# Patient Record
Sex: FEMALE | Race: WHITE | Employment: FULL TIME | URBAN - NONMETROPOLITAN AREA
[De-identification: names, ages, dates, MRNs, and addresses within clinical notes are randomized per-mention and may not be internally consistent; named-entity substitution may affect disease eponyms.]

---

## 2017-01-18 ENCOUNTER — TELEPHONE (OUTPATIENT)
Dept: NEUROSURGERY | Age: 42
End: 2017-01-18

## 2017-01-18 ENCOUNTER — TELEPHONE (OUTPATIENT)
Dept: NEUROLOGY | Age: 42
End: 2017-01-18

## 2017-02-02 ENCOUNTER — TRANSCRIBE ORDERS (OUTPATIENT)
Dept: ADMINISTRATIVE | Facility: HOSPITAL | Age: 42
End: 2017-02-02

## 2017-02-02 DIAGNOSIS — D47.2 MONOCLONAL PARAPROTEINEMIA: Primary | ICD-10-CM

## 2017-02-03 RX ORDER — LEVOCETIRIZINE DIHYDROCHLORIDE 5 MG/1
5 TABLET, FILM COATED ORAL NIGHTLY
Qty: 30 TABLET | Refills: 11 | Status: SHIPPED | OUTPATIENT
Start: 2017-02-03 | End: 2017-07-20

## 2017-02-07 ENCOUNTER — APPOINTMENT (OUTPATIENT)
Dept: GENERAL RADIOLOGY | Facility: HOSPITAL | Age: 42
End: 2017-02-07
Attending: INTERNAL MEDICINE

## 2017-02-09 ENCOUNTER — HOSPITAL ENCOUNTER (OUTPATIENT)
Dept: GENERAL RADIOLOGY | Facility: HOSPITAL | Age: 42
Discharge: HOME | End: 2017-02-09
Attending: INTERNAL MEDICINE | Admitting: INTERNAL MEDICINE

## 2017-02-09 DIAGNOSIS — D47.2 MONOCLONAL PARAPROTEINEMIA: ICD-10-CM

## 2017-02-09 PROCEDURE — 77075 RADEX OSSEOUS SURVEY COMPL: CPT

## 2017-03-31 DIAGNOSIS — M62.838 MUSCLE SPASM: ICD-10-CM

## 2017-03-31 RX ORDER — METHOCARBAMOL 500 MG/1
500 TABLET, FILM COATED ORAL 4 TIMES DAILY PRN
Qty: 120 TABLET | Refills: 0 | Status: SHIPPED | OUTPATIENT
Start: 2017-03-31 | End: 2018-05-22

## 2017-05-19 ENCOUNTER — TELEPHONE (OUTPATIENT)
Dept: PRIMARY CARE CLINIC | Age: 42
End: 2017-05-19

## 2017-07-18 DIAGNOSIS — E55.9 VITAMIN D DEFICIENCY: ICD-10-CM

## 2017-07-18 RX ORDER — CHOLECALCIFEROL (VITAMIN D3) 1250 MCG
50000 CAPSULE ORAL WEEKLY
Qty: 12 CAPSULE | Refills: 0 | Status: SHIPPED | OUTPATIENT
Start: 2017-07-18 | End: 2017-07-20

## 2017-07-20 ENCOUNTER — OFFICE VISIT (OUTPATIENT)
Dept: PRIMARY CARE CLINIC | Age: 42
End: 2017-07-20
Payer: COMMERCIAL

## 2017-07-20 VITALS
BODY MASS INDEX: 32.61 KG/M2 | OXYGEN SATURATION: 98 % | DIASTOLIC BLOOD PRESSURE: 98 MMHG | WEIGHT: 191 LBS | SYSTOLIC BLOOD PRESSURE: 136 MMHG | HEART RATE: 121 BPM | HEIGHT: 64 IN | TEMPERATURE: 98.1 F

## 2017-07-20 DIAGNOSIS — M79.10 MUSCLE PAIN: ICD-10-CM

## 2017-07-20 DIAGNOSIS — J06.9 UPPER RESPIRATORY TRACT INFECTION, UNSPECIFIED TYPE: ICD-10-CM

## 2017-07-20 DIAGNOSIS — G35 MULTIPLE SCLEROSIS (HCC): Primary | ICD-10-CM

## 2017-07-20 DIAGNOSIS — G89.4 CHRONIC PAIN SYNDROME: ICD-10-CM

## 2017-07-20 DIAGNOSIS — G47.00 INSOMNIA, UNSPECIFIED TYPE: ICD-10-CM

## 2017-07-20 PROCEDURE — 96372 THER/PROPH/DIAG INJ SC/IM: CPT | Performed by: NURSE PRACTITIONER

## 2017-07-20 PROCEDURE — 99214 OFFICE O/P EST MOD 30 MIN: CPT | Performed by: NURSE PRACTITIONER

## 2017-07-20 RX ORDER — HYDROXYZINE 50 MG/1
TABLET, FILM COATED ORAL
Qty: 30 TABLET | Refills: 11 | Status: SHIPPED | OUTPATIENT
Start: 2017-07-20 | End: 2018-05-31 | Stop reason: SDUPTHER

## 2017-07-20 RX ORDER — CLONAZEPAM 0.5 MG/1
0.5 TABLET ORAL NIGHTLY
COMMUNITY
End: 2018-05-22

## 2017-07-20 RX ORDER — BUSPIRONE HYDROCHLORIDE 10 MG/1
10 TABLET ORAL 3 TIMES DAILY PRN
Qty: 90 TABLET | Refills: 5 | Status: SHIPPED | OUTPATIENT
Start: 2017-07-20 | End: 2018-05-04 | Stop reason: SDUPTHER

## 2017-07-20 RX ORDER — LEVALBUTEROL 1.25 MG/.5ML
1 SOLUTION, CONCENTRATE RESPIRATORY (INHALATION) EVERY 8 HOURS PRN
COMMUNITY
End: 2017-09-15

## 2017-07-20 RX ORDER — DEXAMETHASONE SODIUM PHOSPHATE 4 MG/ML
8 INJECTION, SOLUTION INTRA-ARTICULAR; INTRALESIONAL; INTRAMUSCULAR; INTRAVENOUS; SOFT TISSUE ONCE
Status: COMPLETED | OUTPATIENT
Start: 2017-07-20 | End: 2017-07-20

## 2017-07-20 RX ORDER — ESCITALOPRAM OXALATE 20 MG/1
20 TABLET ORAL DAILY
Qty: 30 TABLET | Refills: 5 | Status: SHIPPED | OUTPATIENT
Start: 2017-07-20 | End: 2018-05-04 | Stop reason: SDUPTHER

## 2017-07-20 RX ORDER — TRAMADOL HYDROCHLORIDE 50 MG/1
50 TABLET ORAL EVERY 8 HOURS PRN
Qty: 90 TABLET | Refills: 0 | Status: SHIPPED | OUTPATIENT
Start: 2017-07-20 | End: 2017-09-15 | Stop reason: SDUPTHER

## 2017-07-20 RX ORDER — NORTRIPTYLINE HYDROCHLORIDE 25 MG/1
50 CAPSULE ORAL NIGHTLY
COMMUNITY
End: 2017-09-15 | Stop reason: ALTCHOICE

## 2017-07-20 RX ADMIN — DEXAMETHASONE SODIUM PHOSPHATE 8 MG: 4 INJECTION, SOLUTION INTRA-ARTICULAR; INTRALESIONAL; INTRAMUSCULAR; INTRAVENOUS; SOFT TISSUE at 17:44

## 2017-07-21 ASSESSMENT — ENCOUNTER SYMPTOMS
CONSTIPATION: 0
COUGH: 0
SORE THROAT: 0
SHORTNESS OF BREATH: 0
DIARRHEA: 0
ABDOMINAL PAIN: 0
EYE DISCHARGE: 0
VOMITING: 0
EYE PAIN: 0
WHEEZING: 0
NAUSEA: 0
SINUS PRESSURE: 0
VOICE CHANGE: 0
BACK PAIN: 1

## 2017-07-24 ENCOUNTER — TELEPHONE (OUTPATIENT)
Dept: PRIMARY CARE CLINIC | Age: 42
End: 2017-07-24

## 2017-07-31 ENCOUNTER — TELEPHONE (OUTPATIENT)
Dept: PRIMARY CARE CLINIC | Age: 42
End: 2017-07-31

## 2017-07-31 RX ORDER — LEVALBUTEROL 1.25 MG/.5ML
1 SOLUTION, CONCENTRATE RESPIRATORY (INHALATION) EVERY 8 HOURS PRN
Qty: 90 EACH | Refills: 0 | Status: CANCELLED | OUTPATIENT
Start: 2017-07-31

## 2017-08-01 RX ORDER — FLUTICASONE FUROATE AND VILANTEROL 200; 25 UG/1; UG/1
1 POWDER RESPIRATORY (INHALATION) DAILY
Qty: 1 EACH | Refills: 0 | Status: SHIPPED | OUTPATIENT
Start: 2017-08-01 | End: 2018-04-26 | Stop reason: SDUPTHER

## 2017-08-01 RX ORDER — METHYLPREDNISOLONE 4 MG/1
TABLET ORAL
Qty: 1 KIT | Refills: 0 | Status: SHIPPED | OUTPATIENT
Start: 2017-08-01 | End: 2017-08-07

## 2017-08-01 RX ORDER — AZITHROMYCIN 250 MG/1
TABLET, FILM COATED ORAL
Qty: 1 PACKET | Refills: 0 | Status: SHIPPED | OUTPATIENT
Start: 2017-08-01 | End: 2019-12-09 | Stop reason: SDUPTHER

## 2017-09-15 ENCOUNTER — OFFICE VISIT (OUTPATIENT)
Dept: PRIMARY CARE CLINIC | Age: 42
End: 2017-09-15
Payer: COMMERCIAL

## 2017-09-15 VITALS
HEART RATE: 102 BPM | SYSTOLIC BLOOD PRESSURE: 140 MMHG | BODY MASS INDEX: 32.05 KG/M2 | TEMPERATURE: 98.4 F | HEIGHT: 64 IN | DIASTOLIC BLOOD PRESSURE: 90 MMHG | OXYGEN SATURATION: 98 % | WEIGHT: 187.75 LBS

## 2017-09-15 DIAGNOSIS — G89.4 CHRONIC PAIN SYNDROME: ICD-10-CM

## 2017-09-15 DIAGNOSIS — G43.909 MIGRAINE WITHOUT STATUS MIGRAINOSUS, NOT INTRACTABLE, UNSPECIFIED MIGRAINE TYPE: ICD-10-CM

## 2017-09-15 DIAGNOSIS — G35 MULTIPLE SCLEROSIS (HCC): Primary | ICD-10-CM

## 2017-09-15 DIAGNOSIS — F32.A DEPRESSION, UNSPECIFIED DEPRESSION TYPE: ICD-10-CM

## 2017-09-15 DIAGNOSIS — M79.10 MUSCLE PAIN: ICD-10-CM

## 2017-09-15 PROCEDURE — 99214 OFFICE O/P EST MOD 30 MIN: CPT | Performed by: NURSE PRACTITIONER

## 2017-09-15 RX ORDER — LEVOCETIRIZINE DIHYDROCHLORIDE 5 MG/1
TABLET, FILM COATED ORAL
Refills: 11 | COMMUNITY
Start: 2017-08-22 | End: 2018-06-19

## 2017-09-15 RX ORDER — SUMATRIPTAN 100 MG/1
TABLET, FILM COATED ORAL
Qty: 27 TABLET | Refills: 2 | Status: SHIPPED | OUTPATIENT
Start: 2017-09-15 | End: 2022-04-14

## 2017-09-15 RX ORDER — TRAMADOL HYDROCHLORIDE 50 MG/1
50 TABLET ORAL EVERY 8 HOURS PRN
Qty: 90 TABLET | Refills: 0 | Status: SHIPPED | OUTPATIENT
Start: 2017-09-15 | End: 2017-10-15

## 2017-09-15 ASSESSMENT — ENCOUNTER SYMPTOMS
WHEEZING: 0
NAUSEA: 0
CONSTIPATION: 0
EYE DISCHARGE: 0
ABDOMINAL PAIN: 0
VOMITING: 0
SINUS PRESSURE: 0
SHORTNESS OF BREATH: 0
VOICE CHANGE: 0
DIARRHEA: 0
BACK PAIN: 1
EYE PAIN: 0
COUGH: 0
SORE THROAT: 0

## 2017-10-06 ENCOUNTER — TELEPHONE (OUTPATIENT)
Dept: PRIMARY CARE CLINIC | Age: 42
End: 2017-10-06

## 2017-10-06 NOTE — TELEPHONE ENCOUNTER
Tell her thank you.   I really needed to hear that today and I am SO HAPPY that her levels are better

## 2017-10-31 RX ORDER — TRAMADOL HYDROCHLORIDE 50 MG/1
TABLET ORAL
Qty: 90 TABLET | OUTPATIENT
Start: 2017-10-31

## 2017-11-02 ENCOUNTER — OFFICE VISIT (OUTPATIENT)
Dept: PRIMARY CARE CLINIC | Age: 42
End: 2017-11-02
Payer: COMMERCIAL

## 2017-11-02 VITALS
HEIGHT: 64 IN | HEART RATE: 90 BPM | TEMPERATURE: 98 F | DIASTOLIC BLOOD PRESSURE: 88 MMHG | WEIGHT: 187 LBS | BODY MASS INDEX: 31.92 KG/M2 | SYSTOLIC BLOOD PRESSURE: 126 MMHG | OXYGEN SATURATION: 97 %

## 2017-11-02 DIAGNOSIS — G89.4 CHRONIC PAIN SYNDROME: ICD-10-CM

## 2017-11-02 DIAGNOSIS — G89.29 CHRONIC NONINTRACTABLE HEADACHE, UNSPECIFIED HEADACHE TYPE: ICD-10-CM

## 2017-11-02 DIAGNOSIS — E55.9 VITAMIN D DEFICIENCY: ICD-10-CM

## 2017-11-02 DIAGNOSIS — M54.2 NECK PAIN: ICD-10-CM

## 2017-11-02 DIAGNOSIS — R51.9 CHRONIC NONINTRACTABLE HEADACHE, UNSPECIFIED HEADACHE TYPE: ICD-10-CM

## 2017-11-02 DIAGNOSIS — G89.29 CHRONIC MIDLINE LOW BACK PAIN, WITH SCIATICA PRESENCE UNSPECIFIED: ICD-10-CM

## 2017-11-02 DIAGNOSIS — G35 MULTIPLE SCLEROSIS (HCC): Primary | ICD-10-CM

## 2017-11-02 DIAGNOSIS — M62.838 MUSCLE SPASM: ICD-10-CM

## 2017-11-02 DIAGNOSIS — M54.5 CHRONIC MIDLINE LOW BACK PAIN, WITH SCIATICA PRESENCE UNSPECIFIED: ICD-10-CM

## 2017-11-02 PROCEDURE — 99214 OFFICE O/P EST MOD 30 MIN: CPT | Performed by: NURSE PRACTITIONER

## 2017-11-02 RX ORDER — CHOLECALCIFEROL (VITAMIN D3) 1250 MCG
50000 CAPSULE ORAL WEEKLY
Qty: 12 CAPSULE | Refills: 3 | Status: SHIPPED | OUTPATIENT
Start: 2017-11-02 | End: 2018-12-28 | Stop reason: SDUPTHER

## 2017-11-02 RX ORDER — BUTALBITAL, ACETAMINOPHEN AND CAFFEINE 50; 325; 40 MG/1; MG/1; MG/1
1 TABLET ORAL EVERY 6 HOURS PRN
Qty: 60 TABLET | Refills: 0 | Status: SHIPPED | OUTPATIENT
Start: 2017-11-02 | End: 2018-01-11

## 2017-11-02 RX ORDER — CHOLECALCIFEROL (VITAMIN D3) 1250 MCG
50000 CAPSULE ORAL WEEKLY
COMMUNITY
End: 2017-11-02 | Stop reason: SDUPTHER

## 2017-11-02 RX ORDER — TRAMADOL HYDROCHLORIDE 50 MG/1
50 TABLET ORAL EVERY 8 HOURS PRN
COMMUNITY
End: 2017-11-02 | Stop reason: SDUPTHER

## 2017-11-02 RX ORDER — TRAMADOL HYDROCHLORIDE 50 MG/1
50 TABLET ORAL EVERY 8 HOURS PRN
Qty: 90 TABLET | Refills: 0 | Status: SHIPPED | OUTPATIENT
Start: 2017-11-02 | End: 2018-01-11 | Stop reason: SDUPTHER

## 2017-11-02 ASSESSMENT — ENCOUNTER SYMPTOMS
WHEEZING: 0
EYE REDNESS: 0
SORE THROAT: 0
VOICE CHANGE: 0
COUGH: 0
NAUSEA: 0
TROUBLE SWALLOWING: 0
ABDOMINAL PAIN: 0
BACK PAIN: 1
VOMITING: 0
BLOOD IN STOOL: 0
EYE PAIN: 0
DIARRHEA: 0
CONSTIPATION: 0
SHORTNESS OF BREATH: 0
RHINORRHEA: 0
EYE DISCHARGE: 0
SINUS PRESSURE: 0

## 2017-11-02 NOTE — PROGRESS NOTES
nightly      hydrOXYzine (ATARAX) 50 MG tablet Take 1-2 tabs po nightly as needed for insomnia 30 tablet 11    busPIRone (BUSPAR) 10 MG tablet Take 1 tablet by mouth 3 times daily as needed (anxiety) 90 tablet 5    escitalopram (LEXAPRO) 20 MG tablet Take 1 tablet by mouth daily 30 tablet 5    methocarbamol (ROBAXIN) 500 MG tablet Take 1 tablet by mouth 4 times daily as needed (MUSCLE SPASMS) (Patient taking differently: Take 500 mg by mouth nightly ) 120 tablet 0    gabapentin (NEURONTIN) 300 MG capsule Take 300 mg by mouth 3 times daily 2 tabs tid      fenofibrate (TRICOR) 145 MG tablet TAKE ONE TABLET BY MOUTH DAILY 30 tablet 11    promethazine (PHENERGAN) 25 MG tablet TAKE ONE TABLET BY MOUTH EVERY 6 HOURS AS NEEDED FOR NAUSEA 30 tablet 0    COPAXONE 40 MG/ML SOSY       rizatriptan (MAXALT) 10 MG tablet Take 1 tablet by mouth once as needed for Migraine May repeat in 2 hours if needed x 1 in a 24 hour period 9 tablet 3     No current facility-administered medications for this visit. Allergies   Allergen Reactions    Ampicillin     Biaxin [Clarithromycin]     Flagyl [Metronidazole]     Albuterol Rash and Other (See Comments)     migraines       Health Maintenance   Topic Date Due    HIV screen  05/08/1990    DTaP/Tdap/Td vaccine (1 - Tdap) 05/08/1994    Diabetes screen  05/08/2015    Flu vaccine (1) 09/01/2017    Cervical cancer screen  12/03/2018    Lipid screen  05/17/2021        Subjective:      Review of Systems   Constitutional: Positive for fatigue. Negative for activity change, appetite change, fever and unexpected weight change. HENT: Negative for congestion, ear pain, postnasal drip, rhinorrhea, sinus pressure, sore throat, trouble swallowing and voice change. Eyes: Negative for pain, discharge and redness. Respiratory: Negative for cough, shortness of breath and wheezing. Cardiovascular: Negative for chest pain, palpitations and leg swelling.    Gastrointestinal: 32.10 kg/m²     Assessment:        ICD-10-CM ICD-9-CM    1. Multiple sclerosis (HCC) G35 340 traMADol (ULTRAM) 50 MG tablet   2. Muscle spasm M62.838 728.85 traMADol (ULTRAM) 50 MG tablet   3. Vitamin D deficiency E55.9 268.9 Cholecalciferol (VITAMIN D3) 94140 units CAPS   4. Chronic nonintractable headache, unspecified headache type R51 784.0 butalbital-acetaminophen-caffeine (FIORICET, ESGIC) -40 MG per tablet   5. Chronic midline low back pain, with sciatica presence unspecified M54.5 724.2     G89.29 338.29    6. Neck pain M54.2 723.1 traMADol (ULTRAM) 50 MG tablet   7. Chronic pain syndrome G89.4 338.4 traMADol (ULTRAM) 50 MG tablet       Plan:    will use esgic to help with rebound HAs related to imitrex and then alternate use of esgic and imitrex. Continue prn ultram for pain. Controlled Substances Monitoring:     Attestation: The Prescription Monitoring Report for this patient was reviewed today. ([id number]) ([address], APRN)  Documentation: Possible medication side effects, risk of tolerance and/or dependence, and alternative treatments discussed., No signs of potential drug abuse or diversion identified., Existing medication contract. Doris Narvaez, APRN)    3 months    No orders of the defined types were placed in this encounter. Orders Placed This Encounter   Medications    traMADol (ULTRAM) 50 MG tablet     Sig: Take 1 tablet by mouth every 8 hours as needed for Pain     Dispense:  90 tablet     Refill:  0    Cholecalciferol (VITAMIN D3) 70027 units CAPS     Sig: Take 50,000 Units by mouth once a week     Dispense:  12 capsule     Refill:  3    butalbital-acetaminophen-caffeine (FIORICET, ESGIC) -40 MG per tablet     Sig: Take 1 tablet by mouth every 6 hours as needed for Headaches     Dispense:  60 tablet     Refill:  0         Discussed use, benefit, and side effects of prescribed medications. All patient questions answered. Pt voiced understanding.  Reviewed health maintenance. .  Patient agreed with treatment plan. Follow up as directed. There are no Patient Instructions on file for this visit.       Electronically signed by CLINTON Lorenzo on 11/2/2017 at 4:16 PM

## 2017-11-13 ENCOUNTER — TELEPHONE (OUTPATIENT)
Dept: PRIMARY CARE CLINIC | Age: 42
End: 2017-11-13

## 2017-11-13 NOTE — TELEPHONE ENCOUNTER
[id number] Nara Shepherd  She is probably in a cycle of rebound headaches now. Recommend stopping all HA medications including imitrex, maxalt, esgic and ultram. Until the rebound cycle stops. She can call her neurologist and see if they think the same thing. I think she sees Dr. Misael Bruno locally and one in [place].

## 2017-11-14 NOTE — TELEPHONE ENCOUNTER
LEONEL with recommendations from Rosalva Moore. Asked her to callback with any questions or concerns.

## 2017-12-01 RX ORDER — FENOFIBRATE 145 MG/1
TABLET, COATED ORAL
Qty: 30 TABLET | Refills: 11 | Status: SHIPPED | OUTPATIENT
Start: 2017-12-01 | End: 2018-11-05 | Stop reason: SDUPTHER

## 2018-01-05 DIAGNOSIS — M54.2 NECK PAIN: ICD-10-CM

## 2018-01-05 DIAGNOSIS — M62.838 MUSCLE SPASM: ICD-10-CM

## 2018-01-05 DIAGNOSIS — G35 MULTIPLE SCLEROSIS (HCC): ICD-10-CM

## 2018-01-05 DIAGNOSIS — G89.4 CHRONIC PAIN SYNDROME: ICD-10-CM

## 2018-01-05 RX ORDER — TRAMADOL HYDROCHLORIDE 50 MG/1
TABLET ORAL
Qty: 90 TABLET | OUTPATIENT
Start: 2018-01-05

## 2018-01-11 ENCOUNTER — OFFICE VISIT (OUTPATIENT)
Dept: PRIMARY CARE CLINIC | Age: 43
End: 2018-01-11
Payer: COMMERCIAL

## 2018-01-11 VITALS
HEART RATE: 86 BPM | SYSTOLIC BLOOD PRESSURE: 124 MMHG | TEMPERATURE: 98.3 F | DIASTOLIC BLOOD PRESSURE: 86 MMHG | WEIGHT: 190 LBS | OXYGEN SATURATION: 98 % | HEIGHT: 64 IN | BODY MASS INDEX: 32.44 KG/M2

## 2018-01-11 DIAGNOSIS — M54.2 NECK PAIN: ICD-10-CM

## 2018-01-11 DIAGNOSIS — G35 MULTIPLE SCLEROSIS (HCC): ICD-10-CM

## 2018-01-11 DIAGNOSIS — G89.4 CHRONIC PAIN SYNDROME: ICD-10-CM

## 2018-01-11 DIAGNOSIS — M62.838 MUSCLE SPASM: ICD-10-CM

## 2018-01-11 PROCEDURE — 99213 OFFICE O/P EST LOW 20 MIN: CPT | Performed by: NURSE PRACTITIONER

## 2018-01-11 RX ORDER — PROPRANOLOL HYDROCHLORIDE 120 MG/1
120 CAPSULE, EXTENDED RELEASE ORAL DAILY
Refills: 2 | COMMUNITY
Start: 2017-12-06 | End: 2019-04-22 | Stop reason: DRUGHIGH

## 2018-01-11 RX ORDER — TRAMADOL HYDROCHLORIDE 50 MG/1
50 TABLET ORAL EVERY 8 HOURS PRN
Qty: 90 TABLET | Refills: 0 | Status: SHIPPED | OUTPATIENT
Start: 2018-01-11 | End: 2018-02-10

## 2018-01-11 ASSESSMENT — ENCOUNTER SYMPTOMS
VOICE CHANGE: 0
BLOOD IN STOOL: 0
SORE THROAT: 0
NAUSEA: 0
COUGH: 0
VOMITING: 0
RHINORRHEA: 0
EYE REDNESS: 0
BACK PAIN: 1
ABDOMINAL PAIN: 0
TROUBLE SWALLOWING: 0
DIARRHEA: 0
SHORTNESS OF BREATH: 0
EYE PAIN: 0
EYE DISCHARGE: 0
SINUS PRESSURE: 0
CONSTIPATION: 0
WHEEZING: 0

## 2018-01-11 NOTE — PROGRESS NOTES
[address]  [place], [address] [place] [address] Rd  Phone [phone]   Fax [phone]      OFFICE VISIT: 1/11/2018    Fiona Nogueira is a 43 y.o. female who presents today for her medical conditions/complaints as noted below. Fiona Nogueira is c/o of Medication Refill        HPI:     HPI  Patient reports overall she is doing well. Mostly takes her ultram at night to help with the pain . States this is helping her sleep because she is able to rest from the pain. Past Medical History:   Diagnosis Date    Headache(784.0)     Insomnia     Multiple sclerosis ([address]       Past Surgical History:   Procedure Laterality Date    CHOLECYSTECTOMY         No family history on file. Social History   Substance Use Topics    Smoking status: Never Smoker    Smokeless tobacco: Never Used    Alcohol use No      Current Outpatient Prescriptions   Medication Sig Dispense Refill    traMADol (ULTRAM) 50 MG tablet Take 1 tablet by mouth every 8 hours as needed for Pain for up to 30 days.  90 tablet 0    fenofibrate (TRICOR) 145 MG tablet TAKE ONE TABLET BY MOUTH DAILY 30 tablet 11    Cholecalciferol (VITAMIN D3) 82269 units CAPS Take 50,000 Units by mouth once a week 12 capsule 3    levocetirizine (XYZAL) 5 MG tablet TAKE ONE TABLET BY MOUTH AT BEDTIME  11    SUMAtriptan (IMITREX) 100 MG tablet TAKE ONE TABLET BY MOUTH DAILY AS NEEDED FOR MIGRAINE 27 tablet 2    Fluticasone Furoate-Vilanterol 200-25 MCG/INH AEPB Inhale 1 puff into the lungs daily 1 each 0    clonazePAM (KLONOPIN) 0.5 MG tablet Take 0.5 mg by mouth nightly      hydrOXYzine (ATARAX) 50 MG tablet Take 1-2 tabs po nightly as needed for insomnia 30 tablet 11    busPIRone (BUSPAR) 10 MG tablet Take 1 tablet by mouth 3 times daily as needed (anxiety) 90 tablet 5    escitalopram (LEXAPRO) 20 MG tablet Take 1 tablet by mouth daily 30 tablet 5    methocarbamol (ROBAXIN) 500 MG tablet Take 1 tablet by mouth 4 times daily as needed (MUSCLE SPASMS) (Patient taking headaches. Hematological: Negative for adenopathy. Psychiatric/Behavioral: Positive for dysphoric mood (improved) and sleep disturbance (improved). Negative for self-injury and suicidal ideas. The patient is nervous/anxious (improved). Objective:     Physical Exam   Constitutional: She is oriented to person, place, and time. She appears well-developed and well-nourished. HENT:   Head: Normocephalic. Right Ear: Tympanic membrane normal.   Left Ear: Tympanic membrane normal.   Nose: Nose normal.   Mouth/Throat: Oropharynx is clear and moist.   Eyes: Conjunctivae are normal. Pupils are equal, round, and reactive to light. Neck: Normal range of motion. Neck supple. Carotid bruit is not present. Cardiovascular: Normal rate, regular rhythm and normal heart sounds. No murmur heard. Pulmonary/Chest: Effort normal and breath sounds normal. No respiratory distress. She has no wheezes. She has no rales. Abdominal: Soft. Bowel sounds are normal. There is no tenderness. Musculoskeletal: Normal range of motion. Lumbar back: She exhibits tenderness, pain and spasm. Generalized weakness  walking with vogt   Neurological: She is alert and oriented to person, place, and time. She has normal reflexes. Skin: Skin is warm and dry. Psychiatric: She has a normal mood and affect. Her behavior is normal. Judgment and thought content normal.   Vitals reviewed. /86   Pulse 86   Temp 98.3 °F (36.8 °C) (Temporal)   Ht 5' 4\" (1.626 m)   Wt 190 lb (86.2 kg)   LMP 01/11/2018   SpO2 98%   BMI 32.61 kg/m²     Assessment:        ICD-10-CM ICD-9-CM    1. Neck pain M54.2 723.1 traMADol (ULTRAM) 50 MG tablet   2. Chronic pain syndrome G89.4 338.4 traMADol (ULTRAM) 50 MG tablet   3. Multiple sclerosis (HCC) G35 340 traMADol (ULTRAM) 50 MG tablet   4.  Muscle spasm M62.838 728.85 traMADol (ULTRAM) 50 MG tablet       Plan:    Controlled Substances Monitoring:     Attestation: The Prescription

## 2018-02-19 RX ORDER — LEVOCETIRIZINE DIHYDROCHLORIDE 5 MG/1
5 TABLET, FILM COATED ORAL NIGHTLY
Qty: 30 TABLET | Refills: 5 | Status: SHIPPED | OUTPATIENT
Start: 2018-02-19 | End: 2018-09-24 | Stop reason: SDUPTHER

## 2018-03-02 RX ORDER — LEVALBUTEROL 1.25 MG/.5ML
1 SOLUTION, CONCENTRATE RESPIRATORY (INHALATION) EVERY 8 HOURS PRN
Qty: 90 EACH | Refills: 1 | Status: SHIPPED | OUTPATIENT
Start: 2018-03-02 | End: 2019-10-04

## 2018-03-27 ENCOUNTER — OFFICE VISIT (OUTPATIENT)
Dept: PRIMARY CARE CLINIC | Age: 43
End: 2018-03-27
Payer: COMMERCIAL

## 2018-03-27 VITALS
BODY MASS INDEX: 29.62 KG/M2 | SYSTOLIC BLOOD PRESSURE: 108 MMHG | WEIGHT: 173.5 LBS | DIASTOLIC BLOOD PRESSURE: 85 MMHG | OXYGEN SATURATION: 97 % | HEIGHT: 64 IN | HEART RATE: 97 BPM | TEMPERATURE: 98.1 F

## 2018-03-27 DIAGNOSIS — G89.4 CHRONIC PAIN SYNDROME: Primary | ICD-10-CM

## 2018-03-27 DIAGNOSIS — M54.2 NECK PAIN: ICD-10-CM

## 2018-03-27 PROCEDURE — 99213 OFFICE O/P EST LOW 20 MIN: CPT | Performed by: NURSE PRACTITIONER

## 2018-03-27 RX ORDER — TRAMADOL HYDROCHLORIDE 50 MG/1
50 TABLET ORAL EVERY 8 HOURS PRN
Qty: 90 TABLET | Refills: 0 | Status: SHIPPED | OUTPATIENT
Start: 2018-03-27 | End: 2018-04-26

## 2018-03-27 RX ORDER — TRAMADOL HYDROCHLORIDE 50 MG/1
50 TABLET ORAL EVERY 6 HOURS PRN
COMMUNITY
End: 2018-03-27 | Stop reason: SDUPTHER

## 2018-03-27 ASSESSMENT — ENCOUNTER SYMPTOMS
VOMITING: 0
SINUS PRESSURE: 0
SHORTNESS OF BREATH: 0
BACK PAIN: 1
TROUBLE SWALLOWING: 0
COUGH: 0
NAUSEA: 0
RHINORRHEA: 0
DIARRHEA: 0
CONSTIPATION: 0
SORE THROAT: 0
ABDOMINAL PAIN: 0

## 2018-03-27 NOTE — PATIENT INSTRUCTIONS
Patient Education        Chronic Pain: Care Instructions  Your Care Instructions    Chronic pain is pain that lasts a long time (months or even years) and may or may not have a clear cause. It is different from acute pain, which usually does have a clear cause-like an injury or illness-and gets better over time. Chronic pain:  · Lasts over time but may vary from day to day. · Does not go away despite efforts to end it. · May disrupt your sleep and lead to fatigue. · May cause depression or anxiety. · May make your muscles tense, causing more pain. · Can disrupt your work, hobbies, home life, and relationships with friends and family. Chronic pain is a very real condition. It is not just in your head. Treatment can help and usually includes several methods used together, such as medicines, physical therapy, exercise, and other treatments. Learning how to relax and changing negative thought patterns can also help you cope. Chronic pain is complex. Taking an active role in your treatment will help you better manage your pain. Tell your doctor if you have trouble dealing with your pain. You may have to try several things before you find what works best for you. Follow-up care is a key part of your treatment and safety. Be sure to make and go to all appointments, and call your doctor if you are having problems. It's also a good idea to know your test results and keep a list of the medicines you take. How can you care for yourself at home? · Pace yourself. Break up large jobs into smaller tasks. Save harder tasks for days when you have less pain, or go back and forth between hard tasks and easier ones. Take rest breaks. · Relax, and reduce stress. Relaxation techniques such as deep breathing or meditation can help. · Keep moving. Gentle, daily exercise can help reduce pain over the long run. Try low- or no-impact exercises such as walking, swimming, and stationary biking.  Do stretches to stay

## 2018-03-27 NOTE — PROGRESS NOTES
ONE TABLET BY MOUTH AT BEDTIME Yes Historical Provider, MD   SUMAtriptan (IMITREX) 100 MG tablet TAKE ONE TABLET BY MOUTH DAILY AS NEEDED FOR MIGRAINE Yes CLINTON Valentine   Fluticasone Furoate-Vilanterol 200-25 MCG/INH AEPB Inhale 1 puff into the lungs daily Yes CLINTON Valentine   clonazePAM (KLONOPIN) 0.5 MG tablet Take 0.5 mg by mouth nightly Yes Historical Provider, MD   hydrOXYzine (ATARAX) 50 MG tablet Take 1-2 tabs po nightly as needed for insomnia Yes CLINTON Valentine   busPIRone (BUSPAR) 10 MG tablet Take 1 tablet by mouth 3 times daily as needed (anxiety) Yes CLINTON Valentine   escitalopram (LEXAPRO) 20 MG tablet Take 1 tablet by mouth daily Yes CLINTON Valentine   methocarbamol (ROBAXIN) 500 MG tablet Take 1 tablet by mouth 4 times daily as needed (MUSCLE SPASMS)  Patient taking differently: Take 500 mg by mouth nightly  Yes MARJ Gurrola DO   gabapentin (NEURONTIN) 300 MG capsule Take 300 mg by mouth 3 times daily 2 tabs tid Yes Historical Provider, MD   promethazine (PHENERGAN) 25 MG tablet TAKE ONE TABLET BY MOUTH EVERY 6 HOURS AS NEEDED FOR NAUSEA Yes CLINTON Valentine   rizatriptan (MAXALT) 10 MG tablet Take 1 tablet by mouth once as needed for Migraine May repeat in 2 hours if needed x 1 in a 24 hour period Yes Antonio Matos CLINTON Narvaez   COPAXONE 40 MG/ML SOSY  Yes Historical Provider, MD       Allergies: Ampicillin; Biaxin [clarithromycin]; Flagyl [metronidazole]; and Albuterol    Past Medical History:   Diagnosis Date    Headache(784.0)     Insomnia     Multiple sclerosis ([facility]        Past Surgical History:   Procedure Laterality Date    CHOLECYSTECTOMY         Social History   Substance Use Topics    Smoking status: Never Smoker    Smokeless tobacco: Never Used    Alcohol use No       Review of Systems   Constitutional: Negative for activity change, appetite change, fatigue, fever and unexpected weight change.    HENT: Negative for congestion, hearing loss, rhinorrhea, sinus pressure, sore throat and trouble swallowing. Eyes: Negative for visual disturbance. Respiratory: Negative for cough and shortness of breath. Cardiovascular: Negative for chest pain, palpitations and leg swelling. Gastrointestinal: Negative for abdominal pain, constipation, diarrhea, nausea and vomiting. Endocrine: Negative for cold intolerance and heat intolerance. Genitourinary: Negative for flank pain, menstrual problem, pelvic pain, urgency and vaginal discharge. Musculoskeletal: Positive for back pain and neck pain. Negative for arthralgias. Skin: Negative for rash. Neurological: Negative for headaches. Psychiatric/Behavioral: Negative for dysphoric mood and sleep disturbance. The patient is not nervous/anxious. Physical Exam   Constitutional: She is oriented to person, place, and time. She appears well-developed and well-nourished. HENT:   Head: Normocephalic and atraumatic. Mouth/Throat: Mucous membranes are normal.   Eyes: No scleral icterus. Neck: Normal range of motion. Neck supple. Cardiovascular: Normal rate, regular rhythm, normal heart sounds and intact distal pulses. No murmur heard. Pulmonary/Chest: Effort normal and breath sounds normal.   Abdominal: Soft. Bowel sounds are normal. She exhibits no distension and no mass. There is no tenderness. There is no rebound and no guarding. Musculoskeletal: Normal range of motion. She exhibits no edema. Generalized tenderness to back to light palpation. Neurological: She is alert and oriented to person, place, and time. Gait (with cane) abnormal.   Skin: Skin is warm, dry and intact. No lesion and no rash noted. Psychiatric: She has a normal mood and affect. Her speech is normal and behavior is normal. Judgment and thought content normal.   Vitals reviewed. ASSESSMENT      ICD-10-CM ICD-9-CM    1. Chronic pain syndrome G89.4 338.4 traMADol (ULTRAM) 50 MG tablet   2.  Neck pain M54.2

## 2018-04-26 RX ORDER — FLUTICASONE FUROATE AND VILANTEROL 200; 25 UG/1; UG/1
1 POWDER RESPIRATORY (INHALATION) DAILY
Qty: 60 EACH | Refills: 11 | Status: SHIPPED | OUTPATIENT
Start: 2018-04-26 | End: 2019-10-24 | Stop reason: SDUPTHER

## 2018-05-04 RX ORDER — BUSPIRONE HYDROCHLORIDE 10 MG/1
10 TABLET ORAL 3 TIMES DAILY PRN
Qty: 90 TABLET | Refills: 5 | Status: SHIPPED | OUTPATIENT
Start: 2018-05-04 | End: 2018-05-22 | Stop reason: SDUPTHER

## 2018-05-04 RX ORDER — ESCITALOPRAM OXALATE 20 MG/1
20 TABLET ORAL DAILY
Qty: 30 TABLET | Refills: 11 | Status: SHIPPED | OUTPATIENT
Start: 2018-05-04 | End: 2018-05-22

## 2018-05-10 RX ORDER — BUSPIRONE HYDROCHLORIDE 10 MG/1
TABLET ORAL
Qty: 90 TABLET | OUTPATIENT
Start: 2018-05-10

## 2018-05-17 DIAGNOSIS — G89.4 CHRONIC PAIN SYNDROME: ICD-10-CM

## 2018-05-17 DIAGNOSIS — M54.2 NECK PAIN: ICD-10-CM

## 2018-05-17 RX ORDER — TRAMADOL HYDROCHLORIDE 50 MG/1
TABLET ORAL
Qty: 90 TABLET | OUTPATIENT
Start: 2018-05-17

## 2018-05-22 ENCOUNTER — OFFICE VISIT (OUTPATIENT)
Dept: PRIMARY CARE CLINIC | Age: 43
End: 2018-05-22
Payer: COMMERCIAL

## 2018-05-22 VITALS
DIASTOLIC BLOOD PRESSURE: 76 MMHG | OXYGEN SATURATION: 98 % | TEMPERATURE: 98.5 F | SYSTOLIC BLOOD PRESSURE: 110 MMHG | HEIGHT: 64 IN | BODY MASS INDEX: 31.92 KG/M2 | HEART RATE: 81 BPM | WEIGHT: 187 LBS

## 2018-05-22 DIAGNOSIS — Z13.31 POSITIVE DEPRESSION SCREENING: ICD-10-CM

## 2018-05-22 DIAGNOSIS — E78.1 HYPERTRIGLYCERIDEMIA: ICD-10-CM

## 2018-05-22 DIAGNOSIS — F41.1 GAD (GENERALIZED ANXIETY DISORDER): ICD-10-CM

## 2018-05-22 DIAGNOSIS — G35 MULTIPLE SCLEROSIS (HCC): ICD-10-CM

## 2018-05-22 DIAGNOSIS — M79.10 MYALGIA: Primary | ICD-10-CM

## 2018-05-22 DIAGNOSIS — G89.29 CHRONIC NECK PAIN: ICD-10-CM

## 2018-05-22 DIAGNOSIS — F33.1 MODERATE EPISODE OF RECURRENT MAJOR DEPRESSIVE DISORDER (HCC): ICD-10-CM

## 2018-05-22 DIAGNOSIS — M54.2 CHRONIC NECK PAIN: ICD-10-CM

## 2018-05-22 PROCEDURE — G8431 POS CLIN DEPRES SCRN F/U DOC: HCPCS | Performed by: NURSE PRACTITIONER

## 2018-05-22 PROCEDURE — 99214 OFFICE O/P EST MOD 30 MIN: CPT | Performed by: NURSE PRACTITIONER

## 2018-05-22 RX ORDER — TRAMADOL HYDROCHLORIDE 50 MG/1
50 TABLET ORAL EVERY 8 HOURS PRN
Qty: 90 TABLET | Refills: 0 | Status: SHIPPED | OUTPATIENT
Start: 2018-05-22 | End: 2018-06-19 | Stop reason: SDUPTHER

## 2018-05-22 RX ORDER — VILAZODONE HYDROCHLORIDE 20 MG/1
20 TABLET ORAL DAILY
Qty: 30 TABLET | Refills: 5 | Status: SHIPPED | OUTPATIENT
Start: 2018-05-22 | End: 2018-11-05 | Stop reason: SDUPTHER

## 2018-05-22 RX ORDER — BUSPIRONE HYDROCHLORIDE 10 MG/1
10 TABLET ORAL 3 TIMES DAILY PRN
Qty: 90 TABLET | Refills: 5 | Status: SHIPPED | OUTPATIENT
Start: 2018-05-22 | End: 2018-12-14

## 2018-05-22 RX ORDER — TRAMADOL HYDROCHLORIDE 50 MG/1
50 TABLET ORAL EVERY 6 HOURS PRN
COMMUNITY
End: 2018-05-22 | Stop reason: SDUPTHER

## 2018-05-22 ASSESSMENT — PATIENT HEALTH QUESTIONNAIRE - PHQ9
SUM OF ALL RESPONSES TO PHQ QUESTIONS 1-9: 15
3. TROUBLE FALLING OR STAYING ASLEEP: 3
8. MOVING OR SPEAKING SO SLOWLY THAT OTHER PEOPLE COULD HAVE NOTICED. OR THE OPPOSITE, BEING SO FIGETY OR RESTLESS THAT YOU HAVE BEEN MOVING AROUND A LOT MORE THAN USUAL: 0
10. IF YOU CHECKED OFF ANY PROBLEMS, HOW DIFFICULT HAVE THESE PROBLEMS MADE IT FOR YOU TO DO YOUR WORK, TAKE CARE OF THINGS AT HOME, OR GET ALONG WITH OTHER PEOPLE: 1
7. TROUBLE CONCENTRATING ON THINGS, SUCH AS READING THE NEWSPAPER OR WATCHING TELEVISION: 0
9. THOUGHTS THAT YOU WOULD BE BETTER OFF DEAD, OR OF HURTING YOURSELF: 0
4. FEELING TIRED OR HAVING LITTLE ENERGY: 3
5. POOR APPETITE OR OVEREATING: 0
1. LITTLE INTEREST OR PLEASURE IN DOING THINGS: 3
2. FEELING DOWN, DEPRESSED OR HOPELESS: 3
SUM OF ALL RESPONSES TO PHQ9 QUESTIONS 1 & 2: 6
6. FEELING BAD ABOUT YOURSELF - OR THAT YOU ARE A FAILURE OR HAVE LET YOURSELF OR YOUR FAMILY DOWN: 3

## 2018-05-25 PROBLEM — F33.1 MODERATE EPISODE OF RECURRENT MAJOR DEPRESSIVE DISORDER (HCC): Status: ACTIVE | Noted: 2018-05-25

## 2018-05-25 ASSESSMENT — ENCOUNTER SYMPTOMS
SORE THROAT: 0
COUGH: 0
SHORTNESS OF BREATH: 0
TROUBLE SWALLOWING: 0
SINUS PRESSURE: 0
RHINORRHEA: 0
VOMITING: 0
NAUSEA: 0
DIARRHEA: 0
CONSTIPATION: 0
BACK PAIN: 1
ABDOMINAL PAIN: 0

## 2018-05-31 DIAGNOSIS — G47.00 INSOMNIA, UNSPECIFIED TYPE: ICD-10-CM

## 2018-05-31 RX ORDER — HYDROXYZINE 50 MG/1
TABLET, FILM COATED ORAL
Qty: 30 TABLET | Refills: 11 | Status: SHIPPED | OUTPATIENT
Start: 2018-05-31 | End: 2018-11-15

## 2018-06-12 ENCOUNTER — TELEPHONE (OUTPATIENT)
Dept: PRIMARY CARE CLINIC | Age: 43
End: 2018-06-12

## 2018-06-12 RX ORDER — PROMETHAZINE HYDROCHLORIDE 25 MG/1
25 SUPPOSITORY RECTAL EVERY 6 HOURS PRN
Qty: 15 SUPPOSITORY | Refills: 0 | Status: SHIPPED | OUTPATIENT
Start: 2018-06-12 | End: 2018-06-19

## 2018-06-19 ENCOUNTER — OFFICE VISIT (OUTPATIENT)
Dept: PRIMARY CARE CLINIC | Age: 43
End: 2018-06-19
Payer: COMMERCIAL

## 2018-06-19 VITALS
DIASTOLIC BLOOD PRESSURE: 80 MMHG | HEIGHT: 64 IN | SYSTOLIC BLOOD PRESSURE: 106 MMHG | HEART RATE: 76 BPM | WEIGHT: 188 LBS | BODY MASS INDEX: 32.1 KG/M2 | OXYGEN SATURATION: 98 % | TEMPERATURE: 97.3 F

## 2018-06-19 DIAGNOSIS — F33.1 MODERATE EPISODE OF RECURRENT MAJOR DEPRESSIVE DISORDER (HCC): Primary | ICD-10-CM

## 2018-06-19 DIAGNOSIS — Z12.39 SCREENING FOR BREAST CANCER: ICD-10-CM

## 2018-06-19 DIAGNOSIS — M79.10 MYALGIA: ICD-10-CM

## 2018-06-19 DIAGNOSIS — M54.2 CHRONIC NECK PAIN: ICD-10-CM

## 2018-06-19 DIAGNOSIS — G35 MULTIPLE SCLEROSIS (HCC): ICD-10-CM

## 2018-06-19 DIAGNOSIS — G89.29 CHRONIC NECK PAIN: ICD-10-CM

## 2018-06-19 PROCEDURE — 99214 OFFICE O/P EST MOD 30 MIN: CPT | Performed by: NURSE PRACTITIONER

## 2018-06-19 RX ORDER — TRAMADOL HYDROCHLORIDE 50 MG/1
50 TABLET ORAL EVERY 8 HOURS PRN
Qty: 90 TABLET | Refills: 0 | Status: SHIPPED | OUTPATIENT
Start: 2018-06-19 | End: 2018-08-06 | Stop reason: SDUPTHER

## 2018-06-19 ASSESSMENT — ENCOUNTER SYMPTOMS
CONSTIPATION: 0
SINUS PRESSURE: 0
SHORTNESS OF BREATH: 0
TROUBLE SWALLOWING: 0
RHINORRHEA: 0
NAUSEA: 0
DIARRHEA: 0
SORE THROAT: 0
BACK PAIN: 1
ABDOMINAL PAIN: 0
COUGH: 0
VOMITING: 0

## 2018-06-25 ENCOUNTER — TELEPHONE (OUTPATIENT)
Dept: PRIMARY CARE CLINIC | Age: 43
End: 2018-06-25

## 2018-06-29 ENCOUNTER — TELEPHONE (OUTPATIENT)
Dept: PRIMARY CARE CLINIC | Age: 43
End: 2018-06-29

## 2018-07-03 ENCOUNTER — HOSPITAL ENCOUNTER (OUTPATIENT)
Dept: WOMENS IMAGING | Age: 43
Discharge: HOME | End: 2018-07-03
Payer: COMMERCIAL

## 2018-07-03 DIAGNOSIS — Z12.39 SCREENING FOR BREAST CANCER: ICD-10-CM

## 2018-07-03 PROCEDURE — 77063 BREAST TOMOSYNTHESIS BI: CPT

## 2018-07-12 ENCOUNTER — TELEPHONE (OUTPATIENT)
Dept: PRIMARY CARE CLINIC | Age: 43
End: 2018-07-12

## 2018-08-06 ENCOUNTER — OFFICE VISIT (OUTPATIENT)
Dept: PRIMARY CARE CLINIC | Age: 43
End: 2018-08-06
Payer: COMMERCIAL

## 2018-08-06 VITALS
BODY MASS INDEX: 32.43 KG/M2 | HEIGHT: 63 IN | OXYGEN SATURATION: 98 % | SYSTOLIC BLOOD PRESSURE: 100 MMHG | HEART RATE: 61 BPM | DIASTOLIC BLOOD PRESSURE: 68 MMHG | TEMPERATURE: 98 F | WEIGHT: 183 LBS

## 2018-08-06 DIAGNOSIS — G89.29 CHRONIC NECK PAIN: ICD-10-CM

## 2018-08-06 DIAGNOSIS — M79.10 MYALGIA: ICD-10-CM

## 2018-08-06 DIAGNOSIS — M54.2 CHRONIC NECK PAIN: ICD-10-CM

## 2018-08-06 DIAGNOSIS — G35 MULTIPLE SCLEROSIS (HCC): ICD-10-CM

## 2018-08-06 DIAGNOSIS — F51.01 PRIMARY INSOMNIA: Primary | ICD-10-CM

## 2018-08-06 PROCEDURE — 99213 OFFICE O/P EST LOW 20 MIN: CPT | Performed by: NURSE PRACTITIONER

## 2018-08-06 RX ORDER — TRAMADOL HYDROCHLORIDE 50 MG/1
50 TABLET ORAL EVERY 8 HOURS PRN
Qty: 90 TABLET | Refills: 0 | Status: SHIPPED | OUTPATIENT
Start: 2018-08-06 | End: 2018-09-06 | Stop reason: SDUPTHER

## 2018-08-06 ASSESSMENT — ENCOUNTER SYMPTOMS
SINUS PRESSURE: 0
BACK PAIN: 1
CONSTIPATION: 0
RHINORRHEA: 0
ABDOMINAL PAIN: 0
DIARRHEA: 0
SORE THROAT: 0
SHORTNESS OF BREATH: 0
COUGH: 0
NAUSEA: 0
VOMITING: 0
TROUBLE SWALLOWING: 0

## 2018-08-06 NOTE — PROGRESS NOTES
[address]  [place], [address] [place] [address] Rd  Phone [phone]   Fax [phone]      OFFICE VISIT: 8/6/2018    Faby Peña is a 37 y.o. female who presents today for her medical conditions/complaints as noted below. Faby Peña is c/o of 3 Month Follow-Up; Medication Refill (tramadol); and Insomnia (taking the atarax is not helping )        HPI:     HPI  Chronic pain   Has MS has chronic pain related to this. Takes 1-2 ultram per day. Chronic neck pain  MRI scanned in media  She is needing a refill on this  She has only been taking this with a muscle relaxer at bed time. But with her walking 6-8 miles per week now, her pain has been increased. Due to this, she has not been sleeping as well. Past Medical History:   Diagnosis Date    Headache(784.0)     Insomnia     Multiple sclerosis ([address]       Past Surgical History:   Procedure Laterality Date    CHOLECYSTECTOMY         No family history on file. Social History   Substance Use Topics    Smoking status: Never Smoker    Smokeless tobacco: Never Used    Alcohol use No      Current Outpatient Prescriptions   Medication Sig Dispense Refill    traMADol (ULTRAM) 50 MG tablet Take 1 tablet by mouth every 8 hours as needed for Pain for up to 30 days. . 90 tablet 0    hydrOXYzine (ATARAX) 50 MG tablet TAKE 1 TO 2 TABLETS BY MOUTH NIGHTLY AS NEEDED FOR INSOMNIA 30 tablet 11    vilazodone HCl (VILAZODONE HCL) 20 MG TABS Take 1 tablet by mouth daily 30 tablet 5    busPIRone (BUSPAR) 10 MG tablet Take 1 tablet by mouth 3 times daily as needed (anxiety) 90 tablet 5    Fluticasone Furoate-Vilanterol (BREO ELLIPTA) 200-25 MCG/INH AEPB Take 1 puff by mouth daily 60 each 11    levalbuterol (XOPENEX) 1.25 MG/0.5ML nebulizer solution Take 0.5 mLs by nebulization every 8 hours as needed for Wheezing 90 each 1    levocetirizine (XYZAL) 5 MG tablet Take 1 tablet by mouth nightly 30 tablet 5    propranolol (INDERAL LA) 120 MG extended release capsule Take 2. Myalgia M79.1 729.1 traMADol (ULTRAM) 50 MG tablet   3. Chronic neck pain M54.2 723.1 traMADol (ULTRAM) 50 MG tablet    G89.29 338.29        Plan:    continue to work on weight loss and exercise. Discussed taking ultram and muscle relaxer when she gets home from work instead of waiting until bedtime and pain is out of control at that point. I think this will help her with her sleep. If not then consider lunesta or restoril. Yudy Thomas made her drowsy when she woke up and belsomra didn't work for her. Return in about 2 months (around 10/6/2018). No orders of the defined types were placed in this encounter. Orders Placed This Encounter   Medications    traMADol (ULTRAM) 50 MG tablet     Sig: Take 1 tablet by mouth every 8 hours as needed for Pain for up to 30 days. .     Dispense:  90 tablet     Refill:  0         Discussed use, benefit, and side effects of prescribed medications. All patient questions answered. Pt voiced understanding. Reviewed health maintenance. .  Patient agreed with treatment plan. Follow up as directed. There are no Patient Instructions on file for this visit.       Electronically signed by CLINTON Chase on 8/6/2018 at 2:24 PM

## 2018-09-06 ENCOUNTER — OFFICE VISIT (OUTPATIENT)
Dept: PRIMARY CARE CLINIC | Age: 43
End: 2018-09-06
Payer: COMMERCIAL

## 2018-09-06 VITALS
SYSTOLIC BLOOD PRESSURE: 126 MMHG | TEMPERATURE: 98 F | OXYGEN SATURATION: 98 % | BODY MASS INDEX: 31.07 KG/M2 | HEIGHT: 64 IN | DIASTOLIC BLOOD PRESSURE: 84 MMHG | HEART RATE: 76 BPM | WEIGHT: 182 LBS

## 2018-09-06 DIAGNOSIS — M54.2 CHRONIC NECK PAIN: ICD-10-CM

## 2018-09-06 DIAGNOSIS — H83.03 LABYRINTHITIS OF BOTH EARS: ICD-10-CM

## 2018-09-06 DIAGNOSIS — G47.19 EXCESSIVE DAYTIME SLEEPINESS: Primary | ICD-10-CM

## 2018-09-06 DIAGNOSIS — G35 MULTIPLE SCLEROSIS (HCC): ICD-10-CM

## 2018-09-06 DIAGNOSIS — M79.10 MYALGIA: ICD-10-CM

## 2018-09-06 DIAGNOSIS — G89.29 CHRONIC NECK PAIN: ICD-10-CM

## 2018-09-06 DIAGNOSIS — J06.9 VIRAL UPPER RESPIRATORY TRACT INFECTION: ICD-10-CM

## 2018-09-06 PROCEDURE — 96372 THER/PROPH/DIAG INJ SC/IM: CPT | Performed by: NURSE PRACTITIONER

## 2018-09-06 PROCEDURE — 99214 OFFICE O/P EST MOD 30 MIN: CPT | Performed by: NURSE PRACTITIONER

## 2018-09-06 RX ORDER — TRAMADOL HYDROCHLORIDE 50 MG/1
50 TABLET ORAL EVERY 8 HOURS PRN
Qty: 90 TABLET | Refills: 0 | Status: SHIPPED | OUTPATIENT
Start: 2018-09-06 | End: 2018-10-11 | Stop reason: SDUPTHER

## 2018-09-06 RX ORDER — TRIAMCINOLONE ACETONIDE 40 MG/ML
60 INJECTION, SUSPENSION INTRA-ARTICULAR; INTRAMUSCULAR ONCE
Status: COMPLETED | OUTPATIENT
Start: 2018-09-06 | End: 2018-09-06

## 2018-09-06 RX ORDER — MODAFINIL 100 MG/1
100 TABLET ORAL DAILY
Qty: 30 TABLET | Refills: 0 | Status: SHIPPED | OUTPATIENT
Start: 2018-09-06 | End: 2018-09-24 | Stop reason: SDUPTHER

## 2018-09-06 RX ADMIN — TRIAMCINOLONE ACETONIDE 60 MG: 40 INJECTION, SUSPENSION INTRA-ARTICULAR; INTRAMUSCULAR at 16:52

## 2018-09-06 ASSESSMENT — ENCOUNTER SYMPTOMS
SINUS PRESSURE: 1
SHORTNESS OF BREATH: 0
EYE REDNESS: 0
WHEEZING: 0
CONSTIPATION: 0
COUGH: 1
NAUSEA: 0
EYE DISCHARGE: 0
TROUBLE SWALLOWING: 0
DIARRHEA: 0
BACK PAIN: 1
VOMITING: 0
BLOOD IN STOOL: 0
RHINORRHEA: 1
SORE THROAT: 0
ABDOMINAL PAIN: 0

## 2018-09-06 NOTE — PROGRESS NOTES
After obtaining consent, and per orders of CLINTON Chauhan, injection of Kenalog 60mg given in Right upper quad. gluteus by Daryl Francis. Patient instructed to remain in clinic for 20 minutes afterwards, and to report any adverse reaction to me immediately.

## 2018-09-06 NOTE — PROGRESS NOTES
[address]  [place], [address] [place] [address] Rd  Phone [phone]   Fax [phone]      OFFICE VISIT: 9/6/2018    Cheri Gipson is a 37 y.o. female who presents today for her medical conditions/complaints as noted below. Cheri Gipson is c/o of Medication Refill; Nasal Congestion (Started Monday night); Congestion (has been using neb tx); Fatigue; and Fever (low grade on Tues)        HPI:     Cough   This is a new problem. The current episode started in the past 7 days. The problem has been gradually worsening. The cough is non-productive. Associated symptoms include chills, myalgias, nasal congestion, postnasal drip and rhinorrhea. Pertinent negatives include no chest pain, eye redness, fever, headaches, rash, sore throat, shortness of breath or wheezing. Associated symptoms comments: Chest tightness. She has tried OTC cough suppressant for the symptoms. The treatment provided mild relief. she is having some dizziness. And ear fullness    Chronic pain   Has MS has chronic pain related to this. Takes 1-2 ultram per day. Chronic neck pain  MRI scanned in media  She is needing a refill on this  She has only been taking this with a muscle relaxer at bed time. Chronic fatigue  This is worsened by her MS  She is having a hard time at work and is exhausted when she gets home. She tried the adderall that her neurologsit gave her. States she didn't like the way it made her feel. Past Medical History:   Diagnosis Date    Headache(784.0)     Insomnia     Multiple sclerosis ([address]       Past Surgical History:   Procedure Laterality Date    CHOLECYSTECTOMY         No family history on file. Social History   Substance Use Topics    Smoking status: Never Smoker    Smokeless tobacco: Never Used    Alcohol use No      Current Outpatient Prescriptions   Medication Sig Dispense Refill    traMADol (ULTRAM) 50 MG tablet Take 1 tablet by mouth every 8 hours as needed for Pain for up to 30 days. . 90 tablet 0    modafinil (PROVIGIL) 100 MG tablet Take 1 tablet by mouth daily for 30 days. . 30 tablet 0    hydrOXYzine (ATARAX) 50 MG tablet TAKE 1 TO 2 TABLETS BY MOUTH NIGHTLY AS NEEDED FOR INSOMNIA 30 tablet 11    vilazodone HCl (VILAZODONE HCL) 20 MG TABS Take 1 tablet by mouth daily 30 tablet 5    busPIRone (BUSPAR) 10 MG tablet Take 1 tablet by mouth 3 times daily as needed (anxiety) 90 tablet 5    Fluticasone Furoate-Vilanterol (BREO ELLIPTA) 200-25 MCG/INH AEPB Take 1 puff by mouth daily 60 each 11    levalbuterol (XOPENEX) 1.25 MG/0.5ML nebulizer solution Take 0.5 mLs by nebulization every 8 hours as needed for Wheezing 90 each 1    levocetirizine (XYZAL) 5 MG tablet Take 1 tablet by mouth nightly 30 tablet 5    propranolol (INDERAL LA) 120 MG extended release capsule Take 120 mg by mouth daily  2    fenofibrate (TRICOR) 145 MG tablet TAKE ONE TABLET BY MOUTH DAILY 30 tablet 11    Cholecalciferol (VITAMIN D3) 52905 units CAPS Take 50,000 Units by mouth once a week 12 capsule 3    SUMAtriptan (IMITREX) 100 MG tablet TAKE ONE TABLET BY MOUTH DAILY AS NEEDED FOR MIGRAINE 27 tablet 2    promethazine (PHENERGAN) 25 MG tablet TAKE ONE TABLET BY MOUTH EVERY 6 HOURS AS NEEDED FOR NAUSEA 30 tablet 0    COPAXONE 40 MG/ML SOSY        No current facility-administered medications for this visit. Allergies   Allergen Reactions    Ampicillin     Biaxin [Clarithromycin]     Flagyl [Metronidazole]     Saxenda [Liraglutide -Weight Management]      Nausea and vomiting    Albuterol Rash and Other (See Comments)     migraines       Health Maintenance   Topic Date Due    HIV screen  05/08/1990    DTaP/Tdap/Td vaccine (1 - Tdap) 05/08/1994    Diabetes screen  05/08/2015    Flu vaccine (1) 09/01/2018    Cervical cancer screen  12/03/2018    Breast cancer screen  07/03/2019    Lipid screen  05/17/2021        Subjective:      Review of Systems   Constitutional: Positive for chills and fatigue.  Negative for activity change, appetite change, fever and unexpected weight change. HENT: Positive for congestion, postnasal drip, rhinorrhea and sinus pressure. Negative for hearing loss, sore throat and trouble swallowing. Eyes: Negative for discharge, redness and visual disturbance. Respiratory: Positive for cough. Negative for shortness of breath and wheezing. Cardiovascular: Negative for chest pain, palpitations and leg swelling. Gastrointestinal: Negative for abdominal pain, blood in stool, constipation, diarrhea, nausea and vomiting. Endocrine: Negative for cold intolerance and heat intolerance. Genitourinary: Negative for dysuria, flank pain, menstrual problem, pelvic pain, urgency and vaginal discharge. Musculoskeletal: Positive for arthralgias, back pain, myalgias and neck pain. Skin: Negative for rash. Neurological: Positive for dizziness. Negative for weakness and headaches. Hematological: Negative for adenopathy. Psychiatric/Behavioral: Positive for sleep disturbance. Negative for dysphoric mood and suicidal ideas. The patient is not nervous/anxious. Objective:     Physical Exam  /84   Pulse 76   Temp 98 °F (36.7 °C) (Temporal)   Ht 5' 4\" (1.626 m)   Wt 182 lb (82.6 kg)   SpO2 98%   BMI 31.24 kg/m²     Assessment:        ICD-10-CM ICD-9-CM    1. Excessive daytime sleepiness G47.19 780.54 modafinil (PROVIGIL) 100 MG tablet   2. Multiple sclerosis (HCC) G35 340 traMADol (ULTRAM) 50 MG tablet      modafinil (PROVIGIL) 100 MG tablet   3. Myalgia M79.1 729.1 traMADol (ULTRAM) 50 MG tablet   4. Chronic neck pain M54.2 723.1 traMADol (ULTRAM) 50 MG tablet    G89.29 338.29    5. Viral upper respiratory tract infection J06.9 465.9    6. Labyrinthitis of both ears H83.03 386.30 triamcinolone acetonide (KENALOG-40) injection 60 mg       Plan:    will try to get provigil approved. Did not do well with adderall.    Controlled Substances Monitoring:     RX Monitoring 9/6/2018   Attestation The Prescription Monitoring Report for this patient was reviewed today. Documentation Possible medication side effects, risk of tolerance/dependence & alternative treatments discussed. ;No signs of potential drug abuse or diversion identified. Chronic Pain Functional status reviewed - continues with improved or maintaining ADL's. Medication Contracts Existing medication contract. Return in about 1 month (around 10/6/2018) for medrefill 30 min. No orders of the defined types were placed in this encounter. Orders Placed This Encounter   Medications    traMADol (ULTRAM) 50 MG tablet     Sig: Take 1 tablet by mouth every 8 hours as needed for Pain for up to 30 days. .     Dispense:  90 tablet     Refill:  0    modafinil (PROVIGIL) 100 MG tablet     Sig: Take 1 tablet by mouth daily for 30 days. .     Dispense:  30 tablet     Refill:  0    triamcinolone acetonide (KENALOG-40) injection 60 mg         Discussed use, benefit, and side effects of prescribed medications. All patient questions answered. Pt voiced understanding. Reviewed health maintenance. .  Patient agreed with treatment plan. Follow up as directed. There are no Patient Instructions on file for this visit.       Electronically signed by CLINTON Ramsey on 9/6/2018 at 5:29 PM

## 2018-09-07 ENCOUNTER — TELEPHONE (OUTPATIENT)
Dept: PRIMARY CARE CLINIC | Age: 43
End: 2018-09-07

## 2018-09-07 NOTE — TELEPHONE ENCOUNTER
Called pt to let her know that her Provigil has been approved and she should be able to pick that up at the pharmacy. Outcome   Approvedtoday   Your PA request has been approved. Additional information will be provided in the approval communication. (Message [phone])   DrugModafinil 100MG OR TABS   Form[employer] Electronic PA Form (NCPDP)   Original Claim [address] ASHLEIGH CALL [phone]. DRUG REQUIRES PRIOR AUTHORIZATION(PHARMACY HELP DESK [phone])

## 2018-09-24 DIAGNOSIS — G35 MULTIPLE SCLEROSIS (HCC): ICD-10-CM

## 2018-09-24 DIAGNOSIS — G47.19 EXCESSIVE DAYTIME SLEEPINESS: ICD-10-CM

## 2018-09-24 RX ORDER — MODAFINIL 100 MG/1
100 TABLET ORAL DAILY
Qty: 30 TABLET | Refills: 0 | Status: SHIPPED | OUTPATIENT
Start: 2018-09-24 | End: 2019-02-20 | Stop reason: SDUPTHER

## 2018-09-24 RX ORDER — LEVOCETIRIZINE DIHYDROCHLORIDE 5 MG/1
5 TABLET, FILM COATED ORAL NIGHTLY
Qty: 30 TABLET | Refills: 11 | Status: SHIPPED | OUTPATIENT
Start: 2018-09-24 | End: 2019-10-10 | Stop reason: SDUPTHER

## 2018-09-28 ENCOUNTER — TELEPHONE (OUTPATIENT)
Dept: PRIMARY CARE CLINIC | Age: 43
End: 2018-09-28

## 2018-09-28 RX ORDER — GABAPENTIN 300 MG/1
300 CAPSULE ORAL NIGHTLY
COMMUNITY
End: 2018-12-14 | Stop reason: SDUPTHER

## 2018-09-28 NOTE — TELEPHONE ENCOUNTER
FYI:  Pt wants you to know that  put her back on Neurontin 300mg QHS.      Added this to her med list.

## 2018-10-11 ENCOUNTER — OFFICE VISIT (OUTPATIENT)
Dept: PRIMARY CARE CLINIC | Age: 43
End: 2018-10-11
Payer: COMMERCIAL

## 2018-10-11 VITALS
WEIGHT: 186.75 LBS | TEMPERATURE: 98 F | DIASTOLIC BLOOD PRESSURE: 80 MMHG | SYSTOLIC BLOOD PRESSURE: 120 MMHG | HEART RATE: 72 BPM | OXYGEN SATURATION: 98 % | HEIGHT: 64 IN | BODY MASS INDEX: 31.88 KG/M2

## 2018-10-11 DIAGNOSIS — M79.10 MYALGIA: ICD-10-CM

## 2018-10-11 DIAGNOSIS — G35 MULTIPLE SCLEROSIS (HCC): ICD-10-CM

## 2018-10-11 DIAGNOSIS — M54.2 CHRONIC NECK PAIN: ICD-10-CM

## 2018-10-11 DIAGNOSIS — G89.29 CHRONIC NECK PAIN: ICD-10-CM

## 2018-10-11 PROCEDURE — 99213 OFFICE O/P EST LOW 20 MIN: CPT | Performed by: NURSE PRACTITIONER

## 2018-10-11 RX ORDER — TRAMADOL HYDROCHLORIDE 50 MG/1
50 TABLET ORAL EVERY 8 HOURS PRN
Qty: 90 TABLET | Refills: 0 | Status: SHIPPED | OUTPATIENT
Start: 2018-10-11 | End: 2018-11-15 | Stop reason: SDUPTHER

## 2018-10-11 ASSESSMENT — ENCOUNTER SYMPTOMS
NAUSEA: 0
COUGH: 0
SINUS PRESSURE: 0
SORE THROAT: 0
RHINORRHEA: 0
CONSTIPATION: 0
BACK PAIN: 1
ABDOMINAL PAIN: 0
SHORTNESS OF BREATH: 0
TROUBLE SWALLOWING: 0
EYE DISCHARGE: 0
WHEEZING: 0
VOMITING: 0
EYE REDNESS: 0
DIARRHEA: 0
BLOOD IN STOOL: 0

## 2018-10-11 NOTE — PROGRESS NOTES
[address]  [place], [address] [place] [address] Rd  Phone [phone]   Fax [phone]      OFFICE VISIT: 10/11/2018    Sana Gee is a 37 y.o. female whopresents today for her medical conditions/complaints as noted below. Sana Gee isc/o of 1 Month Follow-Up and Medication Refill (tramadol)        HPI:     HPI  Chronic pain   Has MS has chronic pain related to this. Takes 1-2 ultram per day. Chronic neck pain  MRI scanned in media  She is needing a refill on this  She has only been taking this with a muscle relaxer at bed time.      Chronic fatigue  This is worsened by her MS  She is having a hard time at work and is exhausted when she gets home. I gave her provigil last month. States that she was afraid to try it. But will try it this weekend. Past Medical History:   Diagnosis Date    Headache(784.0)     Insomnia     Multiple sclerosis ([address]       Past Surgical History:   Procedure Laterality Date    CHOLECYSTECTOMY         No family history on file. Social History   Substance Use Topics    Smoking status: Never Smoker    Smokeless tobacco: Never Used    Alcohol use No      Current Outpatient Prescriptions   Medication Sig Dispense Refill    traMADol (ULTRAM) 50 MG tablet Take 1 tablet by mouth every 8 hours as needed for Pain for up to 30 days. . 90 tablet 0    gabapentin (NEURONTIN) 300 MG capsule Take 300 mg by mouth nightly. Melvenia Lacie modafinil (PROVIGIL) 100 MG tablet Take 1 tablet by mouth daily for 30 days. . (Patient taking differently: Take 100 mg by mouth daily. Pt has not started yet. Rola Polanco ) 30 tablet 0    levocetirizine (XYZAL) 5 MG tablet Take 1 tablet by mouth nightly 30 tablet 11    hydrOXYzine (ATARAX) 50 MG tablet TAKE 1 TO 2 TABLETS BY MOUTH NIGHTLY AS NEEDED FOR INSOMNIA 30 tablet 11    vilazodone HCl (VILAZODONE HCL) 20 MG TABS Take 1 tablet by mouth daily 30 tablet 5    busPIRone (BUSPAR) 10 MG tablet Take 1 tablet by mouth 3 times daily as needed (anxiety) 90 tablet 5    nausea and vomiting. Endocrine: Negative for cold intolerance and heat intolerance. Genitourinary: Negative for dysuria, flank pain, menstrual problem, pelvic pain, urgency and vaginal discharge. Musculoskeletal: Positive for arthralgias, back pain, myalgias and neck pain. Skin: Negative for rash. Neurological: Negative for dizziness, weakness and headaches. Hematological: Negative for adenopathy. Psychiatric/Behavioral: Positive for sleep disturbance. Negative for dysphoric mood and suicidal ideas. The patient is not nervous/anxious. Objective:     Physical Exam   Constitutional: She is oriented to person, place, and time. She appears well-developed and well-nourished. HENT:   Head: Normocephalic and atraumatic. Right Ear: Tympanic membrane normal.   Left Ear: Tympanic membrane normal.   Nose: Nose normal.   Mouth/Throat: Oropharynx is clear and moist and mucous membranes are normal.   Eyes: Pupils are equal, round, and reactive to light. Conjunctivae are normal. No scleral icterus. Neck: Normal range of motion. Neck supple. Carotid bruit is not present. Cardiovascular: Normal rate, regular rhythm, normal heart sounds and intact distal pulses. No murmur heard. Pulmonary/Chest: Effort normal and breath sounds normal. No respiratory distress. She has no wheezes. She has no rales. Abdominal: Soft. Bowel sounds are normal. She exhibits no distension and no mass. There is no tenderness. There is no rebound and no guarding. Musculoskeletal: Normal range of motion. She exhibits no edema. Lumbar back: She exhibits tenderness, pain and spasm. Generalized weakness  walking with vogt   Neurological: She is alert and oriented to person, place, and time. She has normal reflexes. Gait (with cane) abnormal.   Skin: Skin is warm, dry and intact. No lesion and no rash noted.    Psychiatric: Her speech is normal and behavior is normal. Judgment and thought content normal. Her mood appears

## 2018-10-30 ENCOUNTER — TELEPHONE (OUTPATIENT)
Dept: PRIMARY CARE CLINIC | Age: 43
End: 2018-10-30

## 2018-10-30 DIAGNOSIS — F51.01 PRIMARY INSOMNIA: Primary | ICD-10-CM

## 2018-10-30 RX ORDER — ZOLPIDEM TARTRATE 10 MG/1
10 TABLET ORAL NIGHTLY PRN
Qty: 30 TABLET | Refills: 0 | Status: SHIPPED | OUTPATIENT
Start: 2018-10-30 | End: 2018-11-15

## 2018-11-05 DIAGNOSIS — F33.1 MODERATE EPISODE OF RECURRENT MAJOR DEPRESSIVE DISORDER (HCC): ICD-10-CM

## 2018-11-05 RX ORDER — VILAZODONE HYDROCHLORIDE 20 MG/1
20 TABLET ORAL DAILY
Qty: 30 TABLET | Refills: 11 | Status: SHIPPED | OUTPATIENT
Start: 2018-11-05 | End: 2019-12-04 | Stop reason: SDUPTHER

## 2018-11-05 RX ORDER — FENOFIBRATE 145 MG/1
145 TABLET, COATED ORAL DAILY
Qty: 30 TABLET | Refills: 11 | Status: SHIPPED | OUTPATIENT
Start: 2018-11-05 | End: 2019-10-04

## 2018-11-05 NOTE — TELEPHONE ENCOUNTER
Received fax from pharmacy requesting refill on pts medication(s). Pt was last seen in office on 10/11/2018  and has a follow up scheduled for 11/9/2018. Will send request to  Mario Marie  for patient.      Requested Prescriptions     Pending Prescriptions Disp Refills    vilazodone HCl (VILAZODONE HCL) 20 MG TABS [Pharmacy Med Name: [id number] 20 MG TABLET] 30 tablet 11     Sig: Take 1 tablet by mouth daily

## 2018-11-15 ENCOUNTER — TELEPHONE (OUTPATIENT)
Dept: PRIMARY CARE CLINIC | Age: 43
End: 2018-11-15

## 2018-11-15 ENCOUNTER — OFFICE VISIT (OUTPATIENT)
Dept: PRIMARY CARE CLINIC | Age: 43
End: 2018-11-15
Payer: COMMERCIAL

## 2018-11-15 VITALS
TEMPERATURE: 97.8 F | OXYGEN SATURATION: 98 % | DIASTOLIC BLOOD PRESSURE: 76 MMHG | BODY MASS INDEX: 31.92 KG/M2 | HEIGHT: 64 IN | SYSTOLIC BLOOD PRESSURE: 118 MMHG | WEIGHT: 187 LBS | HEART RATE: 71 BPM

## 2018-11-15 DIAGNOSIS — G35 MULTIPLE SCLEROSIS (HCC): ICD-10-CM

## 2018-11-15 DIAGNOSIS — M54.2 CHRONIC NECK PAIN: ICD-10-CM

## 2018-11-15 DIAGNOSIS — F41.9 ANXIETY: Primary | ICD-10-CM

## 2018-11-15 DIAGNOSIS — G89.29 CHRONIC NECK PAIN: ICD-10-CM

## 2018-11-15 DIAGNOSIS — M79.10 MYALGIA: ICD-10-CM

## 2018-11-15 PROCEDURE — 99214 OFFICE O/P EST MOD 30 MIN: CPT | Performed by: NURSE PRACTITIONER

## 2018-11-15 RX ORDER — LORAZEPAM 1 MG/1
1 TABLET ORAL EVERY 8 HOURS PRN
Qty: 60 TABLET | Refills: 0 | Status: SHIPPED | OUTPATIENT
Start: 2018-11-15 | End: 2018-12-14 | Stop reason: SDUPTHER

## 2018-11-15 RX ORDER — TRAMADOL HYDROCHLORIDE 50 MG/1
50 TABLET ORAL EVERY 8 HOURS PRN
Qty: 90 TABLET | Refills: 0 | Status: SHIPPED | OUTPATIENT
Start: 2018-11-15 | End: 2018-12-14 | Stop reason: SDUPTHER

## 2018-11-15 ASSESSMENT — ENCOUNTER SYMPTOMS
VOMITING: 0
COUGH: 0
NAUSEA: 0
RHINORRHEA: 0
WHEEZING: 0
DIARRHEA: 0
CONSTIPATION: 0
TROUBLE SWALLOWING: 0
ABDOMINAL PAIN: 0
BLOOD IN STOOL: 0
SHORTNESS OF BREATH: 0
EYE REDNESS: 0
BACK PAIN: 1
SINUS PRESSURE: 0
EYE DISCHARGE: 0
SORE THROAT: 0

## 2018-11-15 NOTE — PROGRESS NOTES
(anxiety) 90 tablet 5    Fluticasone Furoate-Vilanterol (BREO ELLIPTA) 200-25 MCG/INH AEPB Take 1 puff by mouth daily 60 each 11    levalbuterol (XOPENEX) 1.25 MG/0.5ML nebulizer solution Take 0.5 mLs by nebulization every 8 hours as needed for Wheezing 90 each 1    propranolol (INDERAL LA) 120 MG extended release capsule Take 120 mg by mouth daily  2    Cholecalciferol (VITAMIN D3) 05682 units CAPS Take 50,000 Units by mouth once a week 12 capsule 3    SUMAtriptan (IMITREX) 100 MG tablet TAKE ONE TABLET BY MOUTH DAILY AS NEEDED FOR MIGRAINE 27 tablet 2    promethazine (PHENERGAN) 25 MG tablet TAKE ONE TABLET BY MOUTH EVERY 6 HOURS AS NEEDED FOR NAUSEA 30 tablet 0    COPAXONE 40 MG/ML SOSY        No current facility-administered medications for this visit. Allergies   Allergen Reactions    Ampicillin     Biaxin [Clarithromycin]     Flagyl [Metronidazole]     Saxenda [Liraglutide -Weight Management]      Nausea and vomiting    Albuterol Rash and Other (See Comments)     migraines       Health Maintenance   Topic Date Due    HIV screen  05/08/1990    DTaP/Tdap/Td vaccine (1 - Tdap) 05/08/1994    Diabetes screen  05/08/2015    Flu vaccine (1) 09/01/2018    Cervical cancer screen  12/03/2018    Breast cancer screen  07/03/2019    Lipid screen  05/17/2021        Subjective:     Review of Systems   Constitutional: Positive for fatigue. Negative for activity change, appetite change, chills, fever and unexpected weight change. HENT: Negative for congestion, hearing loss, postnasal drip, rhinorrhea, sinus pressure, sore throat and trouble swallowing. Eyes: Negative for discharge, redness and visual disturbance. Respiratory: Negative for cough, shortness of breath and wheezing. Cardiovascular: Negative for chest pain, palpitations and leg swelling. Gastrointestinal: Negative for abdominal pain, blood in stool, constipation, diarrhea, nausea and vomiting.    Endocrine: Negative for cold

## 2018-12-14 ENCOUNTER — OFFICE VISIT (OUTPATIENT)
Dept: PRIMARY CARE CLINIC | Age: 43
End: 2018-12-14
Payer: COMMERCIAL

## 2018-12-14 VITALS
DIASTOLIC BLOOD PRESSURE: 78 MMHG | OXYGEN SATURATION: 98 % | WEIGHT: 184 LBS | SYSTOLIC BLOOD PRESSURE: 110 MMHG | HEIGHT: 64 IN | BODY MASS INDEX: 31.41 KG/M2 | TEMPERATURE: 97.8 F | HEART RATE: 74 BPM

## 2018-12-14 DIAGNOSIS — F41.9 ANXIETY: ICD-10-CM

## 2018-12-14 DIAGNOSIS — F51.01 PRIMARY INSOMNIA: Primary | ICD-10-CM

## 2018-12-14 DIAGNOSIS — G35 MULTIPLE SCLEROSIS (HCC): ICD-10-CM

## 2018-12-14 DIAGNOSIS — G89.29 CHRONIC NECK PAIN: ICD-10-CM

## 2018-12-14 DIAGNOSIS — G43.109 MIGRAINE WITH AURA AND WITHOUT STATUS MIGRAINOSUS, NOT INTRACTABLE: ICD-10-CM

## 2018-12-14 DIAGNOSIS — G25.81 RLS (RESTLESS LEGS SYNDROME): ICD-10-CM

## 2018-12-14 DIAGNOSIS — M79.10 MYALGIA: ICD-10-CM

## 2018-12-14 DIAGNOSIS — M54.2 CHRONIC NECK PAIN: ICD-10-CM

## 2018-12-14 PROCEDURE — 99214 OFFICE O/P EST MOD 30 MIN: CPT | Performed by: NURSE PRACTITIONER

## 2018-12-14 RX ORDER — LORAZEPAM 1 MG/1
1 TABLET ORAL EVERY 8 HOURS PRN
Qty: 60 TABLET | Refills: 0 | Status: SHIPPED | OUTPATIENT
Start: 2018-12-14 | End: 2019-01-17 | Stop reason: SDUPTHER

## 2018-12-14 RX ORDER — GABAPENTIN 300 MG/1
300 CAPSULE ORAL NIGHTLY
Qty: 90 CAPSULE | Refills: 3 | Status: SHIPPED | OUTPATIENT
Start: 2018-12-14 | End: 2019-03-25 | Stop reason: SDUPTHER

## 2018-12-14 RX ORDER — TRAMADOL HYDROCHLORIDE 50 MG/1
50 TABLET ORAL EVERY 8 HOURS PRN
Qty: 90 TABLET | Refills: 0 | Status: SHIPPED | OUTPATIENT
Start: 2018-12-14 | End: 2019-01-17 | Stop reason: SDUPTHER

## 2018-12-14 RX ORDER — ZOLPIDEM TARTRATE 12.5 MG/1
12.5 TABLET, FILM COATED, EXTENDED RELEASE ORAL NIGHTLY PRN
Qty: 30 TABLET | Refills: 0 | Status: SHIPPED | OUTPATIENT
Start: 2018-12-14 | End: 2019-01-17 | Stop reason: SDUPTHER

## 2018-12-14 ASSESSMENT — ENCOUNTER SYMPTOMS
TROUBLE SWALLOWING: 0
SINUS PRESSURE: 0
EYE DISCHARGE: 0
DIARRHEA: 0
NAUSEA: 0
BACK PAIN: 1
WHEEZING: 0
COUGH: 0
EYE REDNESS: 0
RHINORRHEA: 0
SHORTNESS OF BREATH: 0
VOMITING: 0
CONSTIPATION: 0
BLOOD IN STOOL: 0
ABDOMINAL PAIN: 0
SORE THROAT: 0

## 2018-12-14 NOTE — PROGRESS NOTES
dry and intact. No lesion and no rash noted. Psychiatric: Her speech is normal and behavior is normal. Judgment and thought content normal. Her mood appears not anxious. She does not exhibit a depressed mood. She expresses no suicidal ideation. Vitals reviewed. /78   Pulse 74   Temp 97.8 °F (36.6 °C) (Temporal)   Ht 5' 4\" (1.626 m)   Wt 184 lb (83.5 kg)   SpO2 98%   BMI 31.58 kg/m²     Assessment:        ICD-10-CM    1. Primary insomnia F51.01    2. Anxiety F41.9 LORazepam (ATIVAN) 1 MG tablet     zolpidem (AMBIEN CR) 12.5 MG extended release tablet   3. Multiple sclerosis (HCC) G35 traMADol (ULTRAM) 50 MG tablet   4. Myalgia M79.10 traMADol (ULTRAM) 50 MG tablet   5. Chronic neck pain M54.2 traMADol (ULTRAM) 50 MG tablet    G89.29    6. RLS (restless legs syndrome) G25.81 gabapentin (NEURONTIN) 300 MG capsule   7. Migraine with aura and without status migrainosus, not intractable G43. 109 Galcanezumab-gnlm (EMGALITY) 120 MG/ML SOAJ       Plan:    will try ambien CR as ambien 10 mg did not work. Will trial emgality . Gave the loading dose sample today. Controlled Substances Monitoring:     RX Monitoring 12/14/2018   Attestation The Prescription Monitoring Report for this patient was reviewed today. Documentation Possible medication side effects, risk of tolerance/dependence & alternative treatments discussed. ;No signs of potential drug abuse or diversion identified. Chronic Pain Functional status reviewed - continues with improved or maintaining ADL's. Medication Contracts Existing medication contract. Return in about 1 month (around 1/14/2019). No orders of the defined types were placed in this encounter. Orders Placed This Encounter   Medications    LORazepam (ATIVAN) 1 MG tablet     Sig: Take 1 tablet by mouth every 8 hours as needed for Anxiety for up to 30 days. .     Dispense:  60 tablet     Refill:  0    traMADol (ULTRAM) 50 MG tablet     Sig: Take 1 tablet by mouth every 8 hours as needed for Pain for up to 30 days. .     Dispense:  90 tablet     Refill:  0    gabapentin (NEURONTIN) 300 MG capsule     Sig: Take 1 capsule by mouth nightly. Cristobal Katz Dispense:  90 capsule     Refill:  3    zolpidem (AMBIEN CR) 12.5 MG extended release tablet     Sig: Take 1 tablet by mouth nightly as needed for Sleep for up to 30 days. .     Dispense:  30 tablet     Refill:  0    Galcanezumab-gnlm (EMGALITY) 120 MG/ML SOAJ     Sig: Inject 120 mg into the skin every 30 days     Dispense:  1 pen     Refill:  11         Discussed use, benefit, and side effectsof prescribed medications. All patient questions answered. Pt voiced understanding. Reviewed health maintenance. .  Patient agreed with treatment plan. Follow up asdirected. There are no Patient Instructions on file for this visit.       Electronically signed by CLINTON Calvillo on12/14/2018 at 4:33 PM

## 2018-12-28 DIAGNOSIS — E55.9 VITAMIN D DEFICIENCY: ICD-10-CM

## 2018-12-28 RX ORDER — CHOLECALCIFEROL (VITAMIN D3) 1250 MCG
1 CAPSULE ORAL WEEKLY
Qty: 12 CAPSULE | Refills: 1 | Status: SHIPPED | OUTPATIENT
Start: 2018-12-28 | End: 2019-04-22

## 2019-01-16 ENCOUNTER — TELEPHONE (OUTPATIENT)
Dept: PRIMARY CARE CLINIC | Age: 44
End: 2019-01-16

## 2019-01-17 ENCOUNTER — OFFICE VISIT (OUTPATIENT)
Dept: PRIMARY CARE CLINIC | Age: 44
End: 2019-01-17
Payer: COMMERCIAL

## 2019-01-17 VITALS
OXYGEN SATURATION: 98 % | HEART RATE: 72 BPM | WEIGHT: 191 LBS | BODY MASS INDEX: 32.61 KG/M2 | HEIGHT: 64 IN | TEMPERATURE: 98 F | SYSTOLIC BLOOD PRESSURE: 124 MMHG | DIASTOLIC BLOOD PRESSURE: 84 MMHG

## 2019-01-17 DIAGNOSIS — M79.10 MYALGIA: ICD-10-CM

## 2019-01-17 DIAGNOSIS — F41.9 ANXIETY: ICD-10-CM

## 2019-01-17 DIAGNOSIS — M54.2 CHRONIC NECK PAIN: ICD-10-CM

## 2019-01-17 DIAGNOSIS — F33.1 MODERATE EPISODE OF RECURRENT MAJOR DEPRESSIVE DISORDER (HCC): ICD-10-CM

## 2019-01-17 DIAGNOSIS — G35 MULTIPLE SCLEROSIS (HCC): ICD-10-CM

## 2019-01-17 DIAGNOSIS — G89.29 CHRONIC NECK PAIN: ICD-10-CM

## 2019-01-17 PROCEDURE — 99214 OFFICE O/P EST MOD 30 MIN: CPT | Performed by: NURSE PRACTITIONER

## 2019-01-17 RX ORDER — ZOLPIDEM TARTRATE 12.5 MG/1
12.5 TABLET, FILM COATED, EXTENDED RELEASE ORAL NIGHTLY PRN
Qty: 30 TABLET | Refills: 0 | Status: SHIPPED | OUTPATIENT
Start: 2019-01-17 | End: 2019-02-14 | Stop reason: SDUPTHER

## 2019-01-17 RX ORDER — LORAZEPAM 1 MG/1
1 TABLET ORAL EVERY 8 HOURS PRN
Qty: 60 TABLET | Refills: 0 | Status: SHIPPED | OUTPATIENT
Start: 2019-01-17 | End: 2019-02-14 | Stop reason: SDUPTHER

## 2019-01-17 RX ORDER — TRAMADOL HYDROCHLORIDE 50 MG/1
50 TABLET ORAL EVERY 8 HOURS PRN
Qty: 90 TABLET | Refills: 0 | Status: SHIPPED | OUTPATIENT
Start: 2019-01-17 | End: 2019-02-14 | Stop reason: SDUPTHER

## 2019-01-17 ASSESSMENT — ENCOUNTER SYMPTOMS
NAUSEA: 0
CONSTIPATION: 0
BACK PAIN: 1
BLOOD IN STOOL: 0
VOMITING: 0
SINUS PRESSURE: 0
EYE DISCHARGE: 0
RHINORRHEA: 0
ABDOMINAL PAIN: 0
COUGH: 0
SHORTNESS OF BREATH: 0
DIARRHEA: 0
EYE REDNESS: 0
SORE THROAT: 0
WHEEZING: 0
TROUBLE SWALLOWING: 0

## 2019-02-14 ENCOUNTER — OFFICE VISIT (OUTPATIENT)
Dept: PRIMARY CARE CLINIC | Age: 44
End: 2019-02-14
Payer: COMMERCIAL

## 2019-02-14 VITALS
TEMPERATURE: 98.5 F | HEIGHT: 64 IN | BODY MASS INDEX: 32.1 KG/M2 | SYSTOLIC BLOOD PRESSURE: 122 MMHG | HEART RATE: 85 BPM | DIASTOLIC BLOOD PRESSURE: 78 MMHG | OXYGEN SATURATION: 98 % | WEIGHT: 188 LBS

## 2019-02-14 DIAGNOSIS — M79.10 MYALGIA: ICD-10-CM

## 2019-02-14 DIAGNOSIS — M54.2 CHRONIC NECK PAIN: ICD-10-CM

## 2019-02-14 DIAGNOSIS — G35 MULTIPLE SCLEROSIS (HCC): ICD-10-CM

## 2019-02-14 DIAGNOSIS — G89.29 CHRONIC NECK PAIN: ICD-10-CM

## 2019-02-14 DIAGNOSIS — F41.9 ANXIETY: ICD-10-CM

## 2019-02-14 PROCEDURE — 99213 OFFICE O/P EST LOW 20 MIN: CPT | Performed by: NURSE PRACTITIONER

## 2019-02-14 RX ORDER — TRAMADOL HYDROCHLORIDE 50 MG/1
50 TABLET ORAL EVERY 8 HOURS PRN
Qty: 90 TABLET | Refills: 0 | Status: SHIPPED | OUTPATIENT
Start: 2019-02-14 | End: 2019-03-25 | Stop reason: SDUPTHER

## 2019-02-14 RX ORDER — LORAZEPAM 1 MG/1
1 TABLET ORAL EVERY 8 HOURS PRN
Qty: 60 TABLET | Refills: 0 | Status: SHIPPED | OUTPATIENT
Start: 2019-02-14 | End: 2019-03-25 | Stop reason: SDUPTHER

## 2019-02-14 RX ORDER — ZOLPIDEM TARTRATE 12.5 MG/1
12.5 TABLET, FILM COATED, EXTENDED RELEASE ORAL NIGHTLY PRN
Qty: 30 TABLET | Refills: 0 | Status: SHIPPED | OUTPATIENT
Start: 2019-02-14 | End: 2019-03-25 | Stop reason: SDUPTHER

## 2019-02-14 ASSESSMENT — ENCOUNTER SYMPTOMS
RHINORRHEA: 0
SINUS PRESSURE: 0
CONSTIPATION: 0
WHEEZING: 0
DIARRHEA: 0
BACK PAIN: 1
ABDOMINAL PAIN: 0
BLOOD IN STOOL: 0
VOMITING: 0
EYE REDNESS: 0
NAUSEA: 0
SHORTNESS OF BREATH: 0
EYE DISCHARGE: 0
SORE THROAT: 0
TROUBLE SWALLOWING: 0
COUGH: 0

## 2019-02-20 DIAGNOSIS — G47.19 EXCESSIVE DAYTIME SLEEPINESS: ICD-10-CM

## 2019-02-20 DIAGNOSIS — G35 MULTIPLE SCLEROSIS (HCC): ICD-10-CM

## 2019-02-21 RX ORDER — MODAFINIL 100 MG/1
TABLET ORAL
Qty: 30 TABLET | Refills: 0 | Status: SHIPPED | OUTPATIENT
Start: 2019-02-21 | End: 2019-03-25 | Stop reason: SDUPTHER

## 2019-02-28 ENCOUNTER — OFFICE VISIT (OUTPATIENT)
Dept: PRIMARY CARE CLINIC | Age: 44
End: 2019-02-28
Payer: COMMERCIAL

## 2019-02-28 VITALS
WEIGHT: 188 LBS | SYSTOLIC BLOOD PRESSURE: 118 MMHG | HEART RATE: 88 BPM | BODY MASS INDEX: 32.1 KG/M2 | HEIGHT: 64 IN | OXYGEN SATURATION: 98 % | TEMPERATURE: 98.5 F | DIASTOLIC BLOOD PRESSURE: 76 MMHG

## 2019-02-28 DIAGNOSIS — R09.89 CHEST CONGESTION: ICD-10-CM

## 2019-02-28 DIAGNOSIS — G35 MS (MULTIPLE SCLEROSIS) (HCC): Primary | ICD-10-CM

## 2019-02-28 DIAGNOSIS — R53.83 OTHER FATIGUE: ICD-10-CM

## 2019-02-28 DIAGNOSIS — R06.2 WHEEZING: ICD-10-CM

## 2019-02-28 LAB
INFLUENZA A ANTIBODY: NORMAL
INFLUENZA B ANTIBODY: NORMAL

## 2019-02-28 PROCEDURE — 99213 OFFICE O/P EST LOW 20 MIN: CPT | Performed by: NURSE PRACTITIONER

## 2019-02-28 PROCEDURE — 96372 THER/PROPH/DIAG INJ SC/IM: CPT | Performed by: NURSE PRACTITIONER

## 2019-02-28 PROCEDURE — 87804 INFLUENZA ASSAY W/OPTIC: CPT | Performed by: NURSE PRACTITIONER

## 2019-02-28 RX ORDER — TRIAMCINOLONE ACETONIDE 40 MG/ML
60 INJECTION, SUSPENSION INTRA-ARTICULAR; INTRAMUSCULAR ONCE
Status: COMPLETED | OUTPATIENT
Start: 2019-02-28 | End: 2019-02-28

## 2019-02-28 RX ORDER — LEVALBUTEROL 1.25 MG/.5ML
1.25 SOLUTION, CONCENTRATE RESPIRATORY (INHALATION) EVERY 4 HOURS PRN
Qty: 30 EACH | Refills: 5 | Status: SHIPPED | OUTPATIENT
Start: 2019-02-28 | End: 2019-04-22

## 2019-02-28 RX ORDER — PREDNISONE 10 MG/1
10 TABLET ORAL DAILY
Qty: 42 TABLET | Refills: 0 | Status: SHIPPED | OUTPATIENT
Start: 2019-02-28 | End: 2019-03-10

## 2019-02-28 RX ADMIN — TRIAMCINOLONE ACETONIDE 60 MG: 40 INJECTION, SUSPENSION INTRA-ARTICULAR; INTRAMUSCULAR at 16:01

## 2019-02-28 ASSESSMENT — ENCOUNTER SYMPTOMS
COUGH: 0
SHORTNESS OF BREATH: 0
EYE DISCHARGE: 0
SINUS PRESSURE: 0
RHINORRHEA: 0
CONSTIPATION: 0
SORE THROAT: 0
WHEEZING: 1
NAUSEA: 0
VOMITING: 0
EYE REDNESS: 0
BACK PAIN: 1
DIARRHEA: 0
ABDOMINAL PAIN: 0
BLOOD IN STOOL: 0
TROUBLE SWALLOWING: 0

## 2019-03-18 DIAGNOSIS — F41.9 ANXIETY: ICD-10-CM

## 2019-03-18 RX ORDER — ZOLPIDEM TARTRATE 12.5 MG/1
TABLET, FILM COATED, EXTENDED RELEASE ORAL
Qty: 30 TABLET | OUTPATIENT
Start: 2019-03-18

## 2019-03-20 DIAGNOSIS — F41.9 ANXIETY: ICD-10-CM

## 2019-03-20 RX ORDER — ZOLPIDEM TARTRATE 12.5 MG/1
TABLET, FILM COATED, EXTENDED RELEASE ORAL
Qty: 30 TABLET | OUTPATIENT
Start: 2019-03-20

## 2019-03-25 ENCOUNTER — OFFICE VISIT (OUTPATIENT)
Dept: PRIMARY CARE CLINIC | Age: 44
End: 2019-03-25
Payer: COMMERCIAL

## 2019-03-25 VITALS
TEMPERATURE: 97.4 F | DIASTOLIC BLOOD PRESSURE: 84 MMHG | OXYGEN SATURATION: 98 % | WEIGHT: 183 LBS | HEART RATE: 78 BPM | SYSTOLIC BLOOD PRESSURE: 120 MMHG | HEIGHT: 64 IN | BODY MASS INDEX: 31.24 KG/M2

## 2019-03-25 DIAGNOSIS — G35 MULTIPLE SCLEROSIS (HCC): ICD-10-CM

## 2019-03-25 DIAGNOSIS — G47.19 EXCESSIVE DAYTIME SLEEPINESS: ICD-10-CM

## 2019-03-25 DIAGNOSIS — M54.2 CHRONIC NECK PAIN: ICD-10-CM

## 2019-03-25 DIAGNOSIS — G89.29 CHRONIC NECK PAIN: ICD-10-CM

## 2019-03-25 DIAGNOSIS — G25.81 RLS (RESTLESS LEGS SYNDROME): ICD-10-CM

## 2019-03-25 DIAGNOSIS — F41.9 ANXIETY: ICD-10-CM

## 2019-03-25 DIAGNOSIS — M79.10 MYALGIA: ICD-10-CM

## 2019-03-25 PROCEDURE — 99214 OFFICE O/P EST MOD 30 MIN: CPT | Performed by: NURSE PRACTITIONER

## 2019-03-25 RX ORDER — GABAPENTIN 300 MG/1
300 CAPSULE ORAL NIGHTLY
Qty: 90 CAPSULE | Refills: 3 | Status: SHIPPED | OUTPATIENT
Start: 2019-03-25 | End: 2019-10-04 | Stop reason: SDUPTHER

## 2019-03-25 RX ORDER — TRAMADOL HYDROCHLORIDE 50 MG/1
50 TABLET ORAL EVERY 8 HOURS PRN
Qty: 90 TABLET | Refills: 0 | Status: SHIPPED | OUTPATIENT
Start: 2019-03-25 | End: 2019-04-22 | Stop reason: SDUPTHER

## 2019-03-25 RX ORDER — LORAZEPAM 1 MG/1
1 TABLET ORAL EVERY 8 HOURS PRN
Qty: 60 TABLET | Refills: 0 | Status: SHIPPED | OUTPATIENT
Start: 2019-03-25 | End: 2019-04-24

## 2019-03-25 RX ORDER — ZOLPIDEM TARTRATE 12.5 MG/1
12.5 TABLET, FILM COATED, EXTENDED RELEASE ORAL NIGHTLY PRN
Qty: 30 TABLET | Refills: 2 | Status: SHIPPED | OUTPATIENT
Start: 2019-03-25 | End: 2019-04-22 | Stop reason: SDUPTHER

## 2019-03-25 RX ORDER — MODAFINIL 100 MG/1
100 TABLET ORAL DAILY
Qty: 30 TABLET | Refills: 2 | Status: SHIPPED | OUTPATIENT
Start: 2019-03-25 | End: 2019-04-22 | Stop reason: SDUPTHER

## 2019-03-26 ASSESSMENT — ENCOUNTER SYMPTOMS
VOMITING: 0
BLOOD IN STOOL: 0
RHINORRHEA: 0
SINUS PRESSURE: 0
WHEEZING: 0
TROUBLE SWALLOWING: 0
ABDOMINAL PAIN: 0
BACK PAIN: 1
SHORTNESS OF BREATH: 0
DIARRHEA: 0
EYE DISCHARGE: 0
NAUSEA: 0
CONSTIPATION: 0
EYE REDNESS: 0
COUGH: 0
SORE THROAT: 0

## 2019-04-08 ENCOUNTER — OFFICE VISIT (OUTPATIENT)
Dept: PRIMARY CARE CLINIC | Age: 44
End: 2019-04-08
Payer: COMMERCIAL

## 2019-04-08 VITALS
SYSTOLIC BLOOD PRESSURE: 120 MMHG | BODY MASS INDEX: 31.07 KG/M2 | TEMPERATURE: 98 F | HEIGHT: 64 IN | HEART RATE: 87 BPM | OXYGEN SATURATION: 95 % | WEIGHT: 182 LBS | DIASTOLIC BLOOD PRESSURE: 80 MMHG

## 2019-04-08 DIAGNOSIS — J40 BRONCHITIS: Primary | ICD-10-CM

## 2019-04-08 DIAGNOSIS — R05.9 COUGH: ICD-10-CM

## 2019-04-08 PROCEDURE — 99213 OFFICE O/P EST LOW 20 MIN: CPT | Performed by: NURSE PRACTITIONER

## 2019-04-08 RX ORDER — LEVALBUTEROL TARTRATE 45 UG/1
1-2 AEROSOL, METERED ORAL EVERY 4 HOURS PRN
Qty: 1 INHALER | Refills: 3 | Status: SHIPPED | OUTPATIENT
Start: 2019-04-08 | End: 2019-04-22

## 2019-04-08 RX ORDER — PREDNISONE 10 MG/1
10 TABLET ORAL DAILY
Qty: 42 TABLET | Refills: 0 | Status: SHIPPED | OUTPATIENT
Start: 2019-04-08 | End: 2019-04-18

## 2019-04-08 RX ORDER — GUAIFENESIN AND CODEINE PHOSPHATE 100; 10 MG/5ML; MG/5ML
5 SOLUTION ORAL 4 TIMES DAILY PRN
Qty: 180 ML | Refills: 0 | Status: SHIPPED | OUTPATIENT
Start: 2019-04-08 | End: 2019-04-11

## 2019-04-08 ASSESSMENT — ENCOUNTER SYMPTOMS
CONSTIPATION: 0
WHEEZING: 1
COUGH: 0
SORE THROAT: 0
EYE DISCHARGE: 0
RHINORRHEA: 1
DIARRHEA: 0
BLOOD IN STOOL: 0
ABDOMINAL PAIN: 0
TROUBLE SWALLOWING: 0
SHORTNESS OF BREATH: 1
EYE REDNESS: 0

## 2019-04-08 NOTE — PROGRESS NOTES
Nikole [address]  [place], [address] [place] [address] Rd  Phone [phone]   Fax [phone]      OFFICE VISIT: 4/8/2019    Zenia Franks is a 37 y.o. female whopresents today for her medical conditions/complaints as noted below. Zenia Franks isc/o of Cough; Congestion; Shortness of Breath; and Wheezing        HPI:      Cough   This is a new problem. Episode onset: 1-2 months. The problem has been waxing and waning. The cough is non-productive. Associated symptoms include nasal congestion, postnasal drip, rhinorrhea, shortness of breath and wheezing. Pertinent negatives include no chest pain, eye redness, fever, rash or sore throat. Wheezing    This is a new problem. The current episode started in the past 7 days. The problem occurs constantly. The problem has been waxing and waning. Associated symptoms include rhinorrhea and shortness of breath. Pertinent negatives include no abdominal pain, chest pain, coughing, diarrhea, fever, rash or sore throat. Past Medical History:   Diagnosis Date    Headache(784.0)     Insomnia     Multiple sclerosis ([address]       Past Surgical History:   Procedure Laterality Date    CHOLECYSTECTOMY         No family history on file. Social History     Tobacco Use    Smoking status: Never Smoker    Smokeless tobacco: Never Used   Substance Use Topics    Alcohol use: No     Alcohol/week: 0.0 oz      Current Outpatient Medications   Medication Sig Dispense Refill    predniSONE (DELTASONE) 10 MG tablet Take 1 tablet by mouth daily for 10 days Days 1-2 (6 tablets), 3-4 (5 tabs), 5-6 (4 tabs), 7-8 (3 tabs), 9-10 (2 tabs) and 11-12 (1 tab) 42 tablet 0    levalbuterol (XOPENEX HFA) 45 MCG/ACT inhaler Inhale 1-2 puffs into the lungs every 4 hours as needed for Wheezing 1 Inhaler 3    guaiFENesin-codeine (CHERATUSSIN AC) 100-10 MG/5ML syrup Take 5 mLs by mouth 4 times daily as needed for Cough for up to 3 days. 180 mL 0    gabapentin (NEURONTIN) 300 MG capsule Take 1 capsule by mouth nightly. 90 capsule 3    traMADol (ULTRAM) 50 MG tablet Take 1 tablet by mouth every 8 hours as needed for Pain for up to 30 days. 90 tablet 0    zolpidem (AMBIEN CR) 12.5 MG extended release tablet Take 1 tablet by mouth nightly as needed for Sleep for up to 30 days. 30 tablet 2    LORazepam (ATIVAN) 1 MG tablet Take 1 tablet by mouth every 8 hours as needed for Anxiety for up to 30 days. 60 tablet 0    modafinil (PROVIGIL) 100 MG tablet Take 1 tablet by mouth daily for 30 days. 30 tablet 2    levalbuterol (XOPENEX) 1.25 MG/0.5ML nebulizer solution Take 0.5 mLs by nebulization every 4 hours as needed for Wheezing 30 each 5    Cholecalciferol (VITAMIN D3) 96305 units CAPS Take 1 capsule by mouth once a week 12 capsule 1    Galcanezumab-gnlm (EMGALITY) 120 MG/ML SOAJ Inject 120 mg into the skin every 30 days 1 pen 11    vilazodone HCl (VILAZODONE HCL) 20 MG TABS Take 1 tablet by mouth daily 30 tablet 11    fenofibrate (TRICOR) 145 MG tablet Take 1 tablet by mouth daily 30 tablet 11    levocetirizine (XYZAL) 5 MG tablet Take 1 tablet by mouth nightly 30 tablet 11    Fluticasone Furoate-Vilanterol (BREO ELLIPTA) 200-25 MCG/INH AEPB Take 1 puff by mouth daily 60 each 11    levalbuterol (XOPENEX) 1.25 MG/0.5ML nebulizer solution Take 0.5 mLs by nebulization every 8 hours as needed for Wheezing 90 each 1    propranolol (INDERAL LA) 120 MG extended release capsule Take 120 mg by mouth daily  2    SUMAtriptan (IMITREX) 100 MG tablet TAKE ONE TABLET BY MOUTH DAILY AS NEEDED FOR MIGRAINE 27 tablet 2    promethazine (PHENERGAN) 25 MG tablet TAKE ONE TABLET BY MOUTH EVERY 6 HOURS AS NEEDED FOR NAUSEA 30 tablet 0     No current facility-administered medications for this visit.       Allergies   Allergen Reactions    Ampicillin     Biaxin [Clarithromycin]     Flagyl [Metronidazole]     Saxenda [Liraglutide -Weight Management]      Nausea and vomiting    Albuterol Rash and Other (See Comments)     migraines Health Maintenance   Topic Date Due    HIV screen  05/08/1990    DTaP/Tdap/Td vaccine (1 - Tdap) 05/08/1994    Diabetes screen  05/08/2015    Cervical cancer screen  12/03/2018    Breast cancer screen  07/03/2019    Flu vaccine (Season Ended) 09/01/2019    Lipid screen  05/17/2021        Subjective:     Review of Systems   Constitutional: Negative for appetite change, fever and unexpected weight change. HENT: Positive for postnasal drip and rhinorrhea. Negative for congestion, sore throat and trouble swallowing. Eyes: Negative for discharge and redness. Respiratory: Positive for shortness of breath and wheezing. Negative for cough. Cardiovascular: Negative for chest pain. Gastrointestinal: Negative for abdominal pain, blood in stool, constipation and diarrhea. Genitourinary: Negative for dysuria. Skin: Negative for rash. Neurological: Negative for weakness. Hematological: Negative for adenopathy. Objective:      Physical Exam   Constitutional: She is oriented to person, place, and time. She appears well-developed and well-nourished. HENT:   Head: Normocephalic. Right Ear: Tympanic membrane normal.   Left Ear: Tympanic membrane normal.   Eyes: Conjunctivae are normal.   Neck: Normal range of motion. Neck supple. Cardiovascular: Normal rate, regular rhythm and normal heart sounds. No murmur heard. Pulmonary/Chest: Effort normal. She has wheezes. Abdominal: Soft. Bowel sounds are normal. There is no tenderness. Musculoskeletal: Normal range of motion. Neurological: She is alert and oriented to person, place, and time. Skin: Skin is warm and dry. Psychiatric: Her behavior is normal.   Vitals reviewed. /80 (Site: Right Upper Arm, Position: Sitting, Cuff Size: Large Adult)   Pulse 87   Temp 98 °F (36.7 °C) (Temporal)   Ht 5' 4\" (1.626 m)   Wt 182 lb (82.6 kg)   SpO2 95%   BMI 31.24 kg/m²     Assessment:           ICD-10-CM    1.  Bronchitis J40 predniSONE (DELTASONE) 10 MG tablet     levalbuterol (XOPENEX HFA) 45 MCG/ACT inhaler   2. Cough R05 guaiFENesin-codeine (CHERATUSSIN AC) 100-10 MG/5ML syrup       Plan:    start neb treatments. Return if symptoms worsen or fail to improve. No orders of the defined types were placed in this encounter. Orders Placed This Encounter   Medications    predniSONE (DELTASONE) 10 MG tablet     Sig: Take 1 tablet by mouth daily for 10 days Days 1-2 (6 tablets), 3-4 (5 tabs), 5-6 (4 tabs), 7-8 (3 tabs), 9-10 (2 tabs) and 11-12 (1 tab)     Dispense:  42 tablet     Refill:  0    levalbuterol (XOPENEX HFA) 45 MCG/ACT inhaler     Sig: Inhale 1-2 puffs into the lungs every 4 hours as needed for Wheezing     Dispense:  1 Inhaler     Refill:  3    guaiFENesin-codeine (CHERATUSSIN AC) 100-10 MG/5ML syrup     Sig: Take 5 mLs by mouth 4 times daily as needed for Cough for up to 3 days. Dispense:  180 mL     Refill:  0     Reduce doses taken as pain becomes manageable         Discussed use, benefit, and side effectsof prescribed medications. All patient questions answered. Pt voiced understanding. Reviewed health maintenance. .  Patient agreed with treatment plan. Follow up asdirected. There are no Patient Instructions on file for this visit.       Electronically signed by CLINTON Mcmahon on4/8/2019 at 10:55 AM

## 2019-04-22 ENCOUNTER — OFFICE VISIT (OUTPATIENT)
Dept: PRIMARY CARE CLINIC | Age: 44
End: 2019-04-22
Payer: COMMERCIAL

## 2019-04-22 VITALS
BODY MASS INDEX: 30.56 KG/M2 | HEIGHT: 64 IN | SYSTOLIC BLOOD PRESSURE: 124 MMHG | HEART RATE: 84 BPM | DIASTOLIC BLOOD PRESSURE: 78 MMHG | TEMPERATURE: 97.5 F | OXYGEN SATURATION: 98 % | WEIGHT: 179 LBS

## 2019-04-22 DIAGNOSIS — G35 MULTIPLE SCLEROSIS (HCC): ICD-10-CM

## 2019-04-22 DIAGNOSIS — Z79.899 MEDICATION MANAGEMENT: ICD-10-CM

## 2019-04-22 DIAGNOSIS — M79.10 MYALGIA: ICD-10-CM

## 2019-04-22 DIAGNOSIS — F41.9 ANXIETY: ICD-10-CM

## 2019-04-22 DIAGNOSIS — G47.19 EXCESSIVE DAYTIME SLEEPINESS: ICD-10-CM

## 2019-04-22 DIAGNOSIS — M54.2 CHRONIC NECK PAIN: Primary | ICD-10-CM

## 2019-04-22 DIAGNOSIS — J40 BRONCHITIS: ICD-10-CM

## 2019-04-22 DIAGNOSIS — G89.29 CHRONIC NECK PAIN: Primary | ICD-10-CM

## 2019-04-22 LAB
ALBUMIN SERPL-MCNC: 4.5 G/DL (ref 3.5–5.2)
ALP BLD-CCNC: 66 U/L (ref 35–104)
ALT SERPL-CCNC: 20 U/L (ref 5–33)
AMPHETAMINE SCREEN, URINE: NORMAL
ANION GAP SERPL CALCULATED.3IONS-SCNC: 17 MMOL/L (ref 7–19)
AST SERPL-CCNC: 11 U/L (ref 5–32)
BARBITURATE SCREEN, URINE: NORMAL
BASOPHILS ABSOLUTE: 0.1 K/UL (ref 0–0.2)
BASOPHILS RELATIVE PERCENT: 0.8 % (ref 0–1)
BENZODIAZEPINE SCREEN, URINE: NORMAL
BILIRUB SERPL-MCNC: 0.8 MG/DL (ref 0.2–1.2)
BUN BLDV-MCNC: 20 MG/DL (ref 6–20)
BUPRENORPHINE URINE: NORMAL
CALCIUM SERPL-MCNC: 10.1 MG/DL (ref 8.6–10)
CHLORIDE BLD-SCNC: 99 MMOL/L (ref 98–111)
CO2: 25 MMOL/L (ref 22–29)
COCAINE METABOLITE SCREEN URINE: NORMAL
CREAT SERPL-MCNC: 0.9 MG/DL (ref 0.5–0.9)
EOSINOPHILS ABSOLUTE: 0.2 K/UL (ref 0–0.6)
EOSINOPHILS RELATIVE PERCENT: 2.3 % (ref 0–5)
GABAPENTIN SCREEN, URINE: NORMAL
GFR NON-AFRICAN AMERICAN: >60
GLUCOSE BLD-MCNC: 337 MG/DL (ref 74–109)
HCT VFR BLD CALC: 47.1 % (ref 37–47)
HEMOGLOBIN: 15.1 G/DL (ref 12–16)
LYMPHOCYTES ABSOLUTE: 2.3 K/UL (ref 1.1–4.5)
LYMPHOCYTES RELATIVE PERCENT: 25 % (ref 20–40)
MCH RBC QN AUTO: 28.6 PG (ref 27–31)
MCHC RBC AUTO-ENTMCNC: 32.1 G/DL (ref 33–37)
MCV RBC AUTO: 89.2 FL (ref 81–99)
MDMA URINE: NORMAL
METHADONE SCREEN, URINE: NORMAL
METHAMPHETAMINE, URINE: NORMAL
MONOCYTES ABSOLUTE: 0.9 K/UL (ref 0–0.9)
MONOCYTES RELATIVE PERCENT: 10 % (ref 0–10)
NEUTROPHILS ABSOLUTE: 5.7 K/UL (ref 1.5–7.5)
NEUTROPHILS RELATIVE PERCENT: 61.5 % (ref 50–65)
OPIATE SCREEN URINE: NORMAL
OXYCODONE SCREEN URINE: NORMAL
PDW BLD-RTO: 12.3 % (ref 11.5–14.5)
PHENCYCLIDINE SCREEN URINE: NORMAL
PLATELET # BLD: 294 K/UL (ref 130–400)
PMV BLD AUTO: 11.2 FL (ref 9.4–12.3)
POTASSIUM SERPL-SCNC: 4.2 MMOL/L (ref 3.5–5)
PROPOXYPHENE SCREEN, URINE: NORMAL
RBC # BLD: 5.28 M/UL (ref 4.2–5.4)
SODIUM BLD-SCNC: 141 MMOL/L (ref 136–145)
THC SCREEN, URINE: NORMAL
TOTAL PROTEIN: 7.1 G/DL (ref 6.6–8.7)
TRICYCLIC ANTIDEPRESSANTS, UR: NORMAL
TSH SERPL DL<=0.05 MIU/L-ACNC: 2.52 UIU/ML (ref 0.27–4.2)
VITAMIN D 25-HYDROXY: 48.3 NG/ML
WBC # BLD: 9.3 K/UL (ref 4.8–10.8)

## 2019-04-22 PROCEDURE — 80305 DRUG TEST PRSMV DIR OPT OBS: CPT | Performed by: NURSE PRACTITIONER

## 2019-04-22 PROCEDURE — 99214 OFFICE O/P EST MOD 30 MIN: CPT | Performed by: NURSE PRACTITIONER

## 2019-04-22 RX ORDER — PROPRANOLOL HCL 60 MG
60 CAPSULE, EXTENDED RELEASE 24HR ORAL DAILY
Qty: 30 CAPSULE | Refills: 5 | Status: SHIPPED | OUTPATIENT
Start: 2019-04-22 | End: 2019-12-04 | Stop reason: SDUPTHER

## 2019-04-22 RX ORDER — GLATIRAMER 40 MG/ML
INJECTION, SOLUTION SUBCUTANEOUS
COMMUNITY
End: 2020-06-22

## 2019-04-22 RX ORDER — TRAMADOL HYDROCHLORIDE 50 MG/1
50 TABLET ORAL EVERY 8 HOURS PRN
Qty: 90 TABLET | Refills: 0 | Status: SHIPPED | OUTPATIENT
Start: 2019-04-22 | End: 2019-05-16 | Stop reason: SDUPTHER

## 2019-04-22 RX ORDER — LEVALBUTEROL TARTRATE 45 UG/1
1-2 AEROSOL, METERED ORAL EVERY 4 HOURS PRN
Qty: 1 INHALER | Refills: 3 | Status: SHIPPED | OUTPATIENT
Start: 2019-04-22 | End: 2019-10-04

## 2019-04-22 RX ORDER — ZOLPIDEM TARTRATE 12.5 MG/1
12.5 TABLET, FILM COATED, EXTENDED RELEASE ORAL NIGHTLY PRN
Qty: 30 TABLET | Refills: 2 | Status: SHIPPED | OUTPATIENT
Start: 2019-04-22 | End: 2019-05-16 | Stop reason: SDUPTHER

## 2019-04-22 RX ORDER — MODAFINIL 100 MG/1
100 TABLET ORAL DAILY
Qty: 30 TABLET | Refills: 2 | Status: SHIPPED | OUTPATIENT
Start: 2019-04-22 | End: 2019-05-16 | Stop reason: SDUPTHER

## 2019-04-22 NOTE — PROGRESS NOTES
Nikole [address]  [place], [address]  Phone [phone]   Fax [phone]      OFFICE VISIT: 4/22/2019    Greta Lang is a 37 y.o. female whopresents today for her medical conditions/complaints as noted below. Greta Lang isc/o of Hypertension (does she need to stay on Propanalol- Dr. Cristopher Willett ordered) and Medication Refill (Xopenex Inhaler never recieved at pharmacy)        HPI:      HPI  Chronic pain   Has MS has chronic pain related to this. Takes 1-2 ultram per day. Also takes gabapentin. Has tried cymbalta in the past.   Cant take NSAIDs any more due to CKD  Chronic neck pain  MRI scanned in media  She is needing a refill on this  She has only been taking this with a muscle relaxer at bed time or with flare ups  Gabapentin helps with the RLS and pins and needle feelings at night    Anxiety and depression  These are improved with viibryd and prn   States that she has had a better out look on life and work this week. takes buspar prn and if she has a panic attack , she takes an ativan.      Insomnia  ambroxann zaman is helping much better with her sleep and she got a new mattress. Getting about 6 hours of sleep. This has been a long term problem for her. She has tried several different medications in the past, including atarax, pamelor, belsomra, trazodone, elavil, ambien 10 mg, and klonopin. Migraines  These have greatly improved with emgality  She is on propranolol as a preventive. She wasn't to know if this can be stopped. Needs lab work. MS  She has excessive fatigue and daytime sleepiness related to this. She has been taking provigil daily in the last 1-2 weeks and states she can tell a difference. She did restart her copaxone and has a MRI scheduled this week.      Past Medical History:   Diagnosis Date    Headache(784.0)     Insomnia     Multiple sclerosis ([address]       Past Surgical History:   Procedure Laterality Date    CHOLECYSTECTOMY         No family history on file.    Social History     Tobacco Use    Smoking status: Never Smoker    Smokeless tobacco: Never Used   Substance Use Topics    Alcohol use: No     Alcohol/week: 0.0 oz      Current Outpatient Medications   Medication Sig Dispense Refill    vitamin D 1000 units CAPS Take 1 capsule by mouth daily      Glatiramer Acetate (COPAXONE) 40 MG/ML SOSY Inject into the skin Indications: Mon Wed and Fri      traMADol (ULTRAM) 50 MG tablet Take 1 tablet by mouth every 8 hours as needed for Pain for up to 30 days. 90 tablet 0    zolpidem (AMBIEN CR) 12.5 MG extended release tablet Take 1 tablet by mouth nightly as needed for Sleep for up to 30 days. 30 tablet 2    modafinil (PROVIGIL) 100 MG tablet Take 1 tablet by mouth daily for 30 days. 30 tablet 2    propranolol (INDERAL LA) 60 MG extended release capsule Take 1 capsule by mouth daily 30 capsule 5    levalbuterol (XOPENEX HFA) 45 MCG/ACT inhaler Inhale 1-2 puffs into the lungs every 4 hours as needed for Wheezing 1 Inhaler 3    gabapentin (NEURONTIN) 300 MG capsule Take 1 capsule by mouth nightly. 90 capsule 3    LORazepam (ATIVAN) 1 MG tablet Take 1 tablet by mouth every 8 hours as needed for Anxiety for up to 30 days.  60 tablet 0    Galcanezumab-gnlm (EMGALITY) 120 MG/ML SOAJ Inject 120 mg into the skin every 30 days 1 pen 11    vilazodone HCl (VILAZODONE HCL) 20 MG TABS Take 1 tablet by mouth daily 30 tablet 11    fenofibrate (TRICOR) 145 MG tablet Take 1 tablet by mouth daily 30 tablet 11    levocetirizine (XYZAL) 5 MG tablet Take 1 tablet by mouth nightly 30 tablet 11    Fluticasone Furoate-Vilanterol (BREO ELLIPTA) 200-25 MCG/INH AEPB Take 1 puff by mouth daily 60 each 11    levalbuterol (XOPENEX) 1.25 MG/0.5ML nebulizer solution Take 0.5 mLs by nebulization every 8 hours as needed for Wheezing 90 each 1    SUMAtriptan (IMITREX) 100 MG tablet TAKE ONE TABLET BY MOUTH DAILY AS NEEDED FOR MIGRAINE 27 tablet 2    promethazine (PHENERGAN) 25 MG tablet TAKE ONE TABLET BY MOUTH EVERY 6 HOURS AS NEEDED FOR NAUSEA 30 tablet 0     No current facility-administered medications for this visit. Allergies   Allergen Reactions    Ampicillin     Biaxin [Clarithromycin]     Flagyl [Metronidazole]     Saxenda [Liraglutide -Weight Management]      Nausea and vomiting    Albuterol Rash and Other (See Comments)     migraines          Health Maintenance   Topic Date Due    HIV screen  05/08/1990    DTaP/Tdap/Td vaccine (1 - Tdap) 05/08/1994    Diabetes screen  05/08/2015    Cervical cancer screen  12/03/2018    Breast cancer screen  07/03/2019    Flu vaccine (Season Ended) 09/01/2019    Lipid screen  05/17/2021    Pneumococcal 0-64 years Vaccine  Aged Out        Subjective:     Review of Systems   Constitutional: Positive for fatigue (improved with provigil). Negative for activity change, appetite change, chills, fever and unexpected weight change. HENT: Negative for congestion, hearing loss, postnasal drip, rhinorrhea, sinus pressure, sore throat and trouble swallowing. Eyes: Negative for discharge, redness and visual disturbance. Respiratory: Negative for cough, shortness of breath and wheezing. Cardiovascular: Negative for chest pain, palpitations and leg swelling. Gastrointestinal: Negative for abdominal pain, blood in stool, constipation, diarrhea, nausea and vomiting. Endocrine: Negative for cold intolerance and heat intolerance. Genitourinary: Negative for dysuria, flank pain, menstrual problem, pelvic pain, urgency and vaginal discharge. Musculoskeletal: Positive for arthralgias, back pain, myalgias and neck pain. Skin: Negative for rash. Neurological: Negative for dizziness, weakness and headaches. Hematological: Negative for adenopathy. Psychiatric/Behavioral: Positive for sleep disturbance. Negative for dysphoric mood and suicidal ideas. The patient is nervous/anxious.         Objective:      Physical Exam traMADol (ULTRAM) 50 MG tablet   4. Anxiety F41.9 zolpidem (AMBIEN CR) 12.5 MG extended release tablet   5. Excessive daytime sleepiness G47.19 modafinil (PROVIGIL) 100 MG tablet   6. Bronchitis J40 levalbuterol (XOPENEX HFA) 45 MCG/ACT inhaler   7. Medication management Z79.899 POCT Rapid Drug Screen       Plan:    she wants to stop propanolol. Will decrease this from 120 mg to 60 mg and see how the HAs do and BP. If not increase in HA and BP remains normal will continue to wean. As this medication could be further increasing her fatigue    Controlled Substances Monitoring:     RX Monitoring 4/23/2019   Attestation The Prescription Monitoring Report for this patient was reviewed today. Chronic Pain Routine Monitoring Possible medication side effects, risk of tolerance/dependence & alternative treatments discussed. ;Random urine drug screen sent today.;Obtaining appropriate analgesic effect of treatment. ;No signs of potential drug abuse or diversion identified: otherwise, see note documentation   Chronic Pain > 80 MEDD -       No follow-ups on file. Orders Placed This Encounter   Procedures    CBC Auto Differential     Standing Status:   Future     Number of Occurrences:   1     Standing Expiration Date:   4/21/2020    Comprehensive Metabolic Panel     Standing Status:   Future     Number of Occurrences:   1     Standing Expiration Date:   4/21/2020    TSH without Reflex     Standing Status:   Future     Number of Occurrences:   1     Standing Expiration Date:   4/21/2020    Vitamin D 25 Hydroxy     Standing Status:   Future     Number of Occurrences:   1     Standing Expiration Date:   4/21/2020    POCT Rapid Drug Screen     Orders Placed This Encounter   Medications    traMADol (ULTRAM) 50 MG tablet     Sig: Take 1 tablet by mouth every 8 hours as needed for Pain for up to 30 days.      Dispense:  90 tablet     Refill:  0     Reduce doses taken as pain becomes manageable    zolpidem (AMBIEN CR) 12.5

## 2019-04-23 ENCOUNTER — TELEPHONE (OUTPATIENT)
Dept: PRIMARY CARE CLINIC | Age: 44
End: 2019-04-23

## 2019-04-23 DIAGNOSIS — R73.09 ELEVATED HEMOGLOBIN A1C: Primary | ICD-10-CM

## 2019-04-23 DIAGNOSIS — R73.09 ELEVATED GLUCOSE: Primary | ICD-10-CM

## 2019-04-23 DIAGNOSIS — R73.09 ELEVATED GLUCOSE: ICD-10-CM

## 2019-04-23 LAB — HBA1C MFR BLD: 8.9 % (ref 4–6)

## 2019-04-23 ASSESSMENT — ENCOUNTER SYMPTOMS
RHINORRHEA: 0
TROUBLE SWALLOWING: 0
COUGH: 0
EYE REDNESS: 0
ABDOMINAL PAIN: 0
SHORTNESS OF BREATH: 0
SORE THROAT: 0
DIARRHEA: 0
NAUSEA: 0
WHEEZING: 0
EYE DISCHARGE: 0
VOMITING: 0
BACK PAIN: 1
SINUS PRESSURE: 0
BLOOD IN STOOL: 0
CONSTIPATION: 0

## 2019-04-23 NOTE — TELEPHONE ENCOUNTER
----- Message from CLINTON Schafer sent at 4/22/2019  7:29 PM CDT -----  CBC normal no anemia or concerns

## 2019-04-23 NOTE — TELEPHONE ENCOUNTER
I tried to call pt to notify her of her results and recommendations from Brissa. There was no answer and her vm box was full so unable to leave a message.

## 2019-04-23 NOTE — TELEPHONE ENCOUNTER
----- Message from CLINTON Awan sent at 4/23/2019  4:20 PM CDT -----  Please inform patient results show  This is high  Will see how we do over the next 3 months with out steroid if we can  Please make changes to diet and exercise such as walking when you can. Please send in testing supplies. Check fasting blood sugar 2-3 times per week.

## 2019-04-23 NOTE — TELEPHONE ENCOUNTER
----- Message from CLINTON Wadsworth sent at 4/23/2019  8:19 AM CDT -----  Please inform patient results show  Thyroid lab is normal  vitd is normal  Kidney function is normal  Glucose is 337 please have them add an a1c, I hoping this is just elevated due to all of the recent steroids.    Cbc is normal

## 2019-04-24 RX ORDER — GLUCOSAMINE HCL/CHONDROITIN SU 500-400 MG
CAPSULE ORAL
Qty: 100 STRIP | Refills: 3 | Status: SHIPPED | OUTPATIENT
Start: 2019-04-24 | End: 2022-04-14

## 2019-04-24 NOTE — TELEPHONE ENCOUNTER
Pt states she did not get her xopenex inhaler. Called [facility] and spoke with Jeremias Marks, ins will not cover xopenex inhaler, but will do proair. Do you want to change?

## 2019-04-24 NOTE — TELEPHONE ENCOUNTER
Called patient, spoke with: Patient regarding the results of the patients most recent labs. I advised Patient of Lisa Hinojosa recommendations.    Patient did voice understanding

## 2019-04-25 RX ORDER — LEVALBUTEROL TARTRATE 45 UG/1
1-2 AEROSOL, METERED ORAL EVERY 4 HOURS PRN
Qty: 1 INHALER | Refills: 3 | Status: SHIPPED | OUTPATIENT
Start: 2019-04-25 | End: 2019-10-04

## 2019-04-25 NOTE — TELEPHONE ENCOUNTER
The levalbuterol tartrate would be the generic. I don't know why the pharmacy didn't just substitute this, but I have sent it in.

## 2019-04-25 NOTE — TELEPHONE ENCOUNTER
Received a denial from [facility] on pts Xopenex inhaler. This medication cannot be approved because a preferred  drug is available. The preferred drugs available are:    Levalbuterol Tartrate  ProAir HFA   ProAir Respiclick       I will send this to provider for further recommendations.

## 2019-04-25 NOTE — TELEPHONE ENCOUNTER
Spoke with pt and she said that she would watch her diet and try to exercise. She ask what she could do to help with diet. I told her to try watch her sugars and carb intake. Supplies sent to pharmacy. She ask me to call and check on her Xopenex inhaler. I called and they said that it needed a PA. Maggie Deras said that she had already sent it in and is waiting on response.

## 2019-04-26 NOTE — TELEPHONE ENCOUNTER
Tried calling [facility] on pts inhaler. They are still running it through at [address] and not the generic name. They were closed of course and do not have an answering machine to leave information on. I will have ANDERSON Paige, nurse call tomorrow morning and have them run it under the generic brand.

## 2019-05-16 ENCOUNTER — OFFICE VISIT (OUTPATIENT)
Dept: PRIMARY CARE CLINIC | Age: 44
End: 2019-05-16
Payer: COMMERCIAL

## 2019-05-16 VITALS
SYSTOLIC BLOOD PRESSURE: 122 MMHG | HEART RATE: 71 BPM | OXYGEN SATURATION: 98 % | TEMPERATURE: 98.3 F | WEIGHT: 177 LBS | HEIGHT: 64 IN | BODY MASS INDEX: 30.22 KG/M2 | DIASTOLIC BLOOD PRESSURE: 78 MMHG

## 2019-05-16 DIAGNOSIS — M54.2 CHRONIC NECK PAIN: ICD-10-CM

## 2019-05-16 DIAGNOSIS — G89.29 CHRONIC NECK PAIN: ICD-10-CM

## 2019-05-16 DIAGNOSIS — G35 MULTIPLE SCLEROSIS (HCC): ICD-10-CM

## 2019-05-16 DIAGNOSIS — R73.09 ELEVATED GLUCOSE: Primary | ICD-10-CM

## 2019-05-16 DIAGNOSIS — M79.10 MYALGIA: ICD-10-CM

## 2019-05-16 DIAGNOSIS — G47.19 EXCESSIVE DAYTIME SLEEPINESS: ICD-10-CM

## 2019-05-16 DIAGNOSIS — G47.00 INSOMNIA, UNSPECIFIED TYPE: ICD-10-CM

## 2019-05-16 DIAGNOSIS — F41.9 ANXIETY: ICD-10-CM

## 2019-05-16 PROCEDURE — 99214 OFFICE O/P EST MOD 30 MIN: CPT | Performed by: NURSE PRACTITIONER

## 2019-05-16 RX ORDER — DOXEPIN HYDROCHLORIDE 50 MG/1
50 CAPSULE ORAL NIGHTLY
Qty: 30 CAPSULE | Refills: 5 | Status: SHIPPED | OUTPATIENT
Start: 2019-05-16 | End: 2019-11-14 | Stop reason: SDUPTHER

## 2019-05-16 RX ORDER — TRAMADOL HYDROCHLORIDE 50 MG/1
50 TABLET ORAL EVERY 8 HOURS PRN
Qty: 90 TABLET | Refills: 0 | Status: SHIPPED | OUTPATIENT
Start: 2019-05-16 | End: 2019-10-04 | Stop reason: SDUPTHER

## 2019-05-16 RX ORDER — ZOLPIDEM TARTRATE 12.5 MG/1
12.5 TABLET, FILM COATED, EXTENDED RELEASE ORAL NIGHTLY PRN
Qty: 30 TABLET | Refills: 2 | Status: SHIPPED | OUTPATIENT
Start: 2019-05-16 | End: 2019-06-15

## 2019-05-16 RX ORDER — MODAFINIL 100 MG/1
100 TABLET ORAL DAILY
Qty: 30 TABLET | Refills: 2 | Status: SHIPPED | OUTPATIENT
Start: 2019-05-16 | End: 2019-10-04 | Stop reason: SDUPTHER

## 2019-05-16 ASSESSMENT — ENCOUNTER SYMPTOMS
ABDOMINAL PAIN: 0
CONSTIPATION: 0
SHORTNESS OF BREATH: 0
EYE REDNESS: 0
SINUS PRESSURE: 0
SORE THROAT: 0
BACK PAIN: 1
WHEEZING: 0
COUGH: 0
TROUBLE SWALLOWING: 0
NAUSEA: 0
RHINORRHEA: 0
BLOOD IN STOOL: 0
VOMITING: 0
EYE DISCHARGE: 0
DIARRHEA: 0

## 2019-05-16 NOTE — PROGRESS NOTES
NEEDED FOR NAUSEA 30 tablet 0     No current facility-administered medications for this visit. Allergies   Allergen Reactions    Ampicillin     Biaxin [Clarithromycin]     Flagyl [Metronidazole]     Saxenda [Liraglutide -Weight Management]      Nausea and vomiting    Albuterol Rash and Other (See Comments)     migraines          Health Maintenance   Topic Date Due    Pneumococcal 0-64 years Vaccine (1 of 1 - PPSV23) 05/08/1981    Diabetic foot exam  05/08/1985    Diabetic retinal exam  05/08/1985    HIV screen  05/08/1990    Diabetic microalbuminuria test  05/08/1993    Hepatitis B Vaccine (1 of 3 - Risk 3-dose series) 05/08/1994    DTaP/Tdap/Td vaccine (1 - Tdap) 05/08/1994    Lipid screen  05/17/2017    Cervical cancer screen  12/03/2018    Breast cancer screen  07/03/2019    Flu vaccine (Season Ended) 09/01/2019    A1C test (Diabetic or Prediabetic)  04/22/2020        Subjective:     Review of Systems   Constitutional: Positive for fatigue (improved with provigil). Negative for activity change, appetite change, chills, fever and unexpected weight change. HENT: Negative for congestion, hearing loss, postnasal drip, rhinorrhea, sinus pressure, sore throat and trouble swallowing. Eyes: Negative for discharge, redness and visual disturbance. Respiratory: Negative for cough, shortness of breath and wheezing. Cardiovascular: Negative for chest pain, palpitations and leg swelling. Gastrointestinal: Negative for abdominal pain, blood in stool, constipation, diarrhea, nausea and vomiting. Endocrine: Negative for cold intolerance and heat intolerance. Genitourinary: Negative for dysuria, flank pain, menstrual problem, pelvic pain, urgency and vaginal discharge. Musculoskeletal: Positive for arthralgias, back pain, myalgias and neck pain. Skin: Negative for rash. Neurological: Negative for dizziness, weakness and headaches. Hematological: Negative for adenopathy. Psychiatric/Behavioral: Positive for sleep disturbance. Negative for dysphoric mood and suicidal ideas. The patient is nervous/anxious. Objective:      Physical Exam   Constitutional: She is oriented to person, place, and time. She appears well-developed and well-nourished. HENT:   Head: Normocephalic and atraumatic. Right Ear: Tympanic membrane normal.   Left Ear: Tympanic membrane normal.   Nose: Nose normal.   Mouth/Throat: Oropharynx is clear and moist and mucous membranes are normal.   Eyes: Pupils are equal, round, and reactive to light. Conjunctivae are normal. No scleral icterus. Neck: Normal range of motion. Neck supple. Carotid bruit is not present. Cardiovascular: Normal rate, regular rhythm, normal heart sounds and intact distal pulses. No murmur heard. Pulmonary/Chest: Effort normal and breath sounds normal. No respiratory distress. She has no wheezes. She has no rales. Abdominal: Soft. Bowel sounds are normal. She exhibits no distension and no mass. There is no tenderness. There is no rebound and no guarding. Musculoskeletal: Normal range of motion. She exhibits no edema. Lumbar back: She exhibits tenderness, pain and spasm. Generalized weakness  walking with vogt   Neurological: She is alert and oriented to person, place, and time. She has normal reflexes. Gait (with cane) abnormal.   Skin: Skin is warm, dry and intact. No lesion and no rash noted. Psychiatric: Her speech is normal and behavior is normal. Judgment and thought content normal. Her mood appears not anxious. She does not exhibit a depressed mood. She expresses no suicidal ideation. Vitals reviewed. /78   Pulse 71   Temp 98.3 °F (36.8 °C) (Temporal)   Ht 5' 4\" (1.626 m)   Wt 177 lb (80.3 kg)   SpO2 98%   BMI 30.38 kg/m²     Assessment:           ICD-10-CM    1. Elevated glucose R73.09    2.  Multiple sclerosis (HCC) G35 traMADol (ULTRAM) 50 MG tablet     modafinil (PROVIGIL) 100 MG tablet 3. Myalgia M79.10 traMADol (ULTRAM) 50 MG tablet   4. Chronic neck pain M54.2 traMADol (ULTRAM) 50 MG tablet    G89.29    5. Anxiety F41.9    6. Excessive daytime sleepiness G47.19 modafinil (PROVIGIL) 100 MG tablet   7. Insomnia, unspecified type G47.00 zolpidem (AMBIEN CR) 12.5 MG extended release tablet     doxepin (SINEQUAN) 50 MG capsule       Plan:    continue diet and exercise changes. Will check lab work in 3 months. She wants to try to wean off Doylene Popper- will try doxepin  Kidney function is normal- she will try prn motrin for pain recheck lab work when I return. Return in about 3 months (around 8/16/2019). No orders of the defined types were placed in this encounter. Orders Placed This Encounter   Medications    traMADol (ULTRAM) 50 MG tablet     Sig: Take 1 tablet by mouth every 8 hours as needed for Pain for up to 30 days. Dispense:  90 tablet     Refill:  0     Reduce doses taken as pain becomes manageable    zolpidem (AMBIEN CR) 12.5 MG extended release tablet     Sig: Take 1 tablet by mouth nightly as needed for Sleep for up to 30 days. Dispense:  30 tablet     Refill:  2    doxepin (SINEQUAN) 50 MG capsule     Sig: Take 1 capsule by mouth nightly     Dispense:  30 capsule     Refill:  5    modafinil (PROVIGIL) 100 MG tablet     Sig: Take 1 tablet by mouth daily for 30 days. Dispense:  30 tablet     Refill:  2         Discussed use, benefit, and side effectsof prescribed medications. All patient questions answered. Pt voiced understanding. Reviewed health maintenance. .  Patient agreed with treatment plan. Follow up asdirected. There are no Patient Instructions on file for this visit.       Electronically signed by CLINTON Mayo on5/16/2019 at 3:07 PM

## 2019-05-24 DIAGNOSIS — F41.9 ANXIETY: ICD-10-CM

## 2019-05-28 RX ORDER — ZOLPIDEM TARTRATE 12.5 MG/1
TABLET, FILM COATED, EXTENDED RELEASE ORAL
Qty: 30 TABLET | OUTPATIENT
Start: 2019-05-28

## 2019-05-28 NOTE — TELEPHONE ENCOUNTER
Received fax from pharmacy requesting refill on pts medication(s). Pt was last seen in office on 5/16/2019  and has a follow up scheduled for Visit date not found. Will send request to  Pharmacy  for patient.      Requested Prescriptions     Refused Prescriptions Disp Refills    zolpidem (AMBIEN CR) 12.5 MG extended release tablet [Pharmacy Med Name: ZOLPIDEM TARTRATE ER 12.5 MG TAB MPHASE] 30 tablet      Sig: TAKE ONE TABLET BY MOUTH IN THE EVENING AS NEEDED     Refused By: Bernie Mark     Reason for Refusal: Refill not appropriate

## 2019-08-14 DIAGNOSIS — G47.00 INSOMNIA, UNSPECIFIED TYPE: ICD-10-CM

## 2019-08-14 RX ORDER — HYDROXYZINE 50 MG/1
TABLET, FILM COATED ORAL
Qty: 30 TABLET | Refills: 11 | OUTPATIENT
Start: 2019-08-14

## 2019-09-27 RX ORDER — BUSPIRONE HYDROCHLORIDE 10 MG/1
10 TABLET ORAL 3 TIMES DAILY PRN
Qty: 90 TABLET | Refills: 5 | Status: SHIPPED | OUTPATIENT
Start: 2019-09-27 | End: 2020-05-21 | Stop reason: SDUPTHER

## 2019-10-04 ENCOUNTER — OFFICE VISIT (OUTPATIENT)
Dept: PRIMARY CARE CLINIC | Age: 44
End: 2019-10-04
Payer: COMMERCIAL

## 2019-10-04 VITALS
HEIGHT: 64 IN | DIASTOLIC BLOOD PRESSURE: 84 MMHG | BODY MASS INDEX: 31.76 KG/M2 | WEIGHT: 186 LBS | TEMPERATURE: 97.4 F | SYSTOLIC BLOOD PRESSURE: 120 MMHG | OXYGEN SATURATION: 98 % | HEART RATE: 92 BPM

## 2019-10-04 DIAGNOSIS — G89.29 CHRONIC NECK PAIN: ICD-10-CM

## 2019-10-04 DIAGNOSIS — M54.2 CHRONIC NECK PAIN: ICD-10-CM

## 2019-10-04 DIAGNOSIS — M79.10 MYALGIA: ICD-10-CM

## 2019-10-04 DIAGNOSIS — G25.81 RLS (RESTLESS LEGS SYNDROME): ICD-10-CM

## 2019-10-04 DIAGNOSIS — G35 MULTIPLE SCLEROSIS (HCC): ICD-10-CM

## 2019-10-04 DIAGNOSIS — G47.19 EXCESSIVE DAYTIME SLEEPINESS: ICD-10-CM

## 2019-10-04 PROCEDURE — 99214 OFFICE O/P EST MOD 30 MIN: CPT | Performed by: NURSE PRACTITIONER

## 2019-10-04 RX ORDER — METHOCARBAMOL 500 MG/1
500 TABLET, FILM COATED ORAL 4 TIMES DAILY
COMMUNITY
End: 2019-10-04 | Stop reason: SDUPTHER

## 2019-10-04 RX ORDER — METHOCARBAMOL 500 MG/1
500 TABLET, FILM COATED ORAL 4 TIMES DAILY
Qty: 120 TABLET | Refills: 3 | Status: SHIPPED | OUTPATIENT
Start: 2019-10-04 | End: 2019-11-03

## 2019-10-04 RX ORDER — MODAFINIL 100 MG/1
100 TABLET ORAL DAILY
Qty: 30 TABLET | Refills: 2 | Status: SHIPPED | OUTPATIENT
Start: 2019-10-04 | End: 2020-01-14 | Stop reason: SDUPTHER

## 2019-10-04 RX ORDER — TRAMADOL HYDROCHLORIDE 50 MG/1
50 TABLET ORAL EVERY 8 HOURS PRN
Qty: 90 TABLET | Refills: 0 | Status: SHIPPED | OUTPATIENT
Start: 2019-10-04 | End: 2020-01-14 | Stop reason: SDUPTHER

## 2019-10-04 RX ORDER — GABAPENTIN 300 MG/1
300 CAPSULE ORAL NIGHTLY
Qty: 90 CAPSULE | Refills: 3 | Status: SHIPPED | OUTPATIENT
Start: 2019-10-04 | End: 2020-01-14 | Stop reason: SDUPTHER

## 2019-10-04 RX ORDER — FUROSEMIDE 20 MG/1
20 TABLET ORAL DAILY PRN
COMMUNITY
End: 2022-04-14

## 2019-10-07 ASSESSMENT — ENCOUNTER SYMPTOMS
BACK PAIN: 1
WHEEZING: 0
BLOOD IN STOOL: 0
RHINORRHEA: 0
COUGH: 0
TROUBLE SWALLOWING: 0
SINUS PRESSURE: 0
SHORTNESS OF BREATH: 0
CONSTIPATION: 0
SORE THROAT: 0
EYE DISCHARGE: 0
NAUSEA: 0
ABDOMINAL PAIN: 0
DIARRHEA: 0
VOMITING: 0
EYE REDNESS: 0

## 2019-10-10 RX ORDER — LEVOCETIRIZINE DIHYDROCHLORIDE 5 MG/1
5 TABLET, FILM COATED ORAL NIGHTLY
Qty: 30 TABLET | Refills: 11 | Status: SHIPPED | OUTPATIENT
Start: 2019-10-10 | End: 2020-08-10 | Stop reason: SDUPTHER

## 2019-10-24 RX ORDER — FLUTICASONE FUROATE AND VILANTEROL 200; 25 UG/1; UG/1
1 POWDER RESPIRATORY (INHALATION) DAILY
Qty: 60 EACH | Refills: 11 | Status: SHIPPED | OUTPATIENT
Start: 2019-10-24 | End: 2021-01-08 | Stop reason: SDUPTHER

## 2019-10-28 ENCOUNTER — TELEPHONE (OUTPATIENT)
Dept: ONCOLOGY | Facility: CLINIC | Age: 44
End: 2019-10-28

## 2019-10-28 DIAGNOSIS — R11.0 NAUSEA: ICD-10-CM

## 2019-10-28 DIAGNOSIS — G44.229 CHRONIC TENSION-TYPE HEADACHE, NOT INTRACTABLE: ICD-10-CM

## 2019-10-28 RX ORDER — PROMETHAZINE HYDROCHLORIDE 25 MG/1
TABLET ORAL
Qty: 30 TABLET | Refills: 0 | Status: SHIPPED | OUTPATIENT
Start: 2019-10-28 | End: 2020-07-16 | Stop reason: SDUPTHER

## 2019-10-28 NOTE — TELEPHONE ENCOUNTER
----- Message from Augustine Baldwin MD sent at 10/27/2019 11:09 AM CDT -----  Regarding: Missing pre-office labs  Please asked patient to come in to have her labs drawn ASAP for her office appointment on Wednesday:     SPIEP, kappa/lambda light chains, 24 hour UPIEP, CMP and CBC with differential

## 2019-10-29 ENCOUNTER — TELEPHONE (OUTPATIENT)
Dept: ONCOLOGY | Facility: CLINIC | Age: 44
End: 2019-10-29

## 2019-10-29 NOTE — TELEPHONE ENCOUNTER
Reminder call placed and message was left for patient to please have her labs drawn today, if not we will need to ollie to later date.

## 2019-10-29 NOTE — TELEPHONE ENCOUNTER
----- Message from Augustine Baldwin MD sent at 10/27/2019 11:10 AM CDT -----  Regarding: Missing pre-office labs  Please asked the patient to come in ASAP for her office visit on Wednesday:     SPIEP, kappa/lambda light chains, 24 hour UPIEP, CMP and CBC with differential

## 2019-11-04 ENCOUNTER — TELEPHONE (OUTPATIENT)
Dept: ONCOLOGY | Facility: CLINIC | Age: 44
End: 2019-11-04

## 2019-11-04 DIAGNOSIS — D47.2 MONOCLONAL GAMMOPATHY: Primary | ICD-10-CM

## 2019-11-04 NOTE — TELEPHONE ENCOUNTER
[facility] called patient went to have labs drawn on  but the lab order she had is already . They need a new order for patient. Fax [phone]

## 2019-11-14 DIAGNOSIS — G47.00 INSOMNIA, UNSPECIFIED TYPE: ICD-10-CM

## 2019-11-15 RX ORDER — DOXEPIN HYDROCHLORIDE 50 MG/1
50 CAPSULE ORAL NIGHTLY
Qty: 30 CAPSULE | Refills: 11 | Status: SHIPPED | OUTPATIENT
Start: 2019-11-15 | End: 2020-11-10 | Stop reason: SDUPTHER

## 2019-11-16 NOTE — PROGRESS NOTES
[facility]AT[facility]Y  [address]  [place] [address] [phone]    Patient Name: Jeannette Hitchcock  Encounter Date: 11/18/2019  YOB: 1975  Patient Number: [id number]      REASON FOR VISIT: Jeannette Hitchcock is a 44-year-old female who returns in follow-up of monoclonal gammopathy. It has been 33.5 months since her initial visit on 02/02/2017. She remains in observation. She is here alone (usually with her spouse Juan). History is obtained from the patient who is considered reliable.     DIAGNOSTIC ABNORMALITIES:   1. CT chest, 06/13/2012: Negative. Prominent varices in the hilum of the spleen.   2. CT abdomen/pelvis, 06/13/2012: Diffuse fatty infiltration of the liver, varices in the splenic hilum. 4 cm right ovarian cyst present.   3. Labs, 06/2012 (evaluation for suspected chronic inflammatory condition): C-reactive protein elevated at 1.69. SPEP: Abnormal protein band 0.3 gm/dL, consistent with monoclonal immunoglobulin or light chains. Immunofixation: IgG lambda monoclonal protein. Urine free light chains unremarkable.   4. Labs, 07/19/2013, Dr. Matamoros: Hemoglobin 12.9, hematocrit 39.7, MCV 90.1, platelets 251,000, WBC 7.0. CMP notable for BUN 10, creatinine 0.82, calcium 9.1, total protein 6.2 (each normal), normal liver enzymes.  (0 - 214). IgG 860 (700 - 1600), IgA 49 (91 - 414), IgM 74 (40 -230), RA factor 6.0 (0 - 13.9). TSH 2.69, T4 11.9 (each normal). C-reactive protein elevated at 11.2 (0 -4.9). It was felt that the patient had nonsignificant monoclonal gammopathy likely related to the chronic inflammatory state.   5. Labs, 06/15/2016: CMP notable for BUN 16, creatinine 1.0 (GFR 64.9), total protein 6.9, calcium 9.7 (each normal). Hemoglobin 13.3, hematocrit 41.9, MCV 89.5, platelets 312,000, WBC 6.6, normal differential. IgG 705, IgA 44, IgM 44, M-spike 0.3.   6. Brain MRI, 10/2015, Dr. Figueroa:  Abnormal. 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 Scan felt to show typical and active white matter inflammatory disease and multiple sclerosis.   7. Labs, 02/02/2017: Hemoglobin 13.5, hematocrit 40.7, MCV 87.8, platelets 304,000, WBC 7.2 with a normal differential. CMP notable for BUN 17, creatinine 1.2 (GFR 52.6) otherwise normal with calcium 10.2, total protein 6.9, normal liver enzymes. B2M 2.96, serum iron 125, saturation 30.49, B12 520, folate 8.8, TIBC 410 (each within reference range), ferritin 21 (depressed). SPIEP IgG 881 (791 - 1643), IgM 55 (43 - 279), IgA 49.8 (66 - 436). Immunofixation electrophoresis: IgG lambda monoclonal immunoglobulin. Monoclonal immunoglobulin concentration 0.2. Kappa free light chains 0.94 (0.3 - 1.94), lambda free light chains 3.82 (0.5 - 2.63), kappa/lambda light chain ratio 0.25 (0.26 - 1.65).  8. [place], Bone survey, 02/09/2017, [facility]: Chest x-ray demonstrates no active disease. Bone survey demonstrates no lytic or blastic bone lesions. Mild degenerative changes in the thoracic spine. Minimal compression deformity of the superior endplate of T10.  9. Labs, 05/15/2017: Hemoglobin 13.7, hematocrit 41.5, MCV 87.9, platelets 258,000, WBC 5.6, normal differential. BUN 22, creatinine 1.11 (GFR 57), calcium 8.9, alk phos 42, kappa 80 (176-443), lambda 186 (), k/l ration 0.4 (1.29-2.55). Serum IEP, IgG lambda with M-spike 0.4  10. UPIEP, 05/16/2017: Total protein 51 mg/24 hr. LANCE: No monoclonal proteins detected.    PREVIOUS INTERVENTIONS:   1. Observation.        Problem List Items Addressed This Visit        Nervous and Auditory    Multiple sclerosis (CMS/HCC) - Primary       Immune and Lymphatic    Monoclonal gammopathy of unknown significance (MGUS)         No history exists.       PAST MEDICAL HISTORY:  ALLERGIES:  Allergies   Allergen Reactions   •  Ampicillin Unknown (See Comments)   • Clarithromycin Unknown (See Comments)   • Liraglutide -Weight Management Nausea And Vomiting     Nausea and vomiting   • Metronidazole Unknown (See Comments)   • Albuterol Other (See Comments) and Rash     migraines       CURRENT MEDICATIONS:  Outpatient Encounter Medications as of 11/18/2019   Medication Sig Dispense Refill   • acetaminophen (TYLENOL) 500 MG tablet Take 500 mg by mouth Every 6 (Six) Hours As Needed for Mild Pain .     • Blood Glucose Monitoring Suppl device Check fasting blood sugar 2-3 times a week     • BREO ELLIPTA 200-25 MCG/INH inhaler INHALE ONE PUFF INTO THE LUNGS DAILY  11   • busPIRone (BUSPAR) 10 MG tablet Take 10 mg by mouth.     • doxepin (SINEquan) 50 MG capsule Take 50 mg by mouth Every Night. nightly  5   • EMGALITY 120 MG/ML prefilled syringe INJECT 120MG INTO THE SKIN EVERY 30 DAYS  11   • furosemide (LASIX) 20 MG tablet Take 20 mg by mouth.     • gabapentin (NEURONTIN) 300 MG capsule gabapentin 300 mg capsule     • Glatiramer Acetate (COPAXONE) 40 MG/ML solution prefilled syringe Copaxone 40 mg/mL subcutaneous syringe     • Glucose Blood (BLOOD GLUCOSE TEST) strip Check fasting blood sugar 2-3 times a week     • levocetirizine (XYZAL) 5 MG tablet Take 5 mg by mouth every night at bedtime.  11   • methocarbamol (ROBAXIN) 500 MG tablet Take 500 mg by mouth 4 (Four) Times a Day.  3   • modafinil (PROVIGIL) 100 MG tablet Take 100 mg by mouth Daily.  2   • promethazine (PHENERGAN) 25 MG tablet Take 25 mg by mouth Every 6 (Six) Hours As Needed. for nausea  0   • propranolol LA (INDERAL LA) 60 MG 24 hr capsule propranolol ER 60 mg capsule,24 hr,extended release     • SUMAtriptan (IMITREX) 100 MG tablet sumatriptan 100 mg tablet   TAKE ONE-HALF TABLET AS NEEDED EACH WEEK FOR HEADACHE     • traMADol (ULTRAM) 50 MG tablet Take 50 mg by mouth Every 8 (Eight) Hours As Needed. for pain  0   • vilazodone (VIIBRYD) 20 MG tablet tablet Take 20 mg by mouth.    "    No facility-administered encounter medications on file as of 11/18/2019.      ADULT ILLNESSES:   Monoclonal gammopathy ( ICD-10:D47.2 ;Monoclonal gammopathy   Anemia ( ICD-10:D64.9 ;Anemia, unspecified   Brain lesion ( leukoencephalopathy; ICD-10:G93.9 ;Disorder of brain, unspecified )   Elevated triglycerides ( ICD-10:E78.1 ;Pure hyperglyceridemia   Fibromyalgia ( ICD-10:M79.7 ;Fibromyalgia   Migraines ( ICD-10:G43.909 ;Migraine, unspecified, not intractable, without status migrainosus   Multiple sclerosis (disorder) ( diagnosed 10/2015. Followed by Dr. Dave Figueroa; ICD-10:G35 ;Multiple sclerosis )   Perforation of colon ( 2 events; ICD-10:K63.1 ;Perforation of intestine (nontraumatic) )    SURGERIES:   Spinal tap   Biopsy of liver: Laparoscopic biopsy of liver \"fatty spot\" with repair of secondarily perforated colon, 2012.   Colostomy and takedown   Appendectomy   Repair of perforated colon x13 years ago with extensive hospital stay x3 months   EGD and colonoscopy, 2010, Dr. Jones      ADULT ILLNESSES:  Patient Active Problem List   Diagnosis Code   • Chronic headache disorder R51   • Headache R51   • Hypertriglyceridemia E78.1   • Insomnia G47.00   • Moderate episode of recurrent major depressive disorder (CMS/HCC) F33.1   • Multiple sclerosis (CMS/HCC) G35   • Vitamin D deficiency E55.9   • Monoclonal gammopathy of unknown significance (MGUS) D47.2     SURGERIES:  Past Surgical History:   Procedure Laterality Date   • APPENDECTOMY     • COLON SURGERY      Repair of perforated colon x13 years ago with extensive hospital stay x3 months   • COLOSTOMY     • COLOSTOMY CLOSURE     • ENDOSCOPY AND COLONOSCOPY  2010    Dr. Jones   • LIVER BIOPSY  2012    Laparoscopic biopsy of liver \"fatty spot\" with repair of secondarily perforated colon   • LUMBAR PUNCTURE      Spinal tap     HEALTH MAINTENANCE ITEMS:  Health Maintenance Due   Topic Date Due   • ANNUAL PHYSICAL  05/08/1978   • TDAP/TD VACCINES (1 - Tdap) " "05/08/1994   • INFLUENZA VACCINE  08/01/2019   • PAP SMEAR  10/03/2019   • LIPID PANEL  10/03/2019       <no information>  Last Completed Colonoscopy     Patient has no health maintenance due at this time          There is no immunization history on file for this patient.  Last Completed Mammogram     Patient has no health maintenance due at this time            FAMILY HISTORY:  Family History   Problem Relation Age of Onset   • Heart attack Mother    • Stroke Father      SOCIAL HISTORY:  Social History     Socioeconomic History   • Marital status:      Spouse name: Not on file   • Number of children: Not on file   • Years of education: Not on file   • Highest education level: Not on file   Tobacco Use   • Smoking status: Never Smoker       REVIEW OF SYSTEMS:  Constitutional:   The patient's appetite is fairly good, but energy is chronically low. 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 Says she also sees a counselor for herself. She has no weight changes (had lost 1 pound at her prior visit). Says she is not walking as she used to, \"too much work.\" Says her usual weight is in the 180's. She has no fevers, chills, or drenching night sweats. Her sleep habits seem appropriate.  Ear/Nose/Throat/Mouth:   She reports no ear pains but has chronic sinus symptoms. No sore throat, nosebleeds, or sore tongue. She has chronic migraine headaches. She denies any hoarseness, change in voice quality, or hemoptysis.   Ocular:   She reports no eye pain, significant change in visual acuity, double vision, or blurry vision. Says she sees intermittent \"orange spots.\" Says her ophthalmologist says she is developing optic nerve demyelination.  Respiratory:   She reports no chronic cough, significant shortness of breathing, phlegm production, or unexplained chest wall pain.  Cardiovascular:   She reports no exertional chest pain, chest pressure, " "or chest heaviness. She reports no claudication. She reports no palpitations or symptomatic orthostasis.  Gastrointestinal:   She reports chronic dysphagia with nausea but no vomiting. She has occasional postprandial abdominal pain, bloating, cramping, but no change in bowel habits, and no discoloration of the stool. She reports no rectal bleeding from known hemorrhoids. She reports intermittent constipation better modulated by dietary fiber. Has not had diarrhea. Last colonoscopy 2010. Diverticulosis. \"I'll never have another.\" Again admits to intermittent bowel incontinence. \"I still get accidents.\" Refuses to wear protective pads.  \"Mentally I can't do it.\"  Genitourinary:   She reports no urinary burning, frequency, dribbling, or discoloration. She reports difficulty controlling her bladder and incontinence. Does not use adult pads. She has no need to urinate frequently through the night.  Musculoskeletal:   She reports chronic arthralgias of the neck, back, arms, and hips with diffuse myalgias and nighttime leg cramping.  Extremities:   She reports no trouble with fluid retention or significant leg swelling.  Endocrine:   She reports no problems with excess thirst, excessive urination, vasomotor instability, or unexplained fatigue.  Heme/Lymphatic:   She reports no unexplained bleeding, bruising, petechial rashes, or swollen glands.  Skin:   She reports no itching, rashes, or lesions which won't heal.  Neuro:   She reports bouts of dizziness, tremors, limb weakness, numbness and weakness (from the MS), with fewer episodes on Copaxone. She is also on muscle relaxants that decreases her pains. She has no recent loss of consciousness, seizures, fainting spells. She has no difficulty with speech. She uses a cane for stability when she walks. She has not had any falls.  Psych:   She seems generally satisfied with life. She denies depression. She reports no mood swings.       VITAL SIGNS: /84   Pulse 87   " "Temp 97.8 °F (36.6 °C)   Resp 16   Ht 162.6 cm (64\")   Wt 84.8 kg (186 lb 14.4 oz)   SpO2 97%   Breastfeeding? No   BMI 32.08 kg/m² Body surface area is 1.9 meters squared.  Pain Score    11/18/19 1140   PainSc: 0-No pain         PHYSICAL EXAMINATION:   General:   She is a pleasant, heavyset, well-developed, well-nourished, and well-kept female who is comfortable at rest. She arrived in the exam room ambulatory with a cane (previously used a walker). She appears to be her stated age. Her skin color is normal.   Head/Neck:   The patient is anicteric and atraumatic. The oropharynx, mouth and throat are clear. The trachea is midline. The neck is supple without evidence of jugular venous distention or cervical adenopathy.   Eyes:   The pupils are equal, round, and reactive to light. The extraocular movements are full. There is no scleral jaundice or erythema.   Chest:   The respiratory efforts are normal and unhindered. The chest is clear to auscultation and percussion. There are no wheezes, rhonchi, rales, or asymmetry of breath sounds.  Cardiovascular:   The patient has a regular cardiac rate and rhythm without murmurs, rubs, or gallops. The peripheral pulses are equal and full.  Abdomen:   The belly is soft and globose. There is no rebound or guarding. There is no organomegaly, mass-effect, or tenderness. Bowel sounds are active and of normal character.  Extremities:   There is no evidence of cyanosis, clubbing, or edema.  Rheumatologic:   There is no overt evidence of rheumatoid deformities of the hands. There is no sausaging of the fingers. There is no sign of active synovitis. The gait is less tentative, less wobbly, but still cane supported.  Cutaneous:   There are no overt rashes, disseminated lesions, purpura, or petechiae.   Lymphatics:   There is no evidence of adenopathy in the cervical, supraclavicular, axillary areas.   Neurologic:   The patient is alert, oriented, cooperative, and pleasant. She is " appropriately conversant. She ambulated into the exam room with a cane but transferred from chair to exam table unaided. There is a subtle gait instability but no overt focal motor weakness.  Psych:   Mood and affect are appropriate for circumstance. Eye contact is appropriate. Normal judgement and decision making.         LABS    ASSESSMENT:   1. IgG lambda monoclonal gammopathy, available evidence consistent with monoclonal gammopathy of undetermined significance.  a. No anemia.  b. Stable immunoglobulins and M-spike (0.3, 11/05/2019; 0.3, 10/08/2018; 0.3, 09/07/2017; 0.4, 05/15/2017; 0.2, 02/02/2017; 0.3, 06/16/2016; baseline 0.2 on 02/05/2014).  c. No hypercalcemia.   d. Stable renal function.  e. Normal MRI of cervical spine and thoracic spine, 10/07/2013.    f. Negative bone survey, 02/09/2017   g. Negative UPIEP. On 09/12/2017 - 50 mg protein. LANCE: No monoclonal proteins detected.   2. Fibromyalgia.  3. Multiple sclerosis.  4. Migraines.  5. History of ruptured colon with repair and extensive hospital stay sometime in 2003.  6. Chronic kidney disease, stage 3. GFR 52.6 mL/min on 02/02/2017. Stable, GFR > 60 mL/min, 11/05/2019; 52 mL/min, 10/08/2018; > 60 mL/min on 09/07/2017; 57 mL/min on 05/15/2017. Followed by nephrology (Dr. Jolly).        7. IBS?    RECOMMENDATIONS:   1.  Re: Apprised of labs from 11/05/2019 with normal CBC, low GFR (stable), normal calcium, otherwise stable CMP, stable (normal) k/l ratio, stable serum M-spike (0.3), UPIEP without monoclonal proteins.   2. Previously discussed labs, 02/02/2017: Stable SPIEP (slightly depressed IgA otherwise normal IgG and IgM with M-spike of 0.2), normal CBC with differential, GFR 52 otherwise normal calcium, normal total protein and normal CMP, normal serum iron, normal Fe sat, low ferritin, normal B12, normal folate, normal retic count, normal B2M, and normal kappa/lambda serum light chains/ratio.   3. Review office encounter from Dr. Jolly,  01/18/2018. Stable from renal standpoint. Follow-up 12 months   4. Forego bone marrow biopsy as M-spike < 1.0 and no other suspicious indicators (CRAB criteria) for myeloma.   5. Previously discussed MGUS. I noted to the patient that in her age group, about 1% to 3% of normal persons harbor a paraprotein. Four groups of patients with MGUS can be identified: 1) Those still alive with monoclonal gammopathy (19%); 2) Those with serum immunoglobulin levels increased more than 3 g/dL, but who do not require any therapy (10%); 3) Patients who die without development of myeloma or another related disease (47%); 4) and patients who develop myeloma, amyloidosis, macroglobulinemia, or another lymphoproliferative disorder (24%, two-thirds of these disorders are myeloma); which occurs at a median of 8 to 10 years. Unfortunately, I noted that there is no consistent way to predict which patients with MGUS would progress to multiple myeloma.   6. Observe.   7. Continue currently identified medications.   8. Return to the [place] office with pre-office labs: SPIEP, kappa/lambda light chains, 24 hour UPIEP, CMP and CBC with differential in 1 year.     MEDICAL DECISION MAKING: Moderate Complexity   AMOUNT OF DATA: Moderate     TIME SPENT: Face-to-face time on this encounter, as defined by the American Medical Association in the 2019 Current Procedural Terminology codebook; assessment, record review, lab review, planning and education - 25 minutes.     cc: MD Gustabo Nagel DO Jeffrey Pittman, MD Rachel Korson, MD - [facility]       Caroline Méndez MD - Neurology - [place], [place]

## 2019-11-18 ENCOUNTER — OFFICE VISIT (OUTPATIENT)
Dept: ONCOLOGY | Facility: CLINIC | Age: 44
End: 2019-11-18

## 2019-11-18 VITALS
BODY MASS INDEX: 31.91 KG/M2 | TEMPERATURE: 97.8 F | HEART RATE: 87 BPM | RESPIRATION RATE: 16 BRPM | WEIGHT: 186.9 LBS | DIASTOLIC BLOOD PRESSURE: 84 MMHG | HEIGHT: 64 IN | OXYGEN SATURATION: 97 % | SYSTOLIC BLOOD PRESSURE: 136 MMHG

## 2019-11-18 DIAGNOSIS — G35 MULTIPLE SCLEROSIS (HCC): Primary | ICD-10-CM

## 2019-11-18 DIAGNOSIS — D47.2 MONOCLONAL GAMMOPATHY OF UNKNOWN SIGNIFICANCE (MGUS): ICD-10-CM

## 2019-11-18 PROBLEM — R51.9 HEADACHE: Status: ACTIVE | Noted: 2019-11-18

## 2019-11-18 PROBLEM — F33.1 MODERATE EPISODE OF RECURRENT MAJOR DEPRESSIVE DISORDER (HCC): Status: ACTIVE | Noted: 2018-05-25

## 2019-11-18 PROCEDURE — 99214 OFFICE O/P EST MOD 30 MIN: CPT | Performed by: INTERNAL MEDICINE

## 2019-11-18 RX ORDER — VILAZODONE HYDROCHLORIDE 20 MG/1
20 TABLET ORAL
COMMUNITY
Start: 2018-11-05

## 2019-11-18 RX ORDER — BUSPIRONE HYDROCHLORIDE 10 MG/1
10 TABLET ORAL
COMMUNITY
Start: 2019-09-27 | End: 2021-08-11

## 2019-11-18 RX ORDER — ACETAMINOPHEN 500 MG
500 TABLET ORAL EVERY 6 HOURS PRN
COMMUNITY
End: 2021-08-24 | Stop reason: HOSPADM

## 2019-11-18 RX ORDER — TRAMADOL HYDROCHLORIDE 50 MG/1
50 TABLET ORAL EVERY 8 HOURS PRN
Refills: 0 | COMMUNITY
Start: 2019-10-05

## 2019-11-18 RX ORDER — PROMETHAZINE HYDROCHLORIDE 25 MG/1
25 TABLET ORAL EVERY 6 HOURS PRN
Refills: 0 | COMMUNITY
Start: 2019-10-28

## 2019-11-18 RX ORDER — GLATIRAMER 40 MG/ML
INJECTION, SOLUTION SUBCUTANEOUS
COMMUNITY
End: 2020-12-03 | Stop reason: ALTCHOICE

## 2019-11-18 RX ORDER — SUMATRIPTAN 100 MG/1
TABLET, FILM COATED ORAL
COMMUNITY
Start: 2017-09-15

## 2019-11-18 RX ORDER — FUROSEMIDE 20 MG/1
20 TABLET ORAL
COMMUNITY

## 2019-11-18 RX ORDER — GLUCOSAMINE HCL/CHONDROITIN SU 500-400 MG
CAPSULE ORAL
COMMUNITY
Start: 2019-04-24

## 2019-11-18 RX ORDER — GABAPENTIN 300 MG/1
CAPSULE ORAL
COMMUNITY
Start: 2019-10-04 | End: 2020-12-03 | Stop reason: ALTCHOICE

## 2019-11-18 RX ORDER — PROPRANOLOL HCL 60 MG
CAPSULE, EXTENDED RELEASE 24HR ORAL
COMMUNITY
Start: 2019-04-22

## 2019-11-18 RX ORDER — GALCANEZUMAB 120 MG/ML
INJECTION, SOLUTION SUBCUTANEOUS
Refills: 11 | COMMUNITY
Start: 2019-10-21 | End: 2021-08-11

## 2019-11-18 RX ORDER — MODAFINIL 100 MG/1
200 TABLET ORAL DAILY
Refills: 2 | COMMUNITY
Start: 2019-10-09

## 2019-11-18 RX ORDER — LEVOCETIRIZINE DIHYDROCHLORIDE 5 MG/1
5 TABLET, FILM COATED ORAL
Refills: 11 | COMMUNITY
Start: 2019-11-11

## 2019-11-18 RX ORDER — METHOCARBAMOL 500 MG/1
500 TABLET, FILM COATED ORAL 4 TIMES DAILY
Refills: 3 | COMMUNITY
Start: 2019-10-05 | End: 2021-08-11

## 2019-11-18 RX ORDER — DOXEPIN HYDROCHLORIDE 50 MG/1
50 CAPSULE ORAL NIGHTLY
Refills: 5 | COMMUNITY
Start: 2019-11-11

## 2019-12-04 DIAGNOSIS — F33.1 MODERATE EPISODE OF RECURRENT MAJOR DEPRESSIVE DISORDER (HCC): ICD-10-CM

## 2019-12-04 RX ORDER — VILAZODONE HYDROCHLORIDE 20 MG/1
20 TABLET ORAL DAILY
Qty: 30 TABLET | Refills: 11 | Status: SHIPPED | OUTPATIENT
Start: 2019-12-04 | End: 2019-12-05 | Stop reason: SDUPTHER

## 2019-12-04 RX ORDER — PROPRANOLOL HCL 60 MG
60 CAPSULE, EXTENDED RELEASE 24HR ORAL DAILY
Qty: 30 CAPSULE | Refills: 5 | Status: SHIPPED | OUTPATIENT
Start: 2019-12-04 | End: 2020-04-21 | Stop reason: SDUPTHER

## 2019-12-05 ENCOUNTER — OFFICE VISIT (OUTPATIENT)
Dept: PRIMARY CARE CLINIC | Age: 44
End: 2019-12-05
Payer: COMMERCIAL

## 2019-12-05 VITALS
DIASTOLIC BLOOD PRESSURE: 86 MMHG | SYSTOLIC BLOOD PRESSURE: 124 MMHG | BODY MASS INDEX: 31.24 KG/M2 | TEMPERATURE: 97.6 F | HEART RATE: 98 BPM | OXYGEN SATURATION: 98 % | HEIGHT: 64 IN | WEIGHT: 183 LBS

## 2019-12-05 DIAGNOSIS — J40 BRONCHITIS: ICD-10-CM

## 2019-12-05 DIAGNOSIS — F33.1 MODERATE EPISODE OF RECURRENT MAJOR DEPRESSIVE DISORDER (HCC): Primary | ICD-10-CM

## 2019-12-05 DIAGNOSIS — R05.9 COUGH: ICD-10-CM

## 2019-12-05 DIAGNOSIS — J01.00 ACUTE MAXILLARY SINUSITIS, RECURRENCE NOT SPECIFIED: ICD-10-CM

## 2019-12-05 PROCEDURE — 99214 OFFICE O/P EST MOD 30 MIN: CPT | Performed by: NURSE PRACTITIONER

## 2019-12-05 PROCEDURE — 96372 THER/PROPH/DIAG INJ SC/IM: CPT | Performed by: NURSE PRACTITIONER

## 2019-12-05 RX ORDER — HYDROCODONE POLISTIREX AND CHLORPHENIRAMINE POLISTIREX 10; 8 MG/5ML; MG/5ML
5 SUSPENSION, EXTENDED RELEASE ORAL EVERY 12 HOURS PRN
Qty: 60 ML | Refills: 0 | Status: SHIPPED | OUTPATIENT
Start: 2019-12-05 | End: 2019-12-12

## 2019-12-05 RX ORDER — DOXYCYCLINE HYCLATE 100 MG
100 TABLET ORAL 2 TIMES DAILY
Qty: 20 TABLET | Refills: 0 | Status: SHIPPED | OUTPATIENT
Start: 2019-12-05 | End: 2019-12-09 | Stop reason: SINTOL

## 2019-12-05 RX ORDER — DEXAMETHASONE SODIUM PHOSPHATE 4 MG/ML
4 INJECTION, SOLUTION INTRA-ARTICULAR; INTRALESIONAL; INTRAMUSCULAR; INTRAVENOUS; SOFT TISSUE ONCE
Status: COMPLETED | OUTPATIENT
Start: 2019-12-05 | End: 2019-12-05

## 2019-12-05 RX ORDER — VILAZODONE HYDROCHLORIDE 20 MG/1
20 TABLET ORAL DAILY
Qty: 30 TABLET | Refills: 11 | Status: SHIPPED | OUTPATIENT
Start: 2019-12-05 | End: 2020-01-14

## 2019-12-05 RX ADMIN — DEXAMETHASONE SODIUM PHOSPHATE 4 MG: 4 INJECTION, SOLUTION INTRA-ARTICULAR; INTRALESIONAL; INTRAMUSCULAR; INTRAVENOUS; SOFT TISSUE at 14:26

## 2019-12-05 ASSESSMENT — ENCOUNTER SYMPTOMS
EYE REDNESS: 0
DIARRHEA: 0
CONSTIPATION: 0
COUGH: 1
BLOOD IN STOOL: 0
ABDOMINAL PAIN: 0
SINUS PAIN: 1
SORE THROAT: 0
SINUS PRESSURE: 1
TROUBLE SWALLOWING: 0
RHINORRHEA: 1
EYE DISCHARGE: 0
WHEEZING: 0

## 2019-12-09 ENCOUNTER — TELEPHONE (OUTPATIENT)
Dept: PRIMARY CARE CLINIC | Age: 44
End: 2019-12-09

## 2019-12-09 RX ORDER — AZITHROMYCIN 250 MG/1
TABLET, FILM COATED ORAL
Qty: 1 PACKET | Refills: 0 | Status: SHIPPED | OUTPATIENT
Start: 2019-12-09 | End: 2019-12-18

## 2019-12-09 RX ORDER — MOMETASONE FUROATE 50 UG/1
2 SPRAY, METERED NASAL DAILY
Qty: 1 INHALER | Refills: 3 | Status: SHIPPED | OUTPATIENT
Start: 2019-12-09 | End: 2021-09-27 | Stop reason: ALTCHOICE

## 2020-01-02 ENCOUNTER — TELEPHONE (OUTPATIENT)
Dept: PRIMARY CARE CLINIC | Age: 45
End: 2020-01-02

## 2020-01-02 NOTE — TELEPHONE ENCOUNTER
Pt aware and voiced understanding. Informed patient of any recommendations from providers. Will call with any further questions. There are samples up front.

## 2020-01-02 NOTE — TELEPHONE ENCOUNTER
Pt was given vraylar samples   She reports that she does not notice a difference in this  She only has one left and wants to know if this is something she can just stop cold turkey or something she will need to wean off of  If she needs to wean off of it she will need some more samples or a rx

## 2020-01-14 ENCOUNTER — OFFICE VISIT (OUTPATIENT)
Dept: PRIMARY CARE CLINIC | Age: 45
End: 2020-01-14
Payer: COMMERCIAL

## 2020-01-14 VITALS
BODY MASS INDEX: 31.92 KG/M2 | HEIGHT: 64 IN | OXYGEN SATURATION: 98 % | WEIGHT: 187 LBS | SYSTOLIC BLOOD PRESSURE: 112 MMHG | TEMPERATURE: 98.6 F | DIASTOLIC BLOOD PRESSURE: 64 MMHG | HEART RATE: 79 BPM

## 2020-01-14 PROCEDURE — 99214 OFFICE O/P EST MOD 30 MIN: CPT | Performed by: NURSE PRACTITIONER

## 2020-01-14 PROCEDURE — 96372 THER/PROPH/DIAG INJ SC/IM: CPT | Performed by: NURSE PRACTITIONER

## 2020-01-14 RX ORDER — VILAZODONE HYDROCHLORIDE 20 MG/1
20 TABLET ORAL DAILY
Qty: 30 TABLET | Refills: 11 | Status: SHIPPED | OUTPATIENT
Start: 2020-01-14 | End: 2021-01-08 | Stop reason: SDUPTHER

## 2020-01-14 RX ORDER — SYRINGE W-NEEDLE,DISPOSAB,3 ML 25GX5/8"
SYRINGE, EMPTY DISPOSABLE MISCELLANEOUS
Qty: 12 EACH | Refills: 1 | Status: SHIPPED | OUTPATIENT
Start: 2020-01-14

## 2020-01-14 RX ORDER — CYANOCOBALAMIN 1000 UG/ML
1000 INJECTION INTRAMUSCULAR; SUBCUTANEOUS
Qty: 3 VIAL | Refills: 3 | Status: SHIPPED | OUTPATIENT
Start: 2020-01-14 | End: 2020-01-15

## 2020-01-14 RX ORDER — ERGOCALCIFEROL 1.25 MG/1
50000 CAPSULE ORAL WEEKLY
Qty: 12 CAPSULE | Refills: 3 | Status: SHIPPED | OUTPATIENT
Start: 2020-01-14 | End: 2020-06-18 | Stop reason: SDUPTHER

## 2020-01-14 RX ORDER — MODAFINIL 200 MG/1
200 TABLET ORAL DAILY
Qty: 30 TABLET | Refills: 2 | Status: SHIPPED | OUTPATIENT
Start: 2020-01-14 | End: 2020-03-26 | Stop reason: SDUPTHER

## 2020-01-14 RX ORDER — GABAPENTIN 300 MG/1
600 CAPSULE ORAL NIGHTLY
Qty: 180 CAPSULE | Refills: 5 | Status: SHIPPED | OUTPATIENT
Start: 2020-01-14 | End: 2020-08-27 | Stop reason: SDUPTHER

## 2020-01-14 RX ORDER — TRAMADOL HYDROCHLORIDE 50 MG/1
50 TABLET ORAL EVERY 8 HOURS PRN
Qty: 90 TABLET | Refills: 0 | Status: SHIPPED | OUTPATIENT
Start: 2020-01-14 | End: 2020-03-16 | Stop reason: SDUPTHER

## 2020-01-14 RX ADMIN — CYANOCOBALAMIN 1000 MCG: 1000 INJECTION INTRAMUSCULAR; SUBCUTANEOUS at 16:45

## 2020-01-14 NOTE — PROGRESS NOTES
[address]  [facility], [address] [place] [address] Rd  Phone [phone]   Fax [phone]      OFFICE VISIT: 1/14/2020    Rachna Buck is a 40 y.o. female whopresents today for her medical conditions/complaints as noted below. Rachna Buck isc/o of Check-Up (pt here to discuss MS results with you. We have her report on file)        HPI:      HPI  She recently saw her new neurologist and is for her to change her medication for this. She did some lab work said that her B12 and vitamin D were low was 200. This was a nonfasting lab. Have her labs faxed over so I can review them. She reports that she is still having a lot of fatigue. She has been taking her Provigil 100 mg    deression  She feels that the Rexulti is helping with her depression she is doing a lot better now. She denies any suicidal ideations. A big stress in her  is having hip [place] in [place] next month. But today she seems to be doing better stress. Chronic pain   Has MS has chronic pain related to this. Takes 1-2 ultram per day. Also takes gabapentin. Has tried cymbalta in the past.   Cant take NSAIDs any more due to CKD  Chronic neck pain  MRI scanned in media  She is needing a refill on this  She has only been taking this with a muscle relaxer at bed time or with flare ups  Gabapentin helps with the RLS and pins and needle feelings at night      Past Medical History:   Diagnosis Date    Headache(784.0)     Insomnia     Multiple sclerosis ([address]       Past Surgical History:   Procedure Laterality Date    CHOLECYSTECTOMY         No family history on file.     Social History     Tobacco Use    Smoking status: Never Smoker    Smokeless tobacco: Never Used   Substance Use Topics    Alcohol use: No     Alcohol/week: 0.0 standard drinks      Current Outpatient Medications   Medication Sig Dispense Refill    vitamin D (ERGOCALCIFEROL) 1.25 MG (11382 UT) CAPS capsule Take 1 capsule by mouth once a week 12 capsule 3    gabapentin (NEURONTIN) 300 MG capsule Take 2 capsules by mouth nightly. 180 capsule 5    traMADol (ULTRAM) 50 MG tablet Take 1 tablet by mouth every 8 hours as needed for Pain for up to 30 days. 90 tablet 0    vilazodone HCl (VILAZODONE HCL) 20 MG TABS Take 1 tablet by mouth daily 30 tablet 11    brexpiprazole (REXULTI) 1 MG TABS tablet Take 1 tablet by mouth daily 30 tablet 11    Syringe/Needle, Disp, (SYRINGE 3CC/25GX1\") 25G X 1\" 3 ML MISC Use to inject b12 once weekly for 1 month, then monthly 12 each 1    modafinil (PROVIGIL) 200 MG tablet Take 1 tablet by mouth daily for 30 days. 30 tablet 2    mometasone (NASONEX) 50 MCG/ACT nasal spray 2 sprays by Nasal route daily 1 Inhaler 3    propranolol (INDERAL LA) 60 MG extended release capsule Take 1 capsule by mouth daily 30 capsule 5    doxepin (SINEQUAN) 50 MG capsule Take 1 capsule by mouth nightly 30 capsule 11    promethazine (PHENERGAN) 25 MG tablet TAKE ONE TABLET BY MOUTH EVERY 6 HOURS AS NEEDED FOR NAUSEA 30 tablet 0    Fluticasone furoate-vilanterol (BREO ELLIPTA) 200-25 MCG/INH AEPB inhaler Inhale 1 puff into the lungs daily 60 each 11    levocetirizine (XYZAL) 5 MG tablet Take 1 tablet by mouth nightly 30 tablet 11    furosemide (LASIX) 20 MG tablet Take 20 mg by mouth daily as needed      busPIRone (BUSPAR) 10 MG tablet Take 1 tablet by mouth 3 times daily as needed (anxiety) 90 tablet 5    blood glucose monitor kit and supplies Check fasting blood sugar 2-3 times a week 1 kit 0    blood glucose monitor strips Check fasting blood sugar 2-3 times a week 100 strip 3    Glatiramer Acetate (COPAXONE) 40 MG/ML SOSY Inject into the skin Indications: Mon Wed and Fri      Galcanezumab-gnlm (EMGALITY) 120 MG/ML SOAJ Inject 120 mg into the skin every 30 days 1 pen 11    SUMAtriptan (IMITREX) 100 MG tablet TAKE ONE TABLET BY MOUTH DAILY AS NEEDED FOR MIGRAINE 27 tablet 2     No current facility-administered medications for this visit.       Allergies Allergen Reactions    Ampicillin     Biaxin [Clarithromycin]     Flagyl [Metronidazole]     Saxenda [Liraglutide -Weight Management]      Nausea and vomiting    Albuterol Rash and Other (See Comments)     migraines    Doxycycline Rash          Health Maintenance   Topic Date Due    Diabetic foot exam  05/08/1985    Diabetic retinal exam  05/08/1985    DTaP/Tdap/Td vaccine (1 - Tdap) 05/08/1986    HIV screen  05/08/1990    Diabetic microalbuminuria test  05/08/1993    Lipid screen  05/17/2017    Cervical cancer screen  12/03/2018    Flu vaccine (1) 09/01/2019    A1C test (Diabetic or Prediabetic)  04/22/2020    Potassium monitoring  04/22/2020    Creatinine monitoring  04/22/2020    Breast cancer screen  07/03/2020    Pneumococcal 0-64 years Vaccine  Aged Out        Subjective:     Review of Systems   Constitutional: Positive for fatigue (improved with provigil). Negative for activity change, appetite change, chills, fever and unexpected weight change. HENT: Negative for congestion, hearing loss, postnasal drip, rhinorrhea, sinus pressure, sore throat and trouble swallowing. Eyes: Negative for discharge, redness and visual disturbance. Respiratory: Negative for cough, shortness of breath and wheezing. Cardiovascular: Negative for chest pain, palpitations and leg swelling. Gastrointestinal: Negative for abdominal pain, blood in stool, constipation, diarrhea, nausea and vomiting. Endocrine: Negative for cold intolerance and heat intolerance. Genitourinary: Negative for dysuria, flank pain, menstrual problem, pelvic pain, urgency and vaginal discharge. Musculoskeletal: Positive for arthralgias, back pain, myalgias and neck pain. Skin: Negative for rash. Neurological: Negative for dizziness, weakness and headaches. Hematological: Negative for adenopathy. Psychiatric/Behavioral: Positive for sleep disturbance. Negative for dysphoric mood and suicidal ideas.  The patient is

## 2020-01-15 RX ORDER — CYANOCOBALAMIN 1000 UG/ML
1000 INJECTION INTRAMUSCULAR; SUBCUTANEOUS ONCE
Status: COMPLETED | OUTPATIENT
Start: 2020-01-15 | End: 2020-01-14

## 2020-01-16 ENCOUNTER — TELEPHONE (OUTPATIENT)
Dept: PRIMARY CARE CLINIC | Age: 45
End: 2020-01-16

## 2020-01-16 ASSESSMENT — ENCOUNTER SYMPTOMS
EYE REDNESS: 0
EYE DISCHARGE: 0
NAUSEA: 0
RHINORRHEA: 0
WHEEZING: 0
ABDOMINAL PAIN: 0
VOMITING: 0
SINUS PRESSURE: 0
DIARRHEA: 0
TROUBLE SWALLOWING: 0
SORE THROAT: 0
SHORTNESS OF BREATH: 0
COUGH: 0
BLOOD IN STOOL: 0
BACK PAIN: 1
CONSTIPATION: 0

## 2020-01-22 ENCOUNTER — TELEPHONE (OUTPATIENT)
Dept: PRIMARY CARE CLINIC | Age: 45
End: 2020-01-22

## 2020-01-22 ENCOUNTER — PROCEDURE VISIT (OUTPATIENT)
Dept: PRIMARY CARE CLINIC | Age: 45
End: 2020-01-22
Payer: COMMERCIAL

## 2020-01-22 PROCEDURE — 96372 THER/PROPH/DIAG INJ SC/IM: CPT | Performed by: NURSE PRACTITIONER

## 2020-01-22 RX ORDER — CYANOCOBALAMIN 1000 UG/ML
1000 INJECTION INTRAMUSCULAR; SUBCUTANEOUS ONCE
Status: COMPLETED | OUTPATIENT
Start: 2020-01-22 | End: 2020-01-22

## 2020-01-22 RX ADMIN — CYANOCOBALAMIN 1000 MCG: 1000 INJECTION INTRAMUSCULAR; SUBCUTANEOUS at 16:17

## 2020-01-22 NOTE — TELEPHONE ENCOUNTER
Patient would like to have orders for vitamin b12 injections to be given at home. Principal Discharge DX:	Leg swelling

## 2020-01-22 NOTE — PROGRESS NOTES
After obtaining consent from [facility], gave patient vitamin b12 1000mcg injection in Right deltoid, patient tolerated well. Medication was not supplied by the patient.

## 2020-01-23 RX ORDER — CYANOCOBALAMIN 1000 UG/ML
INJECTION INTRAMUSCULAR; SUBCUTANEOUS
Qty: 10 ML | Refills: 0 | Status: SHIPPED | OUTPATIENT
Start: 2020-01-23 | End: 2021-04-20 | Stop reason: SDUPTHER

## 2020-03-06 DIAGNOSIS — R73.09 ELEVATED HEMOGLOBIN A1C: ICD-10-CM

## 2020-03-10 LAB
ALBUMIN SERPL-MCNC: 4.4 G/DL (ref 3.5–5.2)
ALP BLD-CCNC: 97 U/L (ref 35–104)
ALT SERPL-CCNC: 21 U/L (ref 5–33)
AST SERPL-CCNC: 16 U/L (ref 5–32)
BILIRUB SERPL-MCNC: 0.8 MG/DL (ref 0.2–1.2)
BILIRUBIN DIRECT: 0.2 MG/DL (ref 0–0.3)
BILIRUBIN, INDIRECT: 0.6 MG/DL (ref 0.1–1)
TOTAL PROTEIN: 7 G/DL (ref 6.6–8.7)

## 2020-03-16 RX ORDER — TRAMADOL HYDROCHLORIDE 50 MG/1
50 TABLET ORAL EVERY 8 HOURS PRN
Qty: 90 TABLET | Refills: 0 | Status: SHIPPED | OUTPATIENT
Start: 2020-03-16 | End: 2020-05-21 | Stop reason: SDUPTHER

## 2020-03-16 NOTE — TELEPHONE ENCOUNTER
LM that we will send in medication    Patient  needs monthly medications sent to pharmacy. Due to patients co-morbidities, lalito being consistent, patient at low risk for diversion and risk for potential exposure of COVID 19, ok to send medications to pharmacy.

## 2020-03-27 RX ORDER — MODAFINIL 200 MG/1
200 TABLET ORAL DAILY
Qty: 30 TABLET | Refills: 2 | Status: SHIPPED | OUTPATIENT
Start: 2020-03-27 | End: 2020-05-21 | Stop reason: SDUPTHER

## 2020-04-20 ENCOUNTER — TELEPHONE (OUTPATIENT)
Dept: PRIMARY CARE CLINIC | Age: 45
End: 2020-04-20

## 2020-04-21 NOTE — TELEPHONE ENCOUNTER
Received fax from pharmacy requesting refill on pts medication(s). Pt was last seen in office on 1/14/2020  and has a follow up scheduled for Visit date not found. Will send request to  Segundo Torres  for patient.      Requested Prescriptions     Pending Prescriptions Disp Refills    propranolol (INDERAL LA) 60 MG extended release capsule 30 capsule 5     Sig: Take 1 capsule by mouth daily

## 2020-04-22 RX ORDER — PROPRANOLOL HCL 60 MG
60 CAPSULE, EXTENDED RELEASE 24HR ORAL DAILY
Qty: 30 CAPSULE | Refills: 11 | Status: SHIPPED | OUTPATIENT
Start: 2020-04-22 | End: 2021-03-19

## 2020-04-23 LAB
ALBUMIN SERPL-MCNC: 3.3 G/DL
ALP BLD-CCNC: 104 U/L
ALT SERPL-CCNC: 32 U/L
ANION GAP SERPL CALCULATED.3IONS-SCNC: ABNORMAL MMOL/L
AST SERPL-CCNC: 24 U/L
AVERAGE GLUCOSE: ABNORMAL
BASOPHILS ABSOLUTE: 0.06 /ΜL
BASOPHILS RELATIVE PERCENT: 1.3 %
BILIRUB SERPL-MCNC: 1.34 MG/DL (ref 0.1–1.4)
BUN BLDV-MCNC: 8 MG/DL
CALCIUM SERPL-MCNC: 8.6 MG/DL
CHLORIDE BLD-SCNC: 102 MMOL/L
CHOLESTEROL, TOTAL: 322 MG/DL
CHOLESTEROL/HDL RATIO: ABNORMAL
CO2: 24 MMOL/L
CREAT SERPL-MCNC: 0.7 MG/DL
EOSINOPHILS ABSOLUTE: 0.19 /ΜL
EOSINOPHILS RELATIVE PERCENT: 4.1 %
GFR CALCULATED: 60
GLUCOSE BLD-MCNC: 280 MG/DL
HBA1C MFR BLD: 8.8 %
HCT VFR BLD CALC: 42.6 % (ref 36–46)
HDLC SERPL-MCNC: 38 MG/DL (ref 35–70)
HEMOGLOBIN: 14.4 G/DL (ref 12–16)
LDL CHOLESTEROL CALCULATED: ABNORMAL
LYMPHOCYTES ABSOLUTE: 1.34 /ΜL
LYMPHOCYTES RELATIVE PERCENT: 29.2 %
MCH RBC QN AUTO: 28.9 PG
MCHC RBC AUTO-ENTMCNC: 33.8 G/DL
MCV RBC AUTO: 85.4 FL
MONOCYTES ABSOLUTE: 0.71 /ΜL
MONOCYTES RELATIVE PERCENT: 15.5 %
NEUTROPHILS ABSOLUTE: 2.28 /ΜL
NEUTROPHILS RELATIVE PERCENT: 49.7 %
PDW BLD-RTO: 12.1 %
PLATELET # BLD: 165 K/ΜL
PMV BLD AUTO: 12.1 FL
POTASSIUM SERPL-SCNC: 3.5 MMOL/L
RBC # BLD: 4.99 10^6/ΜL
SODIUM BLD-SCNC: 135 MMOL/L
T4 FREE: 1.19
TOTAL PROTEIN: 6.4
TRIGL SERPL-MCNC: 1013 MG/DL
TSH SERPL DL<=0.05 MIU/L-ACNC: 3.29 UIU/ML
VITAMIN D 25-HYDROXY: 94
VITAMIN D2, 25 HYDROXY: 39
VITAMIN D3,25 HYDROXY: 55
VLDLC SERPL CALC-MCNC: ABNORMAL MG/DL
WBC # BLD: 4.6 10^3/ML

## 2020-04-24 ENCOUNTER — TELEPHONE (OUTPATIENT)
Dept: PRIMARY CARE CLINIC | Age: 45
End: 2020-04-24

## 2020-04-24 RX ORDER — ROSUVASTATIN CALCIUM 5 MG/1
5 TABLET, COATED ORAL NIGHTLY
Qty: 30 TABLET | Refills: 0 | Status: SHIPPED | OUTPATIENT
Start: 2020-04-24 | End: 2020-05-21

## 2020-04-24 RX ORDER — ROSUVASTATIN CALCIUM 10 MG/1
10 TABLET, COATED ORAL NIGHTLY
Qty: 30 TABLET | Refills: 5 | Status: SHIPPED | OUTPATIENT
Start: 2020-04-24 | End: 2020-06-18 | Stop reason: SDUPTHER

## 2020-04-28 ENCOUNTER — TELEPHONE (OUTPATIENT)
Dept: PRIMARY CARE CLINIC | Age: 45
End: 2020-04-28

## 2020-04-28 NOTE — TELEPHONE ENCOUNTER
----- Message from CLINTON Carrion sent at 4/27/2020  2:39 PM CDT -----  Please inform patient results show  Vit d is normal

## 2020-05-19 RX ORDER — MODAFINIL 200 MG/1
200 TABLET ORAL DAILY
Qty: 30 TABLET | Refills: 2 | OUTPATIENT
Start: 2020-05-19 | End: 2020-06-18

## 2020-05-20 ENCOUNTER — TELEPHONE (OUTPATIENT)
Dept: PRIMARY CARE CLINIC | Age: 45
End: 2020-05-20

## 2020-05-21 ENCOUNTER — VIRTUAL VISIT (OUTPATIENT)
Dept: PRIMARY CARE CLINIC | Age: 45
End: 2020-05-21
Payer: COMMERCIAL

## 2020-05-21 PROCEDURE — 99214 OFFICE O/P EST MOD 30 MIN: CPT | Performed by: NURSE PRACTITIONER

## 2020-05-21 PROCEDURE — 3052F HG A1C>EQUAL 8.0%<EQUAL 9.0%: CPT | Performed by: NURSE PRACTITIONER

## 2020-05-21 RX ORDER — MODAFINIL 200 MG/1
200 TABLET ORAL DAILY
Qty: 30 TABLET | Refills: 2 | Status: SHIPPED | OUTPATIENT
Start: 2020-05-21 | End: 2020-06-20

## 2020-05-21 RX ORDER — TRAMADOL HYDROCHLORIDE 50 MG/1
50 TABLET ORAL EVERY 8 HOURS PRN
Qty: 90 TABLET | Refills: 0 | Status: SHIPPED | OUTPATIENT
Start: 2020-05-21 | End: 2020-06-22 | Stop reason: SDUPTHER

## 2020-05-21 RX ORDER — BUSPIRONE HYDROCHLORIDE 10 MG/1
10 TABLET ORAL 3 TIMES DAILY PRN
Qty: 90 TABLET | Refills: 5 | Status: SHIPPED | OUTPATIENT
Start: 2020-05-21 | End: 2020-06-18 | Stop reason: SDUPTHER

## 2020-05-21 NOTE — PROGRESS NOTES
urgency and vaginal discharge. Musculoskeletal: Positive for arthralgias, back pain, myalgias and neck pain. Skin: Negative for rash. Neurological: Negative for dizziness, weakness and headaches. Hematological: Negative for adenopathy. Psychiatric/Behavioral: Positive for sleep disturbance. Negative for dysphoric mood and suicidal ideas. The patient is nervous/anxious. Prior to Visit Medications    Medication Sig Taking? Authorizing Provider   modafinil (PROVIGIL) 200 MG tablet Take 1 tablet by mouth daily for 30 days. Yes CLINTON Clark   traMADol (ULTRAM) 50 MG tablet Take 1 tablet by mouth every 8 hours as needed for Pain for up to 30 days. Yes CLINTON Clark   busPIRone (BUSPAR) 10 MG tablet Take 1 tablet by mouth 3 times daily as needed (anxiety) Yes CLINTON Valentine   Semaglutide 7 MG TABS 1 po 30 min before food with 4oz of water  CLINTON Clark   rosuvastatin (CRESTOR) 10 MG tablet Take 1 tablet by mouth nightly  CLINTON Valentine   propranolol (INDERAL LA) 60 MG extended release capsule Take 1 capsule by mouth daily  MARJ Nguyen DO   cyanocobalamin 1000 MCG/ML injection 1ml weekly x 4 weeks, then 1ml monthly there after  CLINTON Clark   vitamin D (ERGOCALCIFEROL) 1.25 MG (90613 UT) CAPS capsule Take 1 capsule by mouth once a week  CLINTON Valentine   gabapentin (NEURONTIN) 300 MG capsule Take 2 capsules by mouth nightly.   CLINTON Clark   vilazodone HCl (VILAZODONE HCL) 20 MG TABS Take 1 tablet by mouth daily  CLINTON Valentine   brexpiprazole (REXULTI) 1 MG TABS tablet Take 1 tablet by mouth daily  CLINTON Valentine   Syringe/Needle, Disp, (SYRINGE 3CC/25GX1\") 25G X 1\" 3 ML MISC Use to inject b12 once weekly for 1 month, then monthly  CLINTON Valentine   mometasone (NASONEX) 50 MCG/ACT nasal spray 2 sprays by Nasal route daily  CLINTON Valentine   doxepin (SINEQUAN) 50 MG capsule Take 1 capsule by mouth nightly this patient.,   Health Maintenance   Topic Date Due    Diabetic foot exam  05/08/1985    Diabetic retinal exam  05/08/1985    HIV screen  05/08/1990    Diabetic microalbuminuria test  05/08/1993    DTaP/Tdap/Td vaccine (1 - Tdap) 05/08/1994    Cervical cancer screen  12/03/2018    Breast cancer screen  07/03/2020    Flu vaccine (Season Ended) 09/01/2020    A1C test (Diabetic or Prediabetic)  04/23/2021    Lipid screen  04/23/2021    Potassium monitoring  04/23/2021    Creatinine monitoring  04/23/2021    Hepatitis A vaccine  Aged Out    Hepatitis B vaccine  Aged Out    Hib vaccine  Aged Out    Meningococcal (ACWY) vaccine  Aged Out    Pneumococcal 0-64 years Vaccine  Aged Out       PHYSICAL EXAMINATION:  [ INSTRUCTIONS:  \"[x]\" Indicates a positive item  \"[]\" Indicates a negative item  -- DELETE ALL ITEMS NOT EXAMINED]  Vital Signs: (As obtained by patient/caregiver or practitioner observation)    Blood pressure-  Heart rate-    Respiratory rate-    Temperature-  Pulse oximetry-     Constitutional: [x] Appears well-developed and well-nourished [x] No apparent distress      [] Abnormal-   Mental status  [x] Alert and awake  [x] Oriented to person/place/time [x]Able to follow commands      Eyes:  EOM    []  Normal  [] Abnormal-  Sclera  []  Normal  [] Abnormal -         Discharge [x]  None visible  [] Abnormal -    HENT:   [] Normocephalic, atraumatic.   [] Abnormal   [] Mouth/Throat: Mucous membranes are moist.     External Ears [x] Normal  [] Abnormal-     Neck: [x] No visualized mass     Pulmonary/Chest: [x] Respiratory effort normal.  [] No visualized signs of difficulty breathing or respiratory distress        [] Abnormal-      Musculoskeletal:   [] Normal gait with no signs of ataxia         [x] Normal range of motion of neck        [] Abnormal-       Neurological:        [x] No Facial Asymmetry (Cranial nerve 7 motor function) (limited exam to video visit)          [] No gaze palsy        [] Abnormal-         Skin:        [x] No significant exanthematous lesions or discoloration noted on facial skin         [] Abnormal-            Psychiatric:       [x] Normal Affect [] No Hallucinations        [] Abnormal-     Other pertinent observable physical exam findings-     ASSESSMENT/PLAN:    ICD-10-CM    1. Type 2 diabetes mellitus without complication, without long-term current use of insulin (HCC) E11.9    2. Multiple sclerosis (HCC) G35 modafinil (PROVIGIL) 200 MG tablet     traMADol (ULTRAM) 50 MG tablet   3. Excessive daytime sleepiness G47.19 modafinil (PROVIGIL) 200 MG tablet   4. Myalgia M79.10 traMADol (ULTRAM) 50 MG tablet   5. Chronic neck pain M54.2 traMADol (ULTRAM) 50 MG tablet    G89.29      Her monitoring her carbohydrates as well as sugars. And looking up foods to familiarize herself with how much carbohydrates and sugar are in each food. Discussed that even though watermelon and grapes are natural sugars, these still will have a great effect on her glucose level. Increase Rebelsus to 7 mg  Return in about 1 month (around 6/21/2020) for diabetes with me 30 min. Rosana Oliveira is a 39 y.o. female being evaluated by a Virtual Visit (video visit) encounter to address concerns as mentioned above. Verbal consent was given to conduct a teleheath visit. A caregiver was present when appropriate. Due to this being a TeleHealth encounter (During [place] [address] public health emergency), evaluation of the following organ systems was limited: Vitals/Constitutional/EENT/Resp/CV/GI//MS/Neuro/Skin/Heme-Lymph-Imm. Pursuant to the emergency declaration under the [facility], [address] [place] [address] authority and the [employer] and [employer] Act, this Virtual Visit was conducted with patient's (and/or legal guardian's) consent, to reduce the patient's risk of exposure to COVID-19 and provide necessary medical care.   The patient (and/or legal

## 2020-05-22 ASSESSMENT — ENCOUNTER SYMPTOMS
BACK PAIN: 1
SORE THROAT: 0
EYE REDNESS: 0
CONSTIPATION: 0
RHINORRHEA: 0
BLOOD IN STOOL: 0
EYE DISCHARGE: 0
SHORTNESS OF BREATH: 0
VOMITING: 0
WHEEZING: 0
DIARRHEA: 0
TROUBLE SWALLOWING: 0
NAUSEA: 0
COUGH: 0
SINUS PRESSURE: 0
ABDOMINAL PAIN: 0

## 2020-06-18 ENCOUNTER — PATIENT MESSAGE (OUTPATIENT)
Dept: PRIMARY CARE CLINIC | Age: 45
End: 2020-06-18

## 2020-06-18 RX ORDER — BUSPIRONE HYDROCHLORIDE 10 MG/1
10 TABLET ORAL 3 TIMES DAILY PRN
Qty: 90 TABLET | Refills: 5 | Status: SHIPPED | OUTPATIENT
Start: 2020-06-18 | End: 2020-06-22 | Stop reason: SDUPTHER

## 2020-06-18 RX ORDER — ROSUVASTATIN CALCIUM 10 MG/1
10 TABLET, COATED ORAL NIGHTLY
Qty: 30 TABLET | Refills: 5 | Status: SHIPPED | OUTPATIENT
Start: 2020-06-18 | End: 2021-04-21 | Stop reason: SDUPTHER

## 2020-06-18 RX ORDER — ERGOCALCIFEROL 1.25 MG/1
50000 CAPSULE ORAL WEEKLY
Qty: 12 CAPSULE | Refills: 3 | Status: SHIPPED | OUTPATIENT
Start: 2020-06-18 | End: 2020-09-10 | Stop reason: ALTCHOICE

## 2020-06-22 ENCOUNTER — VIRTUAL VISIT (OUTPATIENT)
Dept: PRIMARY CARE CLINIC | Age: 45
End: 2020-06-22
Payer: COMMERCIAL

## 2020-06-22 PROCEDURE — 99214 OFFICE O/P EST MOD 30 MIN: CPT | Performed by: NURSE PRACTITIONER

## 2020-06-22 PROCEDURE — 3052F HG A1C>EQUAL 8.0%<EQUAL 9.0%: CPT | Performed by: NURSE PRACTITIONER

## 2020-06-22 RX ORDER — BUSPIRONE HYDROCHLORIDE 10 MG/1
10 TABLET ORAL 3 TIMES DAILY PRN
Qty: 90 TABLET | Refills: 5 | Status: SHIPPED | OUTPATIENT
Start: 2020-06-22 | End: 2021-01-25 | Stop reason: SDUPTHER

## 2020-06-22 RX ORDER — ORAL SEMAGLUTIDE 14 MG/1
1 TABLET ORAL DAILY
Qty: 30 TABLET | Refills: 11 | Status: SHIPPED | OUTPATIENT
Start: 2020-06-22 | End: 2021-04-22

## 2020-06-22 RX ORDER — BACLOFEN 10 MG/1
TABLET ORAL
COMMUNITY
Start: 2020-06-16

## 2020-06-22 RX ORDER — TRAMADOL HYDROCHLORIDE 50 MG/1
50 TABLET ORAL EVERY 8 HOURS PRN
Qty: 90 TABLET | Refills: 0 | Status: SHIPPED | OUTPATIENT
Start: 2020-06-22 | End: 2020-09-03 | Stop reason: SDUPTHER

## 2020-06-22 RX ORDER — RENAGEL 800 MG/1
1 TABLET ORAL DAILY
COMMUNITY
Start: 2020-05-15 | End: 2022-09-29

## 2020-06-22 ASSESSMENT — ENCOUNTER SYMPTOMS
VOMITING: 0
COUGH: 0
CONSTIPATION: 0
SINUS PRESSURE: 0
EYE REDNESS: 0
ABDOMINAL PAIN: 0
DIARRHEA: 0
TROUBLE SWALLOWING: 0
BLOOD IN STOOL: 0
SORE THROAT: 0
BACK PAIN: 1
WHEEZING: 0
NAUSEA: 0
RHINORRHEA: 0
SHORTNESS OF BREATH: 0
EYE DISCHARGE: 0

## 2020-06-22 NOTE — PROGRESS NOTES
(PHENERGAN) 25 MG tablet TAKE ONE TABLET BY MOUTH EVERY 6 HOURS AS NEEDED FOR NAUSEA  CLINTON Valentine   Fluticasone furoate-vilanterol (BREO ELLIPTA) 200-25 MCG/INH AEPB inhaler Inhale 1 puff into the lungs daily  CLINTON Valentine   levocetirizine (XYZAL) 5 MG tablet Take 1 tablet by mouth nightly  CLINTON Valentine   furosemide (LASIX) 20 MG tablet Take 20 mg by mouth daily as needed  Historical Provider, MD   blood glucose monitor kit and supplies Check fasting blood sugar 2-3 times a week  CLINTON Snell   blood glucose monitor strips Check fasting blood sugar 2-3 times a week  CLINTON Valentine   Galcanezumab-gnlm (EMGALITY) 120 MG/ML SOAJ Inject 120 mg into the skin every 30 days  CLINTON Snell   SUMAtriptan (IMITREX) 100 MG tablet TAKE ONE TABLET BY MOUTH DAILY AS NEEDED FOR MIGRAINE  CLINTON Valentine       Social History     Tobacco Use    Smoking status: Never Smoker    Smokeless tobacco: Never Used   Substance Use Topics    Alcohol use: No     Alcohol/week: 0.0 standard drinks    Drug use: No        Allergies   Allergen Reactions    Ampicillin     Biaxin [Clarithromycin]     Flagyl [Metronidazole]     Saxenda [Liraglutide -Weight Management]      Nausea and vomiting    Albuterol Rash and Other (See Comments)     migraines    Doxycycline Rash   ,   Past Medical History:   Diagnosis Date    Headache(784.0)     Insomnia     Multiple sclerosis ([facility] [address] [address]    ,   Past Surgical History:   Procedure Laterality Date    CHOLECYSTECTOMY     ,   Social History     Tobacco Use    Smoking status: Never Smoker    Smokeless tobacco: Never Used   Substance Use Topics    Alcohol use: No     Alcohol/week: 0.0 standard drinks    Drug use: No   , History reviewed. No pertinent family history. ,   There is no immunization history on file for this patient.,   Health Maintenance   Topic Date Due    Pneumococcal 0-64 years Vaccine (1 of 1 - PPSV23) 05/08/1981    Diabetic foot exam  05/08/1985    Diabetic retinal exam  05/08/1985    HIV screen  05/08/1990    Diabetic microalbuminuria test  05/08/1993    Hepatitis B vaccine (1 of 3 - Risk 3-dose series) 05/08/1994    DTaP/Tdap/Td vaccine (1 - Tdap) 05/08/1994    Cervical cancer screen  12/03/2018    Breast cancer screen  07/03/2020    Flu vaccine (Season Ended) 09/01/2020    A1C test (Diabetic or Prediabetic)  04/23/2021    Lipid screen  04/23/2021    Potassium monitoring  04/23/2021    Creatinine monitoring  04/23/2021    Hepatitis A vaccine  Aged Out    Hib vaccine  Aged Out    Meningococcal (ACWY) vaccine  Aged Out       PHYSICAL EXAMINATION:  [ INSTRUCTIONS:  \"[x]\" Indicates a positive item  \"[]\" Indicates a negative item  -- DELETE ALL ITEMS NOT EXAMINED]  Vital Signs: (As obtained by patient/caregiver or practitioner observation)    Blood pressure-  Heart rate-    Respiratory rate-    Temperature-  Pulse oximetry-     Constitutional: [x] Appears well-developed and well-nourished [] No apparent distress      [] Abnormal-   Mental status  [x] Alert and awake  [x] Oriented to person/place/time [x]Able to follow commands      Eyes:  EOM    []  Normal  [] Abnormal-  Sclera  []  Normal  [] Abnormal -         Discharge [x]  None visible  [] Abnormal -    HENT:   [] Normocephalic, atraumatic.   [] Abnormal   [x] Mouth/Throat: Mucous membranes are moist.     External Ears [x] Normal  [] Abnormal-     Neck: [x] No visualized mass     Pulmonary/Chest: [x] Respiratory effort normal.  [x] No visualized signs of difficulty breathing or respiratory distress        [] Abnormal-      Musculoskeletal:   [] Normal gait with no signs of ataxia         [x] Normal range of motion of neck        [] Abnormal-       Neurological:        [x] No Facial Asymmetry (Cranial nerve 7 motor function) (limited exam to video visit)          [] No gaze palsy        [] Abnormal-         Skin:        [x] No significant exanthematous lesions or discoloration

## 2020-07-16 RX ORDER — PROMETHAZINE HYDROCHLORIDE 25 MG/1
TABLET ORAL
Qty: 30 TABLET | Refills: 0 | Status: SHIPPED | OUTPATIENT
Start: 2020-07-16 | End: 2021-08-31

## 2020-07-20 ENCOUNTER — TELEPHONE (OUTPATIENT)
Dept: PRIMARY CARE CLINIC | Age: 45
End: 2020-07-20

## 2020-07-20 NOTE — TELEPHONE ENCOUNTER
Left vmail for pt to call to get her appt r/s due to Cahce Vega will be out of the office until 7/30-

## 2020-08-03 ENCOUNTER — VIRTUAL VISIT (OUTPATIENT)
Dept: PRIMARY CARE CLINIC | Age: 45
End: 2020-08-03
Payer: COMMERCIAL

## 2020-08-03 PROCEDURE — 3052F HG A1C>EQUAL 8.0%<EQUAL 9.0%: CPT | Performed by: NURSE PRACTITIONER

## 2020-08-03 PROCEDURE — 99213 OFFICE O/P EST LOW 20 MIN: CPT | Performed by: NURSE PRACTITIONER

## 2020-08-03 RX ORDER — FLUCONAZOLE 150 MG/1
150 TABLET ORAL DAILY PRN
Qty: 12 TABLET | Refills: 0 | Status: SHIPPED | OUTPATIENT
Start: 2020-08-03 | End: 2020-08-06

## 2020-08-03 ASSESSMENT — ENCOUNTER SYMPTOMS
CONSTIPATION: 0
TROUBLE SWALLOWING: 0
EYE REDNESS: 0
WHEEZING: 0
EYE DISCHARGE: 0
SHORTNESS OF BREATH: 0
BLOOD IN STOOL: 0
BACK PAIN: 1
COUGH: 0
ABDOMINAL PAIN: 0
SINUS PRESSURE: 0
RHINORRHEA: 0
VOMITING: 0
DIARRHEA: 0
SORE THROAT: 0
NAUSEA: 0

## 2020-08-03 NOTE — PROGRESS NOTES
8/3/2020    TELEHEALTH EVALUATION -- Audio/Visual (During MIQNK-86 public health emergency)    Solange Azul is a 39 y.o. female who c/o of Diabetes   medical conditions/complaints as noted below. HPI:    Solange Azul (:  1975) has requested an audio/video evaluation for the following concern(s):    Diabetes  Patient has type 2 diabetes she is currently on Rivelsa's. States that on the good days her fasting blood sugar is 1 40-1 50. She recently was in the hospital with her  who was having surgery. During this time she states her blood sugar was in the low 200s. She is worried about what she can and cannot eat and monitoring her blood sugar. This has increased her stress. 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 Review of Systems   Constitutional: Positive for fatigue (improved with provigil). Negative for activity change, appetite change, chills, fever and unexpected weight change. HENT: Negative for congestion, hearing loss, postnasal drip, rhinorrhea, sinus pressure, sore throat and trouble swallowing. Eyes: Negative for discharge, redness and visual disturbance. Respiratory: Negative for cough, shortness of breath and wheezing. Cardiovascular: Negative for chest pain, palpitations and leg swelling. Gastrointestinal: Negative for abdominal pain, blood in stool, constipation, diarrhea, nausea and vomiting. Endocrine: Negative for cold intolerance and heat intolerance. Genitourinary: Negative for dysuria, flank pain, menstrual problem, pelvic pain, urgency and vaginal discharge. Musculoskeletal: Positive for arthralgias, back pain, myalgias and neck pain. Skin: Negative for rash. Neurological: Negative for dizziness, weakness and headaches. Hematological: Negative for adenopathy. Psychiatric/Behavioral: Positive for sleep disturbance. Negative for dysphoric mood and suicidal ideas.  The patient is nervous/anxious. Prior to Visit Medications    Medication Sig Taking? Authorizing Provider   dapagliflozin (FARXIGA) 10 MG tablet Take 1 tablet by mouth every morning Yes CLINTON Valentine   fluconazole (DIFLUCAN) 150 MG tablet Take 1 tablet by mouth daily as needed (yeast infection) Yes CLINTON Valentine   promethazine (PHENERGAN) 25 MG tablet TAKE ONE TABLET BY MOUTH EVERY 6 HOURS AS NEEDED FOR NAUSEA  CLINTON Valentine   Semaglutide (RYBELSUS) 14 MG TABS Take 1 tablet by mouth daily With 4 oz of water 30 min before eating  CLINTON Valentine   busPIRone (BUSPAR) 10 MG tablet Take 1 tablet by mouth 3 times daily as needed (anxiety)  CLINTON Valentine   baclofen (LIORESAL) 10 MG tablet TAKE 1/2 TO 1 TABLET TWICE DAILY AS NEEDED FOR MUSCLE SPASMS  Historical Provider, MD   Teriflunomide (AUBAGIO) 14 MG TABS   Historical Provider, MD   rosuvastatin (CRESTOR) 10 MG tablet Take 1 tablet by mouth nightly  CLINTON Valentine   vitamin D (ERGOCALCIFEROL) 1.25 MG (35509 UT) CAPS capsule Take 1 capsule by mouth once a week  CLINTON Valentine   propranolol (INDERAL LA) 60 MG extended release capsule Take 1 capsule by mouth daily  MARJ Stewart DO   cyanocobalamin 1000 MCG/ML injection 1ml weekly x 4 weeks, then 1ml monthly there after  CLINTON Hood   gabapentin (NEURONTIN) 300 MG capsule Take 2 capsules by mouth nightly.   CLINTON Hartman   vilazodone HCl (VILAZODONE HCL) 20 MG TABS Take 1 tablet by mouth daily  CLINTON Valentine   brexpiprazole (REXULTI) 1 MG TABS tablet Take 1 tablet by mouth daily  CLINTON Valentine   Syringe/Needle, Disp, (SYRINGE 3CC/25GX1\") 25G X 1\" 3 ML MISC Use to inject b12 once weekly for 1 month, then monthly  CLINTON Valentine   mometasone (NASONEX) 50 MCG/ACT nasal spray 2 sprays by Nasal route daily  CLINTON Valentine   doxepin (SINEQUAN) 50 MG capsule Take 1 capsule by mouth nightly  Pablito Bitter Narvaez, APRN   Fluticasone vaccine (1 of 3 - Risk 3-dose series) 05/08/1994    DTaP/Tdap/Td vaccine (1 - Tdap) 05/08/1994    Cervical cancer screen  12/03/2018    Breast cancer screen  07/03/2020    Flu vaccine (1) 09/01/2020    A1C test (Diabetic or Prediabetic)  04/23/2021    Lipid screen  04/23/2021    Potassium monitoring  04/23/2021    Creatinine monitoring  04/23/2021    Hepatitis A vaccine  Aged Out    Hib vaccine  Aged Out    Meningococcal (ACWY) vaccine  Aged Out       PHYSICAL EXAMINATION:  [ INSTRUCTIONS:  \"[x]\" Indicates a positive item  \"[]\" Indicates a negative item  -- DELETE ALL ITEMS NOT EXAMINED]  Vital Signs: (As obtained by patient/caregiver or practitioner observation)    Blood pressure-  Heart rate-    Respiratory rate-    Temperature-  Pulse oximetry-     Constitutional: [x] Appears well-developed and well-nourished [] No apparent distress      [] Abnormal-   Mental status  [x] Alert and awake  [x] Oriented to person/place/time []Able to follow commands      Eyes:  EOM    []  Normal  [] Abnormal-  Sclera  []  Normal  [] Abnormal -         Discharge [x]  None visible  [] Abnormal -    HENT:   [] Normocephalic, atraumatic.   [] Abnormal   [x] Mouth/Throat: Mucous membranes are moist.     External Ears [x] Normal  [] Abnormal-     Neck: [x] No visualized mass     Pulmonary/Chest: [x] Respiratory effort normal.  [] No visualized signs of difficulty breathing or respiratory distress        [] Abnormal-      Musculoskeletal:   [x] Normal gait with no signs of ataxia         [] Normal range of motion of neck        [] Abnormal-       Neurological:        [x] No Facial Asymmetry (Cranial nerve 7 motor function) (limited exam to video visit)          [] No gaze palsy        [] Abnormal-         Skin:        [x] No significant exanthematous lesions or discoloration noted on facial skin         [] Abnormal-            Psychiatric:       [x] Normal Affect [] No Hallucinations        [] Abnormal-     Other pertinent observable physical exam findings-     ASSESSMENT/PLAN:    ICD-10-CM    1. Type 2 diabetes mellitus with other specified complication, without long-term current use of insulin ([facility])  E11.69 dapagliflozin (FARXIGA) 10 MG tablet   2. Moderate episode of recurrent major depressive disorder ([facility] [address]  F33.1      States that her depression was improving until she has started going back to work and having to learn an online platform. Also has been stressed over her 's surgery states before then her depression was doing better. Wants to continue current medications and will discuss at later time. Add for farxiga to rafa. She is unable to take metformin due to severe GI upset. Blood sugar is not well controlled on current therapy    Return in about 1 month (around 9/3/2020) for diabetes 30 min. Kiki Cantrell is a 39 y.o. female being evaluated by a Virtual Visit (video visit) encounter to address concerns as mentioned above. Verbal consent was given to conduct a teleheath visit. A caregiver was present when appropriate. Due to this being a TeleHealth encounter (During EDE-58 public [facility] emergency), evaluation of the following organ systems was limited: Vitals/Constitutional/EENT/Resp/CV/GI//MS/Neuro/Skin/Heme-Lymph-Imm. Pursuant to the emergency declaration under the [address] [place] [address] authority and the [employer] and [employer] Act, this Virtual Visit was conducted with patient's (and/or legal guardian's) consent, to reduce the patient's risk of exposure to COVID-19 and provide necessary medical care. The patient (and/or legal guardian) has also been advised to contact this office for worsening conditions or problems, and seek emergency medical treatment and/or call 911 if deemed necessary. Services were provided through a video synchronous discussion virtually to substitute for in-person clinic visit.  Patient was

## 2020-08-10 NOTE — TELEPHONE ENCOUNTER
Received fax from pharmacy requesting refill on pts medication(s). Pt was last seen in office on 8/3/2020  and has a follow up scheduled for 9/3/2020. Will send request to  Nayeli Yoder  for patient.      Requested Prescriptions     Pending Prescriptions Disp Refills    levocetirizine (XYZAL) 5 MG tablet 30 tablet 11     Sig: Take 1 tablet by mouth nightly

## 2020-08-11 RX ORDER — LEVOCETIRIZINE DIHYDROCHLORIDE 5 MG/1
5 TABLET, FILM COATED ORAL NIGHTLY
Qty: 30 TABLET | Refills: 11 | Status: SHIPPED | OUTPATIENT
Start: 2020-08-11 | End: 2021-08-30 | Stop reason: SDUPTHER

## 2020-08-27 RX ORDER — GABAPENTIN 300 MG/1
600 CAPSULE ORAL NIGHTLY
Qty: 180 CAPSULE | Refills: 5 | Status: SHIPPED | OUTPATIENT
Start: 2020-08-27 | End: 2020-09-29

## 2020-09-03 ENCOUNTER — VIRTUAL VISIT (OUTPATIENT)
Dept: PRIMARY CARE CLINIC | Age: 45
End: 2020-09-03
Payer: COMMERCIAL

## 2020-09-03 VITALS — BODY MASS INDEX: 25.4 KG/M2 | WEIGHT: 148 LBS

## 2020-09-03 PROCEDURE — 3052F HG A1C>EQUAL 8.0%<EQUAL 9.0%: CPT | Performed by: NURSE PRACTITIONER

## 2020-09-03 PROCEDURE — 99214 OFFICE O/P EST MOD 30 MIN: CPT | Performed by: NURSE PRACTITIONER

## 2020-09-03 RX ORDER — ARMODAFINIL 200 MG/1
200 TABLET ORAL EVERY MORNING
Qty: 30 TABLET | Refills: 2 | Status: SHIPPED | OUTPATIENT
Start: 2020-09-03 | End: 2020-10-29

## 2020-09-03 RX ORDER — TRAMADOL HYDROCHLORIDE 50 MG/1
50 TABLET ORAL EVERY 8 HOURS PRN
Qty: 90 TABLET | Refills: 0 | Status: SHIPPED | OUTPATIENT
Start: 2020-09-03 | End: 2020-09-29 | Stop reason: SDUPTHER

## 2020-09-03 ASSESSMENT — ENCOUNTER SYMPTOMS
BLOOD IN STOOL: 0
TROUBLE SWALLOWING: 0
DIARRHEA: 0
VOMITING: 0
WHEEZING: 0
COUGH: 0
RHINORRHEA: 0
SINUS PRESSURE: 0
NAUSEA: 0
ABDOMINAL PAIN: 0
EYE REDNESS: 0
EYE DISCHARGE: 0
SHORTNESS OF BREATH: 0
CONSTIPATION: 0
SORE THROAT: 0
BACK PAIN: 1

## 2020-09-03 NOTE — PROGRESS NOTES
9/3/2020    TELEHEALTH EVALUATION -- Audio/Visual (During KTNEX-80 public health emergency)    Vanesa Alarcon is a 39 y.o. female who c/o of Diabetes   medical conditions/complaints as noted below. HPI:    Vanesa Alarcon (:  1975) has requested an audio/video evaluation for the following concern(s):    Diabetes  Patient has type 2 diabetes she is currently on [employer]  bs has been running 140-150 with the adition of farxiga. Chronic pain   Has MS has chronic pain related to this. Takes 1-2 ultram per day. Also takes gabapentin. Has tried cymbalta in the past.   Cant take NSAIDs any more due to CKD  Chronic neck pain  MRI scanned in media  She is needing a refill on this  She has only been taking this with a muscle relaxer at bed time or with flare ups  Gabapentin helps with the RLS and pins and needle feelings at night     She reports that she is still having a lot of fatigue.  She has been taking her Provigil 200 mg. This does help a little bit but in the past 2 weeks she has been extra tired. Spent most of the weekend in bed. .  Review of Systems   Constitutional: Positive for fatigue (improved with provigil). Negative for activity change, appetite change, chills, fever and unexpected weight change. HENT: Negative for congestion, hearing loss, postnasal drip, rhinorrhea, sinus pressure, sore throat and trouble swallowing. Eyes: Negative for discharge, redness and visual disturbance. Respiratory: Negative for cough, shortness of breath and wheezing. Cardiovascular: Negative for chest pain, palpitations and leg swelling. Gastrointestinal: Negative for abdominal pain, blood in stool, constipation, diarrhea, nausea and vomiting. Endocrine: Negative for cold intolerance and heat intolerance. Genitourinary: Negative for dysuria, flank pain, menstrual problem, pelvic pain, urgency and vaginal discharge.    Musculoskeletal: Positive for arthralgias, back pain, myalgias and neck pain.   Skin: Negative for rash. Neurological: Negative for dizziness, weakness and headaches. Hematological: Negative for adenopathy. Psychiatric/Behavioral: Positive for sleep disturbance. Negative for dysphoric mood and suicidal ideas. The patient is nervous/anxious. Prior to Visit Medications    Medication Sig Taking? Authorizing Provider   traMADol (ULTRAM) 50 MG tablet Take 1 tablet by mouth every 8 hours as needed for Pain for up to 30 days. Yes CLINTON Valentine   Armodafinil 200 MG TABS Take 200 mg by mouth every morning for 90 days. Yes CLINTON Gu   gabapentin (NEURONTIN) 300 MG capsule Take 2 capsules by mouth nightly.   CLINTON Gu   levocetirizine (XYZAL) 5 MG tablet Take 1 tablet by mouth nightly  CLINTON Valentine   dapagliflozin (FARXIGA) 10 MG tablet Take 1 tablet by mouth every morning  CLINTON Valentine   promethazine (PHENERGAN) 25 MG tablet TAKE ONE TABLET BY MOUTH EVERY 6 HOURS AS NEEDED FOR NAUSEA  CLINTON Valentine   Semaglutide (RYBELSUS) 14 MG TABS Take 1 tablet by mouth daily With 4 oz of water 30 min before eating  CLINTON Valentine   busPIRone (BUSPAR) 10 MG tablet Take 1 tablet by mouth 3 times daily as needed (anxiety)  CLINTON Valentine   baclofen (LIORESAL) 10 MG tablet TAKE 1/2 TO 1 TABLET TWICE DAILY AS NEEDED FOR MUSCLE SPASMS  Historical Provider, MD   Teriflunomide (AUBAGIO) 14 MG TABS   Historical Provider, MD   rosuvastatin (CRESTOR) 10 MG tablet Take 1 tablet by mouth nightly  CLINTON Valentine   vitamin D (ERGOCALCIFEROL) 1.25 MG (98756 UT) CAPS capsule Take 1 capsule by mouth once a week  CLINTON Valentine   propranolol (INDERAL LA) 60 MG extended release capsule Take 1 capsule by mouth daily  MARJ Gomez DO   cyanocobalamin 1000 MCG/ML injection 1ml weekly x 4 weeks, then 1ml monthly there after  CLINTON Finney   vilazodone HCl (VILAZODONE HCL) 20 MG TABS Take 1 tablet by mouth daily  Sreekanth GANDHI CLINTON Narvaez   brexpiprazole (REXULTI) 1 MG TABS tablet Take 1 tablet by mouth daily  CLINTON Valentine   Syringe/Needle, Disp, (SYRINGE 3CC/25GX1\") 25G X 1\" 3 ML MISC Use to inject b12 once weekly for 1 month, then monthly  CLINTON Valentine   mometasone (NASONEX) 50 MCG/ACT nasal spray 2 sprays by Nasal route daily  CLINTON Valentine   doxepin (SINEQUAN) 50 MG capsule Take 1 capsule by mouth nightly  CLINTON Valentine   Fluticasone furoate-vilanterol (BREO ELLIPTA) 200-25 MCG/INH AEPB inhaler Inhale 1 puff into the lungs daily  CLINTON Valentine   furosemide (LASIX) 20 MG tablet Take 20 mg by mouth daily as needed  Historical Provider, MD   blood glucose monitor kit and supplies Check fasting blood sugar 2-3 times a week  CLINTON Stanley   blood glucose monitor strips Check fasting blood sugar 2-3 times a week  CLINTON Valentine   Galcanezumab-gnlm (EMGALITY) 120 MG/ML SOAJ Inject 120 mg into the skin every 30 days  CLINTON Stanley   SUMAtriptan (IMITREX) 100 MG tablet TAKE ONE TABLET BY MOUTH DAILY AS NEEDED FOR MIGRAINE  CLINTON Valentine       Social History     Tobacco Use    Smoking status: Never Smoker    Smokeless tobacco: Never Used   Substance Use Topics    Alcohol use: No     Alcohol/week: 0.0 standard drinks    Drug use: No        Allergies   Allergen Reactions    Ampicillin     Biaxin [Clarithromycin]     Flagyl [Metronidazole]     Saxenda [Liraglutide -Weight Management]      Nausea and vomiting    Albuterol Rash and Other (See Comments)     migraines    Doxycycline Rash   ,   Past Medical History:   Diagnosis Date    Headache(784.0)     Insomnia     Multiple sclerosis ([facility] [address]    ,   Past Surgical History:   Procedure Laterality Date    CHOLECYSTECTOMY     ,   Social History     Tobacco Use    Smoking status: Never Smoker    Smokeless tobacco: Never Used   Substance Use Topics    Alcohol use: No     Alcohol/week: 0.0 standard drinks    Drug use: No   , No family history on file.,   There is no immunization history on file for this patient.,   Health Maintenance   Topic Date Due    Pneumococcal 0-64 years Vaccine (1 of 1 - PPSV23) 05/08/1981    Diabetic foot exam  05/08/1985    Diabetic retinal exam  05/08/1985    HIV screen  05/08/1990    Diabetic microalbuminuria test  05/08/1993    Hepatitis B vaccine (1 of 3 - Risk 3-dose series) 05/08/1994    DTaP/Tdap/Td vaccine (1 - Tdap) 05/08/1994    Cervical cancer screen  12/03/2018    Breast cancer screen  07/03/2020    Flu vaccine (1) 09/01/2020    A1C test (Diabetic or Prediabetic)  04/23/2021    Lipid screen  04/23/2021    Potassium monitoring  04/23/2021    Creatinine monitoring  04/23/2021    Hepatitis A vaccine  Aged Out    Hib vaccine  Aged Out    Meningococcal (ACWY) vaccine  Aged Out       PHYSICAL EXAMINATION:  [ INSTRUCTIONS:  \"[x]\" Indicates a positive item  \"[]\" Indicates a negative item  -- DELETE ALL ITEMS NOT EXAMINED]  Vital Signs: (As obtained by patient/caregiver or practitioner observation)    Blood pressure-  Heart rate-    Respiratory rate-    Temperature-  Pulse oximetry-     Constitutional: [x] Appears well-developed and well-nourished [] No apparent distress      [] Abnormal-   Mental status  [x] Alert and awake  [x] Oriented to person/place/time []Able to follow commands      Eyes:  EOM    []  Normal  [] Abnormal-  Sclera  []  Normal  [] Abnormal -         Discharge [x]  None visible  [] Abnormal -    HENT:   [] Normocephalic, atraumatic.   [] Abnormal   [x] Mouth/Throat: Mucous membranes are moist.     External Ears [x] Normal  [] Abnormal-     Neck: [x] No visualized mass     Pulmonary/Chest: [x] Respiratory effort normal.  [] No visualized signs of difficulty breathing or respiratory distress        [] Abnormal-      Musculoskeletal:   [x] Normal gait with no signs of ataxia         [] Normal range of motion of neck        [] Abnormal-       Neurological:        [x] No Facial Asymmetry (Cranial nerve 7 motor function) (limited exam to video visit)          [] No gaze palsy        [] Abnormal-         Skin:        [x] No significant exanthematous lesions or discoloration noted on facial skin         [] Abnormal-            Psychiatric:       [x] Normal Affect [] No Hallucinations        [] Abnormal-     Other pertinent observable physical exam findings-     ASSESSMENT/PLAN:    ICD-10-CM    1. Type 2 diabetes mellitus with other specified complication, without long-term current use of insulin (HCC)  E11.69    2. Multiple sclerosis (HCC)  G35 traMADol (ULTRAM) 50 MG tablet     Armodafinil 200 MG TABS   3. Myalgia  M79.10 traMADol (ULTRAM) 50 MG tablet   4. Chronic neck pain  M54.2 traMADol (ULTRAM) 50 MG tablet    G89.29    5. Chronic fatigue  R53.82 Armodafinil 200 MG TABS     Bs are improving . Will check lab work. Change provigil to nuvigil. Add for farxiga to rebelsus. She is unable to take metformin due to severe GI upset. Blood sugar is not well controlled on current therapy    Return in about 1 month (around 10/3/2020) for diabetes 30 min. Yonis Odom is a 39 y.o. female being evaluated by a Virtual Visit (video visit) encounter to address concerns as mentioned above. Verbal consent was given to conduct a teleheath visit. A caregiver was present when appropriate. Due to this being a TeleHealth encounter (During [facility]- public [facility] emergency), evaluation of the following organ systems was limited: Vitals/Constitutional/EENT/Resp/CV/GI//MS/Neuro/Skin/Heme-Lymph-Imm. Pursuant to the emergency declaration under the [facility], [address] [place] [address] authority and the [employer] and [employer] Act, this Virtual Visit was conducted with patient's (and/or legal guardian's) consent, to reduce the patient's risk of exposure to COVID-19 and provide necessary medical care.   The patient (and/or legal

## 2020-09-09 ENCOUNTER — TELEPHONE (OUTPATIENT)
Dept: PRIMARY CARE CLINIC | Age: 45
End: 2020-09-09

## 2020-09-09 LAB
VITAMIN D 25-HYDROXY: 137
VITAMIN D2, 25 HYDROXY: NORMAL
VITAMIN D3,25 HYDROXY: NORMAL

## 2020-09-09 NOTE — TELEPHONE ENCOUNTER
Would recommend starting otc vitamin d3 2000 units daily and stop the prescription once weekly after this current prescription. We will recheck in the future to make sure levels are staying up.

## 2020-09-29 ENCOUNTER — VIRTUAL VISIT (OUTPATIENT)
Dept: PRIMARY CARE CLINIC | Age: 45
End: 2020-09-29
Payer: COMMERCIAL

## 2020-09-29 PROCEDURE — 99214 OFFICE O/P EST MOD 30 MIN: CPT | Performed by: NURSE PRACTITIONER

## 2020-09-29 PROCEDURE — 3052F HG A1C>EQUAL 8.0%<EQUAL 9.0%: CPT | Performed by: NURSE PRACTITIONER

## 2020-09-29 RX ORDER — PREGABALIN 150 MG/1
150 CAPSULE ORAL 2 TIMES DAILY
Qty: 60 CAPSULE | Refills: 5 | Status: SHIPPED | OUTPATIENT
Start: 2020-09-29 | End: 2020-10-29 | Stop reason: SDUPTHER

## 2020-09-29 RX ORDER — TRAMADOL HYDROCHLORIDE 50 MG/1
50 TABLET ORAL EVERY 8 HOURS PRN
Qty: 90 TABLET | Refills: 0 | Status: SHIPPED | OUTPATIENT
Start: 2020-09-29 | End: 2020-10-29 | Stop reason: SDUPTHER

## 2020-09-29 ASSESSMENT — ENCOUNTER SYMPTOMS
RHINORRHEA: 0
TROUBLE SWALLOWING: 0
SHORTNESS OF BREATH: 0
VOMITING: 0
SINUS PRESSURE: 0
EYE DISCHARGE: 0
NAUSEA: 0
ABDOMINAL PAIN: 0
WHEEZING: 0
BLOOD IN STOOL: 0
COUGH: 0
DIARRHEA: 0
CONSTIPATION: 0
BACK PAIN: 1
EYE REDNESS: 0
SORE THROAT: 0

## 2020-09-29 NOTE — PROGRESS NOTES
2020    TELEHEALTH EVALUATION -- Audio/Visual (During YLMAO-04 public health emergency)    Marleni Reagan is a 39 y.o. female who c/o of Diabetes and Joint Pain   medical conditions/complaints as noted below. HPI:    Marleni Reagan (:  1975) has requested an audio/video evaluation for the following concern(s):    Diabetes  Patient has type 2 diabetes she is currently on Rybelsus  bs has been running 110-130 with the adition of farxiga. Chronic pain   Has MS has chronic pain related to this. Takes 1-2 ultram per day. Also takes gabapentin. Has tried cymbalta in the past.   Cant take NSAIDs any more due to CKD  Chronic neck pain  MRI scanned in media  She is needing a refill on this  She has only been taking this with a muscle relaxer at bed time or with flare ups  Gabapentin helps with the RLS and pins and needle feelings at night  Tried taking this to help during the day but makes her too tired.      She reports that she is still having a lot of fatigue.  She has been taking her Provigil 200 mg. This does help a little bit but in the past 2 weeks she has been extra tired. Spent most of the weekend in bed. He is not sleeping well. .  Review of Systems   Constitutional: Positive for fatigue (improved with provigil). Negative for activity change, appetite change, chills, fever and unexpected weight change. HENT: Negative for congestion, hearing loss, postnasal drip, rhinorrhea, sinus pressure, sore throat and trouble swallowing. Eyes: Negative for discharge, redness and visual disturbance. Respiratory: Negative for cough, shortness of breath and wheezing. Cardiovascular: Negative for chest pain, palpitations and leg swelling. Gastrointestinal: Negative for abdominal pain, blood in stool, constipation, diarrhea, nausea and vomiting. Endocrine: Negative for cold intolerance and heat intolerance.    Genitourinary: Negative for dysuria, flank pain, menstrual problem, pelvic pain, urgency and vaginal discharge. Musculoskeletal: Positive for arthralgias, back pain, myalgias and neck pain. Skin: Negative for rash. Neurological: Negative for dizziness, weakness and headaches. Hematological: Negative for adenopathy. Psychiatric/Behavioral: Positive for sleep disturbance. Negative for dysphoric mood and suicidal ideas. The patient is nervous/anxious. Prior to Visit Medications    Medication Sig Taking? Authorizing Provider   traMADol (ULTRAM) 50 MG tablet Take 1 tablet by mouth every 8 hours as needed for Pain for up to 30 days. Yes Vanessa Narvaez APRN   pregabalin (LYRICA) 150 MG capsule Take 1 capsule by mouth 2 times daily for 60 days. Yes Laila Narvaez APRN   Armodafinil 200 MG TABS Take 200 mg by mouth every morning for 90 days.   Vanessa Narvaez APRN   levocetirizine (XYZAL) 5 MG tablet Take 1 tablet by mouth nightly  Laila A Solange, APRN   dapagliflozin (FARXIGA) 10 MG tablet Take 1 tablet by mouth every morning  Lailacameron Narvaez APRN   promethazine (PHENERGAN) 25 MG tablet TAKE ONE TABLET BY MOUTH EVERY 6 HOURS AS NEEDED FOR NAUSEA  Laila A Solange, APRN   Semaglutide (RYBELSUS) 14 MG TABS Take 1 tablet by mouth daily With 4 oz of water 30 min before eating  Laila A Solange APRN   busPIRone (BUSPAR) 10 MG tablet Take 1 tablet by mouth 3 times daily as needed (anxiety)  Laila A Solange, APRN   baclofen (LIORESAL) 10 MG tablet TAKE 1/2 TO 1 TABLET TWICE DAILY AS NEEDED FOR MUSCLE SPASMS  Historical Provider, MD   Teriflunomide (AUBAGIO) 14 MG TABS   Historical Provider, MD   rosuvastatin (CRESTOR) 10 MG tablet Take 1 tablet by mouth nightly  Laila A Solange APRN   propranolol (INDERAL LA) 60 MG extended release capsule Take 1 capsule by mouth daily  MARJ Soriano DO   cyanocobalamin 1000 MCG/ML injection 1ml weekly x 4 weeks, then 1ml monthly there after  Vanessa Narvaez APRN   vilazodone HCl (VILAZODONE HCL) 20 MG TABS Take 1 tablet by mouth daily  Ita GANDHI CLINTON Narvaez   brexpiprazole (REXULTI) 1 MG TABS tablet Take 1 tablet by mouth daily  CLINTON Valentine   Syringe/Needle, Disp, (SYRINGE 3CC/25GX1\") 25G X 1\" 3 ML MISC Use to inject b12 once weekly for 1 month, then monthly  CLINTON Valentine   mometasone (NASONEX) 50 MCG/ACT nasal spray 2 sprays by Nasal route daily  CLINTON Valentine   doxepin (SINEQUAN) 50 MG capsule Take 1 capsule by mouth nightly  CLINTON Valentine   Fluticasone furoate-vilanterol (BREO ELLIPTA) 200-25 MCG/INH AEPB inhaler Inhale 1 puff into the lungs daily  CLINTON Valentine   furosemide (LASIX) 20 MG tablet Take 20 mg by mouth daily as needed  Historical Provider, MD   blood glucose monitor kit and supplies Check fasting blood sugar 2-3 times a week  CLINTON Barrios   blood glucose monitor strips Check fasting blood sugar 2-3 times a week  CLINTON Valentine   Galcanezumab-gnlm (EMGALITY) 120 MG/ML SOAJ Inject 120 mg into the skin every 30 days  CLINTON Barrios   SUMAtriptan (IMITREX) 100 MG tablet TAKE ONE TABLET BY MOUTH DAILY AS NEEDED FOR MIGRAINE  CLINTON Valentine       Social History     Tobacco Use    Smoking status: Never Smoker    Smokeless tobacco: Never Used   Substance Use Topics    Alcohol use: No     Alcohol/week: 0.0 standard drinks    Drug use: No        Allergies   Allergen Reactions    Ampicillin     Biaxin [Clarithromycin]     Flagyl [Metronidazole]     Saxenda [Liraglutide -Weight Management]      Nausea and vomiting    Albuterol Rash and Other (See Comments)     migraines    Doxycycline Rash   ,   Past Medical History:   Diagnosis Date    Headache(784.0)     Insomnia     Multiple sclerosis ([address]    ,   Past Surgical History:   Procedure Laterality Date    CHOLECYSTECTOMY     ,   Social History     Tobacco Use    Smoking status: Never Smoker    Smokeless tobacco: Never Used   Substance Use Topics    Alcohol use: No     Alcohol/week: 0.0 standard drinks    Drug use: No   , History reviewed. No pertinent family history. ,   There is no immunization history on file for this patient.,   Health Maintenance   Topic Date Due    Pneumococcal 0-64 years Vaccine (1 of 1 - PPSV23) 05/08/1981    Diabetic foot exam  05/08/1985    Diabetic retinal exam  05/08/1985    HIV screen  05/08/1990    Diabetic microalbuminuria test  05/08/1993    Hepatitis B vaccine (1 of 3 - Risk 3-dose series) 05/08/1994    DTaP/Tdap/Td vaccine (1 - Tdap) 05/08/1994    Cervical cancer screen  12/03/2018    Breast cancer screen  07/03/2020    Flu vaccine (1) 09/01/2020    A1C test (Diabetic or Prediabetic)  04/23/2021    Lipid screen  04/23/2021    Potassium monitoring  04/23/2021    Creatinine monitoring  04/23/2021    Hepatitis A vaccine  Aged Out    Hib vaccine  Aged Out    Meningococcal (ACWY) vaccine  Aged Out       PHYSICAL EXAMINATION:  [ INSTRUCTIONS:  \"[x]\" Indicates a positive item  \"[]\" Indicates a negative item  -- DELETE ALL ITEMS NOT EXAMINED]  Vital Signs: (As obtained by patient/caregiver or practitioner observation)    Blood pressure-  Heart rate-    Respiratory rate-    Temperature-  Pulse oximetry-     Constitutional: [x] Appears well-developed and well-nourished [] No apparent distress      [] Abnormal-   Mental status  [x] Alert and awake  [x] Oriented to person/place/time []Able to follow commands      Eyes:  EOM    []  Normal  [] Abnormal-  Sclera  []  Normal  [] Abnormal -         Discharge [x]  None visible  [] Abnormal -    HENT:   [] Normocephalic, atraumatic.   [] Abnormal   [x] Mouth/Throat: Mucous membranes are moist.     External Ears [x] Normal  [] Abnormal-     Neck: [x] No visualized mass     Pulmonary/Chest: [x] Respiratory effort normal.  [] No visualized signs of difficulty breathing or respiratory distress        [] Abnormal-      Musculoskeletal:   [x] Normal gait with no signs of ataxia         [] Normal range of motion of neck        [] Abnormal- Neurological:        [x] No Facial Asymmetry (Cranial nerve 7 motor function) (limited exam to video visit)          [] No gaze palsy        [] Abnormal-         Skin:        [x] No significant exanthematous lesions or discoloration noted on facial skin         [] Abnormal-            Psychiatric:       [x] Normal Affect [] No Hallucinations        [] Abnormal-     Other pertinent observable physical exam findings-     ASSESSMENT/PLAN:    ICD-10-CM    1. Type 2 diabetes mellitus with other specified complication, without long-term current use of insulin (HCC)  E11.69    2. Multiple sclerosis (HCC)  G35 traMADol (ULTRAM) 50 MG tablet     pregabalin (LYRICA) 150 MG capsule   3. Myalgia  M79.10 traMADol (ULTRAM) 50 MG tablet     pregabalin (LYRICA) 150 MG capsule   4. Chronic neck pain  M54.2 traMADol (ULTRAM) 50 MG tablet    G89.29 pregabalin (LYRICA) 150 MG capsule   5. Insomnia, unspecified type  G47.00      Bs are improving . Will check lab work. Change gabapentin to lyrica. Add for farxiga to rebelsus. She is unable to take metformin due to severe GI upset. Blood sugar is not well controlled on current therapy    Return in about 1 month (around 10/29/2020) for diabetes 27 m in. Marleni Reagan is a 39 y.o. female being evaluated by a Virtual Visit (video visit) encounter to address concerns as mentioned above. Verbal consent was given to conduct a teleheath visit. A caregiver was present when appropriate. Due to this being a TeleHealth encounter (During QUZFO-59 public health emergency), evaluation of the following organ systems was limited: Vitals/Constitutional/EENT/Resp/CV/GI//MS/Neuro/Skin/Heme-Lymph-Imm.   Pursuant to the emergency declaration under the [facility] waiver authority and the [employer] and [employer] Act, this Virtual Visit was conducted with patient's (and/or legal guardian's) consent, to reduce the patient's risk of exposure to COVID-19 and provide necessary medical care. The patient (and/or legal guardian) has also been advised to contact this office for worsening conditions or problems, and seek emergency medical treatment and/or call 911 if deemed necessary. Services were provided through a video synchronous discussion virtually to substitute for in-person clinic visit. Patient was located at their individual home and provider was located at the office of [facility]. Patient identification was verified at the start of the visit: Yes    Total time spent on this encounter: Not billed by time    Due to patients risk for potential exposure of COVID 19 pandemic, a virtual visit was initiated. Provider performed history of present illness and review of systems. Diagnosis and treatment plan was discussed with patient. Pharmacy of choice was reviewed along with past medical history, medication allergies, and current medications. Education provided to patient or patient parents/guardian with current illness diagnosis as well as when to seek additional healthcare due to changing or for worsening symptoms. Patient voiced understanding. --CLINTON De Anda on 9/29/2020 at 12:03 PM    An electronic signature was used to authenticate this note.

## 2020-10-29 ENCOUNTER — TELEPHONE (OUTPATIENT)
Dept: PRIMARY CARE CLINIC | Age: 45
End: 2020-10-29

## 2020-10-29 ENCOUNTER — VIRTUAL VISIT (OUTPATIENT)
Dept: PRIMARY CARE CLINIC | Age: 45
End: 2020-10-29
Payer: COMMERCIAL

## 2020-10-29 PROCEDURE — 99214 OFFICE O/P EST MOD 30 MIN: CPT | Performed by: NURSE PRACTITIONER

## 2020-10-29 PROCEDURE — 3052F HG A1C>EQUAL 8.0%<EQUAL 9.0%: CPT | Performed by: NURSE PRACTITIONER

## 2020-10-29 RX ORDER — TRAMADOL HYDROCHLORIDE 50 MG/1
50 TABLET ORAL EVERY 8 HOURS PRN
Qty: 90 TABLET | Refills: 0 | Status: SHIPPED | OUTPATIENT
Start: 2020-10-29 | End: 2021-01-12 | Stop reason: SDUPTHER

## 2020-10-29 RX ORDER — PREGABALIN 50 MG/1
50 CAPSULE ORAL DAILY
Qty: 30 CAPSULE | Refills: 5 | Status: SHIPPED | OUTPATIENT
Start: 2020-10-29 | End: 2021-04-16

## 2020-10-29 RX ORDER — ARMODAFINIL 250 MG/1
250 TABLET ORAL DAILY
Qty: 30 TABLET | Refills: 5 | Status: SHIPPED | OUTPATIENT
Start: 2020-10-29 | End: 2021-04-20 | Stop reason: SDUPTHER

## 2020-10-29 RX ORDER — PREGABALIN 150 MG/1
150 CAPSULE ORAL NIGHTLY
Qty: 30 CAPSULE | Refills: 5 | Status: SHIPPED | OUTPATIENT
Start: 2020-10-29 | End: 2021-04-16

## 2020-10-29 ASSESSMENT — ENCOUNTER SYMPTOMS
SORE THROAT: 0
BACK PAIN: 1
TROUBLE SWALLOWING: 0
COUGH: 0
CONSTIPATION: 0
RHINORRHEA: 0
VOMITING: 0
NAUSEA: 0
ABDOMINAL PAIN: 0
BLOOD IN STOOL: 0
SINUS PRESSURE: 0
EYE REDNESS: 0
DIARRHEA: 0
EYE DISCHARGE: 0
WHEEZING: 0
SHORTNESS OF BREATH: 0

## 2020-10-29 NOTE — PROGRESS NOTES
10/29/2020    TELEHEALTH EVALUATION -- Audio/Visual (During BJJVS-10 public health emergency)    Wendy Martinez is a 39 y.o. female who c/o of Diabetes   medical conditions/complaints as noted below. HPI:    Wendy Martinez (:  1975) has requested an audio/video evaluation for the following concern(s):    Diabetes  Blood sugar is running much better. It was 115 this morning. Chronic pain   Has MS has chronic pain related to this. Takes 1-2 ultram per day. Also takes gabapentin. Has tried cymbalta in the past.   Cant take NSAIDs any more due to CKD  Chronic neck pain  MRI scanned in media  She is needing a refill on this  She has only been taking this with a muscle relaxer at bed time or with flare ups  We tried corinne at bedtime. States she didn't really notice any different good or bad with this. .      Fatigue  Has fatigue from MS  Changed to nuvigil, but she is wiped out at the end of the week and has to rest all weekend.          Review of Systems   Constitutional: Positive for fatigue. Negative for activity change, appetite change, chills, fever and unexpected weight change. HENT: Negative for congestion, hearing loss, postnasal drip, rhinorrhea, sinus pressure, sore throat and trouble swallowing. Eyes: Negative for discharge, redness and visual disturbance. Respiratory: Negative for cough, shortness of breath and wheezing. Cardiovascular: Negative for chest pain, palpitations and leg swelling. Gastrointestinal: Negative for abdominal pain, blood in stool, constipation, diarrhea, nausea and vomiting. Endocrine: Negative for cold intolerance and heat intolerance. Genitourinary: Negative for dysuria, flank pain, menstrual problem, pelvic pain, urgency and vaginal discharge. Musculoskeletal: Positive for arthralgias, back pain, myalgias and neck pain. Skin: Negative for rash. Neurological: Negative for dizziness, weakness and headaches.    Hematological: Negative for adenopathy. Psychiatric/Behavioral: Positive for sleep disturbance. Negative for dysphoric mood and suicidal ideas. The patient is nervous/anxious. Prior to Visit Medications    Medication Sig Taking? Authorizing Provider   pregabalin (LYRICA) 150 MG capsule Take 1 capsule by mouth nightly for 30 days. Yes CLINTON Pickard   pregabalin (LYRICA) 50 MG capsule Take 1 capsule by mouth daily for 30 days. Yes CLINTON Valentine   Armodafinil (NUVIGIL) 250 MG TABS Take 250 mg by mouth daily for 30 days. Yes CLINTON Pickard   traMADol (ULTRAM) 50 MG tablet Take 1 tablet by mouth every 8 hours as needed for Pain for up to 30 days.  Yes CLINTON Pickard   levocetirizine (XYZAL) 5 MG tablet Take 1 tablet by mouth nightly  CLINTON Valentine   dapagliflozin (FARXIGA) 10 MG tablet Take 1 tablet by mouth every morning  CLINTON Valentine   promethazine (PHENERGAN) 25 MG tablet TAKE ONE TABLET BY MOUTH EVERY 6 HOURS AS NEEDED FOR NAUSEA  CLINTON Valentine   Semaglutide (RYBELSUS) 14 MG TABS Take 1 tablet by mouth daily With 4 oz of water 30 min before eating  CLINTON Valentine   busPIRone (BUSPAR) 10 MG tablet Take 1 tablet by mouth 3 times daily as needed (anxiety)  CLINTON Valentine   baclofen (LIORESAL) 10 MG tablet TAKE 1/2 TO 1 TABLET TWICE DAILY AS NEEDED FOR MUSCLE SPASMS  Historical Provider, MD   Teriflunomide (AUBAGIO) 14 MG TABS   Historical Provider, MD   rosuvastatin (CRESTOR) 10 MG tablet Take 1 tablet by mouth nightly  CLINTON Valentine   propranolol (INDERAL LA) 60 MG extended release capsule Take 1 capsule by mouth daily  B Toya Bosworth, DO   cyanocobalamin 1000 MCG/ML injection 1ml weekly x 4 weeks, then 1ml monthly there after  CLINTON Garza   vilazodone HCl (VILAZODONE HCL) 20 MG TABS Take 1 tablet by mouth daily  CLINTON Valentine   brexpiprazole (REXULTI) 1 MG TABS tablet Take 1 tablet by mouth daily  CLINTON Pickard   Syringe/Needle, Disp, (SYRINGE 3CC/25GX1\") 25G X 1\" 3 ML MISC Use to inject b12 once weekly for 1 month, then monthly  CLINTON Valentine   mometasone (NASONEX) 50 MCG/ACT nasal spray 2 sprays by Nasal route daily  CLINTON Valentine   doxepin (SINEQUAN) 50 MG capsule Take 1 capsule by mouth nightly  CLINTON Valentine   Fluticasone furoate-vilanterol (BREO ELLIPTA) 200-25 MCG/INH AEPB inhaler Inhale 1 puff into the lungs daily  CLINTON Valentine   furosemide (LASIX) 20 MG tablet Take 20 mg by mouth daily as needed  Historical Provider, MD   blood glucose monitor kit and supplies Check fasting blood sugar 2-3 times a week  CLINTON Anthony   blood glucose monitor strips Check fasting blood sugar 2-3 times a week  CLINTON Valentine   Galcanezumab-gnlm (EMGALITY) 120 MG/ML SOAJ Inject 120 mg into the skin every 30 days  CLINTON Anthony   SUMAtriptan (IMITREX) 100 MG tablet TAKE ONE TABLET BY MOUTH DAILY AS NEEDED FOR MIGRAINE  CLINTON Valentine       Social History     Tobacco Use    Smoking status: Never Smoker    Smokeless tobacco: Never Used   Substance Use Topics    Alcohol use: No     Alcohol/week: 0.0 standard drinks    Drug use: No        Allergies   Allergen Reactions    Ampicillin     Biaxin [Clarithromycin]     Flagyl [Metronidazole]     Saxenda [Liraglutide -Weight Management]      Nausea and vomiting    Albuterol Rash and Other (See Comments)     migraines    Doxycycline Rash   ,   Past Medical History:   Diagnosis Date    Headache(784.0)     Insomnia     Multiple sclerosis ([facility] [address]    ,   Past Surgical History:   Procedure Laterality Date    CHOLECYSTECTOMY     ,   Social History     Tobacco Use    Smoking status: Never Smoker    Smokeless tobacco: Never Used   Substance Use Topics    Alcohol use: No     Alcohol/week: 0.0 standard drinks    Drug use: No   , No family history on file.,   There is no immunization history on file for this patient.,   Health Maintenance   Topic Date Due    Pneumococcal 0-64 years Vaccine (1 of 1 - PPSV23) 05/08/1981    Diabetic foot exam  05/08/1985    Diabetic retinal exam  05/08/1985    HIV screen  05/08/1990    Diabetic microalbuminuria test  05/08/1993    Hepatitis B vaccine (1 of 3 - Risk 3-dose series) 05/08/1994    DTaP/Tdap/Td vaccine (1 - Tdap) 05/08/1994    Cervical cancer screen  12/03/2018    Breast cancer screen  07/03/2020    Flu vaccine (1) 09/01/2020    A1C test (Diabetic or Prediabetic)  04/23/2021    Lipid screen  04/23/2021    Potassium monitoring  04/23/2021    Creatinine monitoring  04/23/2021    Hepatitis A vaccine  Aged Out    Hib vaccine  Aged Out    Meningococcal (ACWY) vaccine  Aged Out       PHYSICAL EXAMINATION:  [ INSTRUCTIONS:  \"[x]\" Indicates a positive item  \"[]\" Indicates a negative item  -- DELETE ALL ITEMS NOT EXAMINED]  Vital Signs: (As obtained by patient/caregiver or practitioner observation)    Blood pressure-  Heart rate-    Respiratory rate-    Temperature-  Pulse oximetry-     Constitutional: [x] Appears well-developed and well-nourished [] No apparent distress      [] Abnormal-   Mental status  [x] Alert and awake  [] Oriented to person/place/time []Able to follow commands      Eyes:  EOM    []  Normal  [] Abnormal-  Sclera  []  Normal  [] Abnormal -         Discharge [x]  None visible  [] Abnormal -    HENT:   [x] Normocephalic, atraumatic.   [] Abnormal   [] Mouth/Throat: Mucous membranes are moist.     External Ears [x] Normal  [] Abnormal-     Neck: [x] No visualized mass     Pulmonary/Chest: [x] Respiratory effort normal.  [] No visualized signs of difficulty breathing or respiratory distress        [] Abnormal-      Musculoskeletal:   [x] Normal gait with no signs of ataxia         [x] Normal range of motion of neck        [] Abnormal-       Neurological:        [x] No Facial Asymmetry (Cranial nerve 7 motor function) (limited exam to video visit)          [] No gaze palsy        [] Abnormal- Skin:        [x] No significant exanthematous lesions or discoloration noted on facial skin         [] Abnormal-            Psychiatric:       [x] Normal Affect [] No Hallucinations        [] Abnormal-     Other pertinent observable physical exam findings-     ASSESSMENT/PLAN:    ICD-10-CM    1. Type 2 diabetes mellitus with other specified complication, without long-term current use of insulin (HCC)  E11.69    2. Multiple sclerosis (HCC)  G35 pregabalin (LYRICA) 150 MG capsule     pregabalin (LYRICA) 50 MG capsule     Armodafinil (NUVIGIL) 250 MG TABS     traMADol (ULTRAM) 50 MG tablet   3. Myalgia  M79.10 pregabalin (LYRICA) 150 MG capsule     traMADol (ULTRAM) 50 MG tablet   4. Chronic neck pain  M54.2 pregabalin (LYRICA) 150 MG capsule    G89.29 traMADol (ULTRAM) 50 MG tablet   5. Chronic fatigue  R53.82 Armodafinil (NUVIGIL) 250 MG TABS     Diabetes is well controlled. Increase nuvigil  Take lyrica 150 gm at bedtime and 50 mg during the day. Return in about 1 month (around 11/29/2020). Wendy Martinez is a 39 y.o. female being evaluated by a Virtual Visit (video visit) encounter to address concerns as mentioned above. Verbal consent was given to conduct a teleheath visit. A caregiver was present when appropriate. Due to this being a TeleHealth encounter (During [facility] public health emergency), evaluation of the following organ systems was limited: Vitals/Constitutional/EENT/Resp/CV/GI//MS/Neuro/Skin/Heme-Lymph-Imm. Pursuant to the emergency declaration under the [address] [place] [address], [address] [place] [address] authority and the [employer] Act, this Virtual Visit was conducted with patient's (and/or legal guardian's) consent, to reduce the patient's risk of exposure to COVID-19 and provide necessary medical care.   The patient (and/or legal guardian) has also been advised to contact this office for worsening conditions or problems, and seek emergency medical treatment and/or call 911 if deemed necessary. Services were provided through a video synchronous discussion virtually to substitute for in-person clinic visit. Patient was located at their individual home and provider was located at the office of [facility]. Patient identification was verified at the start of the visit: Yes    Total time spent on this encounter: Not billed by time    Due to patients risk for potential exposure of COVID 19 pandemic, a virtual visit was initiated. Provider performed history of present illness and review of systems. Diagnosis and treatment plan was discussed with patient. Pharmacy of choice was reviewed along with past medical history, medication allergies, and current medications. Education provided to patient or patient parents/guardian with current illness diagnosis as well as when to seek additional healthcare due to changing or for worsening symptoms. Patient voiced understanding. --CLINTON Wayne on 10/29/2020 at 5:09 PM    An electronic signature was used to authenticate this note.

## 2020-11-10 RX ORDER — DOXEPIN HYDROCHLORIDE 50 MG/1
50 CAPSULE ORAL NIGHTLY
Qty: 30 CAPSULE | Refills: 11 | Status: SHIPPED | OUTPATIENT
Start: 2020-11-10 | End: 2021-11-08 | Stop reason: SDUPTHER

## 2020-11-10 NOTE — TELEPHONE ENCOUNTER
Received fax from pharmacy requesting refill on pts medication(s). Pt was last seen in office on 10/29/2020  and has a follow up scheduled for Visit date not found. Will send request to  Ángel Golden  for patient.      Requested Prescriptions     Pending Prescriptions Disp Refills    doxepin (SINEQUAN) 50 MG capsule 30 capsule 11     Sig: Take 1 capsule by mouth nightly

## 2020-11-17 ENCOUNTER — TELEPHONE (OUTPATIENT)
Dept: ONCOLOGY | Facility: CLINIC | Age: 45
End: 2020-11-17

## 2020-11-17 NOTE — TELEPHONE ENCOUNTER
Pt asked to change appt for 11.20.20 to a telephone visit ([phone]).    She also asked if her lab orders for the 11.20.20 appt have been sent to [facility] outpatient lab.    [phone] PH

## 2020-11-18 NOTE — TELEPHONE ENCOUNTER
PATIENT IS STILL WAITING TO KNOW IF HER LABS ORDERS HAVE BEEN SENT TO [facility], SHE IS ALSO NOT SURE IF THE LABS WILL BE BACK BY 11-20-20 AND MAY STILL NEED TO RESCHEDULE, PLEASE ADVISE?    PT CALL BACK # [phone]

## 2020-11-30 ENCOUNTER — TELEPHONE (OUTPATIENT)
Dept: ONCOLOGY | Facility: CLINIC | Age: 45
End: 2020-11-30

## 2020-12-03 ENCOUNTER — OFFICE VISIT (OUTPATIENT)
Dept: ONCOLOGY | Facility: CLINIC | Age: 45
End: 2020-12-03

## 2020-12-03 VITALS — BODY MASS INDEX: 24.75 KG/M2 | WEIGHT: 145 LBS | HEIGHT: 64 IN

## 2020-12-03 DIAGNOSIS — D47.2 MONOCLONAL GAMMOPATHY OF UNKNOWN SIGNIFICANCE (MGUS): Primary | ICD-10-CM

## 2020-12-03 PROCEDURE — 99214 OFFICE O/P EST MOD 30 MIN: CPT | Performed by: INTERNAL MEDICINE

## 2020-12-03 RX ORDER — DAPAGLIFLOZIN 10 MG/1
1 TABLET, FILM COATED ORAL EVERY MORNING
COMMUNITY
Start: 2020-11-02

## 2020-12-03 RX ORDER — CYANOCOBALAMIN 1000 UG/ML
INJECTION, SOLUTION INTRAMUSCULAR; SUBCUTANEOUS
COMMUNITY
Start: 2020-10-05

## 2020-12-03 RX ORDER — BREXPIPRAZOLE 1 MG/1
1 TABLET ORAL DAILY
COMMUNITY
Start: 2020-11-02

## 2020-12-08 ENCOUNTER — TELEPHONE (OUTPATIENT)
Dept: ONCOLOGY | Facility: CLINIC | Age: 45
End: 2020-12-08

## 2020-12-08 NOTE — TELEPHONE ENCOUNTER
----- Message from Tarah Calloway sent at 12/4/2020 11:44 AM CST -----  This was Dr. GREGORIO's telephone visit yesterday, he didn't have her lab results during the phone call but they are in her chart now. She asked to be called about how those results look.

## 2021-01-08 ENCOUNTER — PATIENT MESSAGE (OUTPATIENT)
Dept: PRIMARY CARE CLINIC | Age: 46
End: 2021-01-08

## 2021-01-08 DIAGNOSIS — F33.1 MODERATE EPISODE OF RECURRENT MAJOR DEPRESSIVE DISORDER (HCC): ICD-10-CM

## 2021-01-08 RX ORDER — VILAZODONE HYDROCHLORIDE 20 MG/1
20 TABLET ORAL DAILY
Qty: 30 TABLET | Refills: 11 | Status: SHIPPED | OUTPATIENT
Start: 2021-01-08 | End: 2021-07-20 | Stop reason: SDUPTHER

## 2021-01-08 RX ORDER — LEVALBUTEROL TARTRATE 45 UG/1
1-2 AEROSOL, METERED ORAL EVERY 4 HOURS PRN
Qty: 1 INHALER | Refills: 3 | Status: SHIPPED | OUTPATIENT
Start: 2021-01-08 | End: 2021-06-21 | Stop reason: CLARIF

## 2021-01-08 NOTE — TELEPHONE ENCOUNTER
From: Franklin Alcazar  Sent: 1/8/2021 12:43 PM CST  To: Carlton Oliver [address] [place] [address] Clinical Staff  Subject: RE: Prescription Question    Vybrid, levelabuteral, breo ellipta, rexulti    ----- Message -----  From: Rocío Nogueira  Sent: 1/8/21 11:08 AM  To: Franklin Alcazar  Subject: RE: Prescription Question    What medications do you need refilled?       ----- Message -----   From:Mago Macedo   Sent:1/8/2021 11:07 AM EST   To:CLINTON Valentine   Subject:Prescription Question    Please refill meds at [facility]. If there is something I need to do, please let me know.

## 2021-01-12 ENCOUNTER — VIRTUAL VISIT (OUTPATIENT)
Dept: PRIMARY CARE CLINIC | Age: 46
End: 2021-01-12
Payer: COMMERCIAL

## 2021-01-12 DIAGNOSIS — M79.10 MYALGIA: ICD-10-CM

## 2021-01-12 DIAGNOSIS — G35 MULTIPLE SCLEROSIS (HCC): ICD-10-CM

## 2021-01-12 DIAGNOSIS — E11.69 TYPE 2 DIABETES MELLITUS WITH OTHER SPECIFIED COMPLICATION, WITHOUT LONG-TERM CURRENT USE OF INSULIN (HCC): Primary | ICD-10-CM

## 2021-01-12 DIAGNOSIS — G89.29 CHRONIC NECK PAIN: ICD-10-CM

## 2021-01-12 DIAGNOSIS — M54.2 CHRONIC NECK PAIN: ICD-10-CM

## 2021-01-12 PROBLEM — E11.9 DIABETES MELLITUS (HCC): Status: ACTIVE | Noted: 2021-01-12

## 2021-01-12 PROCEDURE — 99214 OFFICE O/P EST MOD 30 MIN: CPT | Performed by: NURSE PRACTITIONER

## 2021-01-12 RX ORDER — LEVALBUTEROL INHALATION SOLUTION 1.25 MG/3ML
1.25 SOLUTION RESPIRATORY (INHALATION) EVERY 4 HOURS PRN
Qty: 120 ML | Refills: 5 | Status: SHIPPED | OUTPATIENT
Start: 2021-01-12 | End: 2021-05-12 | Stop reason: SDUPTHER

## 2021-01-12 RX ORDER — TRAMADOL HYDROCHLORIDE 50 MG/1
50 TABLET ORAL EVERY 8 HOURS PRN
Qty: 90 TABLET | Refills: 0 | Status: SHIPPED | OUTPATIENT
Start: 2021-01-12 | End: 2021-04-01 | Stop reason: SDUPTHER

## 2021-01-12 ASSESSMENT — ENCOUNTER SYMPTOMS
VOMITING: 0
TROUBLE SWALLOWING: 0
EYE DISCHARGE: 0
CONSTIPATION: 0
SORE THROAT: 0
BLOOD IN STOOL: 0
DIARRHEA: 0
EYE REDNESS: 0
SINUS PRESSURE: 0
SHORTNESS OF BREATH: 0
NAUSEA: 0
BACK PAIN: 1
ABDOMINAL PAIN: 0
COUGH: 0
WHEEZING: 0
RHINORRHEA: 0

## 2021-01-12 NOTE — PROGRESS NOTES
Zuleyma Pimentel (:  1975) is a 39 y.o. female,Established patient, here for evaluation of the following chief complaint(s): Diabetes      ASSESSMENT/PLAN:  1. Type 2 diabetes mellitus with other specified complication, without long-term current use of insulin ([address]  2. Multiple sclerosis (HCC)  -     traMADol (ULTRAM) 50 MG tablet; Take 1 tablet by mouth every 8 hours as needed for Pain for up to 30 days. , Disp-90 tablet, R-0Normal  3. Myalgia  -     traMADol (ULTRAM) 50 MG tablet; Take 1 tablet by mouth every 8 hours as needed for Pain for up to 30 days. , Disp-90 tablet, R-0Normal  4. Chronic neck pain  -     traMADol (ULTRAM) 50 MG tablet; Take 1 tablet by mouth every 8 hours as needed for Pain for up to 30 days. , Disp-90 tablet, R-0Normal    Controlled Substance Monitoring:    Acute and Chronic Pain Monitoring:   RX Monitoring 2021   Attestation The Prescription Monitoring Report for this patient was reviewed today. Periodic Controlled Substance Monitoring Possible medication side effects, risk of tolerance/dependence & alternative treatments discussed. ;No signs of potential drug abuse or diversion identified. Chronic Pain > 80 MEDD -         Return in about 3 months (around 2021) for diabetes. SUBJECTIVE/OBJECTIVE:  HPI  Diabetes  Blood sugar has been running highier the  Last couple of weeks it was 140 when she last checked it. States she has been out of her routine. Chronic pain   Has MS has chronic pain related to this. Takes 1-2 ultram per day. Also takes gabapentin. Has tried cymbalta in the past.   Cant take NSAIDs any more due to CKD  Chronic neck pain  MRI scanned in media  She is needing a refill on this  She has only been taking this with a muscle relaxer at bed time or with flare ups  We tried corinne at bedtime. States she didn't really notice any different good or bad with this. .   Just has bunion surgery.  States the recovery has been bad.      Fatigue  Has fatigue from [facility] [address]  Changed to nuvigil, but she is wiped out at the end of the week and has to rest all weekend.       Review of Systems   Constitutional: Positive for fatigue. Negative for activity change, appetite change, chills, fever and unexpected weight change. HENT: Negative for congestion, hearing loss, postnasal drip, rhinorrhea, sinus pressure, sore throat and trouble swallowing. Eyes: Negative for discharge, redness and visual disturbance. Respiratory: Negative for cough, shortness of breath and wheezing. Cardiovascular: Negative for chest pain, palpitations and leg swelling. Gastrointestinal: Negative for abdominal pain, blood in stool, constipation, diarrhea, nausea and vomiting. Endocrine: Negative for cold intolerance and heat intolerance. Genitourinary: Negative for dysuria, flank pain, menstrual problem, pelvic pain, urgency and vaginal discharge. Musculoskeletal: Positive for arthralgias, back pain, myalgias and neck pain. Skin: Negative for rash. Neurological: Negative for dizziness, weakness and headaches. Hematological: Negative for adenopathy. Psychiatric/Behavioral: Positive for sleep disturbance. Negative for dysphoric mood and suicidal ideas. The patient is nervous/anxious.         Patient-Reported Vitals 9/22/2020   Patient-Reported Weight 147   Patient-Reported Height 5 ft 4 in   Patient-Reported Pulse 84   Patient-Reported Temperature 97.6        Physical Exam    [INSTRUCTIONS:  \"[x]\" Indicates a positive item  \"[]\" Indicates a negative item  -- DELETE ALL ITEMS NOT EXAMINED]    Constitutional: [x] Appears well-developed and well-nourished [x] No apparent distress      [] Abnormal -     Mental status: [x] Alert and awake  [x] Oriented to person/place/time [x] Able to follow commands    [] Abnormal -     Eyes:   EOM    [x]  Normal    [] Abnormal -   Sclera  [x]  Normal    [] Abnormal -          Discharge [x]  None visible   [] Abnormal -     HENT: [x] Normocephalic, atraumatic  [] Abnormal -   [x] Mouth/Throat: Mucous membranes are moist    External Ears [x] Normal  [] Abnormal -    Neck: [x] No visualized mass [] Abnormal -     Pulmonary/Chest: [x] Respiratory effort normal   [x] No visualized signs of difficulty breathing or respiratory distress        [] Abnormal -      Musculoskeletal:   [x] Normal gait with no signs of ataxia         [x] Normal range of motion of neck        [] Abnormal -     Neurological:        [x] No Facial Asymmetry (Cranial nerve 7 motor function) (limited exam due to video visit)          [x] No gaze palsy        [] Abnormal -          Skin:        [x] No significant exanthematous lesions or discoloration noted on facial skin         [] Abnormal -            Psychiatric:       [x] Normal Affect [] Abnormal -        [x] No Hallucinations    Other pertinent observable physical exam findings:-                  Verena Bautista is a 39 y.o. female being evaluated by a Virtual Visit (video visit) encounter to address concerns as mentioned above. A caregiver was present when appropriate. Due to this being a TeleHealth encounter (During [facility]- public health emergency), evaluation of the following organ systems was limited: Vitals/Constitutional/EENT/Resp/CV/GI//MS/Neuro/Skin/Heme-Lymph-Imm. Pursuant to the emergency declaration under the [address] [place] [address] authority and the [employer] and [employer] Act, this Virtual Visit was conducted with patient's (and/or legal guardian's) consent, to reduce the patient's risk of exposure to COVID-19 and provide necessary medical care. The patient (and/or legal guardian) has also been advised to contact this office for worsening conditions or problems, and seek emergency medical treatment and/or call 911 if deemed necessary.      Patient identification was verified at the start of the visit: Yes    Services were provided through a video synchronous discussion virtually to substitute for in-person clinic visit. Patient was located at home and provider was located in office or at home. An electronic signature was used to authenticate this note.     --CLINTON Devries

## 2021-01-15 RX ORDER — FLUCONAZOLE 150 MG/1
150 TABLET ORAL ONCE
Qty: 2 TABLET | Refills: 0 | Status: SHIPPED | OUTPATIENT
Start: 2021-01-15 | End: 2021-03-22 | Stop reason: SDUPTHER

## 2021-01-23 ENCOUNTER — PATIENT MESSAGE (OUTPATIENT)
Dept: PRIMARY CARE CLINIC | Age: 46
End: 2021-01-23

## 2021-01-23 DIAGNOSIS — E11.69 TYPE 2 DIABETES MELLITUS WITH OTHER SPECIFIED COMPLICATION, WITHOUT LONG-TERM CURRENT USE OF INSULIN (HCC): ICD-10-CM

## 2021-01-25 DIAGNOSIS — F41.9 ANXIETY: ICD-10-CM

## 2021-01-25 DIAGNOSIS — E11.69 TYPE 2 DIABETES MELLITUS WITH OTHER SPECIFIED COMPLICATION, WITHOUT LONG-TERM CURRENT USE OF INSULIN (HCC): ICD-10-CM

## 2021-01-25 RX ORDER — BUSPIRONE HYDROCHLORIDE 10 MG/1
10 TABLET ORAL 3 TIMES DAILY PRN
Qty: 270 TABLET | Refills: 3 | Status: SHIPPED | OUTPATIENT
Start: 2021-01-25 | End: 2021-02-22 | Stop reason: SDUPTHER

## 2021-01-25 NOTE — TELEPHONE ENCOUNTER
Received fax from pharmacy requesting refill on pts medication(s). Pt was last seen in office on 1/12/2021  and has a follow up scheduled for Visit date not found. Will send request to  Kalpesh Ibrahim  for patient.      Requested Prescriptions     Pending Prescriptions Disp Refills    dapagliflozin (FARXIGA) 10 MG tablet 30 tablet 5     Sig: Take 1 tablet by mouth every morning

## 2021-01-25 NOTE — TELEPHONE ENCOUNTER
Requested Prescriptions     Pending Prescriptions Disp Refills    dapagliflozin (FARXIGA) 10 MG tablet 30 tablet 5     Sig: Take 1 tablet by mouth every morning

## 2021-02-22 ENCOUNTER — PATIENT MESSAGE (OUTPATIENT)
Dept: PRIMARY CARE CLINIC | Age: 46
End: 2021-02-22

## 2021-02-22 DIAGNOSIS — F41.9 ANXIETY: ICD-10-CM

## 2021-02-22 RX ORDER — BUSPIRONE HYDROCHLORIDE 10 MG/1
15 TABLET ORAL 3 TIMES DAILY PRN
Qty: 270 TABLET | Refills: 3 | Status: SHIPPED | OUTPATIENT
Start: 2021-02-22 | End: 2021-04-16

## 2021-02-22 NOTE — TELEPHONE ENCOUNTER
From: Clemencia Alex  To: Christopher MooreCLINTON  Sent: 2/22/2021 7:02 AM CST  Subject: Non-Urgent Medical Question    Good morning! Hope you are doing well and staying safe. I was just wondering if we could adjust the dose on the buspar or change to another med. For the past month and a half, I have had episodes of a racing heart beat and have just been extra anxious and irritable. I'm implementing breathing techniques and taking breaks to distract myself. I'm planning on going back to get my masters in the fall, and my foot isn't healing like it should due to infection. I refuse to take the pain meds, so I just take ibuprophen. Being in pain isn't helping the anxiety. Thanks for all you do.  Let me know what you think is best!

## 2021-03-05 ENCOUNTER — TELEPHONE (OUTPATIENT)
Dept: PRIMARY CARE CLINIC | Age: 46
End: 2021-03-05

## 2021-03-05 NOTE — TELEPHONE ENCOUNTER
Pt called, she said she got notification that her ins would no longer cover  Ysabel Bellamy and shahnaz

## 2021-03-05 NOTE — TELEPHONE ENCOUNTER
CLINTON Russ 10 minutes ago (12:44 PM)        I received a letter from [facility] saying my plan no longer covers [place], [address], or Viibryd. They suggested changing my meds to metformin from [place]. Also changing Rexulti to aripiprazole, quetiapine, risperidone, or ziprasidone. Also change Viibryd to citalopram, escitalopram, fluoxetine, paroxetine HCL, paroxetine HCL ext-rel, sertraline, or Trintellix.

## 2021-03-08 NOTE — TELEPHONE ENCOUNTER
She has been on all of those medications in the past.   Metformin caused diarrhea.    Abilify didn't work and caused weight gain  She has been on celexa, prozac, zoloft, lexapro and trintellix (caused nausea)

## 2021-03-09 DIAGNOSIS — F41.1 GAD (GENERALIZED ANXIETY DISORDER): Primary | ICD-10-CM

## 2021-03-09 RX ORDER — LORAZEPAM 0.5 MG/1
0.5 TABLET ORAL EVERY 8 HOURS PRN
Qty: 60 TABLET | Refills: 0 | Status: SHIPPED | OUTPATIENT
Start: 2021-03-09 | End: 2021-04-08

## 2021-03-09 NOTE — TELEPHONE ENCOUNTER
I have authorizations on file for all three of these medications. Spoke with Ingrid at [facility] where pt fills meds and she checked all of these as well and they are covered. Sent pt a My Chart Message letting her know that these were approved by insurance.

## 2021-03-11 ENCOUNTER — PATIENT MESSAGE (OUTPATIENT)
Dept: PRIMARY CARE CLINIC | Age: 46
End: 2021-03-11

## 2021-03-11 DIAGNOSIS — G89.4 CHRONIC PAIN SYNDROME: Primary | ICD-10-CM

## 2021-03-11 RX ORDER — GABAPENTIN 100 MG/1
100 CAPSULE ORAL 3 TIMES DAILY
Qty: 90 CAPSULE | Refills: 5 | Status: SHIPPED | OUTPATIENT
Start: 2021-03-11 | End: 2021-11-24 | Stop reason: SDUPTHER

## 2021-03-11 NOTE — TELEPHONE ENCOUNTER
Michael Guzman [address] in gabapentin 100 mg tid.  Tell her to start with just taking it at night and can increase this to 300mg at night or can take it 1-3 times per day as tolerated from the side effects

## 2021-03-19 RX ORDER — PROPRANOLOL HCL 60 MG
60 CAPSULE, EXTENDED RELEASE 24HR ORAL DAILY
Qty: 30 CAPSULE | Refills: 11 | Status: SHIPPED | OUTPATIENT
Start: 2021-03-19 | End: 2022-04-14 | Stop reason: SDUPTHER

## 2021-03-19 NOTE — TELEPHONE ENCOUNTER
Received fax from pharmacy requesting refill on pts medication(s). Pt was last seen in office on 1/12/2021  and has a follow up scheduled for Visit date not found. Will send request to  Spencer Yates  for patient.      Requested Prescriptions     Pending Prescriptions Disp Refills    propranolol (INDERAL LA) 60 MG extended release capsule [Pharmacy Med Name: propranolol ER 60 mg capsule,24 hr,extended release] 30 capsule 11     Sig: TAKE ONE CAPSULE BY MOUTH EVERY DAY

## 2021-03-22 DIAGNOSIS — E11.69 TYPE 2 DIABETES MELLITUS WITH OTHER SPECIFIED COMPLICATION, WITHOUT LONG-TERM CURRENT USE OF INSULIN (HCC): ICD-10-CM

## 2021-03-22 RX ORDER — FLUCONAZOLE 150 MG/1
150 TABLET ORAL ONCE
Qty: 2 TABLET | Refills: 0 | Status: SHIPPED | OUTPATIENT
Start: 2021-03-22 | End: 2021-03-22

## 2021-03-22 NOTE — TELEPHONE ENCOUNTER
Received fax from pharmacy requesting refill on pts medication(s). Pt was last seen in office on 1/12/2021  and has a follow up scheduled for Visit date not found. Will send request to  Neda Sanchez  for patient.      Requested Prescriptions     Pending Prescriptions Disp Refills    dapagliflozin (FARXIGA) 10 MG tablet 90 tablet 3     Sig: Take 1 tablet by mouth every morning

## 2021-04-01 DIAGNOSIS — M54.2 CHRONIC NECK PAIN: ICD-10-CM

## 2021-04-01 DIAGNOSIS — M79.10 MYALGIA: ICD-10-CM

## 2021-04-01 DIAGNOSIS — G35 MULTIPLE SCLEROSIS (HCC): ICD-10-CM

## 2021-04-01 DIAGNOSIS — G89.29 CHRONIC NECK PAIN: ICD-10-CM

## 2021-04-01 RX ORDER — TRAMADOL HYDROCHLORIDE 50 MG/1
50 TABLET ORAL EVERY 8 HOURS PRN
Qty: 90 TABLET | Refills: 0 | Status: SHIPPED | OUTPATIENT
Start: 2021-04-01 | End: 2021-04-16 | Stop reason: SDUPTHER

## 2021-04-16 ENCOUNTER — VIRTUAL VISIT (OUTPATIENT)
Dept: PRIMARY CARE CLINIC | Age: 46
End: 2021-04-16
Payer: COMMERCIAL

## 2021-04-16 DIAGNOSIS — G89.29 CHRONIC NECK PAIN: ICD-10-CM

## 2021-04-16 DIAGNOSIS — G35 MULTIPLE SCLEROSIS (HCC): ICD-10-CM

## 2021-04-16 DIAGNOSIS — M79.10 MYALGIA: ICD-10-CM

## 2021-04-16 DIAGNOSIS — M54.2 CHRONIC NECK PAIN: ICD-10-CM

## 2021-04-16 DIAGNOSIS — E11.69 TYPE 2 DIABETES MELLITUS WITH OTHER SPECIFIED COMPLICATION, WITHOUT LONG-TERM CURRENT USE OF INSULIN (HCC): Primary | ICD-10-CM

## 2021-04-16 DIAGNOSIS — F41.1 GAD (GENERALIZED ANXIETY DISORDER): ICD-10-CM

## 2021-04-16 PROCEDURE — 99214 OFFICE O/P EST MOD 30 MIN: CPT | Performed by: NURSE PRACTITIONER

## 2021-04-16 RX ORDER — TRAMADOL HYDROCHLORIDE 50 MG/1
50 TABLET ORAL EVERY 8 HOURS PRN
Qty: 90 TABLET | Refills: 0 | Status: SHIPPED | OUTPATIENT
Start: 2021-04-16 | End: 2021-05-17 | Stop reason: SDUPTHER

## 2021-04-16 RX ORDER — DIAZEPAM 5 MG/1
5 TABLET ORAL EVERY 8 HOURS PRN
Qty: 60 TABLET | Refills: 0 | Status: SHIPPED | OUTPATIENT
Start: 2021-04-16 | End: 2021-05-17 | Stop reason: SDUPTHER

## 2021-04-16 NOTE — PROGRESS NOTES
Venice Slade (:  1975) is a 39 y.o. female,Established patient, here for evaluation of the following chief complaint(s): Diabetes and Anxiety      ASSESSMENT/PLAN:  1. Type 2 diabetes mellitus with other specified complication, without long-term current use of insulin ([facility] [address]  2. Multiple sclerosis (HCC)  -     traMADol (ULTRAM) 50 MG tablet; Take 1 tablet by mouth every 8 hours as needed for Pain for up to 30 days. , Disp-90 tablet, R-0Normal  3. Myalgia  -     traMADol (ULTRAM) 50 MG tablet; Take 1 tablet by mouth every 8 hours as needed for Pain for up to 30 days. , Disp-90 tablet, R-0Normal  4. Chronic neck pain  -     traMADol (ULTRAM) 50 MG tablet; Take 1 tablet by mouth every 8 hours as needed for Pain for up to 30 days. , Disp-90 tablet, R-0Normal  5. NERY (generalized anxiety disorder)  -     diazePAM (VALIUM) 5 MG tablet; Take 1 tablet by mouth every 8 hours as needed for Anxiety or Sleep for up to 30 days. , Disp-60 tablet, R-0Normal      Increase gabapentin to bid . This might help for a pain stand point and to help stablize moods. Change ativan to valium as it wasn't helping. Start checking blood sugar. Return in about 1 month (around 2021) for diabete. SUBJECTIVE/OBJECTIVE:  HPI  Diabetes. She hasn't been testing it lately. She hasn't been making the best food choices. Chronic pain   Has MS has chronic pain related to this. Takes 1-2 ultram per day. Also takes gabapentin. Has tried cymbalta in the past.   Cant take NSAIDs any more due to CKD  Chronic neck pain  MRI scanned in media  She is needing a refill on this  She has only been taking this with a muscle relaxer at bed time or with flare ups  We tried corinne at bedtime. States she didn't really notice any different good or bad with this. .   Just has bunion surgery. States the recovery has been bad.      Fatigue  Has fatigue from MS  Changed to nuvigil,    Mood swings. States her moods are all over the place.  Ativan isn't helping. She is also on viibryd and rexulti. She is in a lot of chronic pain and foot pain post surgery and might have to have another surgery. This has her more irritable. Review of Systems    Patient-Reported Vitals 9/22/2020   Patient-Reported Weight 147   Patient-Reported Height 5 ft 4 in   Patient-Reported Pulse 84   Patient-Reported Temperature 97.6        Physical Exam    [INSTRUCTIONS:  \"[x]\" Indicates a positive item  \"[]\" Indicates a negative item  -- DELETE ALL ITEMS NOT EXAMINED]    Constitutional: [x] Appears well-developed and well-nourished [x] No apparent distress      [] Abnormal -     Mental status: [x] Alert and awake  [x] Oriented to person/place/time [x] Able to follow commands    [] Abnormal -     Eyes:   EOM    [x]  Normal    [] Abnormal -   Sclera  [x]  Normal    [] Abnormal -          Discharge [x]  None visible   [] Abnormal -     HENT: [x] Normocephalic, atraumatic  [] Abnormal -   [x] Mouth/Throat: Mucous membranes are moist    External Ears [x] Normal  [] Abnormal -    Neck: [x] No visualized mass [] Abnormal -     Pulmonary/Chest: [x] Respiratory effort normal   [x] No visualized signs of difficulty breathing or respiratory distress        [] Abnormal -      Musculoskeletal:   [x] Normal gait with no signs of ataxia         [x] Normal range of motion of neck        [] Abnormal -     Neurological:        [x] No Facial Asymmetry (Cranial nerve 7 motor function) (limited exam due to video visit)          [x] No gaze palsy        [] Abnormal -          Skin:        [x] No significant exanthematous lesions or discoloration noted on facial skin         [] Abnormal -            Psychiatric:       [x] Normal Affect [] Abnormal -        [x] No Hallucinations    Other pertinent observable physical exam findings:-                Lita Joseph, was evaluated through a synchronous (real-time) audio-video encounter.  The patient (or guardian if applicable) is aware that this is a

## 2021-04-20 DIAGNOSIS — R53.82 CHRONIC FATIGUE: ICD-10-CM

## 2021-04-20 DIAGNOSIS — G35 MULTIPLE SCLEROSIS (HCC): ICD-10-CM

## 2021-04-20 DIAGNOSIS — E53.8 VITAMIN B12 DEFICIENCY: Primary | ICD-10-CM

## 2021-04-20 RX ORDER — CYANOCOBALAMIN 1000 UG/ML
INJECTION INTRAMUSCULAR; SUBCUTANEOUS
Qty: 10 ML | Refills: 0 | Status: SHIPPED | OUTPATIENT
Start: 2021-04-20 | End: 2021-12-14 | Stop reason: SDUPTHER

## 2021-04-20 RX ORDER — ARMODAFINIL 250 MG/1
250 TABLET ORAL DAILY
Qty: 30 TABLET | Refills: 5 | Status: SHIPPED | OUTPATIENT
Start: 2021-04-20 | End: 2021-06-21 | Stop reason: SDUPTHER

## 2021-04-21 DIAGNOSIS — E11.69 TYPE 2 DIABETES MELLITUS WITH OTHER SPECIFIED COMPLICATION, WITHOUT LONG-TERM CURRENT USE OF INSULIN (HCC): Primary | ICD-10-CM

## 2021-04-21 DIAGNOSIS — E78.2 MIXED HYPERLIPIDEMIA: ICD-10-CM

## 2021-04-21 RX ORDER — ROSUVASTATIN CALCIUM 10 MG/1
10 TABLET, COATED ORAL NIGHTLY
Qty: 90 TABLET | Refills: 3 | Status: SHIPPED | OUTPATIENT
Start: 2021-04-21 | End: 2022-04-14 | Stop reason: SDUPTHER

## 2021-04-22 ENCOUNTER — PATIENT MESSAGE (OUTPATIENT)
Dept: PRIMARY CARE CLINIC | Age: 46
End: 2021-04-22

## 2021-04-22 RX ORDER — DULAGLUTIDE 1.5 MG/.5ML
1.5 INJECTION, SOLUTION SUBCUTANEOUS WEEKLY
Qty: 4 PEN | Refills: 5 | Status: SHIPPED | OUTPATIENT
Start: 2021-04-22 | End: 2021-06-21

## 2021-04-22 NOTE — TELEPHONE ENCOUNTER
From: Jennifer Bull  To: CLINTON Escalera  Sent: 4/22/2021 7:22 AM CDT  Subject: Visit Follow-Up Question    My blood sugar each morning for the past three days has been 153, 152, 163.  Just letting you know per your request.

## 2021-05-12 ENCOUNTER — VIRTUAL VISIT (OUTPATIENT)
Dept: PRIMARY CARE CLINIC | Age: 46
End: 2021-05-12
Payer: COMMERCIAL

## 2021-05-12 DIAGNOSIS — J01.00 ACUTE MAXILLARY SINUSITIS, RECURRENCE NOT SPECIFIED: Primary | ICD-10-CM

## 2021-05-12 DIAGNOSIS — J40 BRONCHITIS: ICD-10-CM

## 2021-05-12 PROCEDURE — 99213 OFFICE O/P EST LOW 20 MIN: CPT | Performed by: NURSE PRACTITIONER

## 2021-05-12 RX ORDER — METHYLPREDNISOLONE 4 MG/1
TABLET ORAL
Qty: 1 KIT | Refills: 0 | Status: SHIPPED | OUTPATIENT
Start: 2021-05-12 | End: 2021-05-17

## 2021-05-12 RX ORDER — LEVALBUTEROL INHALATION SOLUTION 1.25 MG/3ML
1.25 SOLUTION RESPIRATORY (INHALATION) EVERY 4 HOURS PRN
Qty: 120 ML | Refills: 5 | Status: SHIPPED | OUTPATIENT
Start: 2021-05-12 | End: 2022-04-14

## 2021-05-12 RX ORDER — CEFDINIR 300 MG/1
300 CAPSULE ORAL 2 TIMES DAILY
Qty: 20 CAPSULE | Refills: 0 | Status: SHIPPED | OUTPATIENT
Start: 2021-05-12 | End: 2022-02-14 | Stop reason: SDUPTHER

## 2021-05-12 ASSESSMENT — ENCOUNTER SYMPTOMS
EYE REDNESS: 0
TROUBLE SWALLOWING: 0
EYE DISCHARGE: 0
ABDOMINAL PAIN: 0
RHINORRHEA: 1
COUGH: 1
SINUS PRESSURE: 1
DIARRHEA: 0
SINUS PAIN: 1
SORE THROAT: 1
WHEEZING: 0
BLOOD IN STOOL: 0
CONSTIPATION: 0

## 2021-05-12 NOTE — PROGRESS NOTES
Silvana Moore (:  1975) is a 55 y.o. female,Established patient, here for evaluation of the following chief complaint(s): Pharyngitis      ASSESSMENT/PLAN:  1. Acute maxillary sinusitis, recurrence not specified  -     cefdinir (OMNICEF) 300 MG capsule; Take 1 capsule by mouth 2 times daily for 10 days, Disp-20 capsule, R-0Normal  2. Bronchitis  -     levalbuterol (XOPENEX) 1.25 MG/3ML nebulizer solution; Take 3 mLs by nebulization every 4 hours as needed for Wheezing, Disp-120 mL, R-5Normal  -     methylPREDNISolone (MEDROL, MAYANK,) 4 MG tablet; Take po as directed, Disp-1 kit, R-0Normal      Return if symptoms worsen or fail to improve. SUBJECTIVE/OBJECTIVE:  HPI  Sore throat  This started a couple of days ago. Has a cough. Chills but no fever. Very tired  Has had both covid vaccines. Review of Systems   Constitutional: Negative for appetite change and unexpected weight change. HENT: Positive for congestion, postnasal drip, rhinorrhea, sinus pressure, sinus pain and sore throat. Negative for trouble swallowing. Eyes: Negative for discharge and redness. Respiratory: Positive for cough. Negative for wheezing. Cardiovascular: Negative for chest pain. Gastrointestinal: Negative for abdominal pain, blood in stool, constipation and diarrhea. Genitourinary: Negative for dysuria. Skin: Negative for rash. Neurological: Negative for weakness. Hematological: Negative for adenopathy.        Patient-Reported Vitals 2021   Patient-Reported Weight 155   Patient-Reported Height 5'4\"   Patient-Reported Systolic 127   Patient-Reported Diastolic 72   Patient-Reported Pulse -   Patient-Reported Temperature 97        Physical Exam    [INSTRUCTIONS:  \"[x]\" Indicates a positive item  \"[]\" Indicates a negative item  -- DELETE ALL ITEMS NOT EXAMINED]    Constitutional: [x] Appears well-developed and well-nourished [x] No apparent distress      [] Abnormal -     Mental status: [x] Alert and awake  [x] Oriented to person/place/time [x] Able to follow commands    [] Abnormal -     Eyes:   EOM    [x]  Normal    [] Abnormal -   Sclera  [x]  Normal    [] Abnormal -          Discharge [x]  None visible   [] Abnormal -     HENT: [x] Normocephalic, atraumatic  [] Abnormal -   [x] Mouth/Throat: Mucous membranes are moist    External Ears [x] Normal  [] Abnormal -    Neck: [x] No visualized mass [] Abnormal -     Pulmonary/Chest: [x] Respiratory effort normal   [x] No visualized signs of difficulty breathing or respiratory distress        [] Abnormal -      Musculoskeletal:   [x] Normal gait with no signs of ataxia         [x] Normal range of motion of neck        [] Abnormal -     Neurological:        [x] No Facial Asymmetry (Cranial nerve 7 motor function) (limited exam due to video visit)          [x] No gaze palsy        [] Abnormal -          Skin:        [x] No significant exanthematous lesions or discoloration noted on facial skin         [] Abnormal -            Psychiatric:       [x] Normal Affect [] Abnormal -        [x] No Hallucinations    Other pertinent observable physical exam findings:-          On this date 5/12/2021 I have spent 15 minutes reviewing previous notes, test results and face to face (virtual) with the patient discussing the diagnosis and importance of compliance with the treatment plan as well as documenting on the day of the visit. Ekaterina Sheth, was evaluated through a synchronous (real-time) audio-video encounter. The patient (or guardian if applicable) is aware that this is a billable service. Verbal consent to proceed has been obtained within the past 12 months. The visit was conducted pursuant to the emergency declaration under the [address] [place] [address] and the [employer] and [employer] General Act. Patient identification was verified, and a caregiver was present when appropriate.  The patient was located in a state where the provider was credentialed to provide care. An electronic signature was used to authenticate this note.     --CLINTON Brumfield

## 2021-05-17 ENCOUNTER — VIRTUAL VISIT (OUTPATIENT)
Dept: PRIMARY CARE CLINIC | Age: 46
End: 2021-05-17
Payer: COMMERCIAL

## 2021-05-17 DIAGNOSIS — M79.10 MYALGIA: ICD-10-CM

## 2021-05-17 DIAGNOSIS — F41.1 GAD (GENERALIZED ANXIETY DISORDER): ICD-10-CM

## 2021-05-17 DIAGNOSIS — E11.69 TYPE 2 DIABETES MELLITUS WITH OTHER SPECIFIED COMPLICATION, WITHOUT LONG-TERM CURRENT USE OF INSULIN (HCC): Primary | ICD-10-CM

## 2021-05-17 DIAGNOSIS — G35 MULTIPLE SCLEROSIS (HCC): ICD-10-CM

## 2021-05-17 DIAGNOSIS — G89.29 CHRONIC NECK PAIN: ICD-10-CM

## 2021-05-17 DIAGNOSIS — M54.2 CHRONIC NECK PAIN: ICD-10-CM

## 2021-05-17 PROCEDURE — 99214 OFFICE O/P EST MOD 30 MIN: CPT | Performed by: NURSE PRACTITIONER

## 2021-05-17 RX ORDER — DIAZEPAM 5 MG/1
5 TABLET ORAL EVERY 8 HOURS PRN
Qty: 60 TABLET | Refills: 0 | Status: SHIPPED | OUTPATIENT
Start: 2021-05-17 | End: 2021-08-17 | Stop reason: SDUPTHER

## 2021-05-17 RX ORDER — TRAMADOL HYDROCHLORIDE 50 MG/1
50 TABLET ORAL EVERY 8 HOURS PRN
Qty: 90 TABLET | Refills: 0 | Status: SHIPPED | OUTPATIENT
Start: 2021-05-17 | End: 2021-06-21 | Stop reason: SDUPTHER

## 2021-05-17 NOTE — PROGRESS NOTES
Ekaterina Sheth (:  1975) is a 55 y.o. female,Established patient, here for evaluation of the following chief complaint(s): Diabetes and Hyperlipidemia      ASSESSMENT/PLAN:  1. Type 2 diabetes mellitus with other specified complication, without long-term current use of insulin ([facility] [address]  2. Multiple sclerosis (HCC)  -     traMADol (ULTRAM) 50 MG tablet; Take 1 tablet by mouth every 8 hours as needed for Pain for up to 30 days. , Disp-90 tablet, R-0Normal  3. Myalgia  -     traMADol (ULTRAM) 50 MG tablet; Take 1 tablet by mouth every 8 hours as needed for Pain for up to 30 days. , Disp-90 tablet, R-0Normal  4. Chronic neck pain  -     traMADol (ULTRAM) 50 MG tablet; Take 1 tablet by mouth every 8 hours as needed for Pain for up to 30 days. , Disp-90 tablet, R-0Normal  5. NERY (generalized anxiety disorder)  -     diazePAM (VALIUM) 5 MG tablet; Take 1 tablet by mouth every 8 hours as needed for Anxiety or Sleep for up to 30 days. , Disp-60 tablet, R-0Normal      Controlled Substance Monitoring:    Acute and Chronic Pain Monitoring:   RX Monitoring 2021   Attestation The Prescription Monitoring Report for this patient was reviewed today. Periodic Controlled Substance Monitoring Possible medication side effects, risk of tolerance/dependence & alternative treatments discussed. ;No signs of potential drug abuse or diversion identified. Chronic Pain > 80 MEDD -         Return in about 1 month (around 2021) for diabetes. SUBJECTIVE/OBJECTIVE:  HPI  Diabetes. She hasn't been testing it lately. She started making better food choices this week. bs after eating this morning after binging last night was 200. This was not fasting. She has made her mind up to eat better. Chronic pain   Has MS has chronic pain related to this. Takes 1-2 ultram per day. Also takes gabapentin.    Has tried cymbalta in the past.   Cant take NSAIDs any more due to CKD  Chronic neck pain  MRI scanned in media  She is needing a refill on this  She has only been taking this with a muscle relaxer at bed time or with flare ups    Fatigue  Has fatigue from MS  Changed to nuvigil,         Review of Systems    Patient-Reported Vitals 5/12/2021   Patient-Reported Weight 155   Patient-Reported Height 5'4\"   Patient-Reported Systolic 523   Patient-Reported Diastolic 72   Patient-Reported Pulse -   Patient-Reported Temperature 97        Physical Exam    [INSTRUCTIONS:  \"[x]\" Indicates a positive item  \"[]\" Indicates a negative item  -- DELETE ALL ITEMS NOT EXAMINED]    Constitutional: [x] Appears well-developed and well-nourished [x] No apparent distress      [] Abnormal -     Mental status: [x] Alert and awake  [x] Oriented to person/place/time [x] Able to follow commands    [] Abnormal -     Eyes:   EOM    [x]  Normal    [] Abnormal -   Sclera  [x]  Normal    [] Abnormal -          Discharge [x]  None visible   [] Abnormal -     HENT: [x] Normocephalic, atraumatic  [] Abnormal -   [x] Mouth/Throat: Mucous membranes are moist    External Ears [x] Normal  [] Abnormal -    Neck: [x] No visualized mass [] Abnormal -     Pulmonary/Chest: [x] Respiratory effort normal   [x] No visualized signs of difficulty breathing or respiratory distress        [] Abnormal -      Musculoskeletal:   [x] Normal gait with no signs of ataxia         [x] Normal range of motion of neck        [] Abnormal -     Neurological:        [x] No Facial Asymmetry (Cranial nerve 7 motor function) (limited exam due to video visit)          [x] No gaze palsy        [] Abnormal -          Skin:        [x] No significant exanthematous lesions or discoloration noted on facial skin         [] Abnormal -            Psychiatric:       [x] Normal Affect [] Abnormal -        [x] No Hallucinations    Other pertinent observable physical exam findings:-                Phoebe Sether, was evaluated through a synchronous (real-time) audio-video encounter.  The patient (or guardian if applicable) is aware that this is a billable service. Verbal consent to proceed has been obtained within the past 12 months. The visit was conducted pursuant to the emergency declaration under the [address] [place] [address] authority and the [employer] and [employer] General Act. Patient identification was verified, and a caregiver was present when appropriate. The patient was located in a state where the provider was credentialed to provide care. An electronic signature was used to authenticate this note.     --CLINTON Mir

## 2021-06-09 ENCOUNTER — TELEPHONE (OUTPATIENT)
Dept: PRIMARY CARE CLINIC | Age: 46
End: 2021-06-09

## 2021-06-09 LAB
ALBUMIN SERPL-MCNC: 3.5 G/DL
ALP BLD-CCNC: 0.68 U/L
ALT SERPL-CCNC: 23 U/L
ANION GAP SERPL CALCULATED.3IONS-SCNC: 1.1 MMOL/L
AST SERPL-CCNC: 15 U/L
AVERAGE GLUCOSE: NORMAL
BASOPHILS ABSOLUTE: 0.08 /ΜL
BASOPHILS RELATIVE PERCENT: 1.2 %
BILIRUB SERPL-MCNC: 0.68 MG/DL (ref 0.1–1.4)
BUN BLDV-MCNC: 17 MG/DL
CALCIUM SERPL-MCNC: 8.7 MG/DL
CHLORIDE BLD-SCNC: 104 MMOL/L
CO2: 25.7 MMOL/L
CREAT SERPL-MCNC: 0.77 MG/DL
CREATININE, URINE: 70
EOSINOPHILS ABSOLUTE: 0.84 /ΜL
EOSINOPHILS RELATIVE PERCENT: 9.9 %
GFR CALCULATED: >60
GLUCOSE BLD-MCNC: 122 MG/DL
HBA1C MFR BLD: 5.7 %
HCT VFR BLD CALC: 42.7 % (ref 36–46)
HEMOGLOBIN: 13.8 G/DL (ref 12–16)
LYMPHOCYTES ABSOLUTE: 0 /ΜL
LYMPHOCYTES RELATIVE PERCENT: 21.2 %
MCH RBC QN AUTO: 29.8 PG
MCHC RBC AUTO-ENTMCNC: 32.3 G/DL
MCV RBC AUTO: 92.2 FL
MICROALBUMIN/CREAT 24H UR: 1.3 MG/G{CREAT}
MICROALBUMIN/CREAT UR-RTO: 1.8
MONOCYTES ABSOLUTE: 0 /ΜL
MONOCYTES RELATIVE PERCENT: 13 %
NEUTROPHILS ABSOLUTE: 0.2 /ΜL
NEUTROPHILS RELATIVE PERCENT: 54.5 %
PDW BLD-RTO: 12.7 %
PLATELET # BLD: 167 K/ΜL
PMV BLD AUTO: 11.3 FL
POTASSIUM SERPL-SCNC: 3.7 MMOL/L
RBC # BLD: 4.63 10^6/ΜL
SODIUM BLD-SCNC: 138 MMOL/L
TOTAL PROTEIN: 6.6
VITAMIN D 25-HYDROXY: 25.3
VITAMIN D2, 25 HYDROXY: NORMAL
VITAMIN D3,25 HYDROXY: NORMAL
WBC # BLD: 6.5 10^3/ML

## 2021-06-09 NOTE — TELEPHONE ENCOUNTER
----- Message from CLINTON Thakkar sent at 6/9/2021  9:19 AM CDT -----  No significant microalbumin in the urine  CBC: White blood cell count, infection fighting cells, is within normal limits. KILEY globin and hematocrit, oxygen-carrying cells are within normal limits. The size of your red blood cells are normal  CMP: Normal sodium, potassium and calcium. Kidney function is within normal limits. Liver function is within normal limits. Blood sugar is slightly elevated at 122. Vitamin D is mildly low at 25.3. Recommend over-the-counter vitamin D supplement. 2000 units daily  A1c is 5.7. This shows great control of blood sugars over the previous 3 months. Keep up the good work.

## 2021-06-21 ENCOUNTER — OFFICE VISIT (OUTPATIENT)
Dept: PRIMARY CARE CLINIC | Age: 46
End: 2021-06-21
Payer: COMMERCIAL

## 2021-06-21 ENCOUNTER — TELEPHONE (OUTPATIENT)
Dept: PRIMARY CARE CLINIC | Age: 46
End: 2021-06-21

## 2021-06-21 VITALS
SYSTOLIC BLOOD PRESSURE: 122 MMHG | WEIGHT: 161 LBS | DIASTOLIC BLOOD PRESSURE: 80 MMHG | HEIGHT: 64 IN | TEMPERATURE: 97.7 F | BODY MASS INDEX: 27.49 KG/M2 | HEART RATE: 93 BPM | OXYGEN SATURATION: 97 %

## 2021-06-21 DIAGNOSIS — E55.9 VITAMIN D DEFICIENCY: ICD-10-CM

## 2021-06-21 DIAGNOSIS — M54.2 CHRONIC NECK PAIN: ICD-10-CM

## 2021-06-21 DIAGNOSIS — G89.29 CHRONIC NECK PAIN: ICD-10-CM

## 2021-06-21 DIAGNOSIS — R53.82 CHRONIC FATIGUE: ICD-10-CM

## 2021-06-21 DIAGNOSIS — G35 MULTIPLE SCLEROSIS (HCC): ICD-10-CM

## 2021-06-21 DIAGNOSIS — M79.10 MYALGIA: ICD-10-CM

## 2021-06-21 DIAGNOSIS — R11.0 NAUSEA: ICD-10-CM

## 2021-06-21 DIAGNOSIS — E11.69 TYPE 2 DIABETES MELLITUS WITH OTHER SPECIFIED COMPLICATION, WITHOUT LONG-TERM CURRENT USE OF INSULIN (HCC): Primary | ICD-10-CM

## 2021-06-21 PROCEDURE — 99214 OFFICE O/P EST MOD 30 MIN: CPT | Performed by: NURSE PRACTITIONER

## 2021-06-21 RX ORDER — ARMODAFINIL 250 MG/1
250 TABLET ORAL DAILY
Qty: 30 TABLET | Refills: 2 | Status: SHIPPED | OUTPATIENT
Start: 2021-06-21 | End: 2021-08-17 | Stop reason: SDUPTHER

## 2021-06-21 RX ORDER — ERGOCALCIFEROL 1.25 MG/1
50000 CAPSULE ORAL WEEKLY
Qty: 12 CAPSULE | Refills: 1 | Status: SHIPPED | OUTPATIENT
Start: 2021-06-21

## 2021-06-21 RX ORDER — TRAMADOL HYDROCHLORIDE 50 MG/1
50 TABLET ORAL EVERY 8 HOURS PRN
Qty: 90 TABLET | Refills: 0 | Status: SHIPPED | OUTPATIENT
Start: 2021-06-21 | End: 2021-07-20 | Stop reason: SDUPTHER

## 2021-06-21 ASSESSMENT — ENCOUNTER SYMPTOMS
DIARRHEA: 0
SHORTNESS OF BREATH: 0
BLOOD IN STOOL: 0
WHEEZING: 0
EYE REDNESS: 0
ABDOMINAL PAIN: 0
SINUS PRESSURE: 0
SORE THROAT: 0
BACK PAIN: 1
RHINORRHEA: 0
TROUBLE SWALLOWING: 0
CONSTIPATION: 0
NAUSEA: 0
EYE DISCHARGE: 0
VOMITING: 0
COUGH: 0

## 2021-06-21 NOTE — PROGRESS NOTES
Venice Slade (:  1975) is a 55 y.o. female,Established patient, here for evaluation of the following chief complaint(s):  1 Month Follow-Up (nausea and LE edema- low vit d ) and Medication Refill (tramadol )      ASSESSMENT/PLAN:    ICD-10-CM    1. Type 2 diabetes mellitus with other specified complication, without long-term current use of insulin (HCC)  E11.69 SITagliptin (JANUVIA) 100 MG tablet   2. Multiple sclerosis (HCC)  G35 traMADol (ULTRAM) 50 MG tablet     Armodafinil (NUVIGIL) 250 MG TABS   3. Myalgia  M79.10 traMADol (ULTRAM) 50 MG tablet   4. Chronic neck pain  M54.2 traMADol (ULTRAM) 50 MG tablet    G89.29    5. Vitamin D deficiency  E55.9 vitamin D (ERGOCALCIFEROL) 1.25 MG (84202 UT) CAPS capsule   6. Nausea  R11.0    7. Chronic fatigue  R53.82 Armodafinil (NUVIGIL) 250 MG TABS     Stop trulicity and change to [place] and [place]. Controlled Substance Monitoring:    Acute and Chronic Pain Monitoring:   RX Monitoring 2021   Attestation The Prescription Monitoring Report for this patient was reviewed today. Periodic Controlled Substance Monitoring Possible medication side effects, risk of tolerance/dependence & alternative treatments discussed. ;No signs of potential drug abuse or diversion identified. Chronic Pain > 80 MEDD -         Return in about 1 month (around 2021) for diabetes. SUBJECTIVE/OBJECTIVE:  HPI  Chronic pain   Has MS has chronic pain related to this. Takes 1-2 ultram per day. Also takes gabapentin. Has tried cymbalta in the past.   Cant take NSAIDs any more due to CKD  Chronic neck pain  MRI scanned in media  She is needing a refill on this  She has only been taking this with a muscle relaxer at bed time or with flare ups     Fatigue  Has fatigue from MS  She is taking nuvigil    Diabetes  a1c was good. She isn't checking it as much as she was in the past.   She has waves of nausea. Wondering if this is med related.  Had this really bad with manoj a little better with trulicity but still has it off and on. Review of Systems   Constitutional: Positive for fatigue. Negative for activity change, appetite change, chills, fever and unexpected weight change. HENT: Negative for congestion, hearing loss, postnasal drip, rhinorrhea, sinus pressure, sore throat and trouble swallowing. Eyes: Negative for discharge, redness and visual disturbance. Respiratory: Negative for cough, shortness of breath and wheezing. Cardiovascular: Negative for chest pain, palpitations and leg swelling. Gastrointestinal: Negative for abdominal pain, blood in stool, constipation, diarrhea, nausea and vomiting. Endocrine: Negative for cold intolerance and heat intolerance. Genitourinary: Negative for dysuria, flank pain, menstrual problem, pelvic pain, urgency and vaginal discharge. Musculoskeletal: Positive for arthralgias, back pain, myalgias and neck pain. Skin: Negative for rash. Neurological: Negative for dizziness, weakness and headaches. Hematological: Negative for adenopathy. Psychiatric/Behavioral: Positive for sleep disturbance. Negative for dysphoric mood and suicidal ideas. The patient is nervous/anxious. /80 (Site: Right Upper Arm, Position: Sitting, Cuff Size: Large Adult)   Pulse 93   Temp 97.7 °F (36.5 °C) (Temporal)   Ht 5' 4\" (1.626 m)   Wt 161 lb (73 kg)   SpO2 97%   BMI 27.64 kg/m²    Physical Exam  Vitals reviewed. Constitutional:       Appearance: She is well-developed. HENT:      Head: Normocephalic. Eyes:      Conjunctiva/sclera: Conjunctivae normal.   Cardiovascular:      Rate and Rhythm: Normal rate and regular rhythm. Heart sounds: Normal heart sounds. Pulmonary:      Effort: Pulmonary effort is normal.      Breath sounds: Normal breath sounds. Abdominal:      General: Bowel sounds are normal.      Palpations: Abdomen is soft. Tenderness: There is no abdominal tenderness.    Musculoskeletal:         General:

## 2021-06-21 NOTE — TELEPHONE ENCOUNTER
Pt called, on her mychart there are some meds that she is not taking and another that was not added.

## 2021-07-20 ENCOUNTER — OFFICE VISIT (OUTPATIENT)
Dept: PRIMARY CARE CLINIC | Age: 46
End: 2021-07-20
Payer: COMMERCIAL

## 2021-07-20 VITALS
OXYGEN SATURATION: 99 % | HEIGHT: 64 IN | HEART RATE: 77 BPM | TEMPERATURE: 98.4 F | BODY MASS INDEX: 27.14 KG/M2 | DIASTOLIC BLOOD PRESSURE: 72 MMHG | SYSTOLIC BLOOD PRESSURE: 118 MMHG | WEIGHT: 159 LBS

## 2021-07-20 DIAGNOSIS — M79.10 MYALGIA: ICD-10-CM

## 2021-07-20 DIAGNOSIS — G89.29 CHRONIC NECK PAIN: ICD-10-CM

## 2021-07-20 DIAGNOSIS — G35 MULTIPLE SCLEROSIS (HCC): ICD-10-CM

## 2021-07-20 DIAGNOSIS — F41.1 GAD (GENERALIZED ANXIETY DISORDER): Primary | ICD-10-CM

## 2021-07-20 DIAGNOSIS — M54.2 CHRONIC NECK PAIN: ICD-10-CM

## 2021-07-20 DIAGNOSIS — F33.1 MODERATE EPISODE OF RECURRENT MAJOR DEPRESSIVE DISORDER (HCC): ICD-10-CM

## 2021-07-20 PROCEDURE — 99214 OFFICE O/P EST MOD 30 MIN: CPT | Performed by: NURSE PRACTITIONER

## 2021-07-20 RX ORDER — TRAMADOL HYDROCHLORIDE 50 MG/1
50 TABLET ORAL EVERY 8 HOURS PRN
Qty: 90 TABLET | Refills: 0 | Status: SHIPPED | OUTPATIENT
Start: 2021-07-20 | End: 2021-08-17 | Stop reason: SDUPTHER

## 2021-07-20 RX ORDER — VILAZODONE HYDROCHLORIDE 40 MG/1
40 TABLET ORAL DAILY
Qty: 30 TABLET | Refills: 5 | Status: SHIPPED | OUTPATIENT
Start: 2021-07-20 | End: 2021-12-14

## 2021-07-20 NOTE — PROGRESS NOTES
Kat Diane (:  1975) is a 55 y.o. female,Established patient, here for evaluation of the following chief complaint(s):  1 Month Follow-Up (refill tramadol )      ASSESSMENT/PLAN:    ICD-10-CM    1. NERY (generalized anxiety disorder)  F41.1    2. Multiple sclerosis (HCC)  G35 traMADol (ULTRAM) 50 MG tablet   3. Myalgia  M79.10 traMADol (ULTRAM) 50 MG tablet   4. Chronic neck pain  M54.2 traMADol (ULTRAM) 50 MG tablet    G89.29    5. Moderate episode of recurrent major depressive disorder (HCC)  F33.1 vilazodone HCl (VIIBRYD) 40 MG TABS     She has valium at home that she will start taking prn. Increase viibryd  Controlled Substance Monitoring:    Acute and Chronic Pain Monitoring:   RX Monitoring 2021   Attestation The Prescription Monitoring Report for this patient was reviewed today. Periodic Controlled Substance Monitoring Possible medication side effects, risk of tolerance/dependence & alternative treatments discussed. ;No signs of potential drug abuse or diversion identified. Chronic Pain > 80 MEDD -         Return in about 1 month (around 2021) for diabetes anxiety 30 min. SUBJECTIVE/OBJECTIVE:  HPI  Chronic pain   Has MS has chronic pain related to this. Takes 1-2 ultram per day. Also takes gabapentin. Has tried cymbalta in the past.   Cant take NSAIDs any more due to CKD  Chronic neck pain  MRI scanned in media  She is needing a refill on this  She has only been taking this with a muscle relaxer at bed time or with flare ups    Anxiety  She is having a lot of anxiety  She will be starting school in august and kids will be coming back to school all day soon. Still stressed about her foot that has still not fully healed from surgery 7 months ago. She has a follow up with Dr Dior Shown this week. Review of Systems   Constitutional: Positive for fatigue. Negative for activity change, appetite change, chills, fever and unexpected weight change.    HENT: Negative for congestion, hearing loss, postnasal drip, rhinorrhea, sinus pressure, sore throat and trouble swallowing. Eyes: Negative for discharge, redness and visual disturbance. Respiratory: Negative for cough, shortness of breath and wheezing. Cardiovascular: Negative for chest pain, palpitations and leg swelling. Gastrointestinal: Negative for abdominal pain, blood in stool, constipation, diarrhea, nausea and vomiting. Endocrine: Negative for cold intolerance and heat intolerance. Genitourinary: Negative for dysuria, flank pain, menstrual problem, pelvic pain, urgency and vaginal discharge. Musculoskeletal: Positive for arthralgias, back pain, myalgias and neck pain. Skin: Negative for rash. Neurological: Negative for dizziness, weakness and headaches. Hematological: Negative for adenopathy. Psychiatric/Behavioral: Positive for sleep disturbance. Negative for dysphoric mood and suicidal ideas. The patient is nervous/anxious. /72 (Site: Left Upper Arm, Position: Sitting, Cuff Size: Large Adult)   Pulse 77   Temp 98.4 °F (36.9 °C) (Temporal)   Ht 5' 4\" (1.626 m)   Wt 159 lb (72.1 kg)   SpO2 99%   BMI 27.29 kg/m²    Physical Exam  Vitals reviewed. Constitutional:       Appearance: She is well-developed. HENT:      Head: Normocephalic. Eyes:      Conjunctiva/sclera: Conjunctivae normal.   Cardiovascular:      Rate and Rhythm: Normal rate and regular rhythm. Heart sounds: Normal heart sounds. Pulmonary:      Effort: Pulmonary effort is normal.      Breath sounds: Normal breath sounds. Abdominal:      General: Bowel sounds are normal.      Palpations: Abdomen is soft. Tenderness: There is no abdominal tenderness. Musculoskeletal:         General: Normal range of motion. Cervical back: Normal range of motion and neck supple. Skin:     General: Skin is warm and dry. Neurological:      Mental Status: She is alert and oriented to person, place, and time.    Psychiatric: Behavior: Behavior normal.                 An electronic signature was used to authenticate this note.     --CLINTON Colmenares

## 2021-07-22 ASSESSMENT — ENCOUNTER SYMPTOMS
ABDOMINAL PAIN: 0
BACK PAIN: 1
TROUBLE SWALLOWING: 0
SORE THROAT: 0
RHINORRHEA: 0
BLOOD IN STOOL: 0
NAUSEA: 0
WHEEZING: 0
SINUS PRESSURE: 0
DIARRHEA: 0
COUGH: 0
EYE DISCHARGE: 0
EYE REDNESS: 0
VOMITING: 0
CONSTIPATION: 0
SHORTNESS OF BREATH: 0

## 2021-08-11 ENCOUNTER — PRE-ADMISSION TESTING (OUTPATIENT)
Dept: PREADMISSION TESTING | Facility: HOSPITAL | Age: 46
End: 2021-08-11

## 2021-08-11 VITALS
WEIGHT: 160.94 LBS | HEIGHT: 65 IN | HEART RATE: 90 BPM | BODY MASS INDEX: 26.81 KG/M2 | DIASTOLIC BLOOD PRESSURE: 71 MMHG | OXYGEN SATURATION: 98 % | RESPIRATION RATE: 16 BRPM | SYSTOLIC BLOOD PRESSURE: 124 MMHG

## 2021-08-11 LAB
ANION GAP SERPL CALCULATED.3IONS-SCNC: 11 MMOL/L (ref 5–15)
BUN SERPL-MCNC: 20 MG/DL (ref 6–20)
BUN/CREAT SERPL: 29.9 (ref 7–25)
CALCIUM SPEC-SCNC: 9.7 MG/DL (ref 8.6–10.5)
CHLORIDE SERPL-SCNC: 105 MMOL/L (ref 98–107)
CO2 SERPL-SCNC: 22 MMOL/L (ref 22–29)
CREAT SERPL-MCNC: 0.67 MG/DL (ref 0.57–1)
DEPRECATED RDW RBC AUTO: 36.9 FL (ref 37–54)
ERYTHROCYTE [DISTWIDTH] IN BLOOD BY AUTOMATED COUNT: 12.2 % (ref 12.3–15.4)
GFR SERPL CREATININE-BSD FRML MDRD: 95 ML/MIN/1.73
GLUCOSE SERPL-MCNC: 166 MG/DL (ref 65–99)
HCT VFR BLD AUTO: 41.3 % (ref 34–46.6)
HGB BLD-MCNC: 14.4 G/DL (ref 12–15.9)
MCH RBC QN AUTO: 29.1 PG (ref 26.6–33)
MCHC RBC AUTO-ENTMCNC: 34.9 G/DL (ref 31.5–35.7)
MCV RBC AUTO: 83.6 FL (ref 79–97)
PLATELET # BLD AUTO: 170 10*3/MM3 (ref 140–450)
PMV BLD AUTO: 12.7 FL (ref 6–12)
POTASSIUM SERPL-SCNC: 4.1 MMOL/L (ref 3.5–5.2)
RBC # BLD AUTO: 4.94 10*6/MM3 (ref 3.77–5.28)
SODIUM SERPL-SCNC: 138 MMOL/L (ref 136–145)
WBC # BLD AUTO: 6.69 10*3/MM3 (ref 3.4–10.8)

## 2021-08-11 PROCEDURE — 36415 COLL VENOUS BLD VENIPUNCTURE: CPT

## 2021-08-11 PROCEDURE — 93010 ELECTROCARDIOGRAM REPORT: CPT | Performed by: INTERNAL MEDICINE

## 2021-08-11 PROCEDURE — 93005 ELECTROCARDIOGRAM TRACING: CPT

## 2021-08-11 PROCEDURE — 85027 COMPLETE CBC AUTOMATED: CPT

## 2021-08-11 PROCEDURE — 80048 BASIC METABOLIC PNL TOTAL CA: CPT

## 2021-08-11 RX ORDER — ROSUVASTATIN CALCIUM 10 MG/1
10 TABLET, COATED ORAL NIGHTLY
COMMUNITY

## 2021-08-11 RX ORDER — RENAGEL 800 MG/1
14 TABLET ORAL DAILY
COMMUNITY

## 2021-08-11 RX ORDER — BACLOFEN 10 MG/1
10 TABLET ORAL AS NEEDED
COMMUNITY

## 2021-08-11 RX ORDER — GABAPENTIN 600 MG/1
600 TABLET ORAL DAILY
COMMUNITY

## 2021-08-11 RX ORDER — ERGOCALCIFEROL 1.25 MG/1
50000 CAPSULE ORAL WEEKLY
COMMUNITY

## 2021-08-12 LAB
QT INTERVAL: 396 MS
QTC INTERVAL: 465 MS

## 2021-08-17 ENCOUNTER — VIRTUAL VISIT (OUTPATIENT)
Dept: PRIMARY CARE CLINIC | Age: 46
End: 2021-08-17
Payer: COMMERCIAL

## 2021-08-17 DIAGNOSIS — R53.82 CHRONIC FATIGUE: ICD-10-CM

## 2021-08-17 DIAGNOSIS — M79.10 MYALGIA: ICD-10-CM

## 2021-08-17 DIAGNOSIS — M54.2 CHRONIC NECK PAIN: ICD-10-CM

## 2021-08-17 DIAGNOSIS — G35 MULTIPLE SCLEROSIS (HCC): ICD-10-CM

## 2021-08-17 DIAGNOSIS — F41.1 GAD (GENERALIZED ANXIETY DISORDER): ICD-10-CM

## 2021-08-17 DIAGNOSIS — G89.29 CHRONIC NECK PAIN: ICD-10-CM

## 2021-08-17 PROCEDURE — 99214 OFFICE O/P EST MOD 30 MIN: CPT | Performed by: NURSE PRACTITIONER

## 2021-08-17 RX ORDER — ARMODAFINIL 250 MG/1
250 TABLET ORAL DAILY
Qty: 30 TABLET | Refills: 2 | Status: SHIPPED | OUTPATIENT
Start: 2021-08-17 | End: 2021-11-08 | Stop reason: SDUPTHER

## 2021-08-17 RX ORDER — DIAZEPAM 5 MG/1
5 TABLET ORAL EVERY 8 HOURS PRN
Qty: 60 TABLET | Refills: 0 | Status: SHIPPED | OUTPATIENT
Start: 2021-08-17 | End: 2021-10-19 | Stop reason: SDUPTHER

## 2021-08-17 RX ORDER — TRAMADOL HYDROCHLORIDE 50 MG/1
50 TABLET ORAL EVERY 8 HOURS PRN
Qty: 90 TABLET | Refills: 0 | Status: SHIPPED | OUTPATIENT
Start: 2021-08-17 | End: 2021-11-12 | Stop reason: SDUPTHER

## 2021-08-17 ASSESSMENT — ENCOUNTER SYMPTOMS
CONSTIPATION: 0
EYE DISCHARGE: 0
VOMITING: 0
EYE REDNESS: 0
RHINORRHEA: 0
DIARRHEA: 0
WHEEZING: 0
NAUSEA: 0
SORE THROAT: 0
TROUBLE SWALLOWING: 0
BLOOD IN STOOL: 0
SINUS PRESSURE: 0
COUGH: 0
BACK PAIN: 1
SHORTNESS OF BREATH: 0
ABDOMINAL PAIN: 0

## 2021-08-17 NOTE — PROGRESS NOTES
Brina Salinas (:  1975) is a 55 y.o. female,Established patient, here for evaluation of the following chief complaint(s): Anxiety and Diabetes         ASSESSMENT/PLAN:  1. Multiple sclerosis (HCC)  -     traMADol (ULTRAM) 50 MG tablet; Take 1 tablet by mouth every 8 hours as needed for Pain for up to 30 days. , Disp-90 tablet, R-0Normal  -     Armodafinil (NUVIGIL) 250 MG TABS; Take 250 mg by mouth daily for 30 days. , Disp-30 tablet, R-2Normal  2. Myalgia  -     traMADol (ULTRAM) 50 MG tablet; Take 1 tablet by mouth every 8 hours as needed for Pain for up to 30 days. , Disp-90 tablet, R-0Normal  3. Chronic neck pain  -     traMADol (ULTRAM) 50 MG tablet; Take 1 tablet by mouth every 8 hours as needed for Pain for up to 30 days. , Disp-90 tablet, R-0Normal  4. NERY (generalized anxiety disorder)  -     diazePAM (VALIUM) 5 MG tablet; Take 1 tablet by mouth every 8 hours as needed for Anxiety or Sleep for up to 30 days. , Disp-60 tablet, R-0Normal  5. Chronic fatigue  -     Armodafinil (NUVIGIL) 250 MG TABS; Take 250 mg by mouth daily for 30 days. , Disp-30 tablet, R-2Normal      Return in about 1 month (around 2021) for diabetes stress 30 min doxy. SUBJECTIVE/OBJECTIVE:  HPI  Chronic pain   Has MS has chronic pain related to this. Takes 1-2 ultram per day. Also takes gabapentin. Has tried cymbalta in the past.   Cant take NSAIDs any more due to CKD  Chronic neck pain  MRI scanned in media  She is needing a refill on this  She has only been taking this with a muscle relaxer at bed time or with flare ups    Foot pain  She is having surgery next week. Anxiety  She has a lot going on right now. She will have to cancel her classes at [employer] due to having surgery on her foot and she will be out for 8 weeks non-weight bearing. Review of Systems   Constitutional: Positive for fatigue. Negative for activity change, appetite change, chills, fever and unexpected weight change.    HENT: Negative for congestion, hearing loss, postnasal drip, rhinorrhea, sinus pressure, sore throat and trouble swallowing. Eyes: Negative for discharge, redness and visual disturbance. Respiratory: Negative for cough, shortness of breath and wheezing. Cardiovascular: Negative for chest pain, palpitations and leg swelling. Gastrointestinal: Negative for abdominal pain, blood in stool, constipation, diarrhea, nausea and vomiting. Endocrine: Negative for cold intolerance and heat intolerance. Genitourinary: Negative for dysuria, flank pain, menstrual problem, pelvic pain, urgency and vaginal discharge. Musculoskeletal: Positive for arthralgias, back pain, myalgias and neck pain. Skin: Negative for rash. Neurological: Negative for dizziness, weakness and headaches. Hematological: Negative for adenopathy. Psychiatric/Behavioral: Positive for sleep disturbance. Negative for dysphoric mood and suicidal ideas. The patient is nervous/anxious.         Patient-Reported Vitals 8/10/2021   Patient-Reported Weight 158   Patient-Reported Height 5'4\"   Patient-Reported Systolic -   Patient-Reported Diastolic -   Patient-Reported Pulse -   Patient-Reported Temperature 97.6        Physical Exam    [INSTRUCTIONS:  \"[x]\" Indicates a positive item  \"[]\" Indicates a negative item  -- DELETE ALL ITEMS NOT EXAMINED]    Constitutional: [x] Appears well-developed and well-nourished [x] No apparent distress      [] Abnormal -     Mental status: [x] Alert and awake  [x] Oriented to person/place/time [x] Able to follow commands    [] Abnormal -     Eyes:   EOM    [x]  Normal    [] Abnormal -   Sclera  [x]  Normal    [] Abnormal -          Discharge [x]  None visible   [] Abnormal -     HENT: [x] Normocephalic, atraumatic  [] Abnormal -   [x] Mouth/Throat: Mucous membranes are moist    External Ears [x] Normal  [] Abnormal -    Neck: [x] No visualized mass [] Abnormal -     Pulmonary/Chest: [x] Respiratory effort normal   [x] No visualized signs of difficulty breathing or respiratory distress        [] Abnormal -      Musculoskeletal:   [x] Normal gait with no signs of ataxia         [x] Normal range of motion of neck        [] Abnormal -     Neurological:        [x] No Facial Asymmetry (Cranial nerve 7 motor function) (limited exam due to video visit)          [x] No gaze palsy        [] Abnormal -          Skin:        [x] No significant exanthematous lesions or discoloration noted on facial skin         [] Abnormal -            Psychiatric:       [x] Normal Affect [] Abnormal -        [x] No Hallucinations    Other pertinent observable physical exam findings:-                Arnoldo Ivan, was evaluated through a synchronous (real-time) audio-video encounter. The patient (or guardian if applicable) is aware that this is a billable service. Verbal consent to proceed has been obtained within the past 12 months. The visit was conducted pursuant to the emergency declaration under the [facility], [address] [place] [address] authority and the [employer] and [employer] Act. Patient identification was verified, and a caregiver was present when appropriate. The patient was located in a state where the provider was credentialed to provide care. An electronic signature was used to authenticate this note.     --CLINTON Frausto

## 2021-08-19 ENCOUNTER — TRANSCRIBE ORDERS (OUTPATIENT)
Dept: LAB | Facility: HOSPITAL | Age: 46
End: 2021-08-19

## 2021-08-19 DIAGNOSIS — Z01.818 PREOPERATIVE TESTING: Primary | ICD-10-CM

## 2021-08-21 ENCOUNTER — LAB (OUTPATIENT)
Dept: LAB | Facility: HOSPITAL | Age: 46
End: 2021-08-21

## 2021-08-21 LAB — SARS-COV-2 ORF1AB RESP QL NAA+PROBE: NOT DETECTED

## 2021-08-21 PROCEDURE — U0004 COV-19 TEST NON-CDC HGH THRU: HCPCS | Performed by: PODIATRIST

## 2021-08-21 PROCEDURE — C9803 HOPD COVID-19 SPEC COLLECT: HCPCS | Performed by: PODIATRIST

## 2021-08-24 ENCOUNTER — ANESTHESIA (OUTPATIENT)
Dept: PERIOP | Facility: HOSPITAL | Age: 46
End: 2021-08-24

## 2021-08-24 ENCOUNTER — ANESTHESIA EVENT (OUTPATIENT)
Dept: PERIOP | Facility: HOSPITAL | Age: 46
End: 2021-08-24

## 2021-08-24 ENCOUNTER — HOSPITAL ENCOUNTER (OUTPATIENT)
Facility: HOSPITAL | Age: 46
Setting detail: HOSPITAL OUTPATIENT SURGERY
Discharge: HOME | End: 2021-08-24
Attending: PODIATRIST | Admitting: PODIATRIST

## 2021-08-24 ENCOUNTER — APPOINTMENT (OUTPATIENT)
Dept: GENERAL RADIOLOGY | Facility: HOSPITAL | Age: 46
End: 2021-08-24

## 2021-08-24 VITALS
OXYGEN SATURATION: 95 % | TEMPERATURE: 98.3 F | HEART RATE: 92 BPM | DIASTOLIC BLOOD PRESSURE: 69 MMHG | RESPIRATION RATE: 16 BRPM | SYSTOLIC BLOOD PRESSURE: 120 MMHG

## 2021-08-24 DIAGNOSIS — Z98.1 ARTHRODESIS STATUS: ICD-10-CM

## 2021-08-24 LAB
B-HCG UR QL: NEGATIVE
GLUCOSE BLDC GLUCOMTR-MCNC: 183 MG/DL (ref 70–130)
GLUCOSE BLDC GLUCOMTR-MCNC: 215 MG/DL (ref 70–130)

## 2021-08-24 PROCEDURE — 88304 TISSUE EXAM BY PATHOLOGIST: CPT | Performed by: PODIATRIST

## 2021-08-24 PROCEDURE — 25010000002 ROPIVACAINE PER 1 MG: Performed by: PODIATRIST

## 2021-08-24 PROCEDURE — C1713 ANCHOR/SCREW BN/BN,TIS/BN: HCPCS | Performed by: PODIATRIST

## 2021-08-24 PROCEDURE — 25010000002 PROPOFOL 10 MG/ML EMULSION: Performed by: NURSE ANESTHETIST, CERTIFIED REGISTERED

## 2021-08-24 PROCEDURE — 25010000002 FENTANYL CITRATE (PF) 100 MCG/2ML SOLUTION: Performed by: NURSE ANESTHETIST, CERTIFIED REGISTERED

## 2021-08-24 PROCEDURE — 76000 FLUOROSCOPY <1 HR PHYS/QHP: CPT

## 2021-08-24 PROCEDURE — 82962 GLUCOSE BLOOD TEST: CPT

## 2021-08-24 PROCEDURE — 25010000002 DEXAMETHASONE PER 1 MG: Performed by: NURSE ANESTHETIST, CERTIFIED REGISTERED

## 2021-08-24 PROCEDURE — 81025 URINE PREGNANCY TEST: CPT | Performed by: PODIATRIST

## 2021-08-24 PROCEDURE — 25010000002 ONDANSETRON PER 1 MG: Performed by: NURSE ANESTHETIST, CERTIFIED REGISTERED

## 2021-08-24 PROCEDURE — 25010000002 MIDAZOLAM PER 1 MG: Performed by: ANESTHESIOLOGY

## 2021-08-24 PROCEDURE — 73620 X-RAY EXAM OF FOOT: CPT

## 2021-08-24 DEVICE — 3.5 X 14 MM R3CON LOCKING PLATE SCREW
Type: IMPLANTABLE DEVICE | Site: FOOT | Status: FUNCTIONAL
Brand: GORILLA PLATING SYSTEM

## 2021-08-24 DEVICE — PUTTY BONE W/CORT FIBR 1CC: Type: IMPLANTABLE DEVICE | Site: ANKLE | Status: FUNCTIONAL

## 2021-08-24 DEVICE — K-WIRE, SINGLE ENDED TROCAR TIP, SMOOTH, 1.2 X 150MM
Type: IMPLANTABLE DEVICE | Site: FOOT | Status: FUNCTIONAL
Brand: MONSTER SCREW SYSTEM

## 2021-08-24 DEVICE — 3.5 X 16 MM R3CON LOCKING PLATE SCREW
Type: IMPLANTABLE DEVICE | Site: FOOT | Status: FUNCTIONAL
Brand: GORILLA PLATING SYSTEM

## 2021-08-24 DEVICE — 3.5 X 10 MM R3CON LOCKING PLATE SCREW
Type: IMPLANTABLE DEVICE | Site: FOOT | Status: FUNCTIONAL
Brand: GORILLA PLATING SYSTEM

## 2021-08-24 DEVICE — K-WIRE, SINGLE ENDED TROCAR TIP, SMOOTH, 2.3 X 150MM
Type: IMPLANTABLE DEVICE | Site: FOOT | Status: FUNCTIONAL
Brand: MULTI SYSTEM

## 2021-08-24 DEVICE — OLIVE WIRE, SMOOTH, 1.4MM
Type: IMPLANTABLE DEVICE | Site: FOOT | Status: FUNCTIONAL
Brand: BABY GORILLA/GORILLA PLATING SYSTEM

## 2021-08-24 DEVICE — GRFT TISS CELLECT3 ECM: Type: IMPLANTABLE DEVICE | Site: ANKLE | Status: FUNCTIONAL

## 2021-08-24 DEVICE — 3.5 X 18 MM R3CON LOCKING PLATE SCREW
Type: IMPLANTABLE DEVICE | Site: FOOT | Status: FUNCTIONAL
Brand: GORILLA PLATING SYSTEM

## 2021-08-24 DEVICE — IMPLANTABLE DEVICE: Type: IMPLANTABLE DEVICE | Site: ANKLE | Status: FUNCTIONAL

## 2021-08-24 DEVICE — LAPIDUS, MEDIAL WALL - GRAFT SPANNING MEDIUM, RIGHT (1.6MM THK)
Type: IMPLANTABLE DEVICE | Site: FOOT | Status: FUNCTIONAL
Brand: GORILLA PLATING SYSTEM

## 2021-08-24 DEVICE — MINI MONSTER 4.0 X 36MM HEADED SCREW, SHORT THREAD
Type: IMPLANTABLE DEVICE | Site: FOOT | Status: FUNCTIONAL
Brand: MONSTER SCREW SYSTEM

## 2021-08-24 RX ORDER — FENTANYL CITRATE 50 UG/ML
INJECTION, SOLUTION INTRAMUSCULAR; INTRAVENOUS AS NEEDED
Status: DISCONTINUED | OUTPATIENT
Start: 2021-08-24 | End: 2021-08-24 | Stop reason: SURG

## 2021-08-24 RX ORDER — ACETAMINOPHEN 500 MG
1000 TABLET ORAL ONCE
Status: COMPLETED | OUTPATIENT
Start: 2021-08-24 | End: 2021-08-24

## 2021-08-24 RX ORDER — DEXAMETHASONE SODIUM PHOSPHATE 4 MG/ML
INJECTION, SOLUTION INTRA-ARTICULAR; INTRALESIONAL; INTRAMUSCULAR; INTRAVENOUS; SOFT TISSUE AS NEEDED
Status: DISCONTINUED | OUTPATIENT
Start: 2021-08-24 | End: 2021-08-24 | Stop reason: SURG

## 2021-08-24 RX ORDER — IBUPROFEN 600 MG/1
600 TABLET ORAL ONCE AS NEEDED
Status: DISCONTINUED | OUTPATIENT
Start: 2021-08-24 | End: 2021-08-24 | Stop reason: HOSPADM

## 2021-08-24 RX ORDER — SODIUM CHLORIDE, SODIUM LACTATE, POTASSIUM CHLORIDE, CALCIUM CHLORIDE 600; 310; 30; 20 MG/100ML; MG/100ML; MG/100ML; MG/100ML
1000 INJECTION, SOLUTION INTRAVENOUS CONTINUOUS
Status: DISCONTINUED | OUTPATIENT
Start: 2021-08-24 | End: 2021-08-24 | Stop reason: HOSPADM

## 2021-08-24 RX ORDER — FLUMAZENIL 0.1 MG/ML
0.2 INJECTION INTRAVENOUS AS NEEDED
Status: DISCONTINUED | OUTPATIENT
Start: 2021-08-24 | End: 2021-08-24 | Stop reason: HOSPADM

## 2021-08-24 RX ORDER — ONDANSETRON 2 MG/ML
4 INJECTION INTRAMUSCULAR; INTRAVENOUS AS NEEDED
Status: DISCONTINUED | OUTPATIENT
Start: 2021-08-24 | End: 2021-08-24 | Stop reason: HOSPADM

## 2021-08-24 RX ORDER — SODIUM CHLORIDE, SODIUM LACTATE, POTASSIUM CHLORIDE, CALCIUM CHLORIDE 600; 310; 30; 20 MG/100ML; MG/100ML; MG/100ML; MG/100ML
100 INJECTION, SOLUTION INTRAVENOUS CONTINUOUS
Status: DISCONTINUED | OUTPATIENT
Start: 2021-08-24 | End: 2021-08-24 | Stop reason: HOSPADM

## 2021-08-24 RX ORDER — LIDOCAINE HYDROCHLORIDE 10 MG/ML
0.5 INJECTION, SOLUTION EPIDURAL; INFILTRATION; INTRACAUDAL; PERINEURAL ONCE AS NEEDED
Status: DISCONTINUED | OUTPATIENT
Start: 2021-08-24 | End: 2021-08-24 | Stop reason: HOSPADM

## 2021-08-24 RX ORDER — SODIUM CHLORIDE 0.9 % (FLUSH) 0.9 %
3 SYRINGE (ML) INJECTION AS NEEDED
Status: DISCONTINUED | OUTPATIENT
Start: 2021-08-24 | End: 2021-08-24 | Stop reason: HOSPADM

## 2021-08-24 RX ORDER — HYDROMORPHONE HYDROCHLORIDE 1 MG/ML
0.5 INJECTION, SOLUTION INTRAMUSCULAR; INTRAVENOUS; SUBCUTANEOUS
Status: DISCONTINUED | OUTPATIENT
Start: 2021-08-24 | End: 2021-08-24 | Stop reason: HOSPADM

## 2021-08-24 RX ORDER — OXYCODONE AND ACETAMINOPHEN 10; 325 MG/1; MG/1
1 TABLET ORAL EVERY 4 HOURS PRN
Qty: 42 TABLET | Refills: 0 | Status: SHIPPED | OUTPATIENT
Start: 2021-08-24

## 2021-08-24 RX ORDER — ROPIVACAINE HYDROCHLORIDE 5 MG/ML
INJECTION, SOLUTION EPIDURAL; INFILTRATION; PERINEURAL AS NEEDED
Status: DISCONTINUED | OUTPATIENT
Start: 2021-08-24 | End: 2021-08-24 | Stop reason: HOSPADM

## 2021-08-24 RX ORDER — FENTANYL CITRATE 50 UG/ML
25 INJECTION, SOLUTION INTRAMUSCULAR; INTRAVENOUS
Status: DISCONTINUED | OUTPATIENT
Start: 2021-08-24 | End: 2021-08-24 | Stop reason: HOSPADM

## 2021-08-24 RX ORDER — ROCURONIUM BROMIDE 10 MG/ML
INJECTION, SOLUTION INTRAVENOUS AS NEEDED
Status: DISCONTINUED | OUTPATIENT
Start: 2021-08-24 | End: 2021-08-24 | Stop reason: SURG

## 2021-08-24 RX ORDER — OXYCODONE AND ACETAMINOPHEN 10; 325 MG/1; MG/1
1 TABLET ORAL ONCE AS NEEDED
Status: DISCONTINUED | OUTPATIENT
Start: 2021-08-24 | End: 2021-08-24 | Stop reason: HOSPADM

## 2021-08-24 RX ORDER — ONDANSETRON 2 MG/ML
INJECTION INTRAMUSCULAR; INTRAVENOUS AS NEEDED
Status: DISCONTINUED | OUTPATIENT
Start: 2021-08-24 | End: 2021-08-24 | Stop reason: SURG

## 2021-08-24 RX ORDER — NALOXONE HCL 0.4 MG/ML
0.04 VIAL (ML) INJECTION AS NEEDED
Status: DISCONTINUED | OUTPATIENT
Start: 2021-08-24 | End: 2021-08-24 | Stop reason: HOSPADM

## 2021-08-24 RX ORDER — PROPOFOL 10 MG/ML
VIAL (ML) INTRAVENOUS AS NEEDED
Status: DISCONTINUED | OUTPATIENT
Start: 2021-08-24 | End: 2021-08-24 | Stop reason: SURG

## 2021-08-24 RX ORDER — SODIUM CHLORIDE 0.9 % (FLUSH) 0.9 %
3-10 SYRINGE (ML) INJECTION AS NEEDED
Status: DISCONTINUED | OUTPATIENT
Start: 2021-08-24 | End: 2021-08-24 | Stop reason: HOSPADM

## 2021-08-24 RX ORDER — CHOLECALCIFEROL (VITAMIN D3) 125 MCG
5 CAPSULE ORAL NIGHTLY PRN
COMMUNITY

## 2021-08-24 RX ORDER — SODIUM CHLORIDE 0.9 % (FLUSH) 0.9 %
3 SYRINGE (ML) INJECTION EVERY 12 HOURS SCHEDULED
Status: DISCONTINUED | OUTPATIENT
Start: 2021-08-24 | End: 2021-08-24 | Stop reason: HOSPADM

## 2021-08-24 RX ORDER — MAGNESIUM HYDROXIDE 1200 MG/15ML
LIQUID ORAL AS NEEDED
Status: DISCONTINUED | OUTPATIENT
Start: 2021-08-24 | End: 2021-08-24 | Stop reason: HOSPADM

## 2021-08-24 RX ORDER — MIDAZOLAM HYDROCHLORIDE 1 MG/ML
1 INJECTION INTRAMUSCULAR; INTRAVENOUS
Status: DISCONTINUED | OUTPATIENT
Start: 2021-08-24 | End: 2021-08-24 | Stop reason: HOSPADM

## 2021-08-24 RX ORDER — LABETALOL HYDROCHLORIDE 5 MG/ML
5 INJECTION, SOLUTION INTRAVENOUS
Status: DISCONTINUED | OUTPATIENT
Start: 2021-08-24 | End: 2021-08-24 | Stop reason: HOSPADM

## 2021-08-24 RX ORDER — LIDOCAINE HYDROCHLORIDE 20 MG/ML
INJECTION, SOLUTION EPIDURAL; INFILTRATION; INTRACAUDAL; PERINEURAL AS NEEDED
Status: DISCONTINUED | OUTPATIENT
Start: 2021-08-24 | End: 2021-08-24 | Stop reason: SURG

## 2021-08-24 RX ORDER — CLINDAMYCIN PHOSPHATE 900 MG/50ML
INJECTION INTRAVENOUS AS NEEDED
Status: DISCONTINUED | OUTPATIENT
Start: 2021-08-24 | End: 2021-08-24 | Stop reason: SURG

## 2021-08-24 RX ORDER — LIDOCAINE HYDROCHLORIDE 40 MG/ML
SOLUTION TOPICAL AS NEEDED
Status: DISCONTINUED | OUTPATIENT
Start: 2021-08-24 | End: 2021-08-24 | Stop reason: SURG

## 2021-08-24 RX ORDER — DOCUSATE SODIUM 100 MG/1
100 CAPSULE, LIQUID FILLED ORAL 2 TIMES DAILY
Qty: 60 CAPSULE | Refills: 0 | Status: SHIPPED | OUTPATIENT
Start: 2021-08-24

## 2021-08-24 RX ADMIN — ROCURONIUM BROMIDE 30 MG: 50 INJECTION INTRAVENOUS at 13:08

## 2021-08-24 RX ADMIN — PROPOFOL 150 MG: 10 INJECTION, EMULSION INTRAVENOUS at 13:08

## 2021-08-24 RX ADMIN — DEXAMETHASONE SODIUM PHOSPHATE 4 MG: 4 INJECTION, SOLUTION INTRA-ARTICULAR; INTRALESIONAL; INTRAMUSCULAR; INTRAVENOUS; SOFT TISSUE at 14:39

## 2021-08-24 RX ADMIN — MIDAZOLAM 1 MG: 1 INJECTION INTRAMUSCULAR; INTRAVENOUS at 12:51

## 2021-08-24 RX ADMIN — SODIUM CHLORIDE, POTASSIUM CHLORIDE, SODIUM LACTATE AND CALCIUM CHLORIDE 1000 ML: 600; 310; 30; 20 INJECTION, SOLUTION INTRAVENOUS at 08:40

## 2021-08-24 RX ADMIN — FENTANYL CITRATE 100 MCG: 50 INJECTION, SOLUTION INTRAMUSCULAR; INTRAVENOUS at 13:08

## 2021-08-24 RX ADMIN — LIDOCAINE HYDROCHLORIDE 100 MG: 20 INJECTION, SOLUTION EPIDURAL; INFILTRATION; INTRACAUDAL; PERINEURAL at 13:08

## 2021-08-24 RX ADMIN — ACETAMINOPHEN 1000 MG: 500 TABLET, FILM COATED ORAL at 12:51

## 2021-08-24 RX ADMIN — ONDANSETRON 4 MG: 2 INJECTION INTRAMUSCULAR; INTRAVENOUS at 14:39

## 2021-08-24 RX ADMIN — CLINDAMYCIN IN 5 PERCENT DEXTROSE 900 MG: 18 INJECTION, SOLUTION INTRAVENOUS at 13:24

## 2021-08-24 RX ADMIN — LIDOCAINE HYDROCHLORIDE 1 EACH: 40 SOLUTION TOPICAL at 13:08

## 2021-08-24 NOTE — ANESTHESIA PROCEDURE NOTES
Airway  Urgency: elective    Date/Time: 8/24/2021 1:08 PM  Airway not difficult    General Information and Staff    Patient location during procedure: OR  CRNA: Sony Candelario CRNA    Indications and Patient Condition  Indications for airway management: airway protection    Preoxygenated: yes  Mask difficulty assessment: 1 - vent by mask    Final Airway Details  Final airway type: endotracheal airway      Successful airway: ETT  Cuffed: yes   Successful intubation technique: direct laryngoscopy  Facilitating devices/methods: intubating stylet and cricoid pressure  Endotracheal tube insertion site: oral  Blade: Aparicio  Blade size: 2  ETT size (mm): 7.5  Cormack-Lehane Classification: grade I - full view of glottis  Placement verified by: chest auscultation and capnometry   Cuff volume (mL): 8  Measured from: teeth  ETT/EBT  to teeth (cm): 21  Number of attempts at approach: 1  Assessment: lips, teeth, and gum same as pre-op and atraumatic intubation

## 2021-08-24 NOTE — ANESTHESIA PREPROCEDURE EVALUATION
Anesthesia Evaluation     Patient summary reviewed and Nursing notes reviewed   history of anesthetic complications: prolonged sedation  NPO Solid Status: > 8 hours  NPO Liquid Status: > 8 hours           Airway   Mallampati: II  TM distance: >3 FB  Neck ROM: full  No difficulty expected  Dental      Pulmonary    (-) sleep apnea, not a smoker  Cardiovascular   Exercise tolerance: good (4-7 METS)    Patient on routine beta blocker and Beta blocker given within 24 hours of surgery    (+) hyperlipidemia,   (-) CAD      Neuro/Psych  (+) psychiatric history Depression,     (-) seizures, TIA, CVA  GI/Hepatic/Renal/Endo    (+)   renal disease CRI, diabetes mellitus,     Musculoskeletal     Abdominal    Substance History      OB/GYN          Other                        Anesthesia Plan    ASA 2     general     intravenous induction     Anesthetic plan, all risks, benefits, and alternatives have been provided, discussed and informed consent has been obtained with: patient.

## 2021-08-24 NOTE — OP NOTE
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  Bone graft was necessary to get the metatarsal back out to length and revision plating was necessary.      Surgeon(s):  Alexx Lara DPM    Anesthesia: General    Staff:   Circulator: Latisha Kidd RN; Carmine Alvarado RN; Henry Cosby RN  Scrub Person: Robert Sandoval; Kiki López; Sheeba Valles; Marti Rodrigues     was responsible for performing the following activities: Retraction and their skilled assistance was necessary for the success of this case.    Indications for procedure:  pain    Procedure details:  Patient brought the operating room placed under general anesthesia.  A ankle block was performed with 30 cc 0.5% naropin plain.  Right leg prepped draped in usual sterile fashion.      Procedure #1 revision first metatarsocuneiform joint arthrodesis with hardware removal right    Attention was then directed to first metatarsal cuneiform joint where a hypertrophic scar was present.  This was excised.  Was removed the operative site in toto.  A linear capsular incision was made.  The broken hardware was identified dorsally and medially and that was removed.  The joint was then distracted open in the broken arm of the staple  and screws were removed.  Nonviable tissue was removed.    Attention was then directed to first metatarsal phalangeal joint where a lateral use was performed.  Sesamoids were mobilized and McGlamry elevator was utilized to free the sesamoids plantarly.    Attention was then directed back to the first metatarsocuneiform joint.  That was distracted open.  Sagittal saw was utilized to remove nonviable bone.  The defects from the broken hardware were packed with cortical fiber bone graft.  A 5 mm graft was chosen at the first metatarsal back out to length.  This was opened and soaked in cellect.  It was then tamped into place.  The arthrodesis site was then reduced and temporally fixated.  Struthers 28 graft span revision first metatarsocuneiform plate was then placed medially.  Plate tacks were placed for fixation.  The guide was then utilized with guidewire from dorsal lateral to proximal medial.  A 36 mm screw was placed excellent fixation was accomplished.  2 locking screws were placed in the cuneiform.  3 locking screws were then placed into the first metatarsal.  X-ray exam was performed.  The remainder the cortical fiber bone graft was packed around the structural graft.  Closure performed in layers using Monocryl for deep suture.    Procedure #2 excision of hypertrophic scar with sharron Y skin plasty tissue rearrangement for closure.    Attention was then directed to the area of scar tissue on the dorsal aspect of right foot proximal to the second and third digits.  This was excised full-thickness.  Was sent to pathology for gross dislodge examination.  Z-lengthening of the extensor tendons was performed.  Several different types of closure were considered.  I was concerned about dorsal contracture of the of the digits.  A view to Y skin plasty from lateral to medial was mapped out.  Linear incision was made.  The the tissue was advanced.  Skin is reapproximate lysing 4-0 nylon.  Compression bandage was  applied.    Patient procedure a well-padded compression bandage was applied.  Posterior splint was applied.          Estimated Blood Loss: none    Specimens:                Specimens     ID Source Type Tests Collected By Collected At Frozen?    A Foot, Right Tissue · TISSUE PATHOLOGY EXAM   Alexx Lara, DPMARIA DE JESUS 8/24/21 6752     Description: Hypertrophic scar            Drains: * No LDAs found *    Implants:   Implant Name Type Inv. Item Serial No.  Lot No. LRB No. Used Action   GRFT TISS CELLECT3 ECM - [id number] - [id number] Implant GRFT TISS CELLECT3 ECM [id number] GENSANO [id number] Right 1 Implanted   PUTTY BONE W/MOHINI FIBR 1CC - [id number] - [id number] Implant PUTTY BONE W/MOHINI FIBR 1CC [id number] GENSANO [id number] Right 1 Implanted   PUTTY BONE W/MOHINI FIBR 1CC - [id number] - [id number] Implant PUTTY BONE W/MOHINI FIBR 1CC [id number] GENSANO [id number] Right 1 Implanted   WEDGE LAPIDUS 5D 5MM - [id number] Implant WEDGE LAPIDUS 5D 5MM  PARAGON 28 [id number] Right 1 Implanted   KWIRE SMOTH SGL/TROC 2.8E391HK NS - [id number] Implant KWIRE SMOTH SGL/TROC [id number] NS  PARAGON 28  Right 2 Implanted   KWIRE SMOTH SGL/TROC 1.8G327UH NS - [id number] Implant KWIRE SMOTH SGL/TROC 1.9V596KK NS  PARAGON 28  Right 4 Implanted   WR OLIVE SMOTH 1.4MM - [id number] Implant WR OLIVE SMOTH 1.4MM  PARAGON 28  Right 2 Implanted   Lapidus Plate    PARAGON 28  Right 1 Implanted   SCRW ROSA MINI MONSTER SHT THRD 4X36MM - [id number] Implant SCRW ROSA MINI MONSTER SHT THRD 4X36MM  PARAGON 28  Right 1 Implanted   SCRW PLT R3CON LK 3.5X10MM - [id number] Implant SCRW PLT R3CON LK 3.5X10MM  PARAGON 28  Right 1 Implanted   SCRW PLT R3CON LK 3.5X16MM - [id number] Implant SCRW PLT R3CON LK 3.5X16MM  PARAGON 28  Right 1 Implanted   SCRW PLT R3CON LK 3.5X18MM - [id number] Implant SCRW PLT R3CON LK 3.5X18MM  PARAGON 28  Right 2 Implanted   SCRW PLT R3CON LK 3.5X14MM - [id number] Implant SCRW PLT R3CON LK  3.5X14MM  PARAGON 28  Right 1 Implanted        Complications: none    Follow up:   Nonweightbearing.  3 to 5 days for follow-up.  Prescriptions for Percocet Phenergan and Xarelto were given.    Alexx Lara DPM     Date: 8/24/2021  Time: 15:09 CDT

## 2021-08-24 NOTE — DISCHARGE INSTRUCTIONS

## 2021-08-24 NOTE — ANESTHESIA POSTPROCEDURE EVALUATION
Patient: Jeannette Hitchcock    Procedure Summary     Date: 08/24/21 Room / Location: [facility] OR  /  [facility] OR    Anesthesia Start: 1303 Anesthesia Stop: 1503    Procedures:       REVISION FIRST METATARSAL CUNEIFORM JOINT ARTHRODESIS WITH HARDWARE REMOVAL, EXCISION OF HYPERTROPHIC SCAR SECOND METATARSOPHALANGEAL JOINT WITH TISSUE REARRANGEMENT FLAP CLOSURE - RIGHT FOOT (Right Toes)      HARDWARE REMOVAL (Right Ankle) Diagnosis:       Nonunion after arthrodesis      Retained orthopedic hardware      Pain, foot      Keloid scar of skin      Hypertrophic scar of skin      (nonuinon,  HYPERTROPHIC SCAR RIGHT FOOT)    Surgeons: Alexx Lara DPM Provider: Josue Hurley CRNA    Anesthesia Type: general ASA Status: 2          Anesthesia Type: general    Vitals  Vitals Value Taken Time   /74 08/24/21 1534   Temp 98.3 °F (36.8 °C) 08/24/21 1525   Pulse 91 08/24/21 1534   Resp 14 08/24/21 1534   SpO2 94 % 08/24/21 1534           Post Anesthesia Care and Evaluation    Patient location during evaluation: PACU  Patient participation: complete - patient participated  Level of consciousness: awake and alert  Pain management: adequate  Airway patency: patent  Anesthetic complications: No anesthetic complications    Cardiovascular status: acceptable  Respiratory status: acceptable  Hydration status: acceptable    Comments: Blood pressure 120/69, pulse 92, temperature 98.3 °F (36.8 °C), temperature source Temporal, resp. rate 16, last menstrual period 08/03/2021, SpO2 95 %, not currently breastfeeding.    Pt discharged from PACU based on ashley score >8

## 2021-08-30 DIAGNOSIS — E11.69 TYPE 2 DIABETES MELLITUS WITH OTHER SPECIFIED COMPLICATION, WITHOUT LONG-TERM CURRENT USE OF INSULIN (HCC): ICD-10-CM

## 2021-08-30 LAB
CYTO UR: NORMAL
LAB AP CASE REPORT: NORMAL
PATH REPORT.FINAL DX SPEC: NORMAL
PATH REPORT.GROSS SPEC: NORMAL

## 2021-08-30 RX ORDER — LEVOCETIRIZINE DIHYDROCHLORIDE 5 MG/1
5 TABLET, FILM COATED ORAL NIGHTLY
Qty: 90 TABLET | Refills: 3 | Status: SHIPPED | OUTPATIENT
Start: 2021-08-30 | End: 2022-08-05 | Stop reason: SDUPTHER

## 2021-08-31 RX ORDER — PROMETHAZINE HYDROCHLORIDE 25 MG/1
25 TABLET ORAL 4 TIMES DAILY PRN
Qty: 30 TABLET | Refills: 1 | Status: SHIPPED | OUTPATIENT
Start: 2021-08-31 | End: 2021-09-10

## 2021-08-31 RX ORDER — ONDANSETRON 4 MG/1
4 TABLET, ORALLY DISINTEGRATING ORAL EVERY 8 HOURS PRN
Qty: 30 TABLET | Refills: 1 | Status: SHIPPED | OUTPATIENT
Start: 2021-08-31 | End: 2022-04-14

## 2021-09-27 ENCOUNTER — OFFICE VISIT (OUTPATIENT)
Dept: PRIMARY CARE CLINIC | Age: 46
End: 2021-09-27
Payer: COMMERCIAL

## 2021-09-27 ENCOUNTER — TELEPHONE (OUTPATIENT)
Dept: PRIMARY CARE CLINIC | Age: 46
End: 2021-09-27

## 2021-09-27 VITALS
OXYGEN SATURATION: 96 % | HEIGHT: 64 IN | HEART RATE: 100 BPM | DIASTOLIC BLOOD PRESSURE: 70 MMHG | BODY MASS INDEX: 26.46 KG/M2 | TEMPERATURE: 98.5 F | SYSTOLIC BLOOD PRESSURE: 110 MMHG | WEIGHT: 155 LBS

## 2021-09-27 DIAGNOSIS — E11.69 TYPE 2 DIABETES MELLITUS WITH OTHER SPECIFIED COMPLICATION, WITHOUT LONG-TERM CURRENT USE OF INSULIN (HCC): Primary | ICD-10-CM

## 2021-09-27 DIAGNOSIS — G35 MULTIPLE SCLEROSIS (HCC): ICD-10-CM

## 2021-09-27 DIAGNOSIS — R50.9 FEVER, UNSPECIFIED FEVER CAUSE: ICD-10-CM

## 2021-09-27 DIAGNOSIS — M79.10 MYALGIA: ICD-10-CM

## 2021-09-27 LAB — SARS-COV-2, PCR: DETECTED

## 2021-09-27 PROCEDURE — 99214 OFFICE O/P EST MOD 30 MIN: CPT | Performed by: NURSE PRACTITIONER

## 2021-09-27 RX ORDER — FREMANEZUMAB-VFRM 225 MG/1.5ML
225 INJECTION SUBCUTANEOUS
COMMUNITY

## 2021-09-27 RX ORDER — HYDROCODONE BITARTRATE AND ACETAMINOPHEN 7.5; 325 MG/1; MG/1
1 TABLET ORAL EVERY 8 HOURS PRN
Status: ON HOLD | COMMUNITY
Start: 2021-09-15 | End: 2021-10-03

## 2021-09-27 ASSESSMENT — ENCOUNTER SYMPTOMS
WHEEZING: 0
COUGH: 1
ABDOMINAL PAIN: 0
BACK PAIN: 0
SHORTNESS OF BREATH: 0

## 2021-09-27 NOTE — TELEPHONE ENCOUNTER
Pt called and LM that she tested pos for COVID and she is symptomatic.     Called pt and made her an appt Hatchet Flap Text: The defect edges were debeveled with a #15 scalpel blade.  Given the location of the defect, shape of the defect and the proximity to free margins a hatchet flap was deemed most appropriate.  Using a sterile surgical marker, an appropriate hatchet flap was drawn incorporating the defect and placing the expected incisions within the relaxed skin tension lines where possible.    The area thus outlined was incised deep to adipose tissue with a #15 scalpel blade.  The skin margins were undermined to an appropriate distance in all directions utilizing iris scissors.

## 2021-09-27 NOTE — PROGRESS NOTES
Brina Salinas (:  1975) is a 55 y.o. female,Established patient, here for evaluation of the following chief complaint(s):  Positive For Covid-19 (per rapid test ), Fever (as high as 101 off and on for past 2 days.), Cough, Head Congestion, and Shortness of Breath (already started breathing treatments. )      ASSESSMENT/PLAN:    ICD-10-CM    1. Type 2 diabetes mellitus with other specified complication, without long-term current use of insulin ([facility])  E11.69 COVID-19  Risks will check   Consider infusion     COVID-19   2. Multiple sclerosis ([facility] [address]  G35 COVID-19     COVID-19   3. Myalgia  M79.10 COVID-19     COVID-19   4. Fever, unspecified fever cause  R50.9 COVID-19  Supportive care       COVID-19       No follow-ups on file. SUBJECTIVE/OBJECTIVE:  HPI    Patient here    symptoms Wednesday and hers saturday night  Took rapid test yesterday positive  Using mucinex twice a day  concerns with asthma and risks  Vaccinated with moderna    She has asthma   MS and continues with present  Fever and aches in evening  Mild cough   No SOA   Able to taste and smell          /70 (Site: Left Upper Arm, Position: Sitting, Cuff Size: Large Adult)   Pulse 100   Temp 98.5 °F (36.9 °C) (Temporal)   Ht 5' 4\" (1.626 m)   Wt 155 lb (70.3 kg)   SpO2 96%   BMI 26.61 kg/m²   Review of Systems   Constitutional: Positive for activity change, fatigue and fever. Negative for appetite change. HENT: Positive for congestion and postnasal drip. Respiratory: Positive for cough. Negative for shortness of breath and wheezing. Cardiovascular: Negative for chest pain, palpitations and leg swelling. Gastrointestinal: Negative for abdominal pain. Genitourinary: Negative for difficulty urinating and urgency. Musculoskeletal: Negative for arthralgias and back pain. Skin: Negative for rash. Neurological: Negative for tremors, syncope, light-headedness and headaches. Hematological: Negative for adenopathy. Psychiatric/Behavioral: Negative for behavioral problems, self-injury and sleep disturbance. The patient is not nervous/anxious and is not hyperactive. Physical Exam  Vitals reviewed. Constitutional:       General: She is not in acute distress. Appearance: Normal appearance. She is not ill-appearing. HENT:      Head: Normocephalic. Right Ear: Tympanic membrane normal. There is no impacted cerumen. Left Ear: Tympanic membrane normal. There is no impacted cerumen. Nose: No rhinorrhea. Mouth/Throat:      Pharynx: No posterior oropharyngeal erythema. Eyes:      General:         Right eye: No discharge. Left eye: No discharge. Cardiovascular:      Rate and Rhythm: Normal rate and regular rhythm. Heart sounds: No murmur heard. Pulmonary:      Effort: Pulmonary effort is normal.      Breath sounds: Normal breath sounds. No wheezing, rhonchi or rales. Abdominal:      General: Bowel sounds are normal.   Musculoskeletal:         General: No swelling. Normal range of motion. Skin:     General: Skin is warm. Capillary Refill: Capillary refill takes less than 2 seconds. Findings: No rash. Neurological:      General: No focal deficit present. Mental Status: She is alert and oriented to person, place, and time. Psychiatric:         Mood and Affect: Mood normal.         Behavior: Behavior normal.                   An electronic signature was used to authenticate this note.     --CLINTON Keith

## 2021-09-27 NOTE — PATIENT INSTRUCTIONS
Patient Education        Coronavirus ([id number]): Care Instructions  Overview  The coronavirus disease (COVID-19) is caused by a virus. Symptoms may include a fever, a cough, and shortness of breath. It can spread through droplets from coughing and sneezing, breathing, and singing. The virus also can spread when people are in close contact with someone who is infected. Most people have mild symptoms and can take care of themselves at home. If their symptoms get worse, they may need care in a hospital. Treatment may include medicines to reduce symptoms, plus breathing support such as oxygen therapy or a ventilator. It's important to not spread the virus to others. If you have COVID-19, wear a mask anytime you are around other people. It can help stop the spread of the virus. You need to isolate yourself while you are sick. Leave your home only if you need to get medical care or testing. Follow-up care is a key part of your treatment and safety. Be sure to make and go to all appointments, and call your doctor if you are having problems. It's also a good idea to know your test results and keep a list of the medicines you take. How can you care for yourself at home? · Get extra rest. It can help you feel better. · Drink plenty of fluids. This helps replace fluids lost from fever. Fluids may also help ease a scratchy throat. · You can take acetaminophen (Tylenol) or ibuprofen (Advil, Motrin) to reduce a fever. It may also help with muscle and body aches. Read and follow all instructions on the label. · Use petroleum jelly on sore skin. This can help if the skin around your nose and lips becomes sore from rubbing a lot with tissues. If you use oxygen, use a water-based product instead of petroleum jelly. · Keep track of symptoms such as fever and shortness of breath. This can help you know if you need to call your doctor. It can also help you know when it's safe to be around other people.   · In some cases, your doctor might suggest that you get a pulse oximeter. How can you self-isolate when you have COVID-19? If you have COVID-19, there are things you can do to help avoid spreading the virus to others. · Limit contact with people in your home. If possible, stay in a separate bedroom and use a separate bathroom. · Wear a mask when you are around other people. · If you have to leave home, avoid crowds and try to stay at least 6 feet away from other people. · Avoid contact with pets and other animals. · Cover your mouth and nose with a tissue when you cough or sneeze. Then throw it in the trash right away. · Wash your hands often, especially after you cough or sneeze. Use soap and water, and scrub for at least 20 seconds. If soap and water aren't available, use an alcohol-based hand . · Don't share personal household items. These include bedding, towels, cups and glasses, and eating utensils. · [address] in the warmest water allowed for the fabric type, and dry it completely. It's okay to wash other people's laundry with yours. · Clean and disinfect your home. Use household  and disinfectant wipes or sprays. When can you end self-isolation for COVID-19? If you know or think that you have the virus, you will need to self-isolate. You can be around others after:  · It's been at least 10 days since your symptoms started and  · You haven't had a fever for 24 hours without taking medicines to lower the fever and  · Your symptoms are improving. If you tested positive but have no symptoms, you can end isolation after 10 days. But if you start to have symptoms, follow the steps above. Ask your doctor if you need to be tested before you end isolation. This is especially important if you have a weakened immune system. When should you call for help? Call 911 anytime you think you may need emergency care.  For example, call if you have life-threatening symptoms, such as:    · You have severe trouble breathing. (You can't talk at all.)     · You have constant chest pain or pressure.     · You are severely dizzy or lightheaded.     · You are confused or can't think clearly.     · You have pale, gray, or blue-colored skin or lips.     · You pass out (lose consciousness) or are very hard to wake up. Call your doctor now or seek immediate medical care if:    · You have moderate trouble breathing. (You can't speak a full sentence.)     · You are coughing up blood (more than about 1 teaspoon).     · You have signs of low blood pressure. These include feeling lightheaded; being too weak to stand; and having cold, pale, clammy skin. Watch closely for changes in your health, and be sure to contact your doctor if:    · Your symptoms get worse.     · You are not getting better as expected.     · You have new or worse symptoms of anxiety, depression, nightmares, or flashbacks. Call before you go to the doctor's office. Follow their instructions. And wear a mask. Current as of: July 1, 2021               Content Version: 13.0  © 2006-2021 [employer]. Care instructions adapted under license by [facility] ([facility]). If you have questions about a medical condition or this instruction, always ask your healthcare professional. [address] any warranty or liability for your use of this information.

## 2021-09-28 ENCOUNTER — HOSPITAL ENCOUNTER (OUTPATIENT)
Dept: INFUSION THERAPY | Age: 46
Setting detail: INFUSION SERIES
Discharge: HOME | End: 2021-09-28
Payer: COMMERCIAL

## 2021-09-28 ENCOUNTER — TELEPHONE (OUTPATIENT)
Dept: PRIMARY CARE CLINIC | Age: 46
End: 2021-09-28

## 2021-09-28 VITALS
SYSTOLIC BLOOD PRESSURE: 103 MMHG | HEART RATE: 79 BPM | TEMPERATURE: 97.6 F | DIASTOLIC BLOOD PRESSURE: 67 MMHG | OXYGEN SATURATION: 94 %

## 2021-09-28 DIAGNOSIS — U07.1 COVID-19: Primary | ICD-10-CM

## 2021-09-28 DIAGNOSIS — U07.1 COVID-19: ICD-10-CM

## 2021-09-28 PROCEDURE — M0243 CASIRIVI AND IMDEVI INFUSION: HCPCS

## 2021-09-28 PROCEDURE — 96374 THER/PROPH/DIAG INJ IV PUSH: CPT

## 2021-09-28 PROCEDURE — 6370000000 HC RX 637 (ALT 250 FOR IP): Performed by: NURSE PRACTITIONER

## 2021-09-28 PROCEDURE — 6360000002 HC RX W HCPCS: Performed by: NURSE PRACTITIONER

## 2021-09-28 PROCEDURE — 2580000003 HC RX 258: Performed by: NURSE PRACTITIONER

## 2021-09-28 RX ORDER — SODIUM CHLORIDE 9 MG/ML
INJECTION, SOLUTION INTRAVENOUS CONTINUOUS
Status: ACTIVE | OUTPATIENT
Start: 2021-09-28 | End: 2021-09-28

## 2021-09-28 RX ORDER — METHYLPREDNISOLONE SODIUM SUCCINATE 125 MG/2ML
125 INJECTION, POWDER, LYOPHILIZED, FOR SOLUTION INTRAMUSCULAR; INTRAVENOUS ONCE
Status: CANCELLED | OUTPATIENT
Start: 2021-09-28 | End: 2021-09-28

## 2021-09-28 RX ORDER — DIPHENHYDRAMINE HYDROCHLORIDE 50 MG/ML
50 INJECTION INTRAMUSCULAR; INTRAVENOUS ONCE
Status: CANCELLED | OUTPATIENT
Start: 2021-09-28 | End: 2021-09-28

## 2021-09-28 RX ORDER — SODIUM CHLORIDE 0.9 % (FLUSH) 0.9 %
5-40 SYRINGE (ML) INJECTION PRN
Status: CANCELLED | OUTPATIENT
Start: 2021-09-28

## 2021-09-28 RX ORDER — SODIUM CHLORIDE 9 MG/ML
INJECTION, SOLUTION INTRAVENOUS CONTINUOUS
Status: CANCELLED | OUTPATIENT
Start: 2021-09-28

## 2021-09-28 RX ORDER — DIPHENHYDRAMINE HYDROCHLORIDE 50 MG/ML
25 INJECTION INTRAMUSCULAR; INTRAVENOUS ONCE
Status: COMPLETED | OUTPATIENT
Start: 2021-09-28 | End: 2021-09-28

## 2021-09-28 RX ORDER — SODIUM CHLORIDE 0.9 % (FLUSH) 0.9 %
5-40 SYRINGE (ML) INJECTION PRN
Status: DISCONTINUED | OUTPATIENT
Start: 2021-09-28 | End: 2021-09-29 | Stop reason: HOSPADM

## 2021-09-28 RX ORDER — ACETAMINOPHEN 325 MG/1
650 TABLET ORAL ONCE
Status: CANCELLED
Start: 2021-09-28 | End: 2021-09-28

## 2021-09-28 RX ORDER — DIPHENHYDRAMINE HYDROCHLORIDE 50 MG/ML
25 INJECTION INTRAMUSCULAR; INTRAVENOUS ONCE
Status: CANCELLED
Start: 2021-09-28 | End: 2021-09-28

## 2021-09-28 RX ORDER — EPINEPHRINE 1 MG/ML
0.3 INJECTION, SOLUTION, CONCENTRATE INTRAVENOUS PRN
Status: CANCELLED | OUTPATIENT
Start: 2021-09-28

## 2021-09-28 RX ORDER — ACETAMINOPHEN 325 MG/1
650 TABLET ORAL ONCE
Status: COMPLETED | OUTPATIENT
Start: 2021-09-28 | End: 2021-09-28

## 2021-09-28 RX ADMIN — SODIUM CHLORIDE: 9 INJECTION, SOLUTION INTRAVENOUS at 10:40

## 2021-09-28 RX ADMIN — ACETAMINOPHEN 650 MG: 325 TABLET ORAL at 10:40

## 2021-09-28 RX ADMIN — DIPHENHYDRAMINE HYDROCHLORIDE 25 MG: 50 INJECTION, SOLUTION INTRAMUSCULAR; INTRAVENOUS at 10:40

## 2021-09-28 RX ADMIN — CASIRIVIMAB AND IMDEVIMAB: 600; 600 INJECTION, SOLUTION, CONCENTRATE INTRAVENOUS at 10:57

## 2021-09-28 ASSESSMENT — PAIN SCALES - GENERAL: PAINLEVEL_OUTOF10: 0

## 2021-09-28 NOTE — TELEPHONE ENCOUNTER
----- Message from Thaddeus Severin, APRN sent at 9/28/2021  6:45 AM CDT -----  Covid positive due to risk factors please set up for infusion if patient desires

## 2021-09-28 NOTE — PROGRESS NOTES
Patient/Caregiver given FACT SHEET for EMERGENCY USE AUTHORIZATION for monoclonal antibody therapy. Patient/Caregiver verbalize understanding of EMERGENCY USE AUTHORIZATION and understand full FDA approval has not been granted because medication is still being studied. Patient/Caregiver verbalize understanding regarding known safety and effectiveness and understand limited data is available regarding full risks and benefits. Patient/Caregiver understand there is a continued need to self-isolate and continue all infection control measures. Patient/Caregiver choose to proceed with monoclonal antibody infusion therapy. Patient/Caregiver signed a copy of the fact sheet for emergency use authorization. Copy placed in patient medical record.  Electronically signed by Idalia Milner RN on 9/28/2021 at 10:25 AM

## 2021-10-03 ENCOUNTER — APPOINTMENT (OUTPATIENT)
Dept: CT IMAGING | Age: 46
DRG: 388 | End: 2021-10-03
Payer: COMMERCIAL

## 2021-10-03 ENCOUNTER — APPOINTMENT (OUTPATIENT)
Dept: GENERAL RADIOLOGY | Age: 46
DRG: 388 | End: 2021-10-03
Payer: COMMERCIAL

## 2021-10-03 ENCOUNTER — HOSPITAL ENCOUNTER (INPATIENT)
Age: 46
LOS: 2 days | Discharge: HOME | DRG: 388 | End: 2021-10-05
Attending: EMERGENCY MEDICINE | Admitting: INTERNAL MEDICINE
Payer: COMMERCIAL

## 2021-10-03 DIAGNOSIS — R10.31 RIGHT LOWER QUADRANT ABDOMINAL PAIN: Primary | ICD-10-CM

## 2021-10-03 DIAGNOSIS — U07.1 COVID-19: ICD-10-CM

## 2021-10-03 DIAGNOSIS — R11.2 NAUSEA, VOMITING, AND DIARRHEA: ICD-10-CM

## 2021-10-03 DIAGNOSIS — R19.7 NAUSEA, VOMITING, AND DIARRHEA: ICD-10-CM

## 2021-10-03 PROBLEM — K56.600 PARTIAL SMALL BOWEL OBSTRUCTION (HCC): Status: ACTIVE | Noted: 2021-10-03

## 2021-10-03 LAB
ALBUMIN SERPL-MCNC: 4.3 G/DL (ref 3.5–5.2)
ALP BLD-CCNC: 77 U/L (ref 35–104)
ALT SERPL-CCNC: 23 U/L (ref 5–33)
ANION GAP SERPL CALCULATED.3IONS-SCNC: 10 MMOL/L (ref 7–19)
AST SERPL-CCNC: 26 U/L (ref 5–32)
BASOPHILS ABSOLUTE: 0 K/UL (ref 0–0.2)
BASOPHILS RELATIVE PERCENT: 0.6 % (ref 0–1)
BILIRUB SERPL-MCNC: 0.6 MG/DL (ref 0.2–1.2)
BUN BLDV-MCNC: 11 MG/DL (ref 6–20)
C-REACTIVE PROTEIN: 0.86 MG/DL (ref 0–0.5)
CALCIUM SERPL-MCNC: 9.9 MG/DL (ref 8.6–10)
CHLORIDE BLD-SCNC: 103 MMOL/L (ref 98–111)
CO2: 26 MMOL/L (ref 22–29)
CREAT SERPL-MCNC: 0.6 MG/DL (ref 0.5–0.9)
EOSINOPHILS ABSOLUTE: 0.2 K/UL (ref 0–0.6)
EOSINOPHILS RELATIVE PERCENT: 2.3 % (ref 0–5)
GFR AFRICAN AMERICAN: >59
GFR NON-AFRICAN AMERICAN: >60
GLUCOSE BLD-MCNC: 124 MG/DL (ref 74–109)
HBA1C MFR BLD: 6.4 % (ref 4–6)
HCT VFR BLD CALC: 46.6 % (ref 37–47)
HEMOGLOBIN: 15.2 G/DL (ref 12–16)
IMMATURE GRANULOCYTES #: 0 K/UL
LACTIC ACID: 1.3 MMOL/L (ref 0.5–1.9)
LIPASE: 30 U/L (ref 13–60)
LYMPHOCYTES ABSOLUTE: 1 K/UL (ref 1.1–4.5)
LYMPHOCYTES RELATIVE PERCENT: 14.5 % (ref 20–40)
MCH RBC QN AUTO: 28.8 PG (ref 27–31)
MCHC RBC AUTO-ENTMCNC: 32.6 G/DL (ref 33–37)
MCV RBC AUTO: 88.4 FL (ref 81–99)
MONOCYTES ABSOLUTE: 0.5 K/UL (ref 0–0.9)
MONOCYTES RELATIVE PERCENT: 7.8 % (ref 0–10)
NEUTROPHILS ABSOLUTE: 5.1 K/UL (ref 1.5–7.5)
NEUTROPHILS RELATIVE PERCENT: 74.7 % (ref 50–65)
PDW BLD-RTO: 12.7 % (ref 11.5–14.5)
PLATELET # BLD: 228 K/UL (ref 130–400)
PMV BLD AUTO: 10.9 FL (ref 9.4–12.3)
POTASSIUM REFLEX MAGNESIUM: 4.6 MMOL/L (ref 3.5–5)
RBC # BLD: 5.27 M/UL (ref 4.2–5.4)
SODIUM BLD-SCNC: 139 MMOL/L (ref 136–145)
TOTAL PROTEIN: 7.1 G/DL (ref 6.6–8.7)
WBC # BLD: 6.8 K/UL (ref 4.8–10.8)

## 2021-10-03 PROCEDURE — 1210000000 HC MED SURG R&B

## 2021-10-03 PROCEDURE — 87040 BLOOD CULTURE FOR BACTERIA: CPT

## 2021-10-03 PROCEDURE — 84145 PROCALCITONIN (PCT): CPT

## 2021-10-03 PROCEDURE — 6360000002 HC RX W HCPCS

## 2021-10-03 PROCEDURE — 83605 ASSAY OF LACTIC ACID: CPT

## 2021-10-03 PROCEDURE — 83690 ASSAY OF LIPASE: CPT

## 2021-10-03 PROCEDURE — 74022 RADEX COMPL AQT ABD SERIES: CPT

## 2021-10-03 PROCEDURE — 80053 COMPREHEN METABOLIC PANEL: CPT

## 2021-10-03 PROCEDURE — 74177 CT ABD & PELVIS W/CONTRAST: CPT

## 2021-10-03 PROCEDURE — 85025 COMPLETE CBC W/AUTO DIFF WBC: CPT

## 2021-10-03 PROCEDURE — 96374 THER/PROPH/DIAG INJ IV PUSH: CPT

## 2021-10-03 PROCEDURE — 6360000002 HC RX W HCPCS: Performed by: NURSE PRACTITIONER

## 2021-10-03 PROCEDURE — 36415 COLL VENOUS BLD VENIPUNCTURE: CPT

## 2021-10-03 PROCEDURE — 86140 C-REACTIVE PROTEIN: CPT

## 2021-10-03 PROCEDURE — 6360000002 HC RX W HCPCS: Performed by: INTERNAL MEDICINE

## 2021-10-03 PROCEDURE — 2580000003 HC RX 258: Performed by: INTERNAL MEDICINE

## 2021-10-03 PROCEDURE — 83036 HEMOGLOBIN GLYCOSYLATED A1C: CPT

## 2021-10-03 PROCEDURE — 6360000004 HC RX CONTRAST MEDICATION: Performed by: NURSE PRACTITIONER

## 2021-10-03 PROCEDURE — 99283 EMERGENCY DEPT VISIT LOW MDM: CPT

## 2021-10-03 PROCEDURE — 2580000003 HC RX 258: Performed by: NURSE PRACTITIONER

## 2021-10-03 PROCEDURE — 96375 TX/PRO/DX INJ NEW DRUG ADDON: CPT

## 2021-10-03 RX ORDER — KETOROLAC TROMETHAMINE 30 MG/ML
30 INJECTION, SOLUTION INTRAMUSCULAR; INTRAVENOUS EVERY 6 HOURS PRN
Status: DISCONTINUED | OUTPATIENT
Start: 2021-10-03 | End: 2021-10-05 | Stop reason: HOSPADM

## 2021-10-03 RX ORDER — POTASSIUM CHLORIDE 7.45 MG/ML
10 INJECTION INTRAVENOUS PRN
Status: DISCONTINUED | OUTPATIENT
Start: 2021-10-03 | End: 2021-10-05 | Stop reason: HOSPADM

## 2021-10-03 RX ORDER — ONDANSETRON 2 MG/ML
4 INJECTION INTRAMUSCULAR; INTRAVENOUS EVERY 6 HOURS PRN
Status: DISCONTINUED | OUTPATIENT
Start: 2021-10-03 | End: 2021-10-05 | Stop reason: HOSPADM

## 2021-10-03 RX ORDER — DEXTROSE MONOHYDRATE 25 G/50ML
12.5 INJECTION, SOLUTION INTRAVENOUS PRN
Status: DISCONTINUED | OUTPATIENT
Start: 2021-10-03 | End: 2021-10-05 | Stop reason: HOSPADM

## 2021-10-03 RX ORDER — POTASSIUM CHLORIDE 20 MEQ/1
40 TABLET, EXTENDED RELEASE ORAL PRN
Status: DISCONTINUED | OUTPATIENT
Start: 2021-10-03 | End: 2021-10-05 | Stop reason: HOSPADM

## 2021-10-03 RX ORDER — ONDANSETRON 2 MG/ML
4 INJECTION INTRAMUSCULAR; INTRAVENOUS ONCE
Status: COMPLETED | OUTPATIENT
Start: 2021-10-03 | End: 2021-10-03

## 2021-10-03 RX ORDER — ONDANSETRON 4 MG/1
4 TABLET, ORALLY DISINTEGRATING ORAL EVERY 8 HOURS PRN
Status: DISCONTINUED | OUTPATIENT
Start: 2021-10-03 | End: 2021-10-05 | Stop reason: HOSPADM

## 2021-10-03 RX ORDER — NICOTINE POLACRILEX 4 MG
15 LOZENGE BUCCAL PRN
Status: DISCONTINUED | OUTPATIENT
Start: 2021-10-03 | End: 2021-10-05 | Stop reason: HOSPADM

## 2021-10-03 RX ORDER — HYDROMORPHONE HYDROCHLORIDE 1 MG/ML
0.5 INJECTION, SOLUTION INTRAMUSCULAR; INTRAVENOUS; SUBCUTANEOUS ONCE
Status: COMPLETED | OUTPATIENT
Start: 2021-10-03 | End: 2021-10-03

## 2021-10-03 RX ORDER — 0.9 % SODIUM CHLORIDE 0.9 %
1000 INTRAVENOUS SOLUTION INTRAVENOUS ONCE
Status: COMPLETED | OUTPATIENT
Start: 2021-10-03 | End: 2021-10-03

## 2021-10-03 RX ORDER — SODIUM CHLORIDE 0.9 % (FLUSH) 0.9 %
10 SYRINGE (ML) INJECTION PRN
Status: DISCONTINUED | OUTPATIENT
Start: 2021-10-03 | End: 2021-10-05 | Stop reason: HOSPADM

## 2021-10-03 RX ORDER — SODIUM CHLORIDE 9 MG/ML
25 INJECTION, SOLUTION INTRAVENOUS PRN
Status: DISCONTINUED | OUTPATIENT
Start: 2021-10-03 | End: 2021-10-05 | Stop reason: HOSPADM

## 2021-10-03 RX ORDER — DEXTROSE MONOHYDRATE 50 MG/ML
100 INJECTION, SOLUTION INTRAVENOUS PRN
Status: DISCONTINUED | OUTPATIENT
Start: 2021-10-03 | End: 2021-10-05 | Stop reason: HOSPADM

## 2021-10-03 RX ORDER — ACETAMINOPHEN 650 MG/1
650 SUPPOSITORY RECTAL EVERY 6 HOURS PRN
Status: DISCONTINUED | OUTPATIENT
Start: 2021-10-03 | End: 2021-10-05 | Stop reason: HOSPADM

## 2021-10-03 RX ORDER — SODIUM CHLORIDE 0.9 % (FLUSH) 0.9 %
10 SYRINGE (ML) INJECTION EVERY 12 HOURS SCHEDULED
Status: DISCONTINUED | OUTPATIENT
Start: 2021-10-03 | End: 2021-10-05 | Stop reason: HOSPADM

## 2021-10-03 RX ORDER — ACETAMINOPHEN 325 MG/1
650 TABLET ORAL EVERY 6 HOURS PRN
Status: DISCONTINUED | OUTPATIENT
Start: 2021-10-03 | End: 2021-10-05 | Stop reason: HOSPADM

## 2021-10-03 RX ADMIN — KETOROLAC TROMETHAMINE 30 MG: 30 INJECTION, SOLUTION INTRAMUSCULAR; INTRAVENOUS at 21:59

## 2021-10-03 RX ADMIN — IOPAMIDOL 95 ML: 755 INJECTION, SOLUTION INTRAVENOUS at 16:55

## 2021-10-03 RX ADMIN — HYDROMORPHONE HYDROCHLORIDE 0.5 MG: 1 INJECTION, SOLUTION INTRAMUSCULAR; INTRAVENOUS; SUBCUTANEOUS at 16:26

## 2021-10-03 RX ADMIN — SODIUM CHLORIDE 1000 ML: 9 INJECTION, SOLUTION INTRAVENOUS at 16:25

## 2021-10-03 RX ADMIN — ONDANSETRON HYDROCHLORIDE 4 MG: 2 INJECTION, SOLUTION INTRAMUSCULAR; INTRAVENOUS at 16:26

## 2021-10-03 RX ADMIN — ONDANSETRON 4 MG: 2 INJECTION INTRAMUSCULAR; INTRAVENOUS at 21:59

## 2021-10-03 RX ADMIN — SODIUM CHLORIDE, PRESERVATIVE FREE 10 ML: 5 INJECTION INTRAVENOUS at 22:40

## 2021-10-03 ASSESSMENT — PAIN SCALES - GENERAL
PAINLEVEL_OUTOF10: 4
PAINLEVEL_OUTOF10: 7
PAINLEVEL_OUTOF10: 5
PAINLEVEL_OUTOF10: 6
PAINLEVEL_OUTOF10: 5

## 2021-10-03 ASSESSMENT — PAIN DESCRIPTION - LOCATION
LOCATION: ABDOMEN
LOCATION: ABDOMEN

## 2021-10-03 ASSESSMENT — PAIN DESCRIPTION - DESCRIPTORS: DESCRIPTORS: CRAMPING;DISCOMFORT

## 2021-10-03 ASSESSMENT — PAIN DESCRIPTION - PROGRESSION: CLINICAL_PROGRESSION: NOT CHANGED

## 2021-10-03 ASSESSMENT — ENCOUNTER SYMPTOMS
COUGH: 0
VOMITING: 1
COUGH: 1
DIARRHEA: 1
NAUSEA: 1
ABDOMINAL PAIN: 1

## 2021-10-03 ASSESSMENT — PAIN DESCRIPTION - PAIN TYPE
TYPE: ACUTE PAIN
TYPE: ACUTE PAIN

## 2021-10-03 ASSESSMENT — PAIN DESCRIPTION - ONSET: ONSET: ON-GOING

## 2021-10-03 ASSESSMENT — PAIN DESCRIPTION - ORIENTATION: ORIENTATION: MID

## 2021-10-03 ASSESSMENT — PAIN DESCRIPTION - FREQUENCY: FREQUENCY: INTERMITTENT

## 2021-10-03 NOTE — H&P
[address] - History & Physical      PCP: CLINTON Whitman    Date of Admission: 10/3/2021    Date of Service: 10/3/2021    Chief Complaint:  Abdominal pain, nausea and vomiting    History Of Present Illness: The patient is a 55 y.o. female who presented to [facility] ER with PMH bowel resections, MS, DM, COVID+ 9/27, received monoclonal antibodies complaining of worsening abdominal pain and nausea and vomiting. Patient states that she tested positive for COVID on 9/27/2021, has been having nausea,vomiting,diarrhea, fever, and cough during that time. On 10/2 patient states that she began to have intermittent abdominal cramps, along with worsening nausea, and vomiting. Today, patient awakens to severe abdominal pain, persistent vomiting and decides to come into ER. Patient was vaccinated for Jaden [place]. Patient is to be admitted to hospitalist service for partial small bowel obstruction and consultation to general surgery. Past Medical History:        Diagnosis Date    Headache(784.0)     Insomnia     Multiple sclerosis ([facility] [address]        Past Surgical History:        Procedure Laterality Date    CHOLECYSTECTOMY         Home Medications:  Prior to Admission medications    Medication Sig Start Date End Date Taking? Authorizing Provider   Fremanezumab-vfrm (AJOVY) 225 MG/1.5ML SOAJ Inject 225 mg into the skin every 30 days    Historical Provider, MD   HYDROcodone-acetaminophen (NORCO) 7.5-325 MG per tablet Take 1 tablet by mouth every 8 hours as needed.  9/15/21   Historical Provider, MD   ondansetron (ZOFRAN ODT) 4 MG disintegrating tablet Take 1 tablet by mouth every 8 hours as needed for Nausea or Vomiting 8/31/21   Glorine Osgood Narvaez APRNIA   dapagliflozin (FARXIGA) 10 MG tablet Take 1 tablet by mouth every morning 8/30/21   Glorine Osgood Narvaez APRNIA   levocetirizine (XYZAL) 5 MG tablet Take 1 tablet by mouth nightly 8/30/21   Glorine Osgood Solange APRNIA   vilazodone HCl (VIIBRYD) 40 MG TABS Take 1 tablet by mouth daily 7/20/21   CLINTON Esquivel   SITagliptin (JANUVIA) 100 MG tablet Take 1 tablet by mouth daily 6/21/21   CLINTON Esquivel   vitamin D (ERGOCALCIFEROL) 1.25 MG (48095 UT) CAPS capsule Take 1 capsule by mouth once a week 6/21/21   CLINTON Esquivel   levalbuterol Brice Barter) 1.25 MG/3ML nebulizer solution Take 3 mLs by nebulization every 4 hours as needed for Wheezing 5/12/21   CLINTON Esquivel   rosuvastatin (CRESTOR) 10 MG tablet Take 1 tablet by mouth nightly 4/21/21   CLINTON Esquivel   cyanocobalamin 1000 MCG/ML injection 1ml weekly x 4 weeks, then 1ml monthly there after 4/20/21   CLINTON Davidson   propranolol (INDERAL LA) 60 MG extended release capsule Take 1 capsule by mouth daily 3/19/21   CLINTON Esquivel   gabapentin (NEURONTIN) 100 MG capsule Take 1 capsule by mouth 3 times daily for 180 days.  Intended supply: 30 days 3/11/21 9/27/21  CLINTON Davidson   Fluticasone furoate-vilanterol (BREO ELLIPTA) 200-25 MCG/INH AEPB inhaler Inhale 1 puff into the lungs daily  Patient taking differently: Inhale 1 puff into the lungs daily as needed  1/12/21   CLINTON Esquivel   brexpiprazole (REXULTI) 1 MG TABS tablet Take 1 tablet by mouth daily 1/8/21   CLINTON Esquivel   doxepin (SINEQUAN) 50 MG capsule Take 1 capsule by mouth nightly 11/10/20 11/10/21  CLINTON Valentine   baclofen (LIORESAL) 10 MG tablet TAKE 1/2 TO 1 TABLET TWICE DAILY AS NEEDED FOR MUSCLE SPASMS 6/16/20   Historical Provider, MD   Teriflunomide (AUBAGIO) 14 MG TABS Take 1 tablet by mouth daily  5/15/20   Historical Provider, MD   Syringe/Needle, Disp, (SYRINGE 3CC/25GX1\") 25G X 1\" 3 ML MISC Use to inject b12 once weekly for 1 month, then monthly 1/14/20   CLINTON Davidson   furosemide (LASIX) 20 MG tablet Take 20 mg by mouth daily as needed    Historical Provider, MD   blood glucose monitor kit and supplies Check fasting blood sugar 2-3 times a week 4/24/19   Faviola Castro CLINTON Spence   blood glucose monitor strips Check fasting blood sugar 2-3 times a week 4/24/19   Glojerardo Osgoodandrea Narvaez APRNIA   SUMAtriptan (IMITREX) 100 MG tablet TAKE ONE TABLET BY MOUTH DAILY AS NEEDED FOR MIGRAINE 9/15/17   Glorine Osgoodandrea Narvaez APRNIA       Allergies:    Ampicillin, Biaxin [clarithromycin], Flagyl [metronidazole], Saxenda [liraglutide -weight management], Albuterol, and Doxycycline    Social History:    The patient currently lives home with spouse  Tobacco:   reports that she has never smoked. She has never used smokeless tobacco.  Alcohol:   reports no history of alcohol use. Illicit Drugs: Never    Family History:  History reviewed. No pertinent family history. Review of Systems:   Review of Systems   Constitutional: Positive for fever. HENT: Negative. Respiratory: Positive for cough. Gastrointestinal: Positive for abdominal pain, nausea and vomiting. Genitourinary: Negative. Musculoskeletal: Negative. Skin: Positive for wound (has right boot, due to recent surgery). Neurological: Positive for weakness. Hematological: Negative. Psychiatric/Behavioral: Negative. 14 point review of systems is negative except as specifically addressed above. Physical Examination:  /67   Pulse 82   Temp 98.6 °F (37 °C) (Oral)   Resp 18   SpO2 94%   Physical Exam  Constitutional:       Appearance: Normal appearance. HENT:      Head: Normocephalic. Nose: No congestion or rhinorrhea. Mouth/Throat:      Pharynx: Oropharynx is clear. No oropharyngeal exudate or posterior oropharyngeal erythema. Eyes:      Extraocular Movements: Extraocular movements intact. Conjunctiva/sclera: Conjunctivae normal.      Pupils: Pupils are equal, round, and reactive to light. Cardiovascular:      Rate and Rhythm: Normal rate and regular rhythm. Pulses: Normal pulses. Heart sounds: Normal heart sounds. No murmur heard.      Pulmonary:      Effort: Pulmonary effort is normal.      Breath sounds: Normal breath sounds. Abdominal:      General: Bowel sounds are decreased. There is no distension. Tenderness: There is abdominal tenderness in the right upper quadrant. There is rebound. There is no right CVA tenderness, left CVA tenderness or guarding. Musculoskeletal:         General: Signs of injury (right foot in boot due to recent surgery, non-weight bearing) present. No tenderness. Cervical back: Normal range of motion and neck supple. No rigidity or tenderness. Right lower leg: No edema. Left lower leg: No edema. Skin:     General: Skin is warm and dry. Capillary Refill: Capillary refill takes less than 2 seconds. Coloration: Skin is not pale. Neurological:      General: No focal deficit present. Mental Status: She is alert and oriented to person, place, and time. Cranial Nerves: No cranial nerve deficit. Motor: Weakness present. Psychiatric:         Mood and Affect: Mood normal.         Behavior: Behavior normal.          Diagnostic Data:  CBC:  Recent Labs     10/03/21  1530   WBC 6.8   HGB 15.2   HCT 46.6        BMP:  Recent Labs     10/03/21  1530      K 4.6      CO2 26   BUN 11   CREATININE 0.6   CALCIUM 9.9     Recent Labs     10/03/21  1530   AST 26   ALT 23   BILITOT 0.6   ALKPHOS 77       XR ACUTE ABD SERIES CHEST 1 VW    Result Date: 10/3/2021  EXAM: XR ACUTE ABD SERIES CHEST 1 VW - 10/3/2021 4:13 PM INDICATION: Abdominal pain COMPARISON: 6/12/2012 FINDINGS: Cardiac silhouette is normal in size. No pleural effusion or pneumothorax. No focal airspace opacity. No acute osseous finding in the chest. Nonobstructive bowel gas pattern. No evidence of large retained stool burden. Scattered surgical clips are noted. No suspicious calcifications no acute osseous finding. 1.  No acute findings in the chest. 2.  Nonobstructive bowel gas pattern.  Signed by Dr Jaclyn Martinez Additional Contrast? None    Result Date: 10/3/2021  EXAMINATION: CT ABDOMEN PELVIS W IV CONTRAST 10/3/2021 6:14 PM HISTORY: CT ABDOMEN PELVIS W IV CONTRAST 10/3/2021 4:46 PM HISTORY: COMPARISON: October 14, 2016. DLP: 1078 mGy cm TECHNIQUE: Following the intravenous administration of contrast, helical CT tomographic images of the abdomen and pelvis were acquired. Coronal reformatted images were also provided for review. FINDINGS: The lung bases and base of the heart are unremarkable. LIVER: No focal liver lesion. The hepatic vasculature is patent. BILIARY SYSTEM: Surgical clips are present from previous cholecystectomy. Alysa Smith PANCREAS: No focal pancreatic lesion. SPLEEN: Unremarkable. KIDNEYS AND ADRENALS: Bilateral kidneys and adrenal glands are unremarkable. The ureters are decompressed and normal in appearance. RETROPERITONEUM: No mass, lymphadenopathy or hemorrhage. GI TRACT: Small bowel is dilated and fluid-filled. Diverticula are noted throughout the colon. Postsurgical changes noted in the ascending colon. Free fluid is present in the right paracolic gutter. Free fluid is present in the left paracolic gutter. There is some fluid noted in the pelvis. . The small bowel is fluid-filled and dilated. Sutures are also noted in the small bowel in the midabdomen. This may be partial small bowel obstruction or possibly enteritis with the fluid in the paracolic gutters and pelvis. .. OTHER: There is no mesenteric mass. . The abdominopelvic vasculature is patent. The osseous structures and soft tissues demonstrate no worrisome lesions. PELVIS: The uterus is visualized. . The urinary bladder is normal in appearance. 1. Dilated loops of small bowel fluid filled. This may be either enteritis or partial small bowel obstruction. 2. Free fluid is present in the paracolic gutters and pelvis. 3. Diverticulosis.  Signed by Dr Sajan Shook      Assessment/Plan:  Active Problems:    Nausea and vomiting/Partial small bowel obstruction ([facility] [address]    -CT abdomen pelvis   -consult to general surgery   -daily labs, CBC, BMP   -NPO   -antiemetic as needed  COVID   -noted   -currently no respiratory symptoms present   -supplemental oxygen as needed   -encourage cough and deep breathing   -Droplet Plus Precautions    DVT prophylactic: Lovenox, SCD     Signed:  CLINTON Lopes - CNP, 10/3/2021 6:36 PM

## 2021-10-03 NOTE — ED NOTES
Bed: 11  Expected date:   Expected time:   Means of arrival:   Comments:  Terminal     Macario Khan RN  10/03/21 [id number]

## 2021-10-03 NOTE — ED PROVIDER NOTES
[facility] 80  eMERGENCY dEPARTMENT eNCOUnter      Pt Name: Tiffanie Davies  MRN: [id number]  [place] 1975  Date of evaluation: 10/3/2021  Provider: CLINTON Huizar    CHIEF COMPLAINT       Chief Complaint   Patient presents with    Positive For Covid-19    Nausea & Vomiting         HISTORY OF PRESENT ILLNESS   (Location/Symptom, Timing/Onset,Context/Setting, Quality, Duration, Modifying Factors, Severity)  Note limiting factors. Tiffanie Davies is a 55 y.o. female who presents to the emergency department with covid that she has had    Since 9/27. She tells me she was sick a week before this. Did receive monoclonal antibody infusion. + MS. She is here today after having diarrhea x 1 week. Started vomiting today and having abd pain. Patient has a history of 2 bowel ruptures and multiple abdominal surgeries. She says it feels like a previous bowel obstruction. No fever  Coughing better      The history is provided by the patient. Nausea & Vomiting  Severity:  Moderate  Duration:  1 day  Number of daily episodes:  2  Chronicity:  New  Ineffective treatments:  None tried  Associated symptoms: abdominal pain and diarrhea    Associated symptoms: no cough and no fever    Risk factors: prior abdominal surgery    Risk factors comment:  Covid      NursingNotes were reviewed. REVIEW OF SYSTEMS    (2-9 systems for level 4, 10 or more for level 5)     Review of Systems   Constitutional: Negative for fever. Respiratory: Negative for cough. Gastrointestinal: Positive for abdominal pain, diarrhea and nausea. Except as noted above the remainder of the review of systems was reviewed and negative.        PAST MEDICAL HISTORY     Past Medical History:   Diagnosis Date    COVID     Diabetes ([facility] [address]     Headache(784.0)     Insomnia     Multiple sclerosis ([facility] [address]          SURGICALHISTORY       Past Surgical History:   Procedure Laterality Date    APPENDECTOMY      BUNIONECTOMY  2020    and infection  CHOLECYSTECTOMY      REVISION COLOSTOMY      SMALL INTESTINE SURGERY      [id number]         CURRENT MEDICATIONS       Current Discharge Medication List      CONTINUE these medications which have NOT CHANGED    Details   dapagliflozin (FARXIGA) 10 MG tablet Take 1 tablet by mouth every morning  Qty: 90 tablet, Refills: 3    Associated Diagnoses: Type 2 diabetes mellitus with other specified complication, without long-term current use of insulin (HCC)      levocetirizine (XYZAL) 5 MG tablet Take 1 tablet by mouth nightly  Qty: 90 tablet, Refills: 3      vilazodone HCl (VIIBRYD) 40 MG TABS Take 1 tablet by mouth daily  Qty: 30 tablet, Refills: 5    Comments: This prescription was filled on 11/5/2018. Any refills authorized will be placed on file. Associated Diagnoses: Moderate episode of recurrent major depressive disorder (HCC)      SITagliptin (JANUVIA) 100 MG tablet Take 1 tablet by mouth daily  Qty: 30 tablet, Refills: 11    Associated Diagnoses: Type 2 diabetes mellitus with other specified complication, without long-term current use of insulin ([facility])      rosuvastatin (CRESTOR) 10 MG tablet Take 1 tablet by mouth nightly  Qty: 90 tablet, Refills: 3    Associated Diagnoses: Type 2 diabetes mellitus with other specified complication, without long-term current use of insulin ([address]  Mixed hyperlipidemia      cyanocobalamin 1000 MCG/ML injection 1ml weekly x 4 weeks, then 1ml monthly there after  Qty: 10 mL, Refills: 0    Associated Diagnoses: Chronic fatigue; Vitamin B12 deficiency      propranolol (INDERAL LA) 60 MG extended release capsule Take 1 capsule by mouth daily  Qty: 30 capsule, Refills: 11      brexpiprazole (REXULTI) 1 MG TABS tablet Take 1 tablet by mouth daily  Qty: 30 tablet, Refills: 11      doxepin (SINEQUAN) 50 MG capsule Take 1 capsule by mouth nightly  Qty: 30 capsule, Refills: 11    Associated Diagnoses: Insomnia, unspecified type      baclofen (LIORESAL) 10 MG tablet TAKE 1/2 TO 1 TABLET TWICE DAILY AS NEEDED FOR MUSCLE SPASMS      Teriflunomide (AUBAGIO) 14 MG TABS Take 1 tablet by mouth daily       furosemide (LASIX) 20 MG tablet Take 20 mg by mouth daily as needed      Fremanezumab-vfrm (AJOVY) 225 MG/1.5ML SOAJ Inject 225 mg into the skin every 30 days      ondansetron (ZOFRAN ODT) 4 MG disintegrating tablet Take 1 tablet by mouth every 8 hours as needed for Nausea or Vomiting  Qty: 30 tablet, Refills: 1      vitamin D (ERGOCALCIFEROL) 1.25 MG (56390 UT) CAPS capsule Take 1 capsule by mouth once a week  Qty: 12 capsule, Refills: 1    Associated Diagnoses: Vitamin D deficiency      levalbuterol (XOPENEX) 1.25 MG/3ML nebulizer solution Take 3 mLs by nebulization every 4 hours as needed for Wheezing  Qty: 120 mL, Refills: 5    Associated Diagnoses: Bronchitis      gabapentin (NEURONTIN) 100 MG capsule Take 1 capsule by mouth 3 times daily for 180 days. Intended supply: 30 days  Qty: 90 capsule, Refills: 5    Associated Diagnoses: Chronic pain syndrome      Syringe/Needle, Disp, (SYRINGE 3CC/25GX1\") 25G X 1\" 3 ML MISC Use to inject b12 once weekly for 1 month, then monthly  Qty: 12 each, Refills: 1      blood glucose monitor kit and supplies Check fasting blood sugar 2-3 times a week  Qty: 1 kit, Refills: 0    Comments: Brand per patient preference. May round up to next available package size. blood glucose monitor strips Check fasting blood sugar 2-3 times a week  Qty: 100 strip, Refills: 3    Comments: Brand per patient preference. May round up to next available package size.       SUMAtriptan (IMITREX) 100 MG tablet TAKE ONE TABLET BY MOUTH DAILY AS NEEDED FOR MIGRAINE  Qty: 27 tablet, Refills: 2    Comments: Doesn't need one now just keep on file  Associated Diagnoses: Migraine without status migrainosus, not intractable, unspecified migraine type             ALLERGIES     Ampicillin, Biaxin [clarithromycin], Flagyl [metronidazole], Saxenda [liraglutide -weight management], Albuterol, and 105/67 98.6 °F (37 °C) Oral 82 18 94 % -- --       Physical Exam  Vitals and nursing note reviewed. Constitutional:       Appearance: She is well-developed. HENT:      Head: Normocephalic and atraumatic. Eyes:      General: No scleral icterus. Right eye: No discharge. Left eye: No discharge. Cardiovascular:      Rate and Rhythm: Normal rate and regular rhythm. Heart sounds: Normal heart sounds. Pulmonary:      Effort: No respiratory distress. Breath sounds: Normal breath sounds. Abdominal:      General: Abdomen is protuberant. A surgical scar is present. Bowel sounds are decreased. Palpations: Abdomen is soft. Tenderness: There is abdominal tenderness in the right upper quadrant and right lower quadrant. There is guarding. There is no rebound. Hernia: No hernia is present. Musculoskeletal:      Cervical back: Normal range of motion and neck supple. Skin:     General: Skin is warm and dry. Neurological:      Mental Status: She is alert. Psychiatric:         Behavior: Behavior normal.         DIAGNOSTIC RESULTS     EKG: All EKG's are interpreted by the Emergency Department Physician who either signs or Co-signsthis chart in the absence of a cardiologist.        RADIOLOGY:   [place] such as CT, Ultrasound and MRI are read by the radiologist. Plain radiographic images are visualized and preliminarily interpreted by the emergency physician with the below findings:      Interpretation per the Radiologist below, if available at the time of this note:    CT ABDOMEN PELVIS W IV CONTRAST Additional Contrast? None   Final Result   1. Dilated loops of small bowel fluid filled. This may be either   enteritis or partial small bowel obstruction. 2. Free fluid is present in the paracolic gutters and pelvis. 3. Diverticulosis. Signed by Dr Heide Meehan      XR ACUTE ABD SERIES CHEST 1 VW   Final Result   1.   No acute findings in the chest.   2. Nonobstructive bowel gas pattern. Signed by Dr Terry Liao            ED BEDSIDEULTRASOUND:   Performed by ED Physician -none    LABS:  Labs Reviewed   CBC WITH AUTO DIFFERENTIAL - Abnormal; Notable for the following components:       Result Value    MCHC 32.6 (*)     Neutrophils % 74.7 (*)     Lymphocytes % 14.5 (*)     Lymphocytes Absolute 1.0 (*)     All other components within normal limits   COMPREHENSIVE METABOLIC PANEL W/ REFLEX TO MG FOR LOW K - Abnormal; Notable for the following components:    Glucose 124 (*)     All other components within normal limits   C-REACTIVE PROTEIN - Abnormal; Notable for the following components:    CRP 0.86 (*)     All other components within normal limits   HEMOGLOBIN A1C - Abnormal; Notable for the following components:    Hemoglobin A1C 6.4 (*)     All other components within normal limits   CULTURE, BLOOD 1   CULTURE, BLOOD 2   LIPASE   LACTIC ACID, PLASMA   URINE RT REFLEX TO CULTURE   BASIC METABOLIC PANEL W/ REFLEX TO MG FOR LOW K   CBC WITH AUTO DIFFERENTIAL   PROCALCITONIN   POCT GLUCOSE       All other labs were within normal range or not returned as of this dictation. EMERGENCY DEPARTMENT COURSE and DIFFERENTIALDIAGNOSIS/MDM:   Vitals:    Vitals:    10/03/21 1504 10/03/21 1901 10/03/21 2015   BP: 105/67 (!) 124/58 109/67   Pulse: 82 67 85   Resp: 18 18 18   Temp: 98.6 °F (37 °C)  98.8 °F (37.1 °C)   TempSrc: Oral     SpO2: 94% 93% 94%   Weight:   160 lb (72.6 kg)           MDM      CONSULTS:  IP CONSULT TO GENERAL SURGERY  IP CONSULT TO SOCIAL WORK    PROCEDURES:  Unless otherwise noted below, none     Procedures    FINAL IMPRESSION      1. Right lower quadrant abdominal pain    2. COVID-19    3. Nausea, vomiting, and diarrhea        DISPOSITION/PLAN   DISPOSITION Admitted 10/03/2021 06:35:38 PM      PATIENT REFERRED TO:  No follow-up provider specified.     DISCHARGE MEDICATIONS:  Current Discharge Medication List             (Please note that portions of this note were completed with a voice recognitionprogram.  Efforts were made to edit the dictations but occasionally words are mis-transcribed.)    CLINTON Hogan (electronically signed)         CLINTON Hogan  10/03/21 3796

## 2021-10-04 LAB
ANION GAP SERPL CALCULATED.3IONS-SCNC: 10 MMOL/L (ref 7–19)
BASOPHILS ABSOLUTE: 0 K/UL (ref 0–0.2)
BASOPHILS RELATIVE PERCENT: 0.6 % (ref 0–1)
BUN BLDV-MCNC: 12 MG/DL (ref 6–20)
C DIFF TOXIN/ANTIGEN: NORMAL
CALCIUM SERPL-MCNC: 8.7 MG/DL (ref 8.6–10)
CHLORIDE BLD-SCNC: 107 MMOL/L (ref 98–111)
CO2: 25 MMOL/L (ref 22–29)
CREAT SERPL-MCNC: 0.7 MG/DL (ref 0.5–0.9)
EOSINOPHILS ABSOLUTE: 0.2 K/UL (ref 0–0.6)
EOSINOPHILS RELATIVE PERCENT: 3.5 % (ref 0–5)
GFR AFRICAN AMERICAN: >59
GFR NON-AFRICAN AMERICAN: >60
GLUCOSE BLD-MCNC: 73 MG/DL (ref 70–99)
GLUCOSE BLD-MCNC: 88 MG/DL (ref 70–99)
GLUCOSE BLD-MCNC: 94 MG/DL (ref 74–109)
GLUCOSE BLD-MCNC: 97 MG/DL (ref 70–99)
GLUCOSE BLD-MCNC: 99 MG/DL (ref 70–99)
HCT VFR BLD CALC: 40.9 % (ref 37–47)
HEMOGLOBIN: 12.6 G/DL (ref 12–16)
IMMATURE GRANULOCYTES #: 0 K/UL
LYMPHOCYTES ABSOLUTE: 1.4 K/UL (ref 1.1–4.5)
LYMPHOCYTES RELATIVE PERCENT: 27.3 % (ref 20–40)
MCH RBC QN AUTO: 28.3 PG (ref 27–31)
MCHC RBC AUTO-ENTMCNC: 30.8 G/DL (ref 33–37)
MCV RBC AUTO: 91.7 FL (ref 81–99)
MONOCYTES ABSOLUTE: 0.6 K/UL (ref 0–0.9)
MONOCYTES RELATIVE PERCENT: 11.3 % (ref 0–10)
NEUTROPHILS ABSOLUTE: 3 K/UL (ref 1.5–7.5)
NEUTROPHILS RELATIVE PERCENT: 56.7 % (ref 50–65)
PDW BLD-RTO: 12.8 % (ref 11.5–14.5)
PERFORMED ON: NORMAL
PLATELET # BLD: 194 K/UL (ref 130–400)
PMV BLD AUTO: 10.6 FL (ref 9.4–12.3)
POTASSIUM REFLEX MAGNESIUM: 3.8 MMOL/L (ref 3.5–5)
PROCALCITONIN: 0.03 NG/ML (ref 0–0.09)
RBC # BLD: 4.46 M/UL (ref 4.2–5.4)
ROTAVIRUS ANTIGEN: NORMAL
SODIUM BLD-SCNC: 142 MMOL/L (ref 136–145)
WBC # BLD: 5.2 K/UL (ref 4.8–10.8)

## 2021-10-04 PROCEDURE — 87425 ROTAVIRUS AG IA: CPT

## 2021-10-04 PROCEDURE — 2580000003 HC RX 258: Performed by: INTERNAL MEDICINE

## 2021-10-04 PROCEDURE — 6360000002 HC RX W HCPCS: Performed by: INTERNAL MEDICINE

## 2021-10-04 PROCEDURE — 87493 C DIFF AMPLIFIED PROBE: CPT

## 2021-10-04 PROCEDURE — 6360000002 HC RX W HCPCS

## 2021-10-04 PROCEDURE — 87899 AGENT NOS ASSAY W/OPTIC: CPT

## 2021-10-04 PROCEDURE — 85025 COMPLETE CBC W/AUTO DIFF WBC: CPT

## 2021-10-04 PROCEDURE — 87427 SHIGA-LIKE TOXIN AG IA: CPT

## 2021-10-04 PROCEDURE — 99221 1ST HOSP IP/OBS SF/LOW 40: CPT | Performed by: SURGERY

## 2021-10-04 PROCEDURE — 82947 ASSAY GLUCOSE BLOOD QUANT: CPT

## 2021-10-04 PROCEDURE — 82705 FATS/LIPIDS FECES QUAL: CPT

## 2021-10-04 PROCEDURE — 1210000000 HC MED SURG R&B

## 2021-10-04 PROCEDURE — 87449 NOS EACH ORGANISM AG IA: CPT

## 2021-10-04 PROCEDURE — 36415 COLL VENOUS BLD VENIPUNCTURE: CPT

## 2021-10-04 PROCEDURE — 87324 CLOSTRIDIUM AG IA: CPT

## 2021-10-04 PROCEDURE — 87045 FECES CULTURE AEROBIC BACT: CPT

## 2021-10-04 PROCEDURE — 80048 BASIC METABOLIC PNL TOTAL CA: CPT

## 2021-10-04 PROCEDURE — 87015 SPECIMEN INFECT AGNT CONCNTJ: CPT

## 2021-10-04 RX ORDER — OXYCODONE HYDROCHLORIDE AND ACETAMINOPHEN 5; 325 MG/1; MG/1
1 TABLET ORAL EVERY 6 HOURS PRN
Status: DISCONTINUED | OUTPATIENT
Start: 2021-10-04 | End: 2021-10-05 | Stop reason: HOSPADM

## 2021-10-04 RX ORDER — SODIUM CHLORIDE 9 MG/ML
INJECTION, SOLUTION INTRAVENOUS CONTINUOUS
Status: DISCONTINUED | OUTPATIENT
Start: 2021-10-04 | End: 2021-10-05 | Stop reason: HOSPADM

## 2021-10-04 RX ADMIN — KETOROLAC TROMETHAMINE 30 MG: 30 INJECTION, SOLUTION INTRAMUSCULAR; INTRAVENOUS at 08:55

## 2021-10-04 RX ADMIN — KETOROLAC TROMETHAMINE 30 MG: 30 INJECTION, SOLUTION INTRAMUSCULAR; INTRAVENOUS at 15:03

## 2021-10-04 RX ADMIN — ENOXAPARIN SODIUM 30 MG: 30 INJECTION SUBCUTANEOUS at 20:28

## 2021-10-04 RX ADMIN — SODIUM CHLORIDE, PRESERVATIVE FREE 10 ML: 5 INJECTION INTRAVENOUS at 09:00

## 2021-10-04 RX ADMIN — ENOXAPARIN SODIUM 30 MG: 30 INJECTION SUBCUTANEOUS at 08:58

## 2021-10-04 RX ADMIN — ONDANSETRON 4 MG: 2 INJECTION INTRAMUSCULAR; INTRAVENOUS at 08:51

## 2021-10-04 RX ADMIN — SODIUM CHLORIDE: 9 INJECTION, SOLUTION INTRAVENOUS at 18:46

## 2021-10-04 RX ADMIN — SODIUM CHLORIDE, PRESERVATIVE FREE 10 ML: 5 INJECTION INTRAVENOUS at 20:28

## 2021-10-04 ASSESSMENT — ENCOUNTER SYMPTOMS
BACK PAIN: 0
SORE THROAT: 0
WHEEZING: 0
DIARRHEA: 1
ABDOMINAL DISTENTION: 1
CHOKING: 0
CONSTIPATION: 0
RECTAL PAIN: 1
EYE DISCHARGE: 0
EYE REDNESS: 0
EYE PAIN: 0
FACIAL SWELLING: 0
ABDOMINAL PAIN: 1
NAUSEA: 1
SHORTNESS OF BREATH: 0
COUGH: 1
VOMITING: 0
CHEST TIGHTNESS: 0

## 2021-10-04 ASSESSMENT — PAIN SCALES - GENERAL
PAINLEVEL_OUTOF10: 5
PAINLEVEL_OUTOF10: 0
PAINLEVEL_OUTOF10: 5

## 2021-10-04 NOTE — PROGRESS NOTES
[facility] Hospitalists Progress Note    Patient:  Akilah Jefferson  YOB: 1975  Date of Service: 10/4/2021  MRN: [id number]   Acct: [de-identified]   Primary Care Physician: CLINTON De La Paz  Advance Directive: Full Code  Admit Date: 10/3/2021       Hospital Day: 1      CHIEF COMPLAINT:     Chief Complaint   Patient presents with    Positive For Covid-19    Nausea & Vomiting       10/4/2021 4:47 PM  Subjective / Interval History:   10/04/2021  Patient seen this AM.  Diana Keto comfortably in bed in no apparent acute distress. Continues to report intermittent moderate to severe diffuse abdominal pain, with associated nausea, vomiting as well as diarrhea. Denies any other acute complaints or distress. No chest pain, shortness of breath, no dizziness, lightheadedness or palpitation. Review of Systems:   Review of Systems  ROS: 14 point review of systems is negative except as specifically addressed above. ADULT DIET;  Full Liquid    Intake/Output Summary (Last 24 hours) at 10/4/2021 1647  Last data filed at 10/4/2021 1427  Gross per 24 hour   Intake 1000 ml   Output --   Net 1000 ml       Medications:   sodium chloride      dextrose       Current Facility-Administered Medications   Medication Dose Route Frequency Provider Last Rate Last Admin    sodium chloride flush 0.9 % injection 10 mL  10 mL IntraVENous 2 times per day Yoselyn Reveles MD   10 mL at 10/04/21 0900    sodium chloride flush 0.9 % injection 10 mL  10 mL IntraVENous PRN Yoselyn Reveles MD        0.9 % sodium chloride infusion  25 mL IntraVENous PRN Yoselyn Reveles MD        potassium chloride (KLOR-CON M) extended release tablet 40 mEq  40 mEq Oral PRN Yoselyn Reveles MD        Or    potassium bicarb-citric acid (EFFER-K) effervescent tablet 40 mEq  40 mEq Oral PRN Yoselyn Reveles MD        Or    potassium chloride 10 mEq/100 mL IVPB (Peripheral Line)  10 mEq IntraVENous PRN MD Karyna Fernandez ondansetron (ZOFRAN-ODT) disintegrating tablet 4 mg  4 mg Oral Q8H PRN Xochilt Woo MD        Or    ondansetron [facility]) injection 4 mg  4 mg IntraVENous Q6H PRN Xochilt Woo MD   4 mg at 10/04/21 0851    magnesium hydroxide (MILK OF MAGNESIA) 400 MG/5ML suspension 30 mL  30 mL Oral Daily PRN Xochilt Woo MD        acetaminophen (TYLENOL) tablet 650 mg  650 mg Oral Q6H PRN Xochilt Woo MD        Or   [facility] acetaminophen (TYLENOL) suppository 650 mg  650 mg Rectal Q6H PRN Xochilt Woo MD        enoxaparin (LOVENOX) injection 30 mg  30 mg SubCUTAneous BID Xochilt Woo MD   30 mg at 10/04/21 0858    ketorolac (TORADOL) injection 30 mg  30 mg IntraVENous Q6H PRN Romainebutch Laura, APRN - CNP   30 mg at 10/04/21 1503    insulin lispro (HUMALOG) injection vial 0-6 Units  0-6 Units SubCUTAneous TID WC Romaine Valentín, APRN - CNP        insulin lispro (HUMALOG) injection vial 0-3 Units  0-3 Units SubCUTAneous Nightly Romaine Valentín, APRN - CNP        glucose (GLUTOSE) 40 % oral gel 15 g  15 g Oral PRN Romaine Majors, APRN - CNP        dextrose 50 % IV solution  12.5 g IntraVENous PRN Romaine Majors, APRN - CNP        glucagon (rDNA) injection 1 mg  1 mg IntraMUSCular PRN Romaine Mikeys, APRN - CNP        dextrose 5 % solution  100 mL/hr IntraVENous PRN Romaine Majors, APRN - CNP             sodium chloride      dextrose        sodium chloride flush  10 mL IntraVENous 2 times per day    enoxaparin  30 mg SubCUTAneous BID    insulin lispro  0-6 Units SubCUTAneous TID WC    insulin lispro  0-3 Units SubCUTAneous Nightly     sodium chloride flush, sodium chloride, potassium chloride **OR** potassium alternative oral replacement **OR** potassium chloride, ondansetron **OR** ondansetron, magnesium hydroxide, acetaminophen **OR** acetaminophen, ketorolac, glucose, dextrose, glucagon (rDNA), dextrose  ADULT DIET;  Full Liquid       Labs:   CBC with DIFF:  Recent Labs 10/03/21  1530 10/04/21  0406   WBC 6.8 5.2   RBC 5.27 4.46   HGB 15.2 12.6   HCT 46.6 40.9   MCV 88.4 91.7   MCH 28.8 28.3   MCHC 32.6* 30.8*   RDW 12.7 12.8    194   MPV 10.9 10.6   NEUTOPHILPCT 74.7* 56.7   LYMPHOPCT 14.5* 27.3   MONOPCT 7.8 11.3*   EOSRELPCT 2.3 3.5   BASOPCT 0.6 0.6   NEUTROABS 5.1 3.0   LYMPHSABS 1.0* 1.4   MONOSABS 0.50 0.60   EOSABS 0.20 0.20   BASOSABS 0.00 0.00       CMP/BMP:  Recent Labs     10/03/21  1530 10/04/21  0406    142   K 4.6 3.8    107   CO2 26 25   ANIONGAP 10 10   GLUCOSE 124* 94   BUN 11 12   CREATININE 0.6 0.7   LABGLOM >60 >60   CALCIUM 9.9 8.7   PROT 7.1  --    LABALBU 4.3  --    BILITOT 0.6  --    ALKPHOS 77  --    ALT 23  --    AST 26  --          CRP:    Recent Labs     10/03/21  1530   CRP 0.86*     Sed Rate:  No results for input(s): SEDRATE in the last 72 hours. HgBA1c:  No components found for: HGBA1C  FLP:    Lab Results   Component Value Date    TRIG 1,013 04/23/2020    HDL 38 04/23/2020    LDLCALC 65 05/17/2016     TSH:    Lab Results   Component Value Date    TSH 3.286 04/23/2020     Troponin T: No results for input(s): TROPONINI in the last 72 hours. Pro-BNP: No results for input(s): BNP in the last 72 hours. INR: No results for input(s): INR in the last 72 hours. ABGs: No results found for: PHART, PO2ART, TVF6DHA  UA:No results for input(s): NITRITE, COLORU, PHUR, LABCAST, WBCUA, RBCUA, MUCUS, TRICHOMONAS, YEAST, BACTERIA, CLARITYU, SPECGRAV, LEUKOCYTESUR, UROBILINOGEN, BILIRUBINUR, BLOODU, GLUCOSEU, AMORPHOUS in the last 72 hours. Invalid input(s): Pinkie Mcburney      Culture Results:    No results for input(s): CXSURG in the last 720 hours. Blood Culture Recent: No results for input(s): BC in the last 720 hours. No results for input(s): BC, BLOODCULT2, ORG in the last 72 hours. Cultures:   No results for input(s): CULTURE in the last 72 hours. No results for input(s): BC, Marcos Alex in the last 72 hours.   No results for input(s): CXSURG in the last 72 hours. No results for input(s): MG, PHOS in the last 72 hours. Recent Labs     10/03/21  1530   AST 26   ALT 23   BILITOT 0.6   ALKPHOS 77         RAD:   XR ACUTE ABD SERIES CHEST 1 VW    Result Date: 10/3/2021  EXAM: XR ACUTE ABD SERIES CHEST 1 VW - 10/3/2021 4:13 PM INDICATION: Abdominal pain COMPARISON: 6/12/2012 FINDINGS: Cardiac silhouette is normal in size. No pleural effusion or pneumothorax. No focal airspace opacity. No acute osseous finding in the chest. Nonobstructive bowel gas pattern. No evidence of large retained stool burden. Scattered surgical clips are noted. No suspicious calcifications no acute osseous finding. 1.  No acute findings in the chest. 2.  Nonobstructive bowel gas pattern. Signed by Dr Doris Cortés Additional Contrast? None    Result Date: 10/3/2021  EXAMINATION: CT ABDOMEN PELVIS W IV CONTRAST 10/3/2021 6:14 PM HISTORY: CT ABDOMEN PELVIS W IV CONTRAST 10/3/2021 4:46 PM HISTORY: COMPARISON: October 14, 2016. DLP: 1078 mGy cm TECHNIQUE: Following the intravenous administration of contrast, helical CT tomographic images of the abdomen and pelvis were acquired. Coronal reformatted images were also provided for review. FINDINGS: The lung bases and base of the heart are unremarkable. LIVER: No focal liver lesion. The hepatic vasculature is patent. BILIARY SYSTEM: Surgical clips are present from previous cholecystectomy. Mary Crumble PANCREAS: No focal pancreatic lesion. SPLEEN: Unremarkable. KIDNEYS AND ADRENALS: Bilateral kidneys and adrenal glands are unremarkable. The ureters are decompressed and normal in appearance. RETROPERITONEUM: No mass, lymphadenopathy or hemorrhage. GI TRACT: Small bowel is dilated and fluid-filled. Diverticula are noted throughout the colon. Postsurgical changes noted in the ascending colon. Free fluid is present in the right paracolic gutter. Free fluid is present in the left paracolic gutter. There is some fluid noted in the pelvis. . The small bowel is fluid-filled and dilated. Sutures are also noted in the small bowel in the midabdomen. This may be partial small bowel obstruction or possibly enteritis with the fluid in the paracolic gutters and pelvis. .. OTHER: There is no mesenteric mass. . The abdominopelvic vasculature is patent. The osseous structures and soft tissues demonstrate no worrisome lesions. PELVIS: The uterus is visualized. . The urinary bladder is normal in appearance. 1. Dilated loops of small bowel fluid filled. This may be either enteritis or partial small bowel obstruction. 2. Free fluid is present in the paracolic gutters and pelvis. 3. Diverticulosis. Signed by Dr Nicole Rivero      Objective:   Vitals:   /64   Pulse 98   Temp 97 °F (36.1 °C) (Temporal)   Resp 18   Wt 160 lb (72.6 kg)   SpO2 98%   BMI 27.46 kg/m²       Patient Vitals for the past 24 hrs:   BP Temp Temp src Pulse Resp SpO2 Weight   10/04/21 1427 129/64 97 °F (36.1 °C) Temporal 98 18 98 % --   10/04/21 0733 (!) 110/55 96.5 °F (35.8 °C) Temporal 90 16 96 % --   10/03/21 2015 109/67 98.8 °F (37.1 °C) -- 85 18 94 % 160 lb (72.6 kg)   10/03/21 1901 (!) 124/58 -- -- 67 18 93 % --       24HR INTAKE/OUTPUT:      Intake/Output Summary (Last 24 hours) at 10/4/2021 1647  Last data filed at 10/4/2021 1427  Gross per 24 hour   Intake 1000 ml   Output --   Net 1000 ml       Physical Exam  Vitals and nursing note reviewed. Constitutional:       General: She is not in acute distress. Appearance: Normal appearance. She is not ill-appearing, toxic-appearing or diaphoretic. HENT:      Head: Normocephalic and atraumatic. Right Ear: External ear normal.      Left Ear: External ear normal.      Nose: Nose normal. No congestion or rhinorrhea. Mouth/Throat:      Pharynx: No oropharyngeal exudate or posterior oropharyngeal erythema. Eyes:      General: No scleral icterus. Right eye: No discharge. Left eye: No discharge. Extraocular Movements: Extraocular movements intact. Conjunctiva/sclera: Conjunctivae normal.   Cardiovascular:      Rate and Rhythm: Normal rate and regular rhythm. Pulses: Normal pulses. Heart sounds: Normal heart sounds. No murmur heard. No friction rub. No gallop. Pulmonary:      Effort: Pulmonary effort is normal. No respiratory distress. Abdominal:      Tenderness: There is no guarding or rebound. Musculoskeletal:         General: No swelling, tenderness, deformity or signs of injury. Normal range of motion. Cervical back: Normal range of motion and neck supple. No rigidity. No muscular tenderness. Right lower leg: No edema. Left lower leg: No edema. Skin:     General: Skin is warm and dry. Capillary Refill: Capillary refill takes less than 2 seconds. Coloration: Skin is not jaundiced or pale. Findings: No bruising, erythema, lesion or rash. Neurological:      Mental Status: She is alert and oriented to person, place, and time. Cranial Nerves: No cranial nerve deficit. Sensory: No sensory deficit. Motor: No weakness. Coordination: Coordination normal.   Psychiatric:         Mood and Affect: Mood normal.         Behavior: Behavior normal.         Thought Content:  Thought content normal.         Judgment: Judgment normal.         Assessment/plan:     Patient Active Problem List    Diagnosis Date Noted    Nausea and vomiting 10/03/2021    Partial small bowel obstruction ([facility] 10/03/2021    COVID-19 09/28/2021    Diabetes mellitus ([facility] 01/12/2021    Moderate episode of recurrent major depressive disorder ([facility] 05/25/2018    Hypertriglyceridemia 08/18/2016    Vitamin D deficiency 08/18/2016    Multiple sclerosis (HCC)     Chronic headache disorder 06/25/2014    Insomnia 06/25/2014    Headache        Hospital Problems         Last Modified POA    Nausea and vomiting 10/3/2021 Yes    Partial small bowel obstruction ([facility] 10/3/2021 Yes          Active Problems:    Nausea and vomiting    Partial small bowel obstruction (HCC)  Resolved Problems:    * No resolved hospital problems. *        Brief Summary  As per initial admission HPI, quoted below;  Ms. Britney Cat, \"55 y.o. female who presented to [facility] ER with PMH bowel resections, MS, DM, COVID+ 9/27, received monoclonal antibodies complaining of worsening abdominal pain and nausea and vomiting. Patient states that she tested positive for COVID on 9/27/2021, has been having nausea,vomiting,diarrhea, fever, and cough during that time. On 10/2 patient states that she began to have intermittent abdominal cramps, along with worsening nausea, and vomiting. Today, patient awakens to severe abdominal pain, persistent vomiting and decides to come into ER. Patient was vaccinated for [employer]. Patient admitted to hospitalist service for partial small bowel obstruction and consultation to general surgery\"      Nausea and vomiting  Partial small bowel obstruction  vs Viral Enteritis  · CT abdomen pelvis W IV Con (10/03/2021): Impression: Dilated loops of small bowel fluid filled. This may be either enteritis or partial small bowel obstruction. Free fluid is present in the paracolic gutters and pelvis. Diverticulosis  · Patient afebrile no leukocytosis  · Lipase renal ultrasound. · General surgery consulted on admission  · Suspect symptoms likely related to gastroenteritis, from Covid and less likely bowel obstruction. · Clear --> full liquid diets, advance as tolerated  · Continue symptomatic and supportive management, including IV fluids, and pain management as needed. · No indication for antibiotic at this time.       COVID 19 Infection-diagnosed 09/27/2021   No respiratory symptoms   Reportedly with nausea vomiting and diarrhea   Status post monoclonal antibody treatment outpatient   Mildly elevated CRP: 0.86 (10/03/2021)   Procalcitonin within lab reference range: 0.03 (10/03/2021)   Blood cultures x2 sets ordered on admission   Adjunct therapy with;  Melatonin, Vitamin C, D, Zinc,    Addendum  Diarrhea  · Stool for C. difficile antigen/toxin  · Stool culture  · Stool ova and parasite  · Stool for rotavirus antigen  · IV fluids  · Continue symptomatic and supportive management. Continue management of other chronic medical conditions - see above and orders. Advance Directive: Full Code    ADULT DIET; Full Liquid         Consults Made:   IP CONSULT TO GENERAL SURGERY  IP CONSULT TO SOCIAL WORK  PALLIATIVE CARE EVAL    DVT prophylaxis: Enoxaparin      Discharge planning: tbd    Time Spent is 35 mins in the examination, evaluation, counseling and review of medications, assessment and plan.      Electronically signed by   Miller Cisneros MD, MPH, MD,   Internal Medicine Hospitalist   10/4/2021 4:47 PM

## 2021-10-04 NOTE — CARE COORDINATION
Initial CM Assessment    Initial Assessment Completed at bedside with:    [x]   Patient  []   Family/Caregiver/Guardian   []   Other:      Patient Contact Information:  [id number] [facility]  [phone] (home)   Above information verified? [x]   Yes  []   No    ADLS:     Support System:   Pt lives at home with her spouse and their son. She is independent at baseline. Plan to return to current housing:   [x]   Yes  []   No     D/C Plan if not returning home:     Transportation plan for Discharge:  Spouse to transport     Do you have any unmet social needs that would keep you from returning home safely:  []   Yes  [x]   No              Unmet Social Needs Notes:       Had [address] [employer] [facility] prior to admission:    []   Yes  [x]   No    Oxygen Company:       Has a pulse oximetry unit at home:   [x]   Yes  []   No       Currently ACTIVE with [address]:    []   Yes  [x]   No  []   Interested at discharge  [address] [facility]:      Current PCP:  CLINTON Benitez  PCP verified? [x]   Yes  []   No    Have you been vaccinated for COVID-19 (SARS-CoV-2)? [x]   Yes  []   No                   If so, when? February 2021  Which :  []   [employer]  [x]   [employer]  []   [employer]  []   Other:       Pharmacy:    [address] , [address] [place] [address] [phone] [facility] [phone]  [address] [address]  Phone: [phone] Fax: [phone]    [address] [facility] [address], [address] [phone] [facility] [phone]  [address] [facility]  Phone: [phone] Fax: [phone]    [facility] [address] [facility], [address] [address] [phone] - F [phone]  [address] [address]  Phone: [phone] Fax: [phone]    Prefer to use Meds to Bed? []   Yes  [x]   No  Potential assistance purchasing medications?      []   Yes  [x]   No    Active with HD/PD prior to admission:           []   Yes  [x] No  HD Center:       Financial:  Payor: Deb Lyno / Plan: Lucila Albarado / Product Type: *No Product type* /     Pre-Cert required for SNF:   [x]   Yes  []   No    Patient Deficits:  []   Yes   [x]   No    If yes:  []   Confusion/Memory  []   Visual  []   Motor/Sensory         []   Right arm         []   Right leg         []   Left arm         []   Left leg  []   Language/Speech         []   Aphasia         []   Dysarthria         []   Swallow         [place] Coma Scale  Eye Opening: Spontaneous  Best Verbal Response: Oriented  Best Motor Response: Obeys commands  [place] Coma Scale Score: 15    Patient Deficit Notes: Additional CM/SW Notes:   Eliquis coupon provided in chart, pt denies any further needs at this time. Abida Odom and/or her family were provided with choice of provider:  [x]   Yes   []   No      []   Stroke education booklet reviewed and given to patient/family/caregiver/guardian. All questions answered all questions answered appropriately and efficiently per family.     Patient Admission Status:   Inpatient [101]    [address]  Care Management  Electronically signed by Israel Rosenberg on 10/4/2021 at 10:09 AM

## 2021-10-04 NOTE — CONSULTS
Nutrition Assessment    Type and Reason for Visit: Initial(Eval and Treat)    Nutrition Recommendations: Continue current diet. Change Ensure Enlive to Ensure High Protein. Nutrition Assessment: Status post ORIF right hip on 4-23. Pt states he overall is eating well although did not eat much lunch due to bowel meds taken. Likes Ensure; willing to switch to Ensure High Protein which has a lower carbohydrate content. Malnutrition Assessment:  · Malnutrition Status: Insufficient data  · Context: Acute illness or injury  · Findings of the 6 clinical characteristics of malnutrition (Minimum of 2 out of 6 clinical characteristics is required to make the diagnosis of moderate or severe Protein Calorie Malnutrition based on AND/ASPEN Guidelines):  1. Energy Intake-Greater than 75% of estimated energy requirement, Greater than or equal to 7 days    2. Weight Loss-Unable to assess,    3. Fat Loss-Unable to assess,    4. Muscle Loss-Unable to assess,    5. Fluid Accumulation-Mild fluid accumulation(related to surgery), Extremities  6.  Strength-Not measured    Nutrition Risk Level:      Nutrient Needs:  · Estimated Daily Total Kcal: 0762-8088 based on 22-24 kcal per kg admission wt  · Estimated Daily Protein (g):  based on 1.2-1.4 gm per kg Ideal Body Wt     Nutrition Diagnosis:   · Problem: Increased nutrient needs  · Etiology: related to (healing)     Signs and symptoms:  as evidenced by Presence of wounds    Objective Information:  · Nutrition-Focused Physical Findings: Edema: +2 RLE. Constipation.    · Wound Type: Surgical Wound  · Current Nutrition Therapies:  · Oral Diet Orders: Carb Control 4 Carbs/Meal   · Oral Diet intake: %(Usually)  · Oral Nutrition Supplement (ONS) Orders: Standard High Calorie Oral Supplement  · ONS intake: %  · Anthropometric Measures:  · Ht: 6' (182.9 cm)   · Current Body Wt: 190 lb 0.6 oz (86.2 kg)  · Admission Body Wt: 190 lb 0.6 oz (86.2 kg)  · Ideal Body Wt: bicarb-citric acid (EFFER-K) effervescent tablet 40 mEq  40 mEq Oral PRN Miriam Cesar MD        Or    potassium chloride 10 mEq/100 mL IVPB (Peripheral Line)  10 mEq IntraVENous PRN Miriam Cesar MD        ondansetron (ZOFRAN-ODT) disintegrating tablet 4 mg  4 mg Oral Q8H PRN Miriam Cesar MD        Or    ondansetron [facility]) injection 4 mg  4 mg IntraVENous Q6H PRN Miriam Cesar MD   4 mg at 10/04/21 0851    magnesium hydroxide (MILK OF MAGNESIA) 400 MG/5ML suspension 30 mL  30 mL Oral Daily PRN Miriam Cesar MD        acetaminophen (TYLENOL) tablet 650 mg  650 mg Oral Q6H PRN Miriam Cesar MD        Or    acetaminophen (TYLENOL) suppository 650 mg  650 mg Rectal Q6H PRN Miriam Cesar MD        enoxaparin (LOVENOX) injection 30 mg  30 mg SubCUTAneous BID Miriam Cesar MD   30 mg at 10/04/21 0858    ketorolac (TORADOL) injection 30 mg  30 mg IntraVENous Q6H PRN Sunday Lass, APRN - CNP   30 mg at 10/04/21 0855    insulin lispro (HUMALOG) injection vial 0-6 Units  0-6 Units SubCUTAneous TID WC Sunday Lass, APRN - CNP        insulin lispro (HUMALOG) injection vial 0-3 Units  0-3 Units SubCUTAneous Nightly Sunday Lass, APRN - CNP        glucose (GLUTOSE) 40 % oral gel 15 g  15 g Oral PRN Sunday Lass, APRN - CNP        dextrose 50 % IV solution  12.5 g IntraVENous PRN Sunday Lass, APRN - CNP        glucagon (rDNA) injection 1 mg  1 mg IntraMUSCular PRN Sunday Lass, APRN - CNP        dextrose 5 % solution  100 mL/hr IntraVENous PRN Sunday Lass, APRN - CNP         Allergies: Ampicillin, Biaxin [clarithromycin], Flagyl [metronidazole], Saxenda [liraglutide -weight management], Albuterol, and Doxycycline    Family History   Problem Relation Age of Onset    Heart Attack Father     No Known Problems Sister     Other Brother        Social History     Tobacco Use    Smoking status: Never Smoker    Smokeless tobacco: Never Used   Substance Use Topics    Alcohol use: No     Alcohol/week: 0.0 standard drinks       Review of Systems   Constitutional: Positive for activity change, appetite change and fever. Negative for chills and fatigue. HENT: Negative for facial swelling, hearing loss and sore throat. Eyes: Negative for pain, discharge and redness. Respiratory: Positive for cough. Negative for choking, chest tightness, shortness of breath and wheezing. Cardiovascular: Negative for chest pain and palpitations. Gastrointestinal: Positive for abdominal distention, abdominal pain, diarrhea, nausea and rectal pain. Negative for constipation and vomiting. Musculoskeletal: Negative for back pain, neck pain and neck stiffness. Neurological: Negative for dizziness, speech difficulty, weakness and numbness. Psychiatric/Behavioral: Negative for agitation, hallucinations and suicidal ideas. Objective:   BP (!) 110/55   Pulse 90   Temp 96.5 °F (35.8 °C) (Temporal)   Resp 16   Wt 160 lb (72.6 kg)   SpO2 96%   BMI 27.46 kg/m²       Intake/Output Summary (Last 24 hours) at 10/4/2021 1037  Last data filed at 10/4/2021 0913  Gross per 24 hour   Intake 1000 ml   Output --   Net 1000 ml     Physical Exam    Data:  CBC:   Recent Labs     10/03/21  1530 10/04/21  0406   WBC 6.8 5.2   RBC 5.27 4.46   HGB 15.2 12.6   HCT 46.6 40.9   MCV 88.4 91.7   RDW 12.7 12.8    194     BMP:   Recent Labs     10/03/21  1530 10/04/21  0406    142   K 4.6 3.8    107   CO2 26 25   ANIONGAP 10 10   GLUCOSE 124* 94   CREATININE 0.6 0.7   LABGLOM >60 >60   CALCIUM 9.9 8.7     LFT:   Recent Labs     10/03/21  1530   PROT 7.1   LABALBU 4.3   BILITOT 0.6   ALKPHOS 77   ALT 23   AST 26     PT/INR:No results for input(s): PROTIME, INR in the last 72 hours. Assessment:     Active Problems:    Nausea and vomiting    Partial small bowel obstruction (HCC)  Resolved Problems:    * No resolved hospital problems.  *    70-year-old female with nausea, vomiting and diarrhea  COVID-19    Plan:     CT scan reviewed  With patient's clinical findings of diarrhea and Covid it appears more consistent with enteritis than a small bowel obstruction  No signs obstruction at previous anastomotic sites  Gastroenteritis possibly secondary to her Covid diagnosis  Antiemetics  Diet as tolerated  No acute surgical intervention  If further concern recommend GI evaluation  Surgery to follow peripherally    Thank you for your consult    Electronically signed by Mahnaz Moss DO on 10/4/2021 at 10:37 AM

## 2021-10-05 ENCOUNTER — TELEPHONE (OUTPATIENT)
Dept: PRIMARY CARE CLINIC | Age: 46
End: 2021-10-05

## 2021-10-05 VITALS
TEMPERATURE: 98.2 F | HEART RATE: 89 BPM | RESPIRATION RATE: 18 BRPM | WEIGHT: 160 LBS | DIASTOLIC BLOOD PRESSURE: 67 MMHG | OXYGEN SATURATION: 96 % | SYSTOLIC BLOOD PRESSURE: 137 MMHG | BODY MASS INDEX: 27.46 KG/M2

## 2021-10-05 LAB
ANION GAP SERPL CALCULATED.3IONS-SCNC: 11 MMOL/L (ref 7–19)
BASOPHILS ABSOLUTE: 0 K/UL (ref 0–0.2)
BASOPHILS RELATIVE PERCENT: 0.5 % (ref 0–1)
BUN BLDV-MCNC: 13 MG/DL (ref 6–20)
C. DIFFICILE TOXIN MOLECULAR: ABNORMAL
CALCIUM SERPL-MCNC: 8.3 MG/DL (ref 8.6–10)
CHLORIDE BLD-SCNC: 108 MMOL/L (ref 98–111)
CO2: 23 MMOL/L (ref 22–29)
CREAT SERPL-MCNC: 0.5 MG/DL (ref 0.5–0.9)
EOSINOPHILS ABSOLUTE: 0.1 K/UL (ref 0–0.6)
EOSINOPHILS RELATIVE PERCENT: 3.2 % (ref 0–5)
GFR AFRICAN AMERICAN: >59
GFR NON-AFRICAN AMERICAN: >60
GLUCOSE BLD-MCNC: 111 MG/DL (ref 70–99)
GLUCOSE BLD-MCNC: 89 MG/DL (ref 70–99)
GLUCOSE BLD-MCNC: 91 MG/DL (ref 70–99)
GLUCOSE BLD-MCNC: 94 MG/DL (ref 74–109)
HCT VFR BLD CALC: 37.9 % (ref 37–47)
HEMOGLOBIN: 12.1 G/DL (ref 12–16)
IMMATURE GRANULOCYTES #: 0 K/UL
LYMPHOCYTES ABSOLUTE: 1.1 K/UL (ref 1.1–4.5)
LYMPHOCYTES RELATIVE PERCENT: 25.2 % (ref 20–40)
MAGNESIUM: 1.8 MG/DL (ref 1.6–2.6)
MCH RBC QN AUTO: 28.7 PG (ref 27–31)
MCHC RBC AUTO-ENTMCNC: 31.9 G/DL (ref 33–37)
MCV RBC AUTO: 89.8 FL (ref 81–99)
MONOCYTES ABSOLUTE: 0.6 K/UL (ref 0–0.9)
MONOCYTES RELATIVE PERCENT: 13.7 % (ref 0–10)
NEUTROPHILS ABSOLUTE: 2.5 K/UL (ref 1.5–7.5)
NEUTROPHILS RELATIVE PERCENT: 56.9 % (ref 50–65)
ORGANISM: ABNORMAL
PDW BLD-RTO: 12.7 % (ref 11.5–14.5)
PERFORMED ON: ABNORMAL
PERFORMED ON: NORMAL
PERFORMED ON: NORMAL
PLATELET # BLD: 198 K/UL (ref 130–400)
PMV BLD AUTO: 10.6 FL (ref 9.4–12.3)
POTASSIUM REFLEX MAGNESIUM: 3.5 MMOL/L (ref 3.5–5)
RBC # BLD: 4.22 M/UL (ref 4.2–5.4)
SODIUM BLD-SCNC: 142 MMOL/L (ref 136–145)
WBC # BLD: 4.4 K/UL (ref 4.8–10.8)

## 2021-10-05 PROCEDURE — 83735 ASSAY OF MAGNESIUM: CPT

## 2021-10-05 PROCEDURE — 6370000000 HC RX 637 (ALT 250 FOR IP): Performed by: INTERNAL MEDICINE

## 2021-10-05 PROCEDURE — 82947 ASSAY GLUCOSE BLOOD QUANT: CPT

## 2021-10-05 PROCEDURE — 2580000003 HC RX 258: Performed by: INTERNAL MEDICINE

## 2021-10-05 PROCEDURE — 6360000002 HC RX W HCPCS: Performed by: INTERNAL MEDICINE

## 2021-10-05 PROCEDURE — 80048 BASIC METABOLIC PNL TOTAL CA: CPT

## 2021-10-05 PROCEDURE — 85025 COMPLETE CBC W/AUTO DIFF WBC: CPT

## 2021-10-05 PROCEDURE — 36415 COLL VENOUS BLD VENIPUNCTURE: CPT

## 2021-10-05 RX ADMIN — ENOXAPARIN SODIUM 30 MG: 30 INJECTION SUBCUTANEOUS at 08:05

## 2021-10-05 RX ADMIN — ONDANSETRON 4 MG: 4 TABLET, ORALLY DISINTEGRATING ORAL at 14:21

## 2021-10-05 RX ADMIN — SODIUM CHLORIDE: 9 INJECTION, SOLUTION INTRAVENOUS at 08:05

## 2021-10-05 RX ADMIN — POTASSIUM CHLORIDE 40 MEQ: 1500 TABLET, EXTENDED RELEASE ORAL at 03:41

## 2021-10-05 RX ADMIN — SODIUM CHLORIDE, PRESERVATIVE FREE 10 ML: 5 INJECTION INTRAVENOUS at 08:05

## 2021-10-05 RX ADMIN — ONDANSETRON 4 MG: 2 INJECTION INTRAMUSCULAR; INTRAVENOUS at 03:35

## 2021-10-05 NOTE — PROGRESS NOTES
Patient tolerated breakfast and lunch, patient stated \"my right side has been hurting since yesterday\".  Bowel sounds present x4, will continue to monitor   Electronically signed by Porsche Venegas RN on 10/5/2021 at 2:32 PM

## 2021-10-05 NOTE — DISCHARGE SUMMARY
[place], [place], [address]  DEPARTMENT OF HOSPITALIST MEDICINE    DISCHARGE SUMMARY:        Council Kehr  :  1975  MRN:  [id number]    Admit date:  10/3/2021  Discharge date: 10/5/2021      Admitting Physician:  Miriam Cesar MD    Advance Directive: Full Code    Consults Made:   IP CONSULT TO GENERAL SURGERY  IP CONSULT TO SOCIAL WORK  PALLIATIVE CARE EVAL      Primary Care Physician:  Newton Estrada, CLINTON    Discharge Diagnoses: Active Problems:    Nausea and vomiting    Partial small bowel obstruction (HCC)  Resolved Problems:    * No resolved hospital problems. *      Pertinent Labs:  CBC with DIFF:  Recent Labs     10/03/21  1530 10/04/21  0406 10/05/21  0245   WBC 6.8 5.2 4.4*   RBC 5.27 4.46 4.22   HGB 15.2 12.6 12.1   HCT 46.6 40.9 37.9   MCV 88.4 91.7 89.8   MCH 28.8 28.3 28.7   MCHC 32.6* 30.8* 31.9*   RDW 12.7 12.8 12.7    194 198   MPV 10.9 10.6 10.6   NEUTOPHILPCT 74.7* 56.7 56.9   LYMPHOPCT 14.5* 27.3 25.2   MONOPCT 7.8 11.3* 13.7*   EOSRELPCT 2.3 3.5 3.2   BASOPCT 0.6 0.6 0.5   NEUTROABS 5.1 3.0 2.5   LYMPHSABS 1.0* 1.4 1.1   MONOSABS 0.50 0.60 0.60   EOSABS 0.20 0.20 0.10   BASOSABS 0.00 0.00 0.00       CMP/BMP:  Recent Labs     10/03/21  1530 10/04/21  0406 10/05/21  0245    142 142   K 4.6 3.8 3.5    107 108   CO2 26 25 23   ANIONGAP 10 10 11   GLUCOSE 124* 94 94   BUN 11 12 13   CREATININE 0.6 0.7 0.5   LABGLOM >60 >60 >60   CALCIUM 9.9 8.7 8.3*   PROT 7.1  --   --    LABALBU 4.3  --   --    BILITOT 0.6  --   --    ALKPHOS 77  --   --    ALT 23  --   --    AST 26  --   --          CRP:    Recent Labs     10/03/21  1530   CRP 0.86*     Sed Rate:  No results for input(s): SEDRATE in the last 72 hours.       HgBA1c:  No components found for: HGBA1C  FLP:    Lab Results   Component Value Date    TRIG 1,013 2020    HDL 38 2020    LDLCALC 65 2016     TSH:    Lab Results   Component Value Date    TSH 3.286 2020     Troponin T: No results for input(s): TROPONINI in the last 72 hours. Pro-BNP: No results for input(s): BNP in the last 72 hours. INR: No results for input(s): INR in the last 72 hours. ABGs: No results found for: PHART, PO2ART, PQR4XLI  UA:No results for input(s): NITRITE, COLORU, PHUR, LABCAST, WBCUA, RBCUA, MUCUS, TRICHOMONAS, YEAST, BACTERIA, CLARITYU, SPECGRAV, LEUKOCYTESUR, UROBILINOGEN, BILIRUBINUR, BLOODU, GLUCOSEU, AMORPHOUS in the last 72 hours. Invalid input(s): Kell Araujo      Culture Results:    No results for input(s): CXSURG in the last 720 hours. Blood Culture Recent:   Recent Labs     10/03/21  1534   BC No Growth to date. Any change in status will be called. Cultures:   No results for input(s): CULTURE in the last 72 hours. Recent Labs     10/03/21  1530 10/03/21  1534   BC  --  No Growth to date. Any change in status will be called. BLOODCULT2 No Growth to date. Any change in status will be called. --      No results for input(s): CXSURG in the last 72 hours. Recent Labs     10/05/21  0245   MG 1.8     Recent Labs     10/03/21  1530   AST 26   ALT 23   BILITOT 0.6   ALKPHOS 77           Significant Diagnostic Studies:   XR ACUTE ABD SERIES CHEST 1 VW    Result Date: 10/3/2021  EXAM: XR ACUTE ABD SERIES CHEST 1 VW - 10/3/2021 4:13 PM INDICATION: Abdominal pain COMPARISON: 6/12/2012 FINDINGS: Cardiac silhouette is normal in size. No pleural effusion or pneumothorax. No focal airspace opacity. No acute osseous finding in the chest. Nonobstructive bowel gas pattern. No evidence of large retained stool burden. Scattered surgical clips are noted. No suspicious calcifications no acute osseous finding. 1.  No acute findings in the chest. 2.  Nonobstructive bowel gas pattern.  Signed by Dr Rafa Jay Additional Contrast? None    Result Date: 10/3/2021  EXAMINATION: CT ABDOMEN PELVIS W IV CONTRAST 10/3/2021 6:14 PM HISTORY: CT ABDOMEN PELVIS W IV CONTRAST 10/3/2021 4:46 PM HISTORY: COMPARISON: October 14, 2016. DLP: 1078 mGy cm TECHNIQUE: Following the intravenous administration of contrast, helical CT tomographic images of the abdomen and pelvis were acquired. Coronal reformatted images were also provided for review. FINDINGS: The lung bases and base of the heart are unremarkable. LIVER: No focal liver lesion. The hepatic vasculature is patent. BILIARY SYSTEM: Surgical clips are present from previous cholecystectomy. [place] PANCREAS: No focal pancreatic lesion. SPLEEN: Unremarkable. KIDNEYS AND ADRENALS: Bilateral kidneys and adrenal glands are unremarkable. The ureters are decompressed and normal in appearance. RETROPERITONEUM: No mass, lymphadenopathy or hemorrhage. GI TRACT: Small bowel is dilated and fluid-filled. Diverticula are noted throughout the colon. Postsurgical changes noted in the ascending colon. Free fluid is present in the right paracolic gutter. Free fluid is present in the left paracolic gutter. There is some fluid noted in the pelvis. . The small bowel is fluid-filled and dilated. Sutures are also noted in the small bowel in the midabdomen. This may be partial small bowel obstruction or possibly enteritis with the fluid in the paracolic gutters and pelvis. .. OTHER: There is no mesenteric mass. . The abdominopelvic vasculature is patent. The osseous structures and soft tissues demonstrate no worrisome lesions. PELVIS: The uterus is visualized. . The urinary bladder is normal in appearance. 1. Dilated loops of small bowel fluid filled. This may be either enteritis or partial small bowel obstruction. 2. Free fluid is present in the paracolic gutters and pelvis. 3. Diverticulosis. Signed by Dr Declan Yost Course:   Ms. Miguelina Mtz, a 55 y.o. female who presented to [address] [place] [address] with [facility] bowel resections, MS, DM, COVID+ 9/27, received monoclonal antibodies complaining of worsening abdominal pain and nausea and vomiting.    Patient states that she conditions       Physical Exam:   Vital Signs: /67   Pulse 89   Temp 98.2 °F (36.8 °C) (Temporal)   Resp 18   Wt 160 lb (72.6 kg)   SpO2 96%   BMI 27.46 kg/m²   Physical Exam  Vitals and nursing note reviewed. Constitutional:       General: She is not in acute distress. Appearance: Normal appearance. She is not ill-appearing, toxic-appearing or diaphoretic. HENT:      Head: Normocephalic and atraumatic. Right Ear: External ear normal.      Left Ear: External ear normal.      Nose: No congestion or rhinorrhea. Eyes:      General: No scleral icterus. Right eye: No discharge. Left eye: No discharge. Extraocular Movements: Extraocular movements intact. Conjunctiva/sclera: Conjunctivae normal.   Cardiovascular:      Rate and Rhythm: Normal rate and regular rhythm. Heart sounds: No friction rub. Pulmonary:      Effort: Pulmonary effort is normal. No respiratory distress. Breath sounds: Normal breath sounds. No stridor. No wheezing, rhonchi or rales. Chest:      Chest wall: No tenderness. Abdominal:      General: Bowel sounds are normal. There is no distension. Tenderness: There is no abdominal tenderness. There is no guarding or rebound. Musculoskeletal:         General: No swelling, tenderness, deformity or signs of injury. Normal range of motion. Cervical back: Normal range of motion and neck supple. No rigidity or tenderness. No muscular tenderness. Right lower leg: No edema. Left lower leg: No edema. Skin:     General: Skin is warm and dry. Capillary Refill: Capillary refill takes less than 2 seconds. Coloration: Skin is not jaundiced or pale. Findings: No bruising, erythema, lesion or rash. Neurological:      General: No focal deficit present. Mental Status: She is alert and oriented to person, place, and time. Cranial Nerves: No cranial nerve deficit. Sensory: No sensory deficit.       Motor: No weakness. Coordination: Coordination normal.   Psychiatric:         Mood and Affect: Mood normal.         Behavior: Behavior normal.         Thought Content: Thought content normal.         Judgment: Judgment normal.         Discharge Medications:       Darcy Brown   [place] Medication Instructions [facility]:[phone]    Printed on:10/05/21 2358   Medication Information                      baclofen (LIORESAL) 10 MG tablet  TAKE 1/2 TO 1 TABLET TWICE DAILY AS NEEDED FOR MUSCLE SPASMS             blood glucose monitor kit and supplies  Check fasting blood sugar 2-3 times a week             blood glucose monitor strips  Check fasting blood sugar 2-3 times a week             brexpiprazole (REXULTI) 1 MG TABS tablet  Take 1 tablet by mouth daily             cyanocobalamin 1000 MCG/ML injection  1ml weekly x 4 weeks, then 1ml monthly there after             dapagliflozin (FARXIGA) 10 MG tablet  Take 1 tablet by mouth every morning             doxepin (SINEQUAN) 50 MG capsule  Take 1 capsule by mouth nightly             Fremanezumab-vfrm (AJOVY) 225 MG/1.5ML SOAJ  Inject 225 mg into the skin every 30 days             furosemide (LASIX) 20 MG tablet  Take 20 mg by mouth daily as needed             gabapentin (NEURONTIN) 100 MG capsule  Take 1 capsule by mouth 3 times daily for 180 days.  Intended supply: 30 days             levalbuterol (XOPENEX) 1.25 MG/3ML nebulizer solution  Take 3 mLs by nebulization every 4 hours as needed for Wheezing             levocetirizine (XYZAL) 5 MG tablet  Take 1 tablet by mouth nightly             ondansetron (ZOFRAN ODT) 4 MG disintegrating tablet  Take 1 tablet by mouth every 8 hours as needed for Nausea or Vomiting             propranolol (INDERAL LA) 60 MG extended release capsule  Take 1 capsule by mouth daily             rosuvastatin (CRESTOR) 10 MG tablet  Take 1 tablet by mouth nightly             SITagliptin (JANUVIA) 100 MG tablet  Take 1 tablet by mouth daily SUMAtriptan (IMITREX) 100 MG tablet  TAKE ONE TABLET BY MOUTH DAILY AS NEEDED FOR MIGRAINE             Syringe/Needle, Disp, (SYRINGE 3CC/25GX1\") 25G X 1\" 3 ML MISC  Use to inject b12 once weekly for 1 month, then monthly             Teriflunomide (AUBAGIO) 14 MG TABS  Take 1 tablet by mouth daily              vancomycin (VANCOCIN) 50 mg/mL oral solution  Take 2.5 mLs by mouth every 6 hours for 10 days             vilazodone HCl (VIIBRYD) 40 MG TABS  Take 1 tablet by mouth daily             vitamin D (ERGOCALCIFEROL) 1.25 MG (67660 UT) CAPS capsule  Take 1 capsule by mouth once a week                 Discharge Instructions: Follow up with CLINTON Klein in 7 days. Take medications as directed. Resume activity as tolerated. Diet: ADULT DIET; Regular        DISCHARGE STATUS:    Condition: Fair  Disposition: Patient is medically stable and will be discharged home    Extended Emergency Contact Information  Primary Emergency Contact: [facility]  Address: [address] [place] [address] Phone: [phone]  Mobile Phone: [phone]  Relation: Spouse       Time Spent on discharge is  34 mins in the examination, evaluation, counseling and review of medications and discharge plan. Electronically signed by   Miriam Cesar MD, MPH, MD,   Internal Medicine Hospitalist   10/5/2021 3:03 PM      Thank you CLINTON Klein for the opportunity to be involved in this patient's care. If you have any questions or concerns please feel free to contact me at [phone]        EMR Dragon/Transcription disclaimer:   Much of this encounter note is an electronic transcription/translation of spoken language to printed text.  The electronic translation of spoken language may permit erroneous, or at times, nonsensical words or phrases to be inadvertently transcribed; although attempts have made to review the note for such errors, some may still exist.

## 2021-10-06 ENCOUNTER — CARE COORDINATION (OUTPATIENT)
Dept: CASE MANAGEMENT | Age: 46
End: 2021-10-06

## 2021-10-06 LAB
CAMPYLOBACTER ANTIGEN: NORMAL
CULTURE, STOOL: NORMAL
E COLI SHIGA TOXIN ASSAY: NORMAL

## 2021-10-06 NOTE — CARE COORDINATION
[facility] Transitions Initial Follow Up Call    Call within 2 business days of discharge: Yes    Patient: Morena Smith Patient : 1975   MRN: [id number]  Reason for Admission:   Discharge Date: 10/5/21 RARS: Readmission Risk Score: 11      Last Discharge [facility]       Complaint Diagnosis Description Type Department Provider    10/3/21 Positive For Covid-19; Nausea & Vomiting Right lower quadrant abdominal pain . .. ED to Hosp-Admission (Discharged) (ADMITTED) [facility] EZEKIEL Pitts Ra, MD; Blessing Barajas .. Spoke with: N/A    Facility: [place] [address]    Non-face-to-face services provided:  Reviewed encounter information for continuity of care prior to follow up phone call - chart notes, consults, progress notes, test results, med list, appointments, AVS, other information. Care Transitions 24 Hour Call    Care Transitions Interventions         Follow Up ; Attempted to make contact with Jerzy Wolf for an initial follow up call post discharge from the hospital without success. Unable to leave a message regarding intent of call and call back information. Will try again my next business day. No future appointments.     Sera John RN

## 2021-10-07 ENCOUNTER — CARE COORDINATION (OUTPATIENT)
Dept: CASE MANAGEMENT | Age: 46
End: 2021-10-07

## 2021-10-07 DIAGNOSIS — U07.1 COVID-19: Primary | ICD-10-CM

## 2021-10-07 LAB
FECAL NEUTRAL FAT: NORMAL
FECAL SPLIT FATS: NORMAL

## 2021-10-07 NOTE — CARE COORDINATION
[facility] Transitions Initial Follow Up Call    Call within 2 business days of discharge: Yes    Patient: Akilah Jefferson Patient : 1975   MRN: [id number]  Reason for Admission: Covid/Cdiff  Discharge Date: 10/5/21 RARS: Readmission Risk Score: 11      Last Discharge [address] [place] [address]       Complaint Diagnosis Description Type Department Provider    10/3/21 Positive For Covid-19; Nausea & Vomiting Right lower quadrant abdominal pain . .. ED to Hosp-Admission (Discharged) (ADMITTED) [facility] Yoselyn Reveles MD; Gina Horan .. Spoke with: Akilah Jefferson    Advance Care Planning   Healthcare Decision Maker:    Primary Decision Maker: Sol Cordero Spouse - [phone]    Patient contacted regarding COVID-19 diagnosis. Discussed COVID-19 related testing which was available at this time. Test results were positive. Patient informed of results, if available? Yes. Care Transition Nurse contacted the patient by telephone to perform post discharge assessment. Call within 2 business days of discharge: Yes. Verified name and  with patient as identifiers. Provided introduction to self, and explanation of the CTN/ACM role, and reason for call due to risk factors for infection and/or exposure to COVID-19. Symptoms reviewed with patient who verbalized the following symptoms: no new symptoms and no worsening symptoms. Due to no new or worsening symptoms encounter was not routed to provider for escalation. Discussed follow-up appointments. If no appointment was previously scheduled, appointment scheduling offered: Grant Ortiz is calling today  [facility] follow up appointment(s): No future appointments. Non-[facility] follow up appointment(s):     Non-face-to-face services provided:  Obtained and reviewed discharge summary and/or continuity of care documents     Advance Care Planning:   Does patient have an Advance Directive:  not on file; education provided.      Educated patient about risk for severe COVID-19 due to risk factors according to CDC guidelines. CTN reviewed discharge instructions, medical action plan and red flag symptoms with the patient who verbalized understanding. Discussed COVID vaccination status: Yes. Education provided on COVID-19 vaccination as appropriate. Discussed exposure protocols and quarantine with CDC Guidelines. Patient was given an opportunity to verbalize any questions and concerns and agrees to contact CTN or health care provider for questions related to their healthcare. Reviewed and educated patient on any new and changed medications related to discharge diagnosis     Was patient discharged with a pulse oximeter? Unsure   Discussed and confirmed pulse oximeter discharge instructions and when to notify provider or seek emergency care. CTN provided contact information. Plan for follow-up call in 5-7 days based on severity of symptoms and risk factors. Care Transitions 24 Hour Call    Do you have any ongoing symptoms?: No  Do you have a copy of your discharge instructions?: Yes  Do you have all of your prescriptions and are they filled?: Yes  Have you been contacted by a [address]?: No  Have you scheduled your follow up appointment?: Yes  How are you going to get to your appointment?: Car - family or friend to transport  Were you discharged with any Home Care or Post Acute Services: No  Do you feel like you have everything you need to keep you well at home?: Yes  Care Transitions Interventions         Follow Up : Spoke with patient today for initial Covid call after discharge, second attempt. She says she is doing better, has her medications and taking, did review them, 1111F order added. She is calling PCP today for HFU. She says she has not checked her blood sugar today, but readings inpatient were normal for the most part according to labs and testing. Her A1C is 6.4. She says diarrhea has gotten better.  She reports having a very hard time before coming to ED, dry heaving, etc. She says she has MS, DM, Migraines, on several medications to treat. She is restarting them after being off for several days while inpatient. She has had the Covid vaccine, CTN encouraged her to discuss booster with PCP. She is planning on RTW after Fall break as out of quarantine. She has listed her PHCDM, and does not have LW, but is considering getting one filled out. CTN advised her to discuss with PCP. Discussed CTN calls and follow up and she is accepting. Not sure how long CTN will follow as she seems to be doing very well, and will be going back to work soon. CTN will follow up next week and see how she is progressing, and if she is doing well, may consider discharging. No future appointments.     Grace Rodney RN

## 2021-10-08 LAB
BLOOD CULTURE, ROUTINE: NORMAL
CULTURE, BLOOD 2: NORMAL

## 2021-10-08 NOTE — TELEPHONE ENCOUNTER
[facility] Transitions Initial Follow Up Call    Outreach made within 2 business days of discharge: No    Patient: Elisabeth Goltz Patient : 1975   MRN: [id number]  Reason for Admission: There are no discharge diagnoses documented for the most recent discharge. Discharge Date: 10/5/21       Spoke with: no one. I was out of the office 10/6/ and 10/7 so the TCM was not completed within 48 hours of discharge.     Discharge department/facility: [facility]      Scheduled appointment with PCP within 7-14 days    Follow Up  Future Appointments   Date Time Provider Monik Jerez   10/12/2021  8:00 AM CLINTON Julien [facility] [address] [place], [place]

## 2021-10-12 ENCOUNTER — VIRTUAL VISIT (OUTPATIENT)
Dept: PRIMARY CARE CLINIC | Age: 46
End: 2021-10-12
Payer: COMMERCIAL

## 2021-10-12 DIAGNOSIS — K57.90 DIVERTICULOSIS: ICD-10-CM

## 2021-10-12 DIAGNOSIS — E86.0 DEHYDRATION: ICD-10-CM

## 2021-10-12 DIAGNOSIS — U07.1 COVID-19: ICD-10-CM

## 2021-10-12 DIAGNOSIS — Z09 HOSPITAL DISCHARGE FOLLOW-UP: Primary | ICD-10-CM

## 2021-10-12 DIAGNOSIS — K56.600 PARTIAL SMALL BOWEL OBSTRUCTION (HCC): ICD-10-CM

## 2021-10-12 DIAGNOSIS — A04.72 C. DIFFICILE COLITIS: ICD-10-CM

## 2021-10-12 PROCEDURE — 1111F DSCHRG MED/CURRENT MED MERGE: CPT | Performed by: NURSE PRACTITIONER

## 2021-10-12 PROCEDURE — 99496 TRANSJ CARE MGMT HIGH F2F 7D: CPT | Performed by: NURSE PRACTITIONER

## 2021-10-12 RX ORDER — LEVALBUTEROL TARTRATE 45 UG/1
1 AEROSOL, METERED ORAL EVERY 4 HOURS PRN
Qty: 1 EACH | Refills: 3 | Status: SHIPPED | OUTPATIENT
Start: 2021-10-12 | End: 2022-03-18 | Stop reason: SDUPTHER

## 2021-10-12 ASSESSMENT — ENCOUNTER SYMPTOMS
WHEEZING: 0
SORE THROAT: 0
RHINORRHEA: 0
NAUSEA: 0
DIARRHEA: 0
ABDOMINAL PAIN: 0
EYE DISCHARGE: 0
BACK PAIN: 1
COUGH: 0
TROUBLE SWALLOWING: 0
EYE REDNESS: 0
VOMITING: 0
SINUS PRESSURE: 0
SHORTNESS OF BREATH: 0
CONSTIPATION: 0
BLOOD IN STOOL: 0

## 2021-10-12 NOTE — PROGRESS NOTES
Post-Discharge Transitional Care Management Services or Hospital Follow Up      Raghu Godinez   YOB: 1975    Date of Office Visit:  10/12/2021  Date of Hospital Admission: 10/3/21  Date of Hospital Discharge: 10/5/21  Readmission Risk Score(high >=14%.  Medium >=10%):Readmission Risk Score: 11      Care management risk score Rising risk (score 2-5) and Complex Care (Scores >=6): 5     Non face to face  following discharge, date last encounter closed (first attempt may have been earlier): 10/7/2021  1:01 PM 10/7/2021  1:01 PM    Call initiated 2 business days of discharge: Yes     Patient Active Problem List   Diagnosis    Headache    Chronic headache disorder    Insomnia    Multiple sclerosis ([facility] [address]    Hypertriglyceridemia    Vitamin D deficiency    Moderate episode of recurrent major depressive disorder (HCC)    Diabetes mellitus (HCC)    COVID-19    Nausea and vomiting    Partial small bowel obstruction (HCC)       Allergies   Allergen Reactions    Ampicillin     Biaxin [Clarithromycin]     Flagyl [Metronidazole]     Saxenda [Liraglutide -Weight Management]      Nausea and vomiting    Albuterol Rash and Other (See Comments)     migraines    Doxycycline Rash       Medications listed as ordered at the time of discharge from hospital   [facility] Medication Instructions CAMILA:    Printed on:10/12/21 6434   Medication Information                      baclofen (LIORESAL) 10 MG tablet  TAKE 1/2 TO 1 TABLET TWICE DAILY AS NEEDED FOR MUSCLE SPASMS             blood glucose monitor kit and supplies  Check fasting blood sugar 2-3 times a week             blood glucose monitor strips  Check fasting blood sugar 2-3 times a week             brexpiprazole (REXULTI) 1 MG TABS tablet  Take 1 tablet by mouth daily             cyanocobalamin 1000 MCG/ML injection  1ml weekly x 4 weeks, then 1ml monthly there after             dapagliflozin (FARXIGA) 10 MG tablet  Take 1 tablet by mouth every morning             doxepin (SINEQUAN) 50 MG capsule  Take 1 capsule by mouth nightly             Fremanezumab-vfrm (AJOVY) 225 MG/1.5ML SOAJ  Inject 225 mg into the skin every 30 days             furosemide (LASIX) 20 MG tablet  Take 20 mg by mouth daily as needed             gabapentin (NEURONTIN) 100 MG capsule  Take 1 capsule by mouth 3 times daily for 180 days.  Intended supply: 30 days             levalbuterol (XOPENEX HFA) 45 MCG/ACT inhaler  Inhale 1 puff into the lungs every 4 hours as needed for Wheezing             levalbuterol (XOPENEX) 1.25 MG/3ML nebulizer solution  Take 3 mLs by nebulization every 4 hours as needed for Wheezing             levocetirizine (XYZAL) 5 MG tablet  Take 1 tablet by mouth nightly             ondansetron (ZOFRAN ODT) 4 MG disintegrating tablet  Take 1 tablet by mouth every 8 hours as needed for Nausea or Vomiting             propranolol (INDERAL LA) 60 MG extended release capsule  Take 1 capsule by mouth daily             rosuvastatin (CRESTOR) 10 MG tablet  Take 1 tablet by mouth nightly             SITagliptin (JANUVIA) 100 MG tablet  Take 1 tablet by mouth daily             SUMAtriptan (IMITREX) 100 MG tablet  TAKE ONE TABLET BY MOUTH DAILY AS NEEDED FOR MIGRAINE             Syringe/Needle, Disp, (SYRINGE 3CC/25GX1\") 25G X 1\" 3 ML MISC  Use to inject b12 once weekly for 1 month, then monthly             Teriflunomide (AUBAGIO) 14 MG TABS  Take 1 tablet by mouth daily              vancomycin (VANCOCIN) 50 mg/mL oral solution  Take 2.5 mLs by mouth every 6 hours for 10 days             vilazodone HCl (VIIBRYD) 40 MG TABS  Take 1 tablet by mouth daily             vitamin D (ERGOCALCIFEROL) 1.25 MG (60135 UT) CAPS capsule  Take 1 capsule by mouth once a week                   Medications marked \"taking\" at this time  Outpatient Medications Marked as Taking for the 10/12/21 encounter (Virtual Visit) with CLINTON Kuhn   Medication Sig Dispense Refill   Ave Alvarado urgency and vaginal discharge. Musculoskeletal: Positive for arthralgias, back pain, myalgias and neck pain. Skin: Negative for rash. Neurological: Negative for dizziness, weakness and headaches. Hematological: Negative for adenopathy. Psychiatric/Behavioral: Positive for sleep disturbance. Negative for dysphoric mood and suicidal ideas. The patient is nervous/anxious. There were no vitals filed for this visit. There is no height or weight on file to calculate BMI. Wt Readings from Last 3 Encounters:   10/03/21 160 lb (72.6 kg)   09/27/21 155 lb (70.3 kg)   07/20/21 159 lb (72.1 kg)     BP Readings from Last 3 Encounters:   10/05/21 137/67   09/28/21 103/67   09/27/21 110/70       Physical Exam  HENT:      Head: Normocephalic. Eyes:      Conjunctiva/sclera: Conjunctivae normal.   Pulmonary:      Effort: Pulmonary effort is normal.   Neurological:      Mental Status: She is alert and oriented to person, place, and time. Psychiatric:         Mood and Affect: Mood normal.         Behavior: Behavior normal.             Assessment/Plan:  1. Hospital discharge follow-up    - PA DISCHARGE MEDS RECONCILED W/ CURRENT OUTPATIENT MED LIST    2. Partial small bowel obstruction (Neo Garcia MD, Gastroenterology, [place]    3. COVID-19    - levalbuterol [facility]) 45 MCG/ACT inhaler; Inhale 1 puff into the lungs every 4 hours as needed for Wheezing  Dispense: 1 each; Refill: 3    4. C. difficile colitis  No vancomycin  - Kiara Hemphill MD, Gastroenterology, [place]    5. Dehydration  Able to drink fluids now    6.  Diverticulosis    - Kiara Hemphill MD, Gastroenterology, [facility] Decision Making: high complexity

## 2021-10-14 ENCOUNTER — CARE COORDINATION (OUTPATIENT)
Dept: CASE MANAGEMENT | Age: 46
End: 2021-10-14

## 2021-10-14 NOTE — CARE COORDINATION
[facility] Transitions Follow Up Call    10/14/2021    Patient: Oly Hidalgot  Patient : 1975   MRN: [id number]    Discharge Date: 10/5/21 RARS: Readmission Risk Score: 6         Spoke with: N/A    Care Transitions Subsequent and Final Call    Subsequent and Final Calls  Care Transitions Interventions  Other Interventions:         Attempted to make contact with patient/caregiver for a routine follow up call without success. Left a HIPAA compliant message regarding intent of call and call back information. Will try again at a later time.       Follow Up  Future Appointments   Date Time Provider Monik Jerez   2021  3:30 PM [place] [facility]   2021  9:00 AM Jenney Leyden, APRN [facility]       Kel Cobian RN

## 2021-10-19 DIAGNOSIS — F41.1 GAD (GENERALIZED ANXIETY DISORDER): ICD-10-CM

## 2021-10-19 RX ORDER — DIAZEPAM 5 MG/1
5 TABLET ORAL EVERY 8 HOURS PRN
Qty: 60 TABLET | Refills: 0 | Status: SHIPPED | OUTPATIENT
Start: 2021-10-19 | End: 2021-12-07 | Stop reason: SDUPTHER

## 2021-10-21 ENCOUNTER — CARE COORDINATION (OUTPATIENT)
Dept: CASE MANAGEMENT | Age: 46
End: 2021-10-21

## 2021-10-21 NOTE — CARE COORDINATION
[facility] Transitions Follow Up Call    10/21/2021    Patient: Akilah Jefferson  Patient : 1975   MRN: [id number]    Discharge Date: 10/5/21 RARS: Readmission Risk Score: 6         Spoke with: N/A    Care Transitions Subsequent and Final Call    Subsequent and Final Calls  Care Transitions Interventions  Other Interventions:         Attempted to make contact with patient/caregiver for a routine follow up call without success. Unable to leave a HIPAA compliant message regarding intent of call and call back information. As this repeated attempt to make contact was unsuccessful, will disengage at this time.             Follow Up  Future Appointments   Date Time Provider Monik Jerez   2021  3:30 PM [place] Fernandez Si [facility]   2021  9:00 AM [place] [facility] [place] [facility]       Cindy Kwon RN

## 2021-11-08 ENCOUNTER — PATIENT MESSAGE (OUTPATIENT)
Dept: PRIMARY CARE CLINIC | Age: 46
End: 2021-11-08

## 2021-11-08 DIAGNOSIS — G47.00 INSOMNIA, UNSPECIFIED TYPE: ICD-10-CM

## 2021-11-08 DIAGNOSIS — R53.82 CHRONIC FATIGUE: ICD-10-CM

## 2021-11-08 DIAGNOSIS — G35 MULTIPLE SCLEROSIS (HCC): ICD-10-CM

## 2021-11-08 RX ORDER — DOXEPIN HYDROCHLORIDE 50 MG/1
50 CAPSULE ORAL NIGHTLY
Qty: 30 CAPSULE | Refills: 11 | Status: SHIPPED | OUTPATIENT
Start: 2021-11-08 | End: 2022-11-04 | Stop reason: SDUPTHER

## 2021-11-08 RX ORDER — ARMODAFINIL 250 MG/1
250 TABLET ORAL DAILY
Qty: 30 TABLET | Refills: 2 | Status: SHIPPED | OUTPATIENT
Start: 2021-11-08 | End: 2021-12-08

## 2021-11-08 NOTE — TELEPHONE ENCOUNTER
From: Ham Delgado  To: Namita Narvaez  Sent: 11/8/2021 11:28 AM CST  Subject: Prescription Question    I have a visit with you scheduled for this Friday. I need refills on my Doxepin and Armodafinil please. Thank you!

## 2021-11-09 ENCOUNTER — PATIENT MESSAGE (OUTPATIENT)
Dept: PRIMARY CARE CLINIC | Age: 46
End: 2021-11-09

## 2021-11-10 ENCOUNTER — TELEPHONE (OUTPATIENT)
Dept: PRIMARY CARE CLINIC | Age: 46
End: 2021-11-10

## 2021-11-10 NOTE — TELEPHONE ENCOUNTER
Blade Gentile, Iesha Dove, APRN Just now (1:10 PM)     ALISSON      Good afternoon Brissa. I'm sorry to bother you but [facility] still says [facility] armodafinil still needs prior authorization. I believe she thought it was taken care of but the pharmacy still needs insurance authorization. Would be please check. She definitely needs it.  Thanks so much.      Take care      Analilia Barrera

## 2021-11-10 NOTE — TELEPHONE ENCOUNTER
From: Faith Han  To: James Narvaez  Sent: 11/9/2021 5:45 PM CST  Subject: Prescription Question    I'm out of Armodafinil and Doxepin. Can I please get refills on these? Thank you.

## 2021-11-12 ENCOUNTER — VIRTUAL VISIT (OUTPATIENT)
Dept: PRIMARY CARE CLINIC | Age: 46
End: 2021-11-12
Payer: COMMERCIAL

## 2021-11-12 DIAGNOSIS — G35 MULTIPLE SCLEROSIS (HCC): ICD-10-CM

## 2021-11-12 DIAGNOSIS — G89.29 CHRONIC NECK PAIN: ICD-10-CM

## 2021-11-12 DIAGNOSIS — R53.82 CHRONIC FATIGUE: ICD-10-CM

## 2021-11-12 DIAGNOSIS — M54.2 CHRONIC NECK PAIN: ICD-10-CM

## 2021-11-12 DIAGNOSIS — M79.10 MYALGIA: ICD-10-CM

## 2021-11-12 DIAGNOSIS — F41.1 GAD (GENERALIZED ANXIETY DISORDER): Primary | ICD-10-CM

## 2021-11-12 PROCEDURE — 99214 OFFICE O/P EST MOD 30 MIN: CPT | Performed by: NURSE PRACTITIONER

## 2021-11-12 RX ORDER — TRAMADOL HYDROCHLORIDE 50 MG/1
50 TABLET ORAL EVERY 8 HOURS PRN
Qty: 90 TABLET | Refills: 0 | Status: SHIPPED | OUTPATIENT
Start: 2021-11-12 | End: 2021-12-14 | Stop reason: SDUPTHER

## 2021-11-12 ASSESSMENT — ENCOUNTER SYMPTOMS
RHINORRHEA: 0
COUGH: 0
BLOOD IN STOOL: 0
ABDOMINAL PAIN: 0
SINUS PRESSURE: 0
WHEEZING: 0
TROUBLE SWALLOWING: 0
VOMITING: 0
SHORTNESS OF BREATH: 0
EYE DISCHARGE: 0
CONSTIPATION: 0
EYE REDNESS: 0
NAUSEA: 0
SORE THROAT: 0
BACK PAIN: 1
DIARRHEA: 0

## 2021-11-12 NOTE — PROGRESS NOTES
Yefri Jeffers (:  1975) is a 55 y.o. female,Established patient, here for evaluation of the following chief complaint(s): Anxiety, Diabetes, and Depression         ASSESSMENT/PLAN:  1. NERY (generalized anxiety disorder)  2. Multiple sclerosis (HCC)  -     traMADol (ULTRAM) 50 MG tablet; Take 1 tablet by mouth every 8 hours as needed for Pain for up to 30 days. , Disp-90 tablet, R-0Normal  3. Myalgia  -     traMADol (ULTRAM) 50 MG tablet; Take 1 tablet by mouth every 8 hours as needed for Pain for up to 30 days. , Disp-90 tablet, R-0Normal  4. Chronic neck pain  -     traMADol (ULTRAM) 50 MG tablet; Take 1 tablet by mouth every 8 hours as needed for Pain for up to 30 days. , Disp-90 tablet, R-0Normal  5. Chronic fatigue    Decrease Viibryd to 20 mg she has pills at home that she can cut in half. We will see if this helps with sexual dysfunction and fatigue. Return in about 5 weeks (around 2021) for depression adn diabetes 30 min. SUBJECTIVE/OBJECTIVE:  HPI  Diabetes  Patient states that her blood sugars are doing better. This morning it was 170 after she had breakfast and she will recheck it to make sure it was back down. Fatigue  She has had increased fatigue since having Covid. This was about 6 weeks ago or less. Anxiety  States that since she has been more tired lately she has been more irritable and agitated. Review of Systems   Constitutional: Positive for fatigue. Negative for activity change, appetite change, chills, fever and unexpected weight change. HENT: Negative for congestion, hearing loss, postnasal drip, rhinorrhea, sinus pressure, sore throat and trouble swallowing. Eyes: Negative for discharge, redness and visual disturbance. Respiratory: Negative for cough, shortness of breath and wheezing. Cardiovascular: Negative for chest pain, palpitations and leg swelling.    Gastrointestinal: Negative for abdominal pain, blood in stool, constipation, diarrhea, nausea and vomiting. Endocrine: Negative for cold intolerance and heat intolerance. Genitourinary: Negative for dysuria, flank pain, menstrual problem, pelvic pain, urgency and vaginal discharge. Musculoskeletal: Positive for arthralgias, back pain, myalgias and neck pain. Skin: Negative for rash. Neurological: Negative for dizziness, weakness and headaches. Hematological: Negative for adenopathy. Psychiatric/Behavioral: Positive for agitation and sleep disturbance. Negative for dysphoric mood and suicidal ideas. The patient is nervous/anxious.         Patient-Reported Vitals 11/12/2021   Patient-Reported Weight 155 lbs   Patient-Reported Height 5'4\"   Patient-Reported Systolic -   Patient-Reported Diastolic -   Patient-Reported Pulse -   Patient-Reported Temperature 96.7        Physical Exam    [INSTRUCTIONS:  \"[x]\" Indicates a positive item  \"[]\" Indicates a negative item  -- DELETE ALL ITEMS NOT EXAMINED]    Constitutional: [x] Appears well-developed and well-nourished [x] No apparent distress      [] Abnormal -     Mental status: [x] Alert and awake  [x] Oriented to person/place/time [x] Able to follow commands    [] Abnormal -     Eyes:   EOM    [x]  Normal    [] Abnormal -   Sclera  [x]  Normal    [] Abnormal -          Discharge [x]  None visible   [] Abnormal -     HENT: [x] Normocephalic, atraumatic  [] Abnormal -   [x] Mouth/Throat: Mucous membranes are moist    External Ears [x] Normal  [] Abnormal -    Neck: [x] No visualized mass [] Abnormal -     Pulmonary/Chest: [x] Respiratory effort normal   [x] No visualized signs of difficulty breathing or respiratory distress        [] Abnormal -      Musculoskeletal:   [x] Normal gait with no signs of ataxia         [x] Normal range of motion of neck        [] Abnormal -     Neurological:        [x] No Facial Asymmetry (Cranial nerve 7 motor function) (limited exam due to video visit)          [x] No gaze palsy        [] Abnormal -          Skin: [x] No significant exanthematous lesions or discoloration noted on facial skin         [] Abnormal -            Psychiatric:       [x] Normal Affect [] Abnormal -        [x] No Hallucinations    Other pertinent observable physical exam findings:-                Morena Smith was evaluated through a synchronous (real-time) audio-video encounter. The patient (or guardian if applicable) is aware that this is a billable service. Verbal consent to proceed has been obtained within the past 12 months. The visit was conducted pursuant to the emergency declaration under the [address] [place] [address] authority and the [employer] and alike General Act. Patient identification was verified, and a caregiver was present when appropriate. The patient was located in a state where the provider was credentialed to provide care. An electronic signature was used to authenticate this note.     --Nickola Boeck, APRN

## 2021-11-16 ENCOUNTER — TELEPHONE (OUTPATIENT)
Dept: PRIMARY CARE CLINIC | Age: 46
End: 2021-11-16

## 2021-11-16 NOTE — TELEPHONE ENCOUNTER
How did we get this approved for her last year? She was on provigil due to MS then they let me switch her to nuvigil.   If you cant get it approved this time can change to provigil 200mg po q am and can take 200mg mid afternoon if needed

## 2021-11-19 DIAGNOSIS — G35 MULTIPLE SCLEROSIS (HCC): ICD-10-CM

## 2021-11-19 DIAGNOSIS — R53.82 CHRONIC FATIGUE: ICD-10-CM

## 2021-11-19 RX ORDER — MODAFINIL 200 MG/1
TABLET ORAL
Qty: 60 TABLET | Refills: 2 | Status: SHIPPED | OUTPATIENT
Start: 2021-11-19 | End: 2022-03-18 | Stop reason: SDUPTHER

## 2021-11-22 NOTE — TELEPHONE ENCOUNTER
Pt called reports that the viibryd is not working and she does not want to wait the 6 weeks. Can we please change?

## 2021-11-22 NOTE — TELEPHONE ENCOUNTER
----- Message from Nammena Rama sent at 11/19/2021  4:47 PM CST -----  Subject: Message to Provider    QUESTIONS  Information for Provider? Patient says that she is struggling with   depression since being in the hospital in Oct and is wondering if there is   anything that can be done.   ---------------------------------------------------------------------------  --------------  [address]  What is the best way for the office to contact you? OK to leave message on   voicemail  Preferred Call Back Phone Number? [phone]  ---------------------------------------------------------------------------  --------------  SCRIPT ANSWERS  Relationship to Patient?  Self

## 2021-11-22 NOTE — TELEPHONE ENCOUNTER
Juliano Barnes sent me a message on this from his my chart Friday. And I sent in the provigil bid. But it too may need a PA.   Please check to see if she was able to fill this

## 2021-11-22 NOTE — TELEPHONE ENCOUNTER
----- Message from Sandra Tobi sent at 11/19/2021  4:44 PM CST -----  Subject: Medication Problem    QUESTIONS  Name of Medication? Armodafinil (NUVIGIL) 250 MG TABS  Patient-reported dosage and instructions? 1 tablet daily  What question or problem do you have with the medication? Patient called   in because the insurance had denied it -she thinks she may need a new   prior authorization for the refill. Preferred Pharmacy? [address] [place] [address]  Pharmacy phone number (if available)? [phone]  Additional Information for Provider?   ---------------------------------------------------------------------------  --------------  [address]  What is the best way for the office to contact you? OK to leave message on   voicemail  Preferred Call Back Phone Number? [phone]  ---------------------------------------------------------------------------  --------------  SCRIPT ANSWERS  Relationship to Patient?  Self

## 2021-11-22 NOTE — TELEPHONE ENCOUNTER
Change viibryd to effexor 75 mg. Also did she see ariana's my chart message on the provigil. [facility] might not take the coupon so I can send it to [facility] if I need to.    Also can you please try to appeal the provigil to how we got it approved last time

## 2021-11-23 RX ORDER — VENLAFAXINE HYDROCHLORIDE 75 MG/1
150 CAPSULE, EXTENDED RELEASE ORAL DAILY
Qty: 30 CAPSULE | Refills: 3 | Status: SHIPPED | OUTPATIENT
Start: 2021-11-23 | End: 2021-12-14

## 2021-11-24 ENCOUNTER — TELEPHONE (OUTPATIENT)
Dept: PRIMARY CARE CLINIC | Age: 46
End: 2021-11-24

## 2021-11-24 ENCOUNTER — PATIENT MESSAGE (OUTPATIENT)
Dept: PRIMARY CARE CLINIC | Age: 46
End: 2021-11-24

## 2021-11-24 DIAGNOSIS — G89.4 CHRONIC PAIN SYNDROME: ICD-10-CM

## 2021-11-24 RX ORDER — GABAPENTIN 100 MG/1
100 CAPSULE ORAL 3 TIMES DAILY
Qty: 90 CAPSULE | Refills: 5 | Status: SHIPPED | OUTPATIENT
Start: 2021-11-24 | End: 2022-05-13

## 2021-11-24 NOTE — TELEPHONE ENCOUNTER
----- Message from Cathy Brunerippo sent at 11/23/2021  4:48 PM CST -----  Subject: Message to Provider    QUESTIONS  Information for Provider? pt needs a prior auth for her prescription   effexor. ---------------------------------------------------------------------------  --------------  Robin HERNANDEZ  What is the best way for the office to contact you? OK to leave message on   voicemail  Preferred Call Back Phone Number? [phone]  ---------------------------------------------------------------------------  --------------  SCRIPT ANSWERS  Relationship to Patient?  Self

## 2021-11-24 NOTE — TELEPHONE ENCOUNTER
From: Aurelio Fischer  To: Miryam Narvaez  Sent: 11/24/2021 12:55 PM CST  Subject: Gabapentin    Could you please send in a refill of the gabapentin to [facility] in [place] [address]

## 2021-11-29 ENCOUNTER — TELEPHONE (OUTPATIENT)
Dept: ONCOLOGY | Facility: CLINIC | Age: 46
End: 2021-11-29

## 2021-11-29 NOTE — TELEPHONE ENCOUNTER
Returned patients phone call inquiring if she needed pre office labs for her appt on 12/2/21. Wasn't able to leave her a voicemail as her mailbox was full. Patient does need pre office labs completed per Dr. Baldwin's last office note. It looks like she has these done at [place]. So if she wants the orders faxed we can do that if she calls back.    rp

## 2021-11-29 NOTE — TELEPHONE ENCOUNTER
Caller: Jeannette Hitchcock    Relationship: Self    Best call back number: [phone]    What is the best time to reach you: ANY    Who are you requesting to speak with (clinical staff, provider,  specific staff member): CLINICAL      What was the call regarding: PATIENT WANTED TO KNOW IF SHE NEEDED LABS DONE PRIOR TO HER APPT ON 12-2-21    Do you require a callback: YES, PLEASE CALL TO ADVISE

## 2021-11-30 ENCOUNTER — TELEPHONE (OUTPATIENT)
Dept: ONCOLOGY | Facility: CLINIC | Age: 46
End: 2021-11-30

## 2021-11-30 DIAGNOSIS — D47.2 MONOCLONAL GAMMOPATHY OF UNKNOWN SIGNIFICANCE (MGUS): Primary | ICD-10-CM

## 2021-11-30 NOTE — TELEPHONE ENCOUNTER
Caller: Jeannette Hitchcock    Relationship: Self    Best call back number: [phone]    What orders are you requesting (i.e. lab or imaging): LABS    In what timeframe would the patient need to come in: ASAP BEFORE 12/02    Where will you receive your lab/imaging services: [facility]

## 2021-11-30 NOTE — TELEPHONE ENCOUNTER
PT CALLED, STATED SHE WAS AT [facility] [employer] TO GET HER LAB WORK PERFORMED AND THE ORDERS WERE NOT RECVD. CALLED CLINICAL LINE AND SPOKE WITH KARTIK. SHE IS GOING TO SEND THE ORDERS OVER NOW. GAVE HER THE FAX # [phone].

## 2021-12-01 ENCOUNTER — TELEPHONE (OUTPATIENT)
Dept: ONCOLOGY | Facility: CLINIC | Age: 46
End: 2021-12-01

## 2021-12-01 NOTE — TELEPHONE ENCOUNTER
Caller: DUNCAN      Relationship to patient: SELF     Best call back number: [phone]    PATIENT IS WANTING TO KNOW IF SHE CAN CHANGE HER FOLLOW UP WITH DR. NUNEZ TOMORROW 12/2 TO A VIDEO/PHONE VISIT.   PLEASE CALL AND ADVISE   THANK YOU

## 2021-12-02 ENCOUNTER — OFFICE VISIT (OUTPATIENT)
Dept: ONCOLOGY | Facility: CLINIC | Age: 46
End: 2021-12-02

## 2021-12-02 VITALS
HEIGHT: 65 IN | BODY MASS INDEX: 26.86 KG/M2 | SYSTOLIC BLOOD PRESSURE: 128 MMHG | DIASTOLIC BLOOD PRESSURE: 82 MMHG | OXYGEN SATURATION: 94 % | WEIGHT: 161.2 LBS | RESPIRATION RATE: 16 BRPM | TEMPERATURE: 97.3 F | HEART RATE: 97 BPM

## 2021-12-02 DIAGNOSIS — G35 MULTIPLE SCLEROSIS (HCC): Primary | ICD-10-CM

## 2021-12-02 PROBLEM — E11.9 DIABETES MELLITUS (HCC): Status: ACTIVE | Noted: 2021-01-12

## 2021-12-02 PROCEDURE — 99214 OFFICE O/P EST MOD 30 MIN: CPT | Performed by: INTERNAL MEDICINE

## 2021-12-02 RX ORDER — LEVALBUTEROL INHALATION SOLUTION 1.25 MG/3ML
SOLUTION RESPIRATORY (INHALATION)
COMMUNITY

## 2021-12-02 RX ORDER — VENLAFAXINE 100 MG/1
150 TABLET ORAL 2 TIMES DAILY
COMMUNITY

## 2021-12-07 DIAGNOSIS — F41.1 GAD (GENERALIZED ANXIETY DISORDER): ICD-10-CM

## 2021-12-07 RX ORDER — DIAZEPAM 5 MG/1
5 TABLET ORAL EVERY 8 HOURS PRN
Qty: 60 TABLET | Refills: 0 | Status: SHIPPED | OUTPATIENT
Start: 2021-12-07 | End: 2022-02-01 | Stop reason: SDUPTHER

## 2021-12-14 ENCOUNTER — VIRTUAL VISIT (OUTPATIENT)
Dept: PRIMARY CARE CLINIC | Age: 46
End: 2021-12-14
Payer: COMMERCIAL

## 2021-12-14 DIAGNOSIS — R53.82 CHRONIC FATIGUE: ICD-10-CM

## 2021-12-14 DIAGNOSIS — E53.8 VITAMIN B12 DEFICIENCY: ICD-10-CM

## 2021-12-14 DIAGNOSIS — G89.29 CHRONIC NECK PAIN: ICD-10-CM

## 2021-12-14 DIAGNOSIS — M79.10 MYALGIA: ICD-10-CM

## 2021-12-14 DIAGNOSIS — M54.2 CHRONIC NECK PAIN: ICD-10-CM

## 2021-12-14 DIAGNOSIS — G35 MULTIPLE SCLEROSIS (HCC): ICD-10-CM

## 2021-12-14 PROCEDURE — 99214 OFFICE O/P EST MOD 30 MIN: CPT | Performed by: NURSE PRACTITIONER

## 2021-12-14 RX ORDER — TRAMADOL HYDROCHLORIDE 50 MG/1
50 TABLET ORAL EVERY 8 HOURS PRN
Qty: 90 TABLET | Refills: 0 | Status: SHIPPED | OUTPATIENT
Start: 2021-12-14 | End: 2022-02-14 | Stop reason: SDUPTHER

## 2021-12-14 RX ORDER — CYANOCOBALAMIN 1000 UG/ML
INJECTION INTRAMUSCULAR; SUBCUTANEOUS
Qty: 1 ML | Refills: 11 | Status: SHIPPED | OUTPATIENT
Start: 2021-12-14

## 2021-12-14 RX ORDER — VENLAFAXINE HYDROCHLORIDE 150 MG/1
150 CAPSULE, EXTENDED RELEASE ORAL DAILY
COMMUNITY
Start: 2021-12-03 | End: 2022-03-01 | Stop reason: SDUPTHER

## 2021-12-14 ASSESSMENT — ENCOUNTER SYMPTOMS
VOMITING: 0
EYE DISCHARGE: 0
NAUSEA: 0
DIARRHEA: 0
CONSTIPATION: 0
ABDOMINAL PAIN: 0
EYE REDNESS: 0
BLOOD IN STOOL: 0
SHORTNESS OF BREATH: 0
SORE THROAT: 0
WHEEZING: 0
BACK PAIN: 1
TROUBLE SWALLOWING: 0
SINUS PRESSURE: 0
COUGH: 0
RHINORRHEA: 0

## 2021-12-14 NOTE — PROGRESS NOTES
Akilah Jefferson (:  1975) is a 55 y.o. female,Established patient, here for evaluation of the following chief complaint(s): Diabetes, Anxiety, and Depression         ASSESSMENT/PLAN:  1. Chronic fatigue  -     cyanocobalamin 1000 MCG/ML injection; 1ml monthly im, Disp-1 mL, R-11Normal  2. Vitamin B12 deficiency  -     cyanocobalamin 1000 MCG/ML injection; 1ml monthly im, Disp-1 mL, R-11Normal  3. Multiple sclerosis (HCC)  -     traMADol (ULTRAM) 50 MG tablet; Take 1 tablet by mouth every 8 hours as needed for Pain for up to 30 days. , Disp-90 tablet, R-0Normal  4. Myalgia  -     traMADol (ULTRAM) 50 MG tablet; Take 1 tablet by mouth every 8 hours as needed for Pain for up to 30 days. , Disp-90 tablet, R-0Normal  5. Chronic neck pain  -     traMADol (ULTRAM) 50 MG tablet; Take 1 tablet by mouth every 8 hours as needed for Pain for up to 30 days. , Disp-90 tablet, R-0Normal      Return in about 2 months (around 2022) for diabetes 30 min. SUBJECTIVE/OBJECTIVE:  HPI  Diabetes  a1c is 6.4 states that blood sugars are up and down. b12 def  She is needing a refill on her b12. Anxiety  She is feeling more her self with the effexor vs viibryd. She is also on rexulti and takes valium prn. Chronic pain   Has MS has chronic pain related to this. Takes 1-2 ultram per day. Also takes gabapentin. Has tried cymbalta in the past.   Cant take NSAIDs any more due to CKD  Chronic neck pain  MRI scanned in media  She is needing a refill on this  She has only been taking this with a muscle relaxer at bed time or with flare ups  Review of Systems   Constitutional: Positive for fatigue. Negative for activity change, appetite change, chills, fever and unexpected weight change. HENT: Negative for congestion, hearing loss, postnasal drip, rhinorrhea, sinus pressure, sore throat and trouble swallowing. Eyes: Negative for discharge, redness and visual disturbance.    Respiratory: Negative for cough, shortness

## 2021-12-27 ENCOUNTER — PATIENT MESSAGE (OUTPATIENT)
Dept: PRIMARY CARE CLINIC | Age: 46
End: 2021-12-27

## 2021-12-27 NOTE — TELEPHONE ENCOUNTER
From: Ham Delgado  To: Namita Reynosodaquandavid Narvaez  Sent: 12/27/2021 8:31 AM CST  Subject: Rexulti    My rexulti was denied in November. I'm completely out for the past two days. Can we please try and get refills to [facility] in [place] today?  Thank you

## 2022-02-01 DIAGNOSIS — F41.1 GAD (GENERALIZED ANXIETY DISORDER): ICD-10-CM

## 2022-02-01 RX ORDER — DIAZEPAM 5 MG/1
5 TABLET ORAL EVERY 8 HOURS PRN
Qty: 60 TABLET | Refills: 0 | Status: SHIPPED | OUTPATIENT
Start: 2022-02-01 | End: 2022-03-18 | Stop reason: SDUPTHER

## 2022-02-14 ENCOUNTER — VIRTUAL VISIT (OUTPATIENT)
Dept: PRIMARY CARE CLINIC | Age: 47
End: 2022-02-14
Payer: COMMERCIAL

## 2022-02-14 DIAGNOSIS — G35 MULTIPLE SCLEROSIS (HCC): ICD-10-CM

## 2022-02-14 DIAGNOSIS — J01.00 ACUTE MAXILLARY SINUSITIS, RECURRENCE NOT SPECIFIED: ICD-10-CM

## 2022-02-14 DIAGNOSIS — M54.2 CHRONIC NECK PAIN: ICD-10-CM

## 2022-02-14 DIAGNOSIS — M79.10 MYALGIA: ICD-10-CM

## 2022-02-14 DIAGNOSIS — G89.29 CHRONIC NECK PAIN: ICD-10-CM

## 2022-02-14 PROCEDURE — 99214 OFFICE O/P EST MOD 30 MIN: CPT | Performed by: NURSE PRACTITIONER

## 2022-02-14 RX ORDER — TRAMADOL HYDROCHLORIDE 50 MG/1
50 TABLET ORAL EVERY 8 HOURS PRN
Qty: 90 TABLET | Refills: 0 | Status: SHIPPED | OUTPATIENT
Start: 2022-02-14 | End: 2022-03-18 | Stop reason: SDUPTHER

## 2022-02-14 RX ORDER — PREDNISONE 20 MG/1
20 TABLET ORAL DAILY
Qty: 5 TABLET | Refills: 0 | Status: SHIPPED | OUTPATIENT
Start: 2022-02-14 | End: 2022-02-19

## 2022-02-14 RX ORDER — CEFDINIR 300 MG/1
300 CAPSULE ORAL 2 TIMES DAILY
Qty: 20 CAPSULE | Refills: 0 | Status: SHIPPED | OUTPATIENT
Start: 2022-02-14 | End: 2022-05-20 | Stop reason: SDUPTHER

## 2022-02-14 ASSESSMENT — ENCOUNTER SYMPTOMS
WHEEZING: 0
DIARRHEA: 0
CONSTIPATION: 0
EYE REDNESS: 0
ABDOMINAL PAIN: 0
RHINORRHEA: 0
SHORTNESS OF BREATH: 0
NAUSEA: 0
BLOOD IN STOOL: 0
TROUBLE SWALLOWING: 0
EYE DISCHARGE: 0
BACK PAIN: 1
SORE THROAT: 0
VOMITING: 0
COUGH: 0
SINUS PRESSURE: 0

## 2022-02-14 ASSESSMENT — PATIENT HEALTH QUESTIONNAIRE - PHQ9
SUM OF ALL RESPONSES TO PHQ9 QUESTIONS 1 & 2: 2
SUM OF ALL RESPONSES TO PHQ QUESTIONS 1-9: 2
2. FEELING DOWN, DEPRESSED OR HOPELESS: 1
SUM OF ALL RESPONSES TO PHQ QUESTIONS 1-9: 2
1. LITTLE INTEREST OR PLEASURE IN DOING THINGS: 1
SUM OF ALL RESPONSES TO PHQ QUESTIONS 1-9: 2
SUM OF ALL RESPONSES TO PHQ QUESTIONS 1-9: 2

## 2022-02-14 NOTE — PROGRESS NOTES
Silvana Moore (:  1975) is a Established patient, here for evaluation of the following:    Assessment & Plan   Below is the assessment and plan developed based on review of pertinent history, physical exam, labs, studies, and medications. 1. Acute maxillary sinusitis, recurrence not specified  -     cefdinir (OMNICEF) 300 MG capsule; Take 1 capsule by mouth 2 times daily for 10 days, Disp-20 capsule, R-0Normal  2. Multiple sclerosis (HCC)  -     traMADol (ULTRAM) 50 MG tablet; Take 1 tablet by mouth every 8 hours as needed for Pain for up to 30 days. , Disp-90 tablet, R-0Normal  3. Myalgia  -     traMADol (ULTRAM) 50 MG tablet; Take 1 tablet by mouth every 8 hours as needed for Pain for up to 30 days. , Disp-90 tablet, R-0Normal  4. Chronic neck pain  -     traMADol (ULTRAM) 50 MG tablet; Take 1 tablet by mouth every 8 hours as needed for Pain for up to 30 days. , Disp-90 tablet, R-0Normal    Return in about 2 months (around 2022) for diabetes. Subjective   HPI   Sinusitis  Stated about a week ago. Has sinus pain and congestion    Anxiety  She is feeling more her self with the effexor vs viibryd. She is also on rexulti and takes valium prn. Chronic pain   Has MS has chronic pain related to this. Takes 1-2 ultram per day. Also takes gabapentin. Has tried cymbalta in the past.   Cant take NSAIDs any more due to CKD  Chronic neck pain  MRI scanned in media  She is needing a refill on this  She has only been taking this with a muscle relaxer at bed time or with flare ups    Review of Systems   Constitutional: Positive for fatigue. Negative for activity change, appetite change, chills, fever and unexpected weight change. HENT: Negative for congestion, hearing loss, postnasal drip, rhinorrhea, sinus pressure, sore throat and trouble swallowing. Eyes: Negative for discharge, redness and visual disturbance. Respiratory: Negative for cough, shortness of breath and wheezing. Cardiovascular: Negative for chest pain, palpitations and leg swelling. Gastrointestinal: Negative for abdominal pain, blood in stool, constipation, diarrhea, nausea and vomiting. Endocrine: Negative for cold intolerance and heat intolerance. Genitourinary: Negative for dysuria, flank pain, menstrual problem, pelvic pain, urgency and vaginal discharge. Musculoskeletal: Positive for arthralgias, back pain, myalgias and neck pain. Skin: Negative for rash. Neurological: Negative for dizziness, weakness and headaches. Hematological: Negative for adenopathy. Psychiatric/Behavioral: Positive for agitation and sleep disturbance. Negative for dysphoric mood and suicidal ideas. The patient is nervous/anxious.            Objective   Patient-Reported Vitals  No data recorded     Physical Exam       [INSTRUCTIONS:  \"[x]\" Indicates a positive item  \"[]\" Indicates a negative item  -- DELETE ALL ITEMS NOT EXAMINED]    Constitutional: [x] Appears well-developed and well-nourished [x] No apparent distress      [] Abnormal -     Mental status: [x] Alert and awake  [x] Oriented to person/place/time [x] Able to follow commands    [] Abnormal -     Eyes:   EOM    [x]  Normal    [] Abnormal -   Sclera  [x]  Normal    [] Abnormal -          Discharge [x]  None visible   [] Abnormal -     HENT: [x] Normocephalic, atraumatic  [] Abnormal -   [x] Mouth/Throat: Mucous membranes are moist    External Ears [x] Normal  [] Abnormal -    Neck: [x] No visualized mass [] Abnormal -     Pulmonary/Chest: [x] Respiratory effort normal   [x] No visualized signs of difficulty breathing or respiratory distress        [] Abnormal -      Musculoskeletal:   [x] Normal gait with no signs of ataxia         [x] Normal range of motion of neck        [] Abnormal -     Neurological:        [x] No Facial Asymmetry (Cranial nerve 7 motor function) (limited exam due to video visit)          [x] No gaze palsy        [] Abnormal -          Skin: [x] No significant exanthematous lesions or discoloration noted on facial skin         [] Abnormal -            Psychiatric:       [x] Normal Affect [] Abnormal -        [x] No Hallucinations    Other pertinent observable physical exam findings:-                 Sarah Meek, was evaluated through a synchronous (real-time) audio-video encounter. The patient (or guardian if applicable) is aware that this is a billable service, which includes applicable co-pays. This Virtual Visit was conducted with patient's (and/or legal guardian's) consent. The visit was conducted pursuant to the emergency declaration under the [address] [place] [address], [address] [place] [address] [facility] authority and the [employer] General Act. Patient identification was verified, and a caregiver was present when appropriate. The patient was located at home in a state where the provider was licensed to provide care.        --CLINTON Livingston

## 2022-02-17 ENCOUNTER — PATIENT MESSAGE (OUTPATIENT)
Dept: PRIMARY CARE CLINIC | Age: 47
End: 2022-02-17

## 2022-02-17 DIAGNOSIS — R19.7 DIARRHEA, UNSPECIFIED TYPE: Primary | ICD-10-CM

## 2022-02-18 NOTE — TELEPHONE ENCOUNTER
From: Kevin Odom  To: Sally Narvaez  Sent: 2/17/2022 5:46 PM CST  Subject: Antibiotic    I've had to quit taking the antibiotic. I've had watery diahrrea since I've started it. Reminds me of when I had CDIF in October.

## 2022-03-03 ENCOUNTER — PATIENT MESSAGE (OUTPATIENT)
Dept: PRIMARY CARE CLINIC | Age: 47
End: 2022-03-03

## 2022-03-07 ENCOUNTER — NURSE TRIAGE (OUTPATIENT)
Dept: OTHER | Facility: CLINIC | Age: 47
End: 2022-03-07

## 2022-03-07 NOTE — TELEPHONE ENCOUNTER
Received call from [facility] with [employer]. Subjective: Caller states \"trouble breathing\"     Current Symptoms: had hysterectomy on 3/2/22 and had trouble breathing since then. At that time shortness of breath was only with exertion. Has been wheezing and short of breath sitting still. Using xopenex inhaler and it's not working. No chest pain. Triage cut short due to her wheezing and shortness of breath    Onset: 5 days ago; worsening    Associated Symptoms: NA    Pain Severity: 5/10; from incision; {PAIN ASSESSMENT:2179  Temperature: no fever  by unknown method    What has been tried: inhaler    LMP: NA Pregnant: NA    Recommended disposition: Go to ED Now    Care advice provided, patient verbalizes understanding; denies any other questions or concerns; instructed to call back for any new or worsening symptoms. Patient/caller agrees to proceed to nearest Emergency Department     Attention Provider: Thank you for allowing me to participate in the care of your patient. The patient was connected to triage in response to information provided to the ECC/PSC. Please do not respond through this encounter as the response is not directed to a shared pool.           Reason for Disposition   MODERATE difficulty breathing (e.g., speaks in phrases, SOB even at rest, pulse 100-120) of new-onset or worse than normal    Protocols used: BREATHING DIFFICULTY-ADULT-OH

## 2022-03-18 ENCOUNTER — OFFICE VISIT (OUTPATIENT)
Dept: PRIMARY CARE CLINIC | Age: 47
End: 2022-03-18
Payer: COMMERCIAL

## 2022-03-18 VITALS
OXYGEN SATURATION: 97 % | SYSTOLIC BLOOD PRESSURE: 124 MMHG | DIASTOLIC BLOOD PRESSURE: 78 MMHG | BODY MASS INDEX: 28.84 KG/M2 | TEMPERATURE: 97 F | HEART RATE: 100 BPM | WEIGHT: 168 LBS

## 2022-03-18 DIAGNOSIS — G89.29 CHRONIC NECK PAIN: ICD-10-CM

## 2022-03-18 DIAGNOSIS — R53.82 CHRONIC FATIGUE: ICD-10-CM

## 2022-03-18 DIAGNOSIS — M79.10 MYALGIA: ICD-10-CM

## 2022-03-18 DIAGNOSIS — Z09 HOSPITAL DISCHARGE FOLLOW-UP: Primary | ICD-10-CM

## 2022-03-18 DIAGNOSIS — G35 MULTIPLE SCLEROSIS (HCC): ICD-10-CM

## 2022-03-18 DIAGNOSIS — M54.2 CHRONIC NECK PAIN: ICD-10-CM

## 2022-03-18 DIAGNOSIS — F41.1 GAD (GENERALIZED ANXIETY DISORDER): ICD-10-CM

## 2022-03-18 PROCEDURE — 99496 TRANSJ CARE MGMT HIGH F2F 7D: CPT | Performed by: NURSE PRACTITIONER

## 2022-03-18 PROCEDURE — 1111F DSCHRG MED/CURRENT MED MERGE: CPT | Performed by: NURSE PRACTITIONER

## 2022-03-18 RX ORDER — MODAFINIL 200 MG/1
TABLET ORAL
Qty: 60 TABLET | Refills: 2 | Status: SHIPPED | OUTPATIENT
Start: 2022-03-18 | End: 2022-04-14 | Stop reason: SDUPTHER

## 2022-03-18 RX ORDER — TRAMADOL HYDROCHLORIDE 50 MG/1
50 TABLET ORAL EVERY 8 HOURS PRN
Qty: 90 TABLET | Refills: 0 | Status: SHIPPED | OUTPATIENT
Start: 2022-03-18 | End: 2022-04-14 | Stop reason: SDUPTHER

## 2022-03-18 RX ORDER — LEVALBUTEROL TARTRATE 45 UG/1
1 AEROSOL, METERED ORAL EVERY 4 HOURS PRN
Qty: 1 EACH | Refills: 3 | Status: SHIPPED | OUTPATIENT
Start: 2022-03-18 | End: 2022-04-14

## 2022-03-18 RX ORDER — DIAZEPAM 5 MG/1
5 TABLET ORAL EVERY 8 HOURS PRN
Qty: 60 TABLET | Refills: 0 | Status: SHIPPED | OUTPATIENT
Start: 2022-03-18 | End: 2022-04-14 | Stop reason: SDUPTHER

## 2022-03-18 SDOH — ECONOMIC STABILITY: FOOD INSECURITY: WITHIN THE PAST 12 MONTHS, YOU WORRIED THAT YOUR FOOD WOULD RUN OUT BEFORE YOU GOT MONEY TO BUY MORE.: NEVER TRUE

## 2022-03-18 SDOH — ECONOMIC STABILITY: FOOD INSECURITY: WITHIN THE PAST 12 MONTHS, THE FOOD YOU BOUGHT JUST DIDN'T LAST AND YOU DIDN'T HAVE MONEY TO GET MORE.: NEVER TRUE

## 2022-03-18 ASSESSMENT — SOCIAL DETERMINANTS OF HEALTH (SDOH): HOW HARD IS IT FOR YOU TO PAY FOR THE VERY BASICS LIKE FOOD, HOUSING, MEDICAL CARE, AND HEATING?: NOT HARD AT ALL

## 2022-03-21 ASSESSMENT — ENCOUNTER SYMPTOMS
SINUS PRESSURE: 0
CONSTIPATION: 0
DIARRHEA: 0
BACK PAIN: 1
BLOOD IN STOOL: 0
EYE DISCHARGE: 0
ABDOMINAL PAIN: 0
EYE REDNESS: 0
RHINORRHEA: 0
SORE THROAT: 0
TROUBLE SWALLOWING: 0
COUGH: 0
WHEEZING: 0
NAUSEA: 0
VOMITING: 0
SHORTNESS OF BREATH: 0

## 2022-03-21 NOTE — PROGRESS NOTES
Post-Discharge Transitional Care Follow Up    Kevin Odom   YOB: 1975    Date of Office Visit:  3/18/2022  Date of Hospital Admission: 3/7/22  Date of Hospital Discharge: 3/10/22    Care management risk score Rising risk (score 2-5) and Complex Care (Scores >=6): 5     Non face to face  following discharge, date last encounter closed (first attempt may have been earli er): 3/11/22  Call initiated 2 business days of discharge: *3/11/22 yes    ASSESSMENT/PLAN:   Hospital discharge follow-up  -     KS DISCHARGE MEDS RECONCILED W/ CURRENT OUTPATIENT MED LIST  Multiple sclerosis ([facility] [address]  -     traMADol (ULTRAM) 50 MG tablet; Take 1 tablet by mouth every 8 hours as needed for Pain for up to 30 days. , Disp-90 tablet, R-0Normal  -     modafinil (PROVIGIL) 200 MG tablet; Take 1 tablet po qam and 1 tablet at lunch or early afternoon, Disp-60 tablet, R-2Normal  Myalgia  -     traMADol (ULTRAM) 50 MG tablet; Take 1 tablet by mouth every 8 hours as needed for Pain for up to 30 days. , Disp-90 tablet, R-0Normal  Chronic neck pain  -     traMADol (ULTRAM) 50 MG tablet; Take 1 tablet by mouth every 8 hours as needed for Pain for up to 30 days. , Disp-90 tablet, R-0Normal  NERY (generalized anxiety disorder)  -     diazePAM (VALIUM) 5 MG tablet; Take 1 tablet by mouth every 8 hours as needed for Anxiety or Sleep for up to 30 days. , Disp-60 tablet, R-0Normal  Chronic fatigue  -     modafinil (PROVIGIL) 200 MG tablet; Take 1 tablet po qam and 1 tablet at lunch or early afternoon, Disp-60 tablet, R-2Normal      Medical Decision Making: high complexity  Return in 1 month (on 4/18/2022). Subjective:   HPI    Inpatient course: Discharge summary reviewed- see chart. She was admitted to [facility] for shortness of breath. She had had a open hysterectomy 5 days prior to that and developed shortness of breath and swelling in her lower legs. Her surgeon advised her to go to the ER.   Her D-dimer was positive they did a CTA that did not show any PEs but did show a right upper lobe pneumonia consistent with aspiration pneumonia. She was treated with antibiotics. Interval history/Current status: She is still feeling weak but overall feeling better. Still has abdominal distention from her surgery. She is wearing a abdominal binder to help with the pain and distention. Shortness of breath has improved. She denies fever    Patient Active Problem List   Diagnosis    Headache    Chronic headache disorder    Insomnia    Multiple sclerosis (HCC)    Hypertriglyceridemia    Vitamin D deficiency    Moderate episode of recurrent major depressive disorder (HCC)    Diabetes mellitus ([facility] [address]    COVID-19    Nausea and vomiting    Partial small bowel obstruction ([facility] [address]       Medication list at time of discharge reviewed: Yes    Medications marked \"taking\" at this time  Outpatient Medications Marked as Taking for the 3/18/22 encounter (Office Visit) with CLINTON Wu   Medication Sig Dispense Refill    levalbuterol (XOPENEX HFA) 45 MCG/ACT inhaler Inhale 1 puff into the lungs every 4 hours as needed for Wheezing 1 each 3    traMADol (ULTRAM) 50 MG tablet Take 1 tablet by mouth every 8 hours as needed for Pain for up to 30 days. 90 tablet 0    diazePAM (VALIUM) 5 MG tablet Take 1 tablet by mouth every 8 hours as needed for Anxiety or Sleep for up to 30 days. 60 tablet 0    modafinil (PROVIGIL) 200 MG tablet Take 1 tablet po qam and 1 tablet at lunch or early afternoon 60 tablet 2    venlafaxine (EFFEXOR XR) 150 MG extended release capsule Take 1 capsule by mouth daily 90 capsule 3    brexpiprazole (REXULTI) 1 MG TABS tablet Take 1 tablet by mouth daily 30 tablet 11    cyanocobalamin 1000 MCG/ML injection 1ml monthly im 1 mL 11    gabapentin (NEURONTIN) 100 MG capsule Take 1 capsule by mouth 3 times daily for 180 days.  Intended supply: 30 days 90 capsule 5    doxepin (SINEQUAN) 50 MG capsule Take 1 capsule by mouth nightly 30 capsule 11    Fremanezumab-vfrm (AJOVY) 225 MG/1.5ML SOAJ Inject 225 mg into the skin every 30 days      ondansetron (ZOFRAN ODT) 4 MG disintegrating tablet Take 1 tablet by mouth every 8 hours as needed for Nausea or Vomiting 30 tablet 1    dapagliflozin (FARXIGA) 10 MG tablet Take 1 tablet by mouth every morning 90 tablet 3    levocetirizine (XYZAL) 5 MG tablet Take 1 tablet by mouth nightly 90 tablet 3    SITagliptin (JANUVIA) 100 MG tablet Take 1 tablet by mouth daily 30 tablet 11    vitamin D (ERGOCALCIFEROL) 1.25 MG (73508 UT) CAPS capsule Take 1 capsule by mouth once a week 12 capsule 1    levalbuterol (XOPENEX) 1.25 MG/3ML nebulizer solution Take 3 mLs by nebulization every 4 hours as needed for Wheezing 120 mL 5    rosuvastatin (CRESTOR) 10 MG tablet Take 1 tablet by mouth nightly 90 tablet 3    propranolol (INDERAL LA) 60 MG extended release capsule Take 1 capsule by mouth daily 30 capsule 11    baclofen (LIORESAL) 10 MG tablet TAKE 1/2 TO 1 TABLET TWICE DAILY AS NEEDED FOR MUSCLE SPASMS      Teriflunomide (AUBAGIO) 14 MG TABS Take 1 tablet by mouth daily       Syringe/Needle, Disp, (SYRINGE 3CC/25GX1\") 25G X 1\" 3 ML MISC Use to inject b12 once weekly for 1 month, then monthly 12 each 1    furosemide (LASIX) 20 MG tablet Take 20 mg by mouth daily as needed      blood glucose monitor kit and supplies Check fasting blood sugar 2-3 times a week 1 kit 0    blood glucose monitor strips Check fasting blood sugar 2-3 times a week 100 strip 3    SUMAtriptan (IMITREX) 100 MG tablet TAKE ONE TABLET BY MOUTH DAILY AS NEEDED FOR MIGRAINE 27 tablet 2        Medications patient taking as of now reconciled against medications ordered at time of hospital discharge: Yes    Review of Systems   Constitutional: Positive for fatigue. Negative for activity change, appetite change, chills, fever and unexpected weight change.    HENT: Negative for congestion, hearing loss, postnasal drip, rhinorrhea, sinus pressure, sore throat and trouble swallowing. Eyes: Negative for discharge, redness and visual disturbance. Respiratory: Negative for cough, shortness of breath and wheezing. Cardiovascular: Negative for chest pain, palpitations and leg swelling. Gastrointestinal: Negative for abdominal pain, blood in stool, constipation, diarrhea, nausea and vomiting. Endocrine: Negative for cold intolerance and heat intolerance. Genitourinary: Negative for dysuria, flank pain, menstrual problem, pelvic pain, urgency and vaginal discharge. Musculoskeletal: Positive for arthralgias, back pain, myalgias and neck pain. Skin: Negative for rash. Neurological: Negative for dizziness, weakness and headaches. Hematological: Negative for adenopathy. Psychiatric/Behavioral: Negative for dysphoric mood, sleep disturbance and suicidal ideas. The patient is not nervous/anxious. Objective:    /78 (Site: Right Upper Arm, Position: Sitting, Cuff Size: Small Adult)   Pulse 100   Temp 97 °F (36.1 °C) (Temporal)   Wt 168 lb (76.2 kg)   LMP 01/01/2020   SpO2 97%   BMI 28.84 kg/m²   Physical Exam  Vitals reviewed. Constitutional:       Appearance: She is well-developed. HENT:      Head: Normocephalic. Eyes:      Conjunctiva/sclera: Conjunctivae normal.   Cardiovascular:      Rate and Rhythm: Normal rate and regular rhythm. Heart sounds: Normal heart sounds. Pulmonary:      Effort: Pulmonary effort is normal.      Breath sounds: Normal breath sounds. Abdominal:      General: Bowel sounds are normal. There is distension. Palpations: Abdomen is soft. Tenderness: There is no abdominal tenderness. Comments: Incision is healing well. Musculoskeletal:         General: Normal range of motion. Cervical back: Normal range of motion and neck supple. Skin:     General: Skin is warm and dry.    Neurological:      Mental Status: She is alert and oriented to person, place, and time. Psychiatric:         Behavior: Behavior normal.           An electronic signature was used to authenticate this note.   --Roman Funes, APRN

## 2022-04-13 DIAGNOSIS — E78.2 MIXED HYPERLIPIDEMIA: ICD-10-CM

## 2022-04-13 DIAGNOSIS — E11.69 TYPE 2 DIABETES MELLITUS WITH OTHER SPECIFIED COMPLICATION, WITHOUT LONG-TERM CURRENT USE OF INSULIN (HCC): ICD-10-CM

## 2022-04-13 RX ORDER — ROSUVASTATIN CALCIUM 10 MG/1
10 TABLET, COATED ORAL NIGHTLY
Qty: 90 TABLET | Refills: 3 | Status: CANCELLED | OUTPATIENT
Start: 2022-04-13

## 2022-04-13 RX ORDER — PROPRANOLOL HCL 60 MG
60 CAPSULE, EXTENDED RELEASE 24HR ORAL DAILY
Qty: 90 CAPSULE | Refills: 3 | Status: CANCELLED | OUTPATIENT
Start: 2022-04-13

## 2022-04-13 NOTE — TELEPHONE ENCOUNTER
Received fax from pharmacy requesting refill on pts medication(s). Pt was last seen in office on 3/18/2022  and has a follow up scheduled for 4/14/2022. Will send request to  [facility]  for patient.      Requested Prescriptions     Pending Prescriptions Disp Refills    propranolol (INDERAL LA) 60 MG extended release capsule 30 capsule 11     Sig: Take 1 capsule by mouth daily

## 2022-04-14 ENCOUNTER — TELEMEDICINE (OUTPATIENT)
Dept: PRIMARY CARE CLINIC | Age: 47
End: 2022-04-14
Payer: COMMERCIAL

## 2022-04-14 DIAGNOSIS — M54.2 CHRONIC NECK PAIN: ICD-10-CM

## 2022-04-14 DIAGNOSIS — E78.2 MIXED HYPERLIPIDEMIA: ICD-10-CM

## 2022-04-14 DIAGNOSIS — R53.82 CHRONIC FATIGUE: ICD-10-CM

## 2022-04-14 DIAGNOSIS — G89.29 CHRONIC NECK PAIN: ICD-10-CM

## 2022-04-14 DIAGNOSIS — E11.69 TYPE 2 DIABETES MELLITUS WITH OTHER SPECIFIED COMPLICATION, WITHOUT LONG-TERM CURRENT USE OF INSULIN (HCC): Primary | ICD-10-CM

## 2022-04-14 DIAGNOSIS — F41.1 GAD (GENERALIZED ANXIETY DISORDER): ICD-10-CM

## 2022-04-14 DIAGNOSIS — M79.10 MYALGIA: ICD-10-CM

## 2022-04-14 DIAGNOSIS — H10.33 ACUTE BACTERIAL CONJUNCTIVITIS OF BOTH EYES: ICD-10-CM

## 2022-04-14 DIAGNOSIS — G35 MULTIPLE SCLEROSIS (HCC): ICD-10-CM

## 2022-04-14 PROCEDURE — 99214 OFFICE O/P EST MOD 30 MIN: CPT | Performed by: NURSE PRACTITIONER

## 2022-04-14 RX ORDER — ROSUVASTATIN CALCIUM 10 MG/1
10 TABLET, COATED ORAL NIGHTLY
Qty: 90 TABLET | Refills: 3 | Status: SHIPPED | OUTPATIENT
Start: 2022-04-14

## 2022-04-14 RX ORDER — TOBRAMYCIN 3 MG/ML
1-2 SOLUTION/ DROPS OPHTHALMIC 4 TIMES DAILY
Qty: 1 EACH | Refills: 1 | Status: SHIPPED | OUTPATIENT
Start: 2022-04-14 | End: 2022-04-24

## 2022-04-14 RX ORDER — MODAFINIL 200 MG/1
TABLET ORAL
Qty: 60 TABLET | Refills: 2 | Status: SHIPPED | OUTPATIENT
Start: 2022-04-14 | End: 2022-06-10 | Stop reason: SDUPTHER

## 2022-04-14 RX ORDER — DIAZEPAM 5 MG/1
5 TABLET ORAL EVERY 8 HOURS PRN
Qty: 60 TABLET | Refills: 0 | Status: SHIPPED | OUTPATIENT
Start: 2022-04-14 | End: 2022-05-27 | Stop reason: SDUPTHER

## 2022-04-14 RX ORDER — PROPRANOLOL HCL 60 MG
60 CAPSULE, EXTENDED RELEASE 24HR ORAL EVERY OTHER DAY
Qty: 7 CAPSULE | Refills: 0 | Status: SHIPPED | OUTPATIENT
Start: 2022-04-14 | End: 2022-05-03 | Stop reason: SDUPTHER

## 2022-04-14 RX ORDER — TRAMADOL HYDROCHLORIDE 50 MG/1
50 TABLET ORAL EVERY 8 HOURS PRN
Qty: 90 TABLET | Refills: 0 | Status: SHIPPED | OUTPATIENT
Start: 2022-04-14 | End: 2022-05-27 | Stop reason: SDUPTHER

## 2022-04-14 ASSESSMENT — ENCOUNTER SYMPTOMS
WHEEZING: 0
SORE THROAT: 0
VOMITING: 0
SINUS PRESSURE: 0
TROUBLE SWALLOWING: 0
EYE DISCHARGE: 0
BACK PAIN: 1
COUGH: 0
SHORTNESS OF BREATH: 0
ABDOMINAL PAIN: 0
BLOOD IN STOOL: 0
CONSTIPATION: 0
RHINORRHEA: 0
EYE REDNESS: 0
DIARRHEA: 0
NAUSEA: 0

## 2022-04-14 NOTE — PROGRESS NOTES
Ronnell Zuniga (:  1975) is a Established patient, here for evaluation of the following:    Assessment & Plan   Below is the assessment and plan developed based on review of pertinent history, physical exam, labs, studies, and medications. 1. Type 2 diabetes mellitus with other specified complication, without long-term current use of insulin (HCC)  -     Insulin Glargine, 1 Unit Dial, 300 UNIT/ML SOPN; Inject 10 Units into the skin nightly, Disp-1 pen, R-2Normal  -     rosuvastatin (CRESTOR) 10 MG tablet; Take 1 tablet by mouth nightly, Disp-90 tablet, R-3Normal  -     Insulin Pen Needle 31G X 5 MM MISC; Disp-100 each, R-3, NormalTo use with insulin pen  2. Acute bacterial conjunctivitis of both eyes  -     tobramycin (TOBREX) 0.3 % ophthalmic solution; Place 1-2 drops into both eyes 4 times daily for 10 days, Disp-1 each, R-1Normal  3. Multiple sclerosis (HCC)  -     traMADol (ULTRAM) 50 MG tablet; Take 1 tablet by mouth every 8 hours as needed for Pain for up to 30 days. , Disp-90 tablet, R-0Normal  -     modafinil (PROVIGIL) 200 MG tablet; Take 1 tablet po qam and 1 tablet at lunch or early afternoon, Disp-60 tablet, R-2Normal  4. Myalgia  -     traMADol (ULTRAM) 50 MG tablet; Take 1 tablet by mouth every 8 hours as needed for Pain for up to 30 days. , Disp-90 tablet, R-0Normal  5. Chronic neck pain  -     traMADol (ULTRAM) 50 MG tablet; Take 1 tablet by mouth every 8 hours as needed for Pain for up to 30 days. , Disp-90 tablet, R-0Normal  6. NERY (generalized anxiety disorder)  -     diazePAM (VALIUM) 5 MG tablet; Take 1 tablet by mouth every 8 hours as needed for Anxiety or Sleep for up to 30 days. , Disp-60 tablet, R-0Normal  7. Chronic fatigue  -     modafinil (PROVIGIL) 200 MG tablet; Take 1 tablet po qam and 1 tablet at lunch or early afternoon, Disp-60 tablet, R-2Normal  8. Mixed hyperlipidemia  -     rosuvastatin (CRESTOR) 10 MG tablet;  Take 1 tablet by mouth nightly, Disp-90 tablet, R-3Normal    Add toujeo at night for now until she is able to heal from surgery and go back to low carb diet. Controlled Substance Monitoring:    Acute and Chronic Pain Monitoring:   RX Monitoring 4/14/2022   Attestation -   Periodic Controlled Substance Monitoring Possible medication side effects, risk of tolerance/dependence & alternative treatments discussed. ;No signs of potential drug abuse or diversion identified. Chronic Pain > 50 MEDD -   Chronic Pain > 80 MEDD -           Return in about 2 months (around 6/14/2022) for diabetes 30 min. Subjective   HPI   Diabetes  bs has been running 140-220  She is on [place] and [place] and [place]  glps made her too nauseated. Pink eye  On and off for the past 2 weeks. Chronic pain   Has MS has chronic pain related to this. Takes 1-2 ultram per day. Also takes gabapentin. Has tried cymbalta in the past.   Cant take NSAIDs any more due to CKD  Chronic neck pain  MRI scanned in media  She is needing a refill on this  She has only been taking this with a muscle relaxer at bed time or with flare ups    Anxiety  She is feeling more her self with the effexor vs viibryd. She is also on rexulti and takes valium prn.     Review of Systems   Constitutional: Positive for fatigue. Negative for activity change, appetite change, chills, fever and unexpected weight change. HENT: Negative for congestion, hearing loss, postnasal drip, rhinorrhea, sinus pressure, sore throat and trouble swallowing. Eyes: Negative for discharge, redness and visual disturbance. Respiratory: Negative for cough, shortness of breath and wheezing. Cardiovascular: Negative for chest pain, palpitations and leg swelling. Gastrointestinal: Negative for abdominal pain, blood in stool, constipation, diarrhea, nausea and vomiting. Endocrine: Negative for cold intolerance and heat intolerance. Genitourinary: Negative for dysuria, flank pain, menstrual problem, pelvic pain, urgency and vaginal discharge. Musculoskeletal: Positive for arthralgias, back pain, myalgias and neck pain. Skin: Negative for rash. Neurological: Negative for dizziness, weakness and headaches. Hematological: Negative for adenopathy. Psychiatric/Behavioral: Negative for dysphoric mood, sleep disturbance and suicidal ideas. The patient is not nervous/anxious.            Objective   Patient-Reported Vitals  No data recorded     Physical Exam  [INSTRUCTIONS:  \"[x]\" Indicates a positive item  \"[]\" Indicates a negative item  -- DELETE ALL ITEMS NOT EXAMINED]    Constitutional: [x] Appears well-developed and well-nourished [x] No apparent distress      [] Abnormal -     Mental status: [x] Alert and awake  [x] Oriented to person/place/time [x] Able to follow commands    [] Abnormal -     Eyes:   EOM    [x]  Normal    [] Abnormal -   Sclera  [x]  Normal    [] Abnormal -          Discharge [x]  None visible   [] Abnormal -     HENT: [x] Normocephalic, atraumatic  [] Abnormal -   [x] Mouth/Throat: Mucous membranes are moist    External Ears [x] Normal  [] Abnormal -    Neck: [x] No visualized mass [] Abnormal -     Pulmonary/Chest: [x] Respiratory effort normal   [x] No visualized signs of difficulty breathing or respiratory distress        [] Abnormal -      Musculoskeletal:   [x] Normal gait with no signs of ataxia         [x] Normal range of motion of neck        [] Abnormal -     Neurological:        [x] No Facial Asymmetry (Cranial nerve 7 motor function) (limited exam due to video visit)          [x] No gaze palsy        [] Abnormal -          Skin:        [x] No significant exanthematous lesions or discoloration noted on facial skin         [] Abnormal -            Psychiatric:       [x] Normal Affect [] Abnormal -        [x] No Hallucinations    Other pertinent observable physical exam findings:-             On this date 4/14/2022 I have spent 36 minutes reviewing previous notes, test results and face to face (virtual) with the patient discussing the diagnosis and importance of compliance with the treatment plan as well as documenting on the day of the visit. Brenden Gallegos, was evaluated through a synchronous (real-time) audio-video encounter. The patient (or guardian if applicable) is aware that this is a billable service, which includes applicable co-pays. This Virtual Visit was conducted with patient's (and/or legal guardian's) consent. The visit was conducted pursuant to the emergency declaration under the [address] [place] [address] authority and the [employer] General Act. Patient identification was verified, and a caregiver was present when appropriate. The patient was located at home in a state where the provider was licensed to provide care.        --CLINTON Ridley

## 2022-04-18 RX ORDER — INSULIN GLARGINE 100 [IU]/ML
10 INJECTION, SOLUTION SUBCUTANEOUS NIGHTLY
Qty: 1 PEN | Refills: 0 | Status: SHIPPED | OUTPATIENT
Start: 2022-04-18 | End: 2022-07-12 | Stop reason: SDUPTHER

## 2022-05-03 RX ORDER — PROPRANOLOL HCL 60 MG
60 CAPSULE, EXTENDED RELEASE 24HR ORAL DAILY
Qty: 30 CAPSULE | Refills: 5 | Status: SHIPPED | OUTPATIENT
Start: 2022-05-03 | End: 2022-05-27 | Stop reason: SDUPTHER

## 2022-05-13 DIAGNOSIS — E11.69 TYPE 2 DIABETES MELLITUS WITH OTHER SPECIFIED COMPLICATION, WITHOUT LONG-TERM CURRENT USE OF INSULIN (HCC): ICD-10-CM

## 2022-05-13 DIAGNOSIS — G89.4 CHRONIC PAIN SYNDROME: ICD-10-CM

## 2022-05-13 RX ORDER — GABAPENTIN 100 MG/1
100 CAPSULE ORAL 3 TIMES DAILY
Qty: 90 CAPSULE | Refills: 3 | Status: SHIPPED | OUTPATIENT
Start: 2022-05-13 | End: 2022-09-13 | Stop reason: SDUPTHER

## 2022-05-13 NOTE — TELEPHONE ENCOUNTER
Received fax from pharmacy requesting refill on pts medication(s). Pt was last seen in office on 4/14/2022  and has a follow up scheduled for 5/27/2022. Will send request to  Sharion Cheadle  for authorization.      Requested Prescriptions     Pending Prescriptions Disp Refills    SITagliptin (JANUVIA) 100 MG tablet 30 tablet 11     Sig: Take 1 tablet by mouth daily

## 2022-05-17 DIAGNOSIS — R53.82 CHRONIC FATIGUE: ICD-10-CM

## 2022-05-17 DIAGNOSIS — R23.2 HOT FLASHES: Primary | ICD-10-CM

## 2022-05-17 DIAGNOSIS — E11.69 TYPE 2 DIABETES MELLITUS WITH OTHER SPECIFIED COMPLICATION, WITHOUT LONG-TERM CURRENT USE OF INSULIN (HCC): ICD-10-CM

## 2022-05-20 ENCOUNTER — OFFICE VISIT (OUTPATIENT)
Dept: PRIMARY CARE CLINIC | Age: 47
End: 2022-05-20
Payer: COMMERCIAL

## 2022-05-20 VITALS
TEMPERATURE: 96.8 F | OXYGEN SATURATION: 98 % | HEART RATE: 99 BPM | DIASTOLIC BLOOD PRESSURE: 68 MMHG | BODY MASS INDEX: 28.67 KG/M2 | WEIGHT: 167 LBS | SYSTOLIC BLOOD PRESSURE: 120 MMHG

## 2022-05-20 DIAGNOSIS — R23.2 HOT FLASHES: ICD-10-CM

## 2022-05-20 DIAGNOSIS — J30.1 SEASONAL ALLERGIC RHINITIS DUE TO POLLEN: ICD-10-CM

## 2022-05-20 DIAGNOSIS — R53.82 CHRONIC FATIGUE: ICD-10-CM

## 2022-05-20 DIAGNOSIS — J45.21 MILD INTERMITTENT REACTIVE AIRWAY DISEASE WITH ACUTE EXACERBATION: ICD-10-CM

## 2022-05-20 DIAGNOSIS — E11.69 TYPE 2 DIABETES MELLITUS WITH OTHER SPECIFIED COMPLICATION, WITHOUT LONG-TERM CURRENT USE OF INSULIN (HCC): ICD-10-CM

## 2022-05-20 DIAGNOSIS — J01.00 ACUTE MAXILLARY SINUSITIS, RECURRENCE NOT SPECIFIED: Primary | ICD-10-CM

## 2022-05-20 LAB
ALBUMIN SERPL-MCNC: 4.2 G/DL (ref 3.5–5.2)
ALP BLD-CCNC: 108 U/L (ref 35–104)
ALT SERPL-CCNC: 21 U/L (ref 5–33)
ANION GAP SERPL CALCULATED.3IONS-SCNC: 19 MMOL/L (ref 7–19)
AST SERPL-CCNC: 19 U/L (ref 5–32)
BASOPHILS ABSOLUTE: 0.1 K/UL (ref 0–0.2)
BASOPHILS RELATIVE PERCENT: 1.1 % (ref 0–1)
BILIRUB SERPL-MCNC: 0.5 MG/DL (ref 0.2–1.2)
BUN BLDV-MCNC: 13 MG/DL (ref 6–20)
CALCIUM SERPL-MCNC: 9.5 MG/DL (ref 8.6–10)
CHLORIDE BLD-SCNC: 99 MMOL/L (ref 98–111)
CHOLESTEROL, TOTAL: 296 MG/DL (ref 160–199)
CO2: 22 MMOL/L (ref 22–29)
CREAT SERPL-MCNC: 0.6 MG/DL (ref 0.5–0.9)
CREATININE URINE: 44.2 MG/DL (ref 4.2–622)
EOSINOPHILS ABSOLUTE: 0.3 K/UL (ref 0–0.6)
EOSINOPHILS RELATIVE PERCENT: 4.4 % (ref 0–5)
ESTRADIOL LEVEL: <5 PG/ML
FERRITIN: 65.9 NG/ML (ref 13–150)
FOLLICLE STIMULATING HORMONE: 53.9 MIU/ML
GFR AFRICAN AMERICAN: >59
GFR NON-AFRICAN AMERICAN: >60
GLUCOSE BLD-MCNC: 254 MG/DL (ref 74–109)
HBA1C MFR BLD: 7.7 % (ref 4–6)
HCT VFR BLD CALC: 43.6 % (ref 37–47)
HDLC SERPL-MCNC: 40 MG/DL (ref 65–121)
HEMOGLOBIN: 14.1 G/DL (ref 12–16)
IMMATURE GRANULOCYTES #: 0 K/UL
IRON SATURATION: 21 % (ref 14–50)
IRON: 78 UG/DL (ref 37–145)
LDL CHOLESTEROL CALCULATED: ABNORMAL MG/DL
LDL CHOLESTEROL DIRECT: 59 MG/DL
LUTEINIZING HORMONE: 43.8 MIU/ML
LYMPHOCYTES ABSOLUTE: 1.4 K/UL (ref 1.1–4.5)
LYMPHOCYTES RELATIVE PERCENT: 24.2 % (ref 20–40)
MCH RBC QN AUTO: 28.5 PG (ref 27–31)
MCHC RBC AUTO-ENTMCNC: 32.3 G/DL (ref 33–37)
MCV RBC AUTO: 88.1 FL (ref 81–99)
MICROALBUMIN UR-MCNC: <1.2 MG/DL (ref 0–19)
MICROALBUMIN/CREAT UR-RTO: NORMAL MG/G
MONOCYTES ABSOLUTE: 0.9 K/UL (ref 0–0.9)
MONOCYTES RELATIVE PERCENT: 15.4 % (ref 0–10)
NEUTROPHILS ABSOLUTE: 3.1 K/UL (ref 1.5–7.5)
NEUTROPHILS RELATIVE PERCENT: 54.5 % (ref 50–65)
PDW BLD-RTO: 12.7 % (ref 11.5–14.5)
PLATELET # BLD: 182 K/UL (ref 130–400)
PMV BLD AUTO: 12.4 FL (ref 9.4–12.3)
POTASSIUM SERPL-SCNC: 3.9 MMOL/L (ref 3.5–5)
PROGESTERONE LEVEL: <0.05 NG/ML
RBC # BLD: 4.95 M/UL (ref 4.2–5.4)
SODIUM BLD-SCNC: 140 MMOL/L (ref 136–145)
T4 FREE: 0.83 NG/DL (ref 0.93–1.7)
TOTAL IRON BINDING CAPACITY: 364 UG/DL (ref 250–400)
TOTAL PROTEIN: 6.6 G/DL (ref 6.6–8.7)
TRIGL SERPL-MCNC: 1006 MG/DL (ref 0–149)
TSH SERPL DL<=0.05 MIU/L-ACNC: 1.17 UIU/ML (ref 0.27–4.2)
WBC # BLD: 5.7 K/UL (ref 4.8–10.8)

## 2022-05-20 PROCEDURE — 99213 OFFICE O/P EST LOW 20 MIN: CPT | Performed by: NURSE PRACTITIONER

## 2022-05-20 RX ORDER — MONTELUKAST SODIUM 10 MG/1
10 TABLET ORAL NIGHTLY
Qty: 30 TABLET | Refills: 5 | Status: SHIPPED | OUTPATIENT
Start: 2022-05-20 | End: 2022-11-04 | Stop reason: SDUPTHER

## 2022-05-20 RX ORDER — CEFDINIR 300 MG/1
300 CAPSULE ORAL 2 TIMES DAILY
Qty: 20 CAPSULE | Refills: 0 | Status: SHIPPED | OUTPATIENT
Start: 2022-05-20 | End: 2022-05-30

## 2022-05-20 RX ORDER — METHYLPREDNISOLONE 4 MG/1
TABLET ORAL
Qty: 1 KIT | Refills: 0 | Status: SHIPPED | OUTPATIENT
Start: 2022-05-20 | End: 2022-06-27

## 2022-05-20 ASSESSMENT — ENCOUNTER SYMPTOMS
BLOOD IN STOOL: 0
RHINORRHEA: 1
DIARRHEA: 0
WHEEZING: 0
COUGH: 1
CONSTIPATION: 0
TROUBLE SWALLOWING: 0
SINUS PAIN: 1
EYE REDNESS: 0
ABDOMINAL PAIN: 0
SINUS PRESSURE: 1
EYE DISCHARGE: 0
CHEST TIGHTNESS: 1
SHORTNESS OF BREATH: 1
SORE THROAT: 0

## 2022-05-20 NOTE — PROGRESS NOTES
Raman Crespo (:  1975) is a 52 y.o. female,Established patient, here for evaluation of the following chief complaint(s):  Congestion (Patient is here today for sneezing, coughing, yellow drainage. )      ASSESSMENT/PLAN:    ICD-10-CM    1. Acute maxillary sinusitis, recurrence not specified  J01.00 cefdinir (OMNICEF) 300 MG capsule   2. Seasonal allergic rhinitis due to pollen  J30.1 montelukast (SINGULAIR) 10 MG tablet   3. Mild intermittent reactive airway disease with acute exacerbation  J45.21 methylPREDNISolone (MEDROL, MAYANK,) 4 MG tablet       Return if symptoms worsen or fail to improve. SUBJECTIVE/OBJECTIVE:  HPI  Congestion (Patient is here today for sneezing, coughing, yellow drainage. )  Started about a week ago. Feels tight to breath. Review of Systems   Constitutional: Negative for appetite change and unexpected weight change. HENT: Positive for congestion, rhinorrhea, sinus pressure and sinus pain. Negative for sore throat and trouble swallowing. Eyes: Negative for discharge and redness. Respiratory: Positive for cough, chest tightness and shortness of breath. Negative for wheezing. Cardiovascular: Negative for chest pain. Gastrointestinal: Negative for abdominal pain, blood in stool, constipation and diarrhea. Genitourinary: Negative for dysuria. Skin: Negative for rash. Neurological: Negative for weakness. Hematological: Negative for adenopathy. /68 (Site: Left Upper Arm, Position: Sitting, Cuff Size: Large Adult)   Pulse 99   Temp 96.8 °F (36 °C) (Temporal)   Wt 167 lb (75.8 kg)   LMP 2020   SpO2 98%   BMI 28.67 kg/m²    Physical Exam  Vitals reviewed. Constitutional:       Appearance: She is well-developed. HENT:      Head: Normocephalic. Eyes:      Conjunctiva/sclera: Conjunctivae normal.   Cardiovascular:      Rate and Rhythm: Normal rate and regular rhythm. Heart sounds: Normal heart sounds. No murmur heard.       Pulmonary: Effort: Pulmonary effort is normal.      Breath sounds: Decreased breath sounds present. Abdominal:      General: Bowel sounds are normal.      Palpations: Abdomen is soft. Tenderness: There is no abdominal tenderness. Musculoskeletal:         General: Normal range of motion. Cervical back: Normal range of motion and neck supple. Skin:     General: Skin is warm and dry. Neurological:      Mental Status: She is alert and oriented to person, place, and time. Psychiatric:         Behavior: Behavior normal.                 An electronic signature was used to authenticate this note.     --CLINTON Wayne

## 2022-05-23 DIAGNOSIS — E78.1 HYPERTRIGLYCERIDEMIA: Primary | ICD-10-CM

## 2022-05-23 RX ORDER — OMEGA-3-ACID ETHYL ESTERS 1 G/1
2 CAPSULE, LIQUID FILLED ORAL 2 TIMES DAILY
Qty: 60 CAPSULE | Refills: 5 | Status: SHIPPED | OUTPATIENT
Start: 2022-05-23 | End: 2022-08-05 | Stop reason: SDUPTHER

## 2022-05-27 DIAGNOSIS — G89.29 CHRONIC NECK PAIN: ICD-10-CM

## 2022-05-27 DIAGNOSIS — F41.1 GAD (GENERALIZED ANXIETY DISORDER): ICD-10-CM

## 2022-05-27 DIAGNOSIS — M54.2 CHRONIC NECK PAIN: ICD-10-CM

## 2022-05-27 DIAGNOSIS — G35 MULTIPLE SCLEROSIS (HCC): ICD-10-CM

## 2022-05-27 DIAGNOSIS — M79.10 MYALGIA: ICD-10-CM

## 2022-05-27 RX ORDER — TRAMADOL HYDROCHLORIDE 50 MG/1
50 TABLET ORAL EVERY 8 HOURS PRN
Qty: 90 TABLET | Refills: 0 | Status: SHIPPED | OUTPATIENT
Start: 2022-05-27 | End: 2022-06-28 | Stop reason: SDUPTHER

## 2022-05-27 RX ORDER — PROPRANOLOL HCL 60 MG
60 CAPSULE, EXTENDED RELEASE 24HR ORAL DAILY
Qty: 30 CAPSULE | Refills: 5 | Status: SHIPPED | OUTPATIENT
Start: 2022-05-27 | End: 2022-10-10 | Stop reason: SDUPTHER

## 2022-05-27 RX ORDER — DIAZEPAM 5 MG/1
5 TABLET ORAL EVERY 8 HOURS PRN
Qty: 60 TABLET | Refills: 0 | Status: SHIPPED | OUTPATIENT
Start: 2022-05-27 | End: 2022-06-26

## 2022-06-07 LAB — TESTOSTERONE, FEMALES/CHILDREN: 5 NG/DL (ref 9–55)

## 2022-06-10 DIAGNOSIS — R53.82 CHRONIC FATIGUE: ICD-10-CM

## 2022-06-10 DIAGNOSIS — G35 MULTIPLE SCLEROSIS (HCC): ICD-10-CM

## 2022-06-10 RX ORDER — MODAFINIL 200 MG/1
TABLET ORAL
Qty: 60 TABLET | Refills: 2 | Status: SHIPPED | OUTPATIENT
Start: 2022-06-10 | End: 2022-06-27

## 2022-06-10 NOTE — TELEPHONE ENCOUNTER
Received fax from pharmacy requesting refill on pts medication(s). Pt was last seen in office on 5/20/2022  and has a follow up scheduled for 6/20/2022. Will send request to  Alo Minor  for authorization.      Requested Prescriptions     Pending Prescriptions Disp Refills    modafinil (PROVIGIL) 200 MG tablet 60 tablet 2     Sig: Take 1 tablet po qam and 1 tablet at lunch or early afternoon

## 2022-06-27 ENCOUNTER — OFFICE VISIT (OUTPATIENT)
Dept: PRIMARY CARE CLINIC | Age: 47
End: 2022-06-27
Payer: COMMERCIAL

## 2022-06-27 VITALS
OXYGEN SATURATION: 98 % | BODY MASS INDEX: 28.84 KG/M2 | HEART RATE: 83 BPM | DIASTOLIC BLOOD PRESSURE: 80 MMHG | SYSTOLIC BLOOD PRESSURE: 120 MMHG | TEMPERATURE: 97.5 F | WEIGHT: 168 LBS

## 2022-06-27 DIAGNOSIS — G35 MULTIPLE SCLEROSIS (HCC): ICD-10-CM

## 2022-06-27 DIAGNOSIS — F41.1 GAD (GENERALIZED ANXIETY DISORDER): Primary | ICD-10-CM

## 2022-06-27 DIAGNOSIS — M79.10 MYALGIA: ICD-10-CM

## 2022-06-27 DIAGNOSIS — G89.29 CHRONIC NECK PAIN: ICD-10-CM

## 2022-06-27 DIAGNOSIS — F33.1 MODERATE EPISODE OF RECURRENT MAJOR DEPRESSIVE DISORDER (HCC): ICD-10-CM

## 2022-06-27 DIAGNOSIS — M54.2 CHRONIC NECK PAIN: ICD-10-CM

## 2022-06-27 PROCEDURE — 99214 OFFICE O/P EST MOD 30 MIN: CPT | Performed by: NURSE PRACTITIONER

## 2022-06-27 RX ORDER — ARMODAFINIL 250 MG/1
250 TABLET ORAL EVERY MORNING
Qty: 30 TABLET | Refills: 2 | Status: CANCELLED | OUTPATIENT
Start: 2022-06-27 | End: 2022-09-25

## 2022-06-27 ASSESSMENT — PATIENT HEALTH QUESTIONNAIRE - PHQ9
6. FEELING BAD ABOUT YOURSELF - OR THAT YOU ARE A FAILURE OR HAVE LET YOURSELF OR YOUR FAMILY DOWN: 3
3. TROUBLE FALLING OR STAYING ASLEEP: 2
10. IF YOU CHECKED OFF ANY PROBLEMS, HOW DIFFICULT HAVE THESE PROBLEMS MADE IT FOR YOU TO DO YOUR WORK, TAKE CARE OF THINGS AT HOME, OR GET ALONG WITH OTHER PEOPLE: 2
7. TROUBLE CONCENTRATING ON THINGS, SUCH AS READING THE NEWSPAPER OR WATCHING TELEVISION: 2
5. POOR APPETITE OR OVEREATING: 3
8. MOVING OR SPEAKING SO SLOWLY THAT OTHER PEOPLE COULD HAVE NOTICED. OR THE OPPOSITE, BEING SO FIGETY OR RESTLESS THAT YOU HAVE BEEN MOVING AROUND A LOT MORE THAN USUAL: 0
2. FEELING DOWN, DEPRESSED OR HOPELESS: 2
9. THOUGHTS THAT YOU WOULD BE BETTER OFF DEAD, OR OF HURTING YOURSELF: 0
SUM OF ALL RESPONSES TO PHQ QUESTIONS 1-9: 17
SUM OF ALL RESPONSES TO PHQ9 QUESTIONS 1 & 2: 4
1. LITTLE INTEREST OR PLEASURE IN DOING THINGS: 2
4. FEELING TIRED OR HAVING LITTLE ENERGY: 3
SUM OF ALL RESPONSES TO PHQ QUESTIONS 1-9: 17

## 2022-06-27 NOTE — PROGRESS NOTES
Coleman Aquino (:  1975) is a 52 y.o. female,Established patient, here for evaluation of the following chief complaint(s):  Diabetes (Patient is here today for a diabetes follow up. Patient is picking at arms and legs. Feels as if its related to pain/anxiety. )      ASSESSMENT/PLAN:    ICD-10-CM    1. NERY (generalized anxiety disorder)  F41.1 clonazePAM (KLONOPIN) 0.5 MG tablet   2. Multiple sclerosis (HCC)  G35 traMADol (ULTRAM) 50 MG tablet   3. Myalgia  M79.10 traMADol (ULTRAM) 50 MG tablet   4. Chronic neck pain  M54.2 traMADol (ULTRAM) 50 MG tablet    G89.29    5. Moderate episode of recurrent major depressive disorder (HCC)  F33.1 brexpiprazole (REXULTI) 0.5 MG TABS tablet     Decrease rexulti. Add klonopin and stop valium. Change provigil to nuvigil    Return in about 1 month (around 2022) for diabetes. SUBJECTIVE/OBJECTIVE:  HPI  Diabetes (Patient is here today for a diabetes follow up. It is better currently taking 10 u of basaglar at night. anxiety   Patient is picking at arms and legs. Feels as if its related to pain/anxiety. When we increased rexulti, this gave her sexual dysfunction. MS  Chronic fatigue. She can not tell that provigil makes much of a difference   She was previously changed to nuvigil but her insurance didn't approve for her to continue this so we switched to provigil again. Review of Systems   Constitutional: Positive for fatigue. Negative for activity change, appetite change, chills, fever and unexpected weight change. HENT: Negative for congestion, hearing loss, postnasal drip, rhinorrhea, sinus pressure, sore throat and trouble swallowing. Eyes: Negative for discharge, redness and visual disturbance. Respiratory: Negative for cough, shortness of breath and wheezing. Cardiovascular: Negative for chest pain, palpitations and leg swelling.    Gastrointestinal: Negative for abdominal pain, blood in stool, constipation, diarrhea, nausea and vomiting. Endocrine: Negative for cold intolerance and heat intolerance. Genitourinary: Negative for dysuria, flank pain, menstrual problem, pelvic pain, urgency and vaginal discharge. Musculoskeletal: Positive for arthralgias, back pain, myalgias and neck pain. Skin: Negative for rash. Neurological: Negative for dizziness, weakness and headaches. Hematological: Negative for adenopathy. Psychiatric/Behavioral: Negative for dysphoric mood, sleep disturbance and suicidal ideas. The patient is not nervous/anxious. /80 (Site: Left Upper Arm, Position: Sitting, Cuff Size: Large Adult)   Pulse 83   Temp 97.5 °F (36.4 °C) (Temporal)   Wt 168 lb (76.2 kg)   LMP 01/01/2020   SpO2 98%   BMI 28.84 kg/m²    Physical Exam  Vitals reviewed. Constitutional:       General: She is not in acute distress. Appearance: Normal appearance. She is not ill-appearing. HENT:      Head: Normocephalic. Right Ear: There is no impacted cerumen. Left Ear: There is no impacted cerumen. Nose: No rhinorrhea. Mouth/Throat:      Pharynx: No posterior oropharyngeal erythema. Eyes:      General:         Right eye: No discharge. Left eye: No discharge. Cardiovascular:      Rate and Rhythm: Normal rate and regular rhythm. Heart sounds: No murmur heard. Pulmonary:      Effort: Pulmonary effort is normal.      Breath sounds: Normal breath sounds. No wheezing, rhonchi or rales. Abdominal:      General: Bowel sounds are normal.   Musculoskeletal:         General: No swelling. Normal range of motion. Skin:     General: Skin is warm. Capillary Refill: Capillary refill takes less than 2 seconds. Findings: No rash. Neurological:      General: No focal deficit present. Mental Status: She is alert and oriented to person, place, and time.    Psychiatric:         Mood and Affect: Mood normal.         Behavior: Behavior normal.                 An electronic signature was used to authenticate this note.     --CLINTON Garcia

## 2022-06-28 ENCOUNTER — TELEPHONE (OUTPATIENT)
Dept: PRIMARY CARE CLINIC | Age: 47
End: 2022-06-28

## 2022-06-28 DIAGNOSIS — G35 MULTIPLE SCLEROSIS (HCC): Primary | ICD-10-CM

## 2022-06-28 DIAGNOSIS — R53.82 CHRONIC FATIGUE: ICD-10-CM

## 2022-06-28 RX ORDER — TOBRAMYCIN 3 MG/ML
1-2 SOLUTION/ DROPS OPHTHALMIC 4 TIMES DAILY
Qty: 1 EACH | Refills: 1 | Status: SHIPPED | OUTPATIENT
Start: 2022-06-28 | End: 2022-07-08

## 2022-06-28 RX ORDER — CLONAZEPAM 0.5 MG/1
0.5 TABLET ORAL 2 TIMES DAILY
Qty: 60 TABLET | Refills: 2 | Status: SHIPPED | OUTPATIENT
Start: 2022-06-28 | End: 2022-07-26 | Stop reason: SDUPTHER

## 2022-06-28 RX ORDER — TRAMADOL HYDROCHLORIDE 50 MG/1
50 TABLET ORAL EVERY 8 HOURS PRN
Qty: 90 TABLET | Refills: 0 | Status: SHIPPED | OUTPATIENT
Start: 2022-06-28 | End: 2022-07-26 | Stop reason: SDUPTHER

## 2022-06-28 RX ORDER — ARMODAFINIL 250 MG/1
250 TABLET ORAL DAILY
Qty: 30 TABLET | Refills: 2 | Status: SHIPPED | OUTPATIENT
Start: 2022-06-28 | End: 2022-09-15 | Stop reason: SDUPTHER

## 2022-06-28 ASSESSMENT — ENCOUNTER SYMPTOMS
SHORTNESS OF BREATH: 0
ABDOMINAL PAIN: 0
VOMITING: 0
WHEEZING: 0
DIARRHEA: 0
SINUS PRESSURE: 0
EYE REDNESS: 0
BACK PAIN: 1
SORE THROAT: 0
EYE DISCHARGE: 0
COUGH: 0
NAUSEA: 0
BLOOD IN STOOL: 0
TROUBLE SWALLOWING: 0
CONSTIPATION: 0
RHINORRHEA: 0

## 2022-06-28 NOTE — TELEPHONE ENCOUNTER
Pt called, she said that you changed her anxiety med yesterday, but its not at [facility]. Also, she said that you lowered a med-thinks mood stabilizer? Down to .5mg, is what she thinks, but it hasnt been sent in either.

## 2022-06-28 NOTE — TELEPHONE ENCOUNTER
Yes . I am so sorry, it was still pended in her note. These have been sent in now. The valium was changed to klonopin , rexulti was decreased to 0.5mg. provigil was changed to nuvigil.

## 2022-06-28 NOTE — TELEPHONE ENCOUNTER
Laila Calderon APRN 25 minutes ago (10:20 AM)     ALISSON Dumont Junior.      We never received Magos prescription for armodafinil.      Thanks.   Amairani Johnson  I am showing that she had this, but then it was d/c

## 2022-07-11 DIAGNOSIS — E11.69 TYPE 2 DIABETES MELLITUS WITH OTHER SPECIFIED COMPLICATION, WITHOUT LONG-TERM CURRENT USE OF INSULIN (HCC): ICD-10-CM

## 2022-07-11 NOTE — TELEPHONE ENCOUNTER
Received fax from pharmacy requesting refill on pts medication(s). Pt was last seen in office on 6/27/2022  and has a follow up scheduled for 7/26/2022. Will send request to  Fabrizio Eckert  for authorization.      Requested Prescriptions     Pending Prescriptions Disp Refills    dapagliflozin (FARXIGA) 10 MG tablet 90 tablet 3     Sig: Take 1 tablet by mouth every morning

## 2022-07-12 DIAGNOSIS — E11.69 TYPE 2 DIABETES MELLITUS WITH OTHER SPECIFIED COMPLICATION, WITHOUT LONG-TERM CURRENT USE OF INSULIN (HCC): Primary | ICD-10-CM

## 2022-07-12 RX ORDER — INSULIN GLARGINE 100 [IU]/ML
10 INJECTION, SOLUTION SUBCUTANEOUS NIGHTLY
Qty: 1 PEN | Refills: 0 | OUTPATIENT
Start: 2022-07-12

## 2022-07-12 RX ORDER — INSULIN GLARGINE 100 [IU]/ML
10 INJECTION, SOLUTION SUBCUTANEOUS NIGHTLY
Qty: 3 PEN | Refills: 3 | Status: SHIPPED | OUTPATIENT
Start: 2022-07-12

## 2022-07-12 NOTE — TELEPHONE ENCOUNTER
Received fax from pharmacy requesting refill on pts medication(s). Pt was last seen in office on 6/27/2022  and has a follow up scheduled for 7/26/2022. Will send request to  Erasto Whitfield  for patient.      Requested Prescriptions     Pending Prescriptions Disp Refills    insulin glargine (BASAGLAR KWIKPEN) 100 UNIT/ML injection pen 3 pen 3     Sig: Inject 10 Units into the skin nightly

## 2022-07-12 NOTE — TELEPHONE ENCOUNTER
Requested Prescriptions     Refused Prescriptions Disp Refills    insulin glargine (BASAGLAR KWIKPEN) 100 UNIT/ML injection pen 1 pen 0     Sig: Inject 10 Units into the skin nightly     Refused By: Rajan Gold     Reason for Refusal: Duplicate Request

## 2022-07-26 ENCOUNTER — OFFICE VISIT (OUTPATIENT)
Dept: PRIMARY CARE CLINIC | Age: 47
End: 2022-07-26
Payer: COMMERCIAL

## 2022-07-26 VITALS
TEMPERATURE: 96.9 F | HEART RATE: 92 BPM | WEIGHT: 166 LBS | OXYGEN SATURATION: 97 % | SYSTOLIC BLOOD PRESSURE: 110 MMHG | DIASTOLIC BLOOD PRESSURE: 80 MMHG | BODY MASS INDEX: 28.49 KG/M2

## 2022-07-26 DIAGNOSIS — G35 MULTIPLE SCLEROSIS (HCC): ICD-10-CM

## 2022-07-26 DIAGNOSIS — F41.1 GAD (GENERALIZED ANXIETY DISORDER): ICD-10-CM

## 2022-07-26 DIAGNOSIS — M79.10 MYALGIA: ICD-10-CM

## 2022-07-26 DIAGNOSIS — M54.2 CHRONIC NECK PAIN: ICD-10-CM

## 2022-07-26 DIAGNOSIS — N39.0 URINARY TRACT INFECTION WITHOUT HEMATURIA, SITE UNSPECIFIED: ICD-10-CM

## 2022-07-26 DIAGNOSIS — G89.29 CHRONIC NECK PAIN: ICD-10-CM

## 2022-07-26 DIAGNOSIS — R53.82 CHRONIC FATIGUE: ICD-10-CM

## 2022-07-26 DIAGNOSIS — E11.69 TYPE 2 DIABETES MELLITUS WITH OTHER SPECIFIED COMPLICATION, WITHOUT LONG-TERM CURRENT USE OF INSULIN (HCC): Primary | ICD-10-CM

## 2022-07-26 LAB
APPEARANCE FLUID: NORMAL
BILIRUBIN, POC: NORMAL
BLOOD URINE, POC: NORMAL
CLARITY, POC: NORMAL
COLOR, POC: YELLOW
GLUCOSE URINE, POC: NORMAL
KETONES, POC: NORMAL
LEUKOCYTE EST, POC: NORMAL
NITRITE, POC: NORMAL
PH, POC: 6.5
PROTEIN, POC: NORMAL
SPECIFIC GRAVITY, POC: 1.02
UROBILINOGEN, POC: NORMAL

## 2022-07-26 PROCEDURE — 81002 URINALYSIS NONAUTO W/O SCOPE: CPT | Performed by: NURSE PRACTITIONER

## 2022-07-26 PROCEDURE — 3051F HG A1C>EQUAL 7.0%<8.0%: CPT | Performed by: NURSE PRACTITIONER

## 2022-07-26 PROCEDURE — 99214 OFFICE O/P EST MOD 30 MIN: CPT | Performed by: NURSE PRACTITIONER

## 2022-07-26 RX ORDER — TRAMADOL HYDROCHLORIDE 50 MG/1
50 TABLET ORAL EVERY 8 HOURS PRN
Qty: 90 TABLET | Refills: 0 | Status: SHIPPED | OUTPATIENT
Start: 2022-07-26 | End: 2022-08-25

## 2022-07-26 RX ORDER — PIOGLITAZONEHYDROCHLORIDE 30 MG/1
30 TABLET ORAL DAILY
Qty: 30 TABLET | Refills: 3 | Status: SHIPPED | OUTPATIENT
Start: 2022-07-26 | End: 2022-11-04 | Stop reason: SDUPTHER

## 2022-07-26 RX ORDER — CLONAZEPAM 0.5 MG/1
0.5 TABLET ORAL 2 TIMES DAILY
Qty: 60 TABLET | Refills: 0 | Status: SHIPPED | OUTPATIENT
Start: 2022-07-26 | End: 2022-08-25

## 2022-07-26 ASSESSMENT — ENCOUNTER SYMPTOMS
RHINORRHEA: 0
SINUS PRESSURE: 0
ABDOMINAL PAIN: 0
BLOOD IN STOOL: 0
CONSTIPATION: 0
EYE DISCHARGE: 0
WHEEZING: 0
EYE REDNESS: 0
VOMITING: 0
SORE THROAT: 0
NAUSEA: 0
TROUBLE SWALLOWING: 0
DIARRHEA: 0
BACK PAIN: 1
COUGH: 0
SHORTNESS OF BREATH: 0

## 2022-07-26 NOTE — PROGRESS NOTES
Oly Rodriguez (:  1975) is a 52 y.o. female,Established patient, here for evaluation of the following chief complaint(s):  Follow-up (Patient presents today for follow up. States that her anxiety has not improved. Patients neurologist treated her for a UTI. Patient is needing a follow up urine culture ran. This has been collected in office today.)      ASSESSMENT/PLAN:    ICD-10-CM    1. Type 2 diabetes mellitus with other specified complication, without long-term current use of insulin (HCC)  E11.69 pioglitazone (ACTOS) 30 MG tablet      2. Urinary tract infection without hematuria, site unspecified  N39.0 POCT Urinalysis no Micro     Culture, Urine      3. NERY (generalized anxiety disorder)  F41.1 clonazePAM (KLONOPIN) 0.5 MG tablet      4. Multiple sclerosis (HCC)  G35 traMADol (ULTRAM) 50 MG tablet      5. Chronic fatigue  R53.82       6. Myalgia  M79.10 traMADol (ULTRAM) 50 MG tablet      7. Chronic neck pain  M54.2 traMADol (ULTRAM) 50 MG tablet    G89.29         Add actos. Return in about 1 month (around 2022) for diabetes. SUBJECTIVE/OBJECTIVE:  HPI  Diabetes  Has been a little better she states it was 150 this morning. She is taking 18 u of basaglar. Farxiga 10 mg and januvia 100 mg. Anxiety  She is currently on effexor 150 mg and rexulti 0.5 mg. Klonopin bid prn. She has a lot going on and external stressors right now. Chronic pain  Has MS has chronic pain related to this. Takes 1-2 ultram per day. Also takes gabapentin. Has tried cymbalta in the past.  Cant take NSAIDs any more due to CKD  Chronic neck pain  MRI scanned in media    Review of Systems   Constitutional:  Positive for fatigue. Negative for activity change, appetite change, chills, fever and unexpected weight change. HENT:  Negative for congestion, hearing loss, postnasal drip, rhinorrhea, sinus pressure, sore throat and trouble swallowing. Eyes:  Negative for discharge, redness and visual disturbance. Respiratory:  Negative for cough, shortness of breath and wheezing. Cardiovascular:  Negative for chest pain, palpitations and leg swelling. Gastrointestinal:  Negative for abdominal pain, blood in stool, constipation, diarrhea, nausea and vomiting. Endocrine: Negative for cold intolerance and heat intolerance. Genitourinary:  Negative for dysuria, flank pain, menstrual problem, pelvic pain, urgency and vaginal discharge. Musculoskeletal:  Positive for arthralgias, back pain, myalgias and neck pain. Skin:  Negative for rash. Neurological:  Negative for dizziness, weakness and headaches. Hematological:  Negative for adenopathy. Psychiatric/Behavioral:  Negative for dysphoric mood, sleep disturbance and suicidal ideas. The patient is not nervous/anxious. /80 (Site: Left Upper Arm, Position: Sitting, Cuff Size: Small Adult)   Pulse 92   Temp 96.9 °F (36.1 °C) (Temporal)   Wt 166 lb (75.3 kg)   LMP 01/01/2020   SpO2 97%   BMI 28.49 kg/m²    Physical Exam  Vitals reviewed. Constitutional:       General: She is not in acute distress. Appearance: Normal appearance. She is not ill-appearing. HENT:      Head: Normocephalic. Right Ear: There is no impacted cerumen. Left Ear: There is no impacted cerumen. Nose: No rhinorrhea. Mouth/Throat:      Pharynx: No posterior oropharyngeal erythema. Eyes:      General:         Right eye: No discharge. Left eye: No discharge. Cardiovascular:      Rate and Rhythm: Normal rate and regular rhythm. Heart sounds: No murmur heard. Pulmonary:      Effort: Pulmonary effort is normal.      Breath sounds: Normal breath sounds. No wheezing, rhonchi or rales. Abdominal:      General: Bowel sounds are normal.   Musculoskeletal:         General: No swelling. Normal range of motion. Skin:     General: Skin is warm. Capillary Refill: Capillary refill takes less than 2 seconds. Findings: No rash. Neurological:      General: No focal deficit present. Mental Status: She is alert and oriented to person, place, and time. Psychiatric:         Mood and Affect: Mood normal.         Behavior: Behavior normal.               An electronic signature was used to authenticate this note.     --CLINTON Guan

## 2022-07-28 LAB — URINE CULTURE, ROUTINE: NORMAL

## 2022-07-29 ENCOUNTER — TELEPHONE (OUTPATIENT)
Dept: FAMILY MEDICINE CLINIC | Age: 47
End: 2022-07-29

## 2022-07-29 NOTE — TELEPHONE ENCOUNTER
----- Message from CLINTON Kuhn sent at 7/28/2022  3:28 PM CDT -----  Please inform patient results show  Urine culture is negative

## 2022-08-05 DIAGNOSIS — E11.69 TYPE 2 DIABETES MELLITUS WITH OTHER SPECIFIED COMPLICATION, WITHOUT LONG-TERM CURRENT USE OF INSULIN (HCC): ICD-10-CM

## 2022-08-05 DIAGNOSIS — E78.1 HYPERTRIGLYCERIDEMIA: ICD-10-CM

## 2022-08-05 RX ORDER — LEVOCETIRIZINE DIHYDROCHLORIDE 5 MG/1
5 TABLET, FILM COATED ORAL NIGHTLY
Qty: 90 TABLET | Refills: 3 | Status: SHIPPED | OUTPATIENT
Start: 2022-08-05

## 2022-08-05 RX ORDER — OMEGA-3-ACID ETHYL ESTERS 1 G/1
2 CAPSULE, LIQUID FILLED ORAL 2 TIMES DAILY
Qty: 60 CAPSULE | Refills: 5 | Status: SHIPPED | OUTPATIENT
Start: 2022-08-05

## 2022-08-05 NOTE — TELEPHONE ENCOUNTER
Received fax from pharmacy requesting refill on pts medication(s). Pt was last seen in office on 7/26/2022  and has a follow up scheduled for Visit date not found. Will send request to  Shadia Franco  for authorization. Requested Prescriptions     Pending Prescriptions Disp Refills    levocetirizine (XYZAL) 5 MG tablet 90 tablet 3     Sig: Take 1 tablet by mouth nightly    Insulin Pen Needle 31G X 5 MM MISC 100 each 3     Sig: To use with insulin pen    omega-3 acid ethyl esters (LOVAZA) 1 g capsule 60 capsule 5     Sig: Take 2 capsules by mouth in the morning and 2 capsules before bedtime.

## 2022-08-22 RX ORDER — ONDANSETRON 4 MG/1
4 TABLET, ORALLY DISINTEGRATING ORAL 3 TIMES DAILY PRN
Qty: 30 TABLET | Refills: 1 | Status: SHIPPED | OUTPATIENT
Start: 2022-08-22

## 2022-08-24 ENCOUNTER — TELEPHONE (OUTPATIENT)
Dept: PRIMARY CARE CLINIC | Age: 47
End: 2022-08-24

## 2022-08-24 NOTE — TELEPHONE ENCOUNTER
Marilyn Flannery, CLINTON 1 hour ago (2:11 PM)     [address] Jay Gibbons is having a very bad relapse. Adriano has been trying to get her on corticotrophin but the insurance has denied the prior authorization. They may want her to have a steroid infusion which I suggested 5 weeks ago. I know its going going to mess with her blood glucose but she needs relief. Heres my question. She is currently on 18 units once daily. How would we adjust the insulin as a result of the steroids? Complicated question but she desperately needs something. She stayed home from work today.       Thanks     Cindy Valdez

## 2022-08-25 ENCOUNTER — HOSPITAL ENCOUNTER (OUTPATIENT)
Dept: INFUSION THERAPY | Age: 47
Setting detail: INFUSION SERIES
Discharge: HOME | End: 2022-08-25
Payer: COMMERCIAL

## 2022-08-25 VITALS
RESPIRATION RATE: 18 BRPM | TEMPERATURE: 97.6 F | OXYGEN SATURATION: 98 % | DIASTOLIC BLOOD PRESSURE: 67 MMHG | HEART RATE: 87 BPM | SYSTOLIC BLOOD PRESSURE: 117 MMHG

## 2022-08-25 DIAGNOSIS — G35 MULTIPLE SCLEROSIS (HCC): Primary | ICD-10-CM

## 2022-08-25 PROCEDURE — 96365 THER/PROPH/DIAG IV INF INIT: CPT

## 2022-08-25 PROCEDURE — 2580000003 HC RX 258: Performed by: NURSE PRACTITIONER

## 2022-08-25 PROCEDURE — 6360000002 HC RX W HCPCS: Performed by: NURSE PRACTITIONER

## 2022-08-25 RX ORDER — SODIUM CHLORIDE 0.9 % (FLUSH) 0.9 %
5-40 SYRINGE (ML) INJECTION PRN
Status: CANCELLED | OUTPATIENT
Start: 2022-08-26

## 2022-08-25 RX ORDER — SODIUM CHLORIDE 0.9 % (FLUSH) 0.9 %
5-40 SYRINGE (ML) INJECTION PRN
Status: CANCELLED | OUTPATIENT
Start: 2022-08-25

## 2022-08-25 RX ORDER — SODIUM CHLORIDE 0.9 % (FLUSH) 0.9 %
5-40 SYRINGE (ML) INJECTION PRN
Status: DISCONTINUED | OUTPATIENT
Start: 2022-08-25 | End: 2022-08-26 | Stop reason: HOSPADM

## 2022-08-25 RX ADMIN — METHYLPREDNISOLONE SODIUM SUCCINATE 1000 MG: 1 INJECTION, POWDER, LYOPHILIZED, FOR SOLUTION INTRAMUSCULAR; INTRAVENOUS at 16:47

## 2022-08-25 NOTE — TELEPHONE ENCOUNTER
I sent her a [employer] message. I suggested monitoring it closely and increasing in initially by 10 u per day and then 15 u if still elevated.

## 2022-08-26 ENCOUNTER — HOSPITAL ENCOUNTER (OUTPATIENT)
Dept: INFUSION THERAPY | Age: 47
Setting detail: INFUSION SERIES
Discharge: HOME | End: 2022-08-26
Attending: INTERNAL MEDICINE | Admitting: INTERNAL MEDICINE
Payer: COMMERCIAL

## 2022-08-26 VITALS
OXYGEN SATURATION: 100 % | SYSTOLIC BLOOD PRESSURE: 115 MMHG | RESPIRATION RATE: 18 BRPM | HEART RATE: 95 BPM | DIASTOLIC BLOOD PRESSURE: 69 MMHG | TEMPERATURE: 97.9 F

## 2022-08-26 DIAGNOSIS — G35 MULTIPLE SCLEROSIS (HCC): Primary | ICD-10-CM

## 2022-08-26 PROCEDURE — 2580000003 HC RX 258: Performed by: NURSE PRACTITIONER

## 2022-08-26 PROCEDURE — 96365 THER/PROPH/DIAG IV INF INIT: CPT

## 2022-08-26 PROCEDURE — 6360000002 HC RX W HCPCS: Performed by: NURSE PRACTITIONER

## 2022-08-26 RX ORDER — SODIUM CHLORIDE 0.9 % (FLUSH) 0.9 %
5-40 SYRINGE (ML) INJECTION PRN
Status: DISCONTINUED | OUTPATIENT
Start: 2022-08-26 | End: 2022-08-27 | Stop reason: HOSPADM

## 2022-08-26 RX ORDER — SODIUM CHLORIDE 0.9 % (FLUSH) 0.9 %
5-40 SYRINGE (ML) INJECTION PRN
Status: CANCELLED | OUTPATIENT
Start: 2022-08-27

## 2022-08-26 RX ADMIN — METHYLPREDNISOLONE SODIUM SUCCINATE 1000 MG: 1 INJECTION, POWDER, LYOPHILIZED, FOR SOLUTION INTRAMUSCULAR; INTRAVENOUS at 15:50

## 2022-08-27 ENCOUNTER — HOSPITAL ENCOUNTER (OUTPATIENT)
Age: 47
Discharge: HOME | End: 2022-08-27
Attending: STUDENT IN AN ORGANIZED HEALTH CARE EDUCATION/TRAINING PROGRAM | Admitting: STUDENT IN AN ORGANIZED HEALTH CARE EDUCATION/TRAINING PROGRAM
Payer: COMMERCIAL

## 2022-08-27 VITALS
TEMPERATURE: 97.4 F | RESPIRATION RATE: 18 BRPM | SYSTOLIC BLOOD PRESSURE: 123 MMHG | OXYGEN SATURATION: 99 % | HEART RATE: 91 BPM | DIASTOLIC BLOOD PRESSURE: 76 MMHG

## 2022-08-27 DIAGNOSIS — G35 MULTIPLE SCLEROSIS (HCC): Primary | ICD-10-CM

## 2022-08-27 PROCEDURE — 6360000002 HC RX W HCPCS: Performed by: NURSE PRACTITIONER

## 2022-08-27 PROCEDURE — 96365 THER/PROPH/DIAG IV INF INIT: CPT

## 2022-08-27 PROCEDURE — 2580000003 HC RX 258: Performed by: NURSE PRACTITIONER

## 2022-08-27 RX ORDER — SODIUM CHLORIDE 0.9 % (FLUSH) 0.9 %
5-40 SYRINGE (ML) INJECTION PRN
Status: DISCONTINUED | OUTPATIENT
Start: 2022-08-27 | End: 2022-08-27 | Stop reason: HOSPADM

## 2022-08-27 RX ORDER — SODIUM CHLORIDE 0.9 % (FLUSH) 0.9 %
5-40 SYRINGE (ML) INJECTION PRN
Status: CANCELLED | OUTPATIENT
Start: 2022-08-27

## 2022-08-27 RX ADMIN — METHYLPREDNISOLONE SODIUM SUCCINATE 1000 MG: 1 INJECTION, POWDER, LYOPHILIZED, FOR SOLUTION INTRAMUSCULAR; INTRAVENOUS at 17:28

## 2022-08-27 RX ADMIN — SODIUM CHLORIDE, PRESERVATIVE FREE 10 ML: 5 INJECTION INTRAVENOUS at 17:28

## 2022-09-13 DIAGNOSIS — G89.4 CHRONIC PAIN SYNDROME: ICD-10-CM

## 2022-09-13 RX ORDER — GABAPENTIN 100 MG/1
100 CAPSULE ORAL 3 TIMES DAILY
Qty: 90 CAPSULE | Refills: 3 | Status: SHIPPED | OUTPATIENT
Start: 2022-09-13 | End: 2023-01-11

## 2022-09-14 DIAGNOSIS — G35 MULTIPLE SCLEROSIS (HCC): ICD-10-CM

## 2022-09-14 DIAGNOSIS — R53.82 CHRONIC FATIGUE: ICD-10-CM

## 2022-09-15 RX ORDER — ARMODAFINIL 250 MG/1
250 TABLET ORAL DAILY
Qty: 30 TABLET | Refills: 2 | Status: SHIPPED | OUTPATIENT
Start: 2022-09-15 | End: 2022-12-14

## 2022-09-29 ENCOUNTER — OFFICE VISIT (OUTPATIENT)
Dept: PRIMARY CARE CLINIC | Age: 47
End: 2022-09-29
Payer: COMMERCIAL

## 2022-09-29 VITALS
OXYGEN SATURATION: 98 % | HEART RATE: 91 BPM | DIASTOLIC BLOOD PRESSURE: 74 MMHG | BODY MASS INDEX: 29.24 KG/M2 | WEIGHT: 171.25 LBS | HEIGHT: 64 IN | TEMPERATURE: 97.6 F | SYSTOLIC BLOOD PRESSURE: 112 MMHG

## 2022-09-29 DIAGNOSIS — G35 MULTIPLE SCLEROSIS (HCC): Primary | ICD-10-CM

## 2022-09-29 DIAGNOSIS — G89.29 CHRONIC NONINTRACTABLE HEADACHE, UNSPECIFIED HEADACHE TYPE: ICD-10-CM

## 2022-09-29 DIAGNOSIS — J40 BRONCHITIS: ICD-10-CM

## 2022-09-29 DIAGNOSIS — G89.4 CHRONIC PAIN SYNDROME: ICD-10-CM

## 2022-09-29 DIAGNOSIS — R51.9 CHRONIC NONINTRACTABLE HEADACHE, UNSPECIFIED HEADACHE TYPE: ICD-10-CM

## 2022-09-29 DIAGNOSIS — R60.0 LOCALIZED EDEMA: ICD-10-CM

## 2022-09-29 DIAGNOSIS — J01.00 ACUTE MAXILLARY SINUSITIS, RECURRENCE NOT SPECIFIED: ICD-10-CM

## 2022-09-29 PROCEDURE — 99214 OFFICE O/P EST MOD 30 MIN: CPT | Performed by: NURSE PRACTITIONER

## 2022-09-29 RX ORDER — TRAMADOL HYDROCHLORIDE 50 MG/1
TABLET ORAL
COMMUNITY
End: 2022-09-29 | Stop reason: SDUPTHER

## 2022-09-29 RX ORDER — TRAMADOL HYDROCHLORIDE 50 MG/1
50 TABLET ORAL EVERY 8 HOURS PRN
Qty: 90 TABLET | Refills: 1 | Status: SHIPPED | OUTPATIENT
Start: 2022-09-29 | End: 2022-10-29

## 2022-09-29 RX ORDER — FUROSEMIDE 20 MG/1
20 TABLET ORAL DAILY PRN
Qty: 30 TABLET | Refills: 3 | Status: SHIPPED | OUTPATIENT
Start: 2022-09-29

## 2022-09-29 RX ORDER — METHYLPREDNISOLONE 4 MG/1
TABLET ORAL
Qty: 1 KIT | Refills: 0 | Status: SHIPPED | OUTPATIENT
Start: 2022-09-29

## 2022-09-29 RX ORDER — CEFDINIR 300 MG/1
300 CAPSULE ORAL 2 TIMES DAILY
Qty: 20 CAPSULE | Refills: 0 | Status: SHIPPED | OUTPATIENT
Start: 2022-09-29 | End: 2022-10-09

## 2022-09-29 ASSESSMENT — ENCOUNTER SYMPTOMS
EYE REDNESS: 0
EYE DISCHARGE: 0
CONSTIPATION: 0
RHINORRHEA: 1
TROUBLE SWALLOWING: 0
ABDOMINAL PAIN: 0
COUGH: 1
WHEEZING: 1
BLOOD IN STOOL: 0
SORE THROAT: 0
DIARRHEA: 0

## 2022-09-29 NOTE — PROGRESS NOTES
Joselyn Lawrence (:  1975) is a 52 y.o. female,Established patient, here for evaluation of the following chief complaint(s):  Wheezing (Started a week ago. Started as allergy symptoms. Taking mucinex and nose spray. )      ASSESSMENT/PLAN:    ICD-10-CM    1. Multiple sclerosis (HCC)  G35 traMADol (ULTRAM) 50 MG tablet      2. Chronic nonintractable headache, unspecified headache type  R51.9 traMADol (ULTRAM) 50 MG tablet    G89.29       3. Chronic pain syndrome  G89.4 traMADol (ULTRAM) 50 MG tablet      4. Bronchitis  J40 methylPREDNISolone (MEDROL, MAYANK,) 4 MG tablet      5. Acute maxillary sinusitis, recurrence not specified  J01.00 cefdinir (OMNICEF) 300 MG capsule      6. Localized edema  R60.0 furosemide (LASIX) 20 MG tablet          Return if symptoms worsen or fail to improve. SUBJECTIVE/OBJECTIVE:  HPI  Wheezing (Started a week ago. Started as allergy symptoms. Taking mucinex and nose spray. ) using neb treatments. Also on singular. Felt bad last week like she had a head cold. That part is better but now has wheezing. Swelling  Bilateral legs. Has been on high dose of steroids for MS exacerbation    Chronic pain  Has MS has chronic pain related to this. Takes 1-2 ultram per day. Also takes gabapentin. Has tried cymbalta in the past.  Cant take NSAIDs any more due to CKD  Chronic neck pain  MRI scanned in media    Cyst on foot. Review of Systems   Constitutional:  Negative for appetite change and unexpected weight change. HENT:  Positive for congestion, postnasal drip and rhinorrhea. Negative for sore throat and trouble swallowing. Eyes:  Negative for discharge and redness. Respiratory:  Positive for cough and wheezing. Cardiovascular:  Positive for leg swelling. Negative for chest pain. Gastrointestinal:  Negative for abdominal pain, blood in stool, constipation and diarrhea. Genitourinary:  Negative for dysuria. Skin:  Negative for rash.    Neurological:  Negative for weakness. Hematological:  Negative for adenopathy. /74 (Site: Left Upper Arm, Position: Sitting, Cuff Size: Large Adult)   Pulse 91   Temp 97.6 °F (36.4 °C) (Temporal)   Ht 5' 4\" (1.626 m)   Wt 171 lb 4 oz (77.7 kg)   LMP 01/01/2020   SpO2 98%   BMI 29.39 kg/m²    Physical Exam  Vitals reviewed. Constitutional:       Appearance: She is well-developed. HENT:      Head: Normocephalic. Eyes:      Conjunctiva/sclera: Conjunctivae normal.   Cardiovascular:      Rate and Rhythm: Normal rate and regular rhythm. Heart sounds: Normal heart sounds. Pulmonary:      Effort: Pulmonary effort is normal.      Breath sounds: Wheezing present. Abdominal:      General: Bowel sounds are normal.      Palpations: Abdomen is soft. Tenderness: There is no abdominal tenderness. Musculoskeletal:         General: Normal range of motion. Cervical back: Normal range of motion and neck supple. Comments: Cyst on arch of foot   Skin:     General: Skin is warm and dry. Neurological:      Mental Status: She is alert and oriented to person, place, and time. Psychiatric:         Behavior: Behavior normal.               An electronic signature was used to authenticate this note.     --CLINTON Gates

## 2022-10-04 ENCOUNTER — PATIENT MESSAGE (OUTPATIENT)
Dept: PRIMARY CARE CLINIC | Age: 47
End: 2022-10-04

## 2022-10-04 DIAGNOSIS — N39.0 URINARY TRACT INFECTION WITHOUT HEMATURIA, SITE UNSPECIFIED: Primary | ICD-10-CM

## 2022-10-04 NOTE — TELEPHONE ENCOUNTER
I'm glad she is feeling better than she was. Please order a UA reflex to culture. She can some down to the lab and do it.  Also referral to Dr. Juhi Shine for allergies/asthma

## 2022-10-05 ENCOUNTER — TELEPHONE (OUTPATIENT)
Dept: PRIMARY CARE CLINIC | Age: 47
End: 2022-10-05

## 2022-10-05 DIAGNOSIS — N39.0 URINARY TRACT INFECTION WITHOUT HEMATURIA, SITE UNSPECIFIED: ICD-10-CM

## 2022-10-05 LAB
BILIRUBIN URINE: NEGATIVE
BLOOD, URINE: NEGATIVE
CLARITY: CLEAR
COLOR: YELLOW
GLUCOSE URINE: =>1000 MG/DL
KETONES, URINE: NEGATIVE MG/DL
LEUKOCYTE ESTERASE, URINE: NEGATIVE
NITRITE, URINE: NEGATIVE
PH UA: 6.5 (ref 5–8)
PROTEIN UA: NEGATIVE MG/DL
SPECIFIC GRAVITY UA: 1.01 (ref 1–1.03)
UROBILINOGEN, URINE: 0.2 E.U./DL

## 2022-10-05 PROCEDURE — 81003 URINALYSIS AUTO W/O SCOPE: CPT | Performed by: NURSE PRACTITIONER

## 2022-10-05 NOTE — TELEPHONE ENCOUNTER
----- Message from CLINTON Longoria sent at 10/5/2022  3:05 PM CDT -----  Please call patient and let them know results.    Negative urinalysis

## 2022-10-10 RX ORDER — PROPRANOLOL HCL 60 MG
60 CAPSULE, EXTENDED RELEASE 24HR ORAL DAILY
Qty: 30 CAPSULE | Refills: 5 | Status: SHIPPED | OUTPATIENT
Start: 2022-10-10 | End: 2023-04-08

## 2022-10-10 NOTE — TELEPHONE ENCOUNTER
Received fax from pharmacy requesting refill on pts medication(s). Pt was last seen in office on 9/29/2022  and has a follow up scheduled for Visit date not found. Will send request to  Martita Weir  for authorization.      Requested Prescriptions     Pending Prescriptions Disp Refills    propranolol (INDERAL LA) 60 MG extended release capsule 30 capsule 5     Sig: Take 1 capsule by mouth daily

## 2022-10-20 ENCOUNTER — TELEPHONE (OUTPATIENT)
Dept: PRIMARY CARE CLINIC | Age: 47
End: 2022-10-20

## 2022-10-20 DIAGNOSIS — R11.0 NAUSEA: Primary | ICD-10-CM

## 2022-10-20 RX ORDER — ONDANSETRON 8 MG/1
8 TABLET, ORALLY DISINTEGRATING ORAL
Qty: 30 TABLET | Refills: 2 | Status: SHIPPED | OUTPATIENT
Start: 2022-10-20

## 2022-10-20 NOTE — TELEPHONE ENCOUNTER
Call returned to pt with CLINTON Page, radha. Sent rx to pharmacy for pt.     Requested Prescriptions     Signed Prescriptions Disp Refills    ondansetron (ZOFRAN-ODT) 8 MG TBDP disintegrating tablet 30 tablet 2     Sig: Place 1 tablet under the tongue every 6-8 hours as needed for Nausea or Vomiting     Authorizing Provider: Nohemy Raymundo     Ordering User: Giselle Preciado

## 2022-10-20 NOTE — TELEPHONE ENCOUNTER
[id number] Nara Templeton for refill on zofran. 8 mg odt q 6-8 hours #30 rf 2. Does she need anything else from me?

## 2022-11-04 DIAGNOSIS — G47.00 INSOMNIA, UNSPECIFIED TYPE: ICD-10-CM

## 2022-11-04 DIAGNOSIS — E11.69 TYPE 2 DIABETES MELLITUS WITH OTHER SPECIFIED COMPLICATION, WITHOUT LONG-TERM CURRENT USE OF INSULIN (HCC): ICD-10-CM

## 2022-11-04 DIAGNOSIS — J30.1 SEASONAL ALLERGIC RHINITIS DUE TO POLLEN: ICD-10-CM

## 2022-11-04 RX ORDER — MONTELUKAST SODIUM 10 MG/1
10 TABLET ORAL NIGHTLY
Qty: 30 TABLET | Refills: 5 | Status: SHIPPED | OUTPATIENT
Start: 2022-11-04

## 2022-11-04 RX ORDER — PIOGLITAZONEHYDROCHLORIDE 30 MG/1
30 TABLET ORAL DAILY
Qty: 30 TABLET | Refills: 5 | Status: SHIPPED | OUTPATIENT
Start: 2022-11-04

## 2022-11-04 RX ORDER — DOXEPIN HYDROCHLORIDE 50 MG/1
50 CAPSULE ORAL NIGHTLY
Qty: 30 CAPSULE | Refills: 5 | Status: SHIPPED | OUTPATIENT
Start: 2022-11-04 | End: 2023-11-04

## 2022-11-04 NOTE — TELEPHONE ENCOUNTER
Received fax from pharmacy requesting refill on pts medication(s). Pt was last seen in office on 9/29/2022  and has a follow up scheduled for Visit date not found. Will send request to  Lizzie Michael  for authorization.      Requested Prescriptions     Pending Prescriptions Disp Refills    montelukast (SINGULAIR) 10 MG tablet 30 tablet 5     Sig: Take 1 tablet by mouth nightly With xyzal for allergies    doxepin (SINEQUAN) 50 MG capsule 30 capsule 5     Sig: Take 1 capsule by mouth nightly    pioglitazone (ACTOS) 30 MG tablet 30 tablet 5     Sig: Take 1 tablet by mouth daily

## 2022-11-15 DIAGNOSIS — G35 MULTIPLE SCLEROSIS (HCC): ICD-10-CM

## 2022-11-15 DIAGNOSIS — G89.29 CHRONIC NONINTRACTABLE HEADACHE, UNSPECIFIED HEADACHE TYPE: ICD-10-CM

## 2022-11-15 DIAGNOSIS — R51.9 CHRONIC NONINTRACTABLE HEADACHE, UNSPECIFIED HEADACHE TYPE: ICD-10-CM

## 2022-11-15 DIAGNOSIS — G89.4 CHRONIC PAIN SYNDROME: ICD-10-CM

## 2022-11-15 RX ORDER — TIZANIDINE 2 MG/1
2 TABLET ORAL EVERY 8 HOURS PRN
Qty: 90 TABLET | Refills: 5 | Status: SHIPPED | OUTPATIENT
Start: 2022-11-15

## 2022-11-15 RX ORDER — HYDROCODONE BITARTRATE AND ACETAMINOPHEN 5; 325 MG/1; MG/1
1 TABLET ORAL EVERY 6 HOURS PRN
Qty: 28 TABLET | Refills: 0 | Status: SHIPPED | OUTPATIENT
Start: 2022-11-15 | End: 2022-11-22

## 2022-11-17 ENCOUNTER — TELEPHONE (OUTPATIENT)
Dept: PRIMARY CARE CLINIC | Age: 47
End: 2022-11-17

## 2022-11-17 DIAGNOSIS — Z00.00 ROUTINE ADULT HEALTH MAINTENANCE: Primary | ICD-10-CM

## 2022-11-17 LAB
ALBUMIN SERPL-MCNC: 3.7 G/DL
ALP BLD-CCNC: 122 U/L
ALT SERPL-CCNC: 30 U/L
ANION GAP SERPL CALCULATED.3IONS-SCNC: 11.3 MMOL/L
AST SERPL-CCNC: 15 U/L
BILIRUB SERPL-MCNC: 0.83 MG/DL (ref 0.1–1.4)
BUN BLDV-MCNC: 20 MG/DL
CALCIUM SERPL-MCNC: 9.1 MG/DL
CHLORIDE BLD-SCNC: 102 MMOL/L
CO2: 26.7 MMOL/L
CREAT SERPL-MCNC: 0.8 MG/DL
GFR CALCULATED: 60
GLUCOSE BLD-MCNC: 215 MG/DL
POTASSIUM SERPL-SCNC: 4 MMOL/L
SODIUM BLD-SCNC: 140 MMOL/L
TOTAL PROTEIN: 7.1

## 2022-11-17 NOTE — TELEPHONE ENCOUNTER
----- Message from CLINTON Reyes sent at 11/17/2022  3:12 PM CST -----  Please inform patient results show  Cmp is normal but the glucose is 215

## 2022-11-18 DIAGNOSIS — R51.9 CHRONIC NONINTRACTABLE HEADACHE, UNSPECIFIED HEADACHE TYPE: ICD-10-CM

## 2022-11-18 DIAGNOSIS — G89.29 CHRONIC NONINTRACTABLE HEADACHE, UNSPECIFIED HEADACHE TYPE: ICD-10-CM

## 2022-11-18 DIAGNOSIS — G89.4 CHRONIC PAIN SYNDROME: ICD-10-CM

## 2022-11-18 DIAGNOSIS — G35 MULTIPLE SCLEROSIS (HCC): ICD-10-CM

## 2022-11-18 RX ORDER — TRAMADOL HYDROCHLORIDE 50 MG/1
50 TABLET ORAL EVERY 8 HOURS PRN
Qty: 90 TABLET | Refills: 1 | Status: SHIPPED | OUTPATIENT
Start: 2022-11-18 | End: 2022-12-18

## 2022-11-18 NOTE — TELEPHONE ENCOUNTER
Spoke to pt she stated she has only taken one because she is so scared of them, she did say she was struggling and has tried to see a counselor for these issues as well, but she feels she is failing at it. She asked how I would take this medication and I let her know its prescribed every 6 hours for the pain but it is how she feels. I let her know you may want an appointment, but I wasn't sure.

## 2022-11-18 NOTE — TELEPHONE ENCOUNTER
Pt would like to have the tramadol ordered fir the day and the Norco for bedtime.  She said that she will see if this helps if not she will give us a call back

## 2022-11-18 NOTE — TELEPHONE ENCOUNTER
Norco inplace of the tramadol and I want her to take zanaflex and gabapentin still. I will send in more norco if it is helping her. Has it helped more than tramadol?

## 2022-11-18 NOTE — TELEPHONE ENCOUNTER
How do you want her to handle the norco refills since it was just a 7 day supply sent in? It said that the norco is taking place of her tramadol and zanaflex. Does she need to stop the zanaflex alll together? And does she need to stop the gabapentin? Called patient, spoke with: Patient regarding the results of the patients most recent labs. I advised Patient of Smith Pressley recommendations.    Patient did voice understanding

## 2022-11-18 NOTE — DISCHARGE INSTRUCTIONS
DAY OF SURGERY INSTRUCTIONS        YOUR SURGEON: DR. JOSEFA HENSON    PROCEDURE: REVISION FIRST METATARSAL CUNEIFORM JOINT ARTHRODESIS WITH HARDWARE REMOVAL.  EXCISION OF HYPERTROPHIC SCAR SECOND METATARSOPHALANGEAL JOINT WITH TISSUE REARRANGEMENT FLAP CLOSURE RIGHT FOOT    DATE OF SURGERY: AUGUST 24, 2021    ARRIVAL TIME: AS DIRECTED BY OFFICE    YOU MAY TAKE THE FOLLOWING MEDICATION(S) THE MORNING OF SURGERY WITH A SIP OF WATER: PROPRANOLOL, TRAMADOL, BUSPAR      ALL OTHER HOME MEDICATION CHECK WITH YOUR PHYSICIAN                      MANAGING PAIN AFTER SURGERY    We know you are probably wondering what your pain will be like after surgery.  Following surgery it is unrealistic to expect you will not have pain.   Pain is how our bodies let us know that something is wrong or cautions us to be careful.  That said, our goal is to make your pain tolerable.    Methods we may use to treat your pain include (oral or IV medications, PCAs, epidurals, nerve blocks, etc.)   While some procedures require IV pain medications for a short time after surgery, transitioning to pain medications by mouth allows for better management of pain.   Your nurse will encourage you to take oral pain medications whenever possible.  IV medications work almost immediately, but only last a short while.  Taking medications by mouth allows for a more constant level of medication in your blood stream for a longer period of time.      Once your pain is out of control it is harder to get back under control.  It is important you are aware when your next dose of pain medication is due.  If you are admitted, your nurse may write the time of your next dose on the white board in your room to help you remember.      We are interested in your pain and encourage you to inform us about aggravating factors during your visit.   Many times a simple repositioning every few hours can make a big difference.    If your physician says it is okay, do not let your  pain prevent you from getting out of bed. Be sure to call your nurse for assistance prior to getting up so you do not fall.      Before surgery, please decide your tolerable pain goal.  These faces help describe the pain ratings we use on a 0-10 scale.   Be prepared to tell us your goal and whether or not you take pain or anxiety medications at home.          BEFORE YOU COME TO THE HOSPITAL  (Pre-op instructions)  • Do not eat, drink, smoke or chew gum after midnight the night before surgery.  This also includes no mints.  • Morning of surgery take only the medicines you have been instructed with a sip of water unless otherwise instructed  by your physician.  • Do not shave, wear makeup or dark nail polish.  • Remove all jewelry including rings.  • Leave anything you consider valuable at home.  • Leave your suitcase in the car until after your surgery.  • Bring the following with you if applicable:  o Picture ID and insurance, Medicare or Medicaid cards  o Co-pay/deductible required by insurance (cash, check, credit card)  o Copy of advance directive, living will or power-of- documents if not brought to PAT  o CPAP or BIPAP mask and tubing  o Relaxation aids ( book, magazine), etc.  o Hearing aids                        ON THE DAY OF SURGERY  · On the day of surgery check in at registration located at the main entrance of the hospital.   ? You will be registered and given a beeper with instructions where to wait in the main lobby.  ? When your beeper lights up and vibrates a member of the Outpatient Surgery staff will meet you at the double doors under the stair steps and escort you to your preoperative room.   · You may have cloth compression devices placed on your legs. These help to prevent blood clots and reduce swelling in your legs.  · An IV may be inserted into one of your veins.  · In the operating room, you may be given one or more of the following:  ? A medicine to help you relax (sedative).  ? A  "medicine to numb the area (local anesthetic).  ? A medicine to make you fall asleep (general anesthetic).  ? A medicine that is injected into an area of your body to numb everything below the injection site (regional anesthetic).  · Your surgical site will be marked or identified.  · You may be given an antibiotic through your IV to help prevent infection.  Contact a health care provider if you:  · Develop a fever of more than 100.4°F (38°C) or other feelings of illness during the 48 hours before your surgery.  · Have symptoms that get worse.  Have questions or concerns about your surgery    General Anesthesia/Surgery, Adult  General anesthesia is the use of medicines to make a person \"go to sleep\" (unconscious) for a medical procedure. General anesthesia must be used for certain procedures, and is often recommended for procedures that:  · Last a long time.  · Require you to be still or in an unusual position.  · Are major and can cause blood loss.  The medicines used for general anesthesia are called general anesthetics. As well as making you unconscious for a certain amount of time, these medicines:  · Prevent pain.  · Control your blood pressure.  · Relax your muscles.  Tell a health care provider about:  · Any allergies you have.  · All medicines you are taking, including vitamins, herbs, eye drops, creams, and over-the-counter medicines.  · Any problems you or family members have had with anesthetic medicines.  · Types of anesthetics you have had in the past.  · Any blood disorders you have.  · Any surgeries you have had.  · Any medical conditions you have.  · Any recent upper respiratory, chest, or ear infections.  · Any history of:  ? Heart or lung conditions, such as heart failure, sleep apnea, asthma, or chronic obstructive pulmonary disease (COPD).  ?  service.  ? Depression or anxiety.  · Any tobacco or drug use, including marijuana or alcohol use.  · Whether you are pregnant or may be " pregnant.  What are the risks?  Generally, this is a safe procedure. However, problems may occur, including:  · Allergic reaction.  · Lung and heart problems.  · Inhaling food or liquid from the stomach into the lungs (aspiration).  · Nerve injury.  · Air in the bloodstream, which can lead to stroke.  · Extreme agitation or confusion (delirium) when you wake up from the anesthetic.  · Waking up during your procedure and being unable to move. This is rare.  These problems are more likely to develop if you are having a major surgery or if you have an advanced or serious medical condition. You can prevent some of these complications by answering all of your health care provider's questions thoroughly and by following all instructions before your procedure.  General anesthesia can cause side effects, including:  · Nausea or vomiting.  · A sore throat from the breathing tube.  · Hoarseness.  · Wheezing or coughing.  · Shaking chills.  · Tiredness.  · Body aches.  · Anxiety.  · Sleepiness or drowsiness.  · Confusion or agitation.  RISKS AND COMPLICATIONS OF SURGERY  Your health care provider will discuss possible risks and complications with you before surgery. Common risks and complications include:    · Problems due to the use of anesthetics.  · Blood loss and replacement (does not apply to minor surgical procedures).  · Temporary increase in pain due to surgery.  · Uncorrected pain or problems that the surgery was meant to correct.  · Infection.  · New damage.    What happens before the procedure?    Medicines  Ask your health care provider about:  · Changing or stopping your regular medicines. This is especially important if you are taking diabetes medicines or blood thinners.  · Taking medicines such as aspirin and ibuprofen. These medicines can thin your blood. Do not take these medicines unless your health care provider tells you to take them.  · Taking over-the-counter medicines, vitamins, herbs, and supplements.  Do not take these during the week before your procedure unless your health care provider approves them.  General instructions  · Starting 3-6 weeks before the procedure, do not use any products that contain nicotine or tobacco, such as cigarettes and e-cigarettes. If you need help quitting, ask your health care provider.  · If you brush your teeth on the morning of the procedure, make sure to spit out all of the toothpaste.  · Tell your health care provider if you become ill or develop a cold, cough, or fever.  · If instructed by your health care provider, bring your sleep apnea device with you on the day of your surgery (if applicable).  · Ask your health care provider if you will be going home the same day, the following day, or after a longer hospital stay.  ? Plan to have someone take you home from the hospital or clinic.  ? Plan to have a responsible adult care for you for at least 24 hours after you leave the hospital or clinic. This is important.  What happens during the procedure?  · You will be given anesthetics through both of the following:  ? A mask placed over your nose and mouth.  ? An IV in one of your veins.  · You may receive a medicine to help you relax (sedative).  · After you are unconscious, a breathing tube may be inserted down your throat to help you breathe. This will be removed before you wake up.  · An anesthesia specialist will stay with you throughout your procedure. He or she will:  ? Keep you comfortable and safe by continuing to give you medicines and adjusting the amount of medicine that you get.  ? Monitor your blood pressure, pulse, and oxygen levels to make sure that the anesthetics do not cause any problems.  The procedure may vary among health care providers and hospitals.  What happens after the procedure?  · Your blood pressure, temperature, heart rate, breathing rate, and blood oxygen level will be monitored until the medicines you were given have worn off.  · You will wake up  in a recovery area. You may wake up slowly.  · If you feel anxious or agitated, you may be given medicine to help you calm down.  · If you will be going home the same day, your health care provider may check to make sure you can walk, drink, and urinate.  · Your health care provider will treat any pain or side effects you have before you go home.  · Do not drive for 24 hours if you were given a sedative.  Summary  · General anesthesia is used to keep you still and prevent pain during a procedure.  · It is important to tell your healthcare provider about your medical history and any surgeries you have had, and previous experience with anesthesia.  · Follow your healthcare provider’s instructions about when to stop eating, drinking, or taking certain medicines before your procedure.  · Plan to have someone take you home from the hospital or clinic.  This information is not intended to replace advice given to you by your health care provider. Make sure you discuss any questions you have with your health care provider.  Document Released: 03/26/2009 Document Revised: 08/03/2018 Document Reviewed: 08/03/2018  [employer] Interactive Patient Education © 2019 [employer]       Fall Prevention in Hospitals, Adult  As a hospital patient, your condition and the treatments you receive can increase your risk for falls. Some additional risk factors for falls in a hospital include:  · Being in an unfamiliar environment.  · Being on bed rest.  · Your surgery.  · Taking certain medicines.  · Your tubing requirements, such as intravenous (IV) therapy or catheters.  It is important that you learn how to decrease fall risks while at the hospital. Below are important tips that can help prevent falls.  SAFETY TIPS FOR PREVENTING FALLS  Talk about your risk of falling.  · Ask your health care provider why you are at risk for falling. Is it your medicine, illness, tubing placement, or something else?  · Make a plan with your health care  provider to keep you safe from falls.  · Ask your health care provider or pharmacist about side effects of your medicines. Some medicines can make you dizzy or affect your coordination.  Ask for help.  · Ask for help before getting out of bed. You may need to press your call button.  · Ask for assistance in getting safely to the toilet.  · Ask for a walker or cane to be put at your bedside. Ask that most of the side rails on your bed be placed up before your health care provider leaves the room.  · Ask family or friends to sit with you.  · Ask for things that are out of your reach, such as your glasses, hearing aids, telephone, bedside table, or call button.  Follow these tips to avoid falling:  · Stay lying or seated, rather than standing, while waiting for help.  · Wear rubber-soled slippers or shoes whenever you walk in the hospital.  · Avoid quick, sudden movements.  ¨ Change positions slowly.  ¨ Sit on the side of your bed before standing.  ¨ Stand up slowly and wait before you start to walk.  · Let your health care provider know if there is a spill on the floor.  · Pay careful attention to the medical equipment, electrical cords, and tubes around you.  · When you need help, use your call button by your bed or in the bathroom. Wait for one of your health care providers to help you.  · If you feel dizzy or unsure of your footing, return to bed and wait for assistance.  · Avoid being distracted by the TV, telephone, or another person in your room.  · Do not lean or support yourself on rolling objects, such as IV poles or bedside tables.     This information is not intended to replace advice given to you by your health care provider. Make sure you discuss any questions you have with your health care provider.     Document Released: 12/15/2001 Document Revised: 01/08/2016 Document Reviewed: 08/25/2013  [employer] Interactive Patient Education ©2016 [employer]       [facility] 4% Patient  Instruction Sheet    Chlorhexidine Before Surgery  Chlorhexidine gluconate (CHG) is a germ-killing (antiseptic) solution that is used to clean the skin. It gets rid of the bacteria that normally live on the skin. Cleaning your skin with CHG before surgery helps lower the risk for infection after surgery.    How to use CHG solution  · You will take 2 showers, one shower the night before surgery, the second shower the morning of surgery before coming to the hospital.  · Use CHG only as told by your health care provider, and follow the instructions on the label.  · Use CHG solution while taking a shower. Follow these steps when using CHG solution (unless your health care provider gives you different instructions):  1. Start the shower.  2. Use your normal soap and shampoo to wash your face and hair.  3. Turn off the shower or move out of the shower stream.  4. Pour the CHG onto a clean washcloth. Do not use any type of brush or rough-edged sponge.  5. Starting at your neck, lather your body down to your toes. Make sure you:  6. Pay special attention to the part of your body where you will be having surgery. Scrub this area for at least 1 minute.  7. Use the full amount of CHG as directed. Usually, this is one half bottle for each shower.  8. Do not use CHG on your head or face. If the solution gets into your ears or eyes, rinse them well with water.  9. Avoid your genital area.  10. Avoid any areas of skin that have broken skin, cuts, or scrapes.  11. Scrub your back and under your arms. Make sure to wash skin folds.  12. Let the lather sit on your skin for 1-2 minutes or as long as told by your health care  provider.  13. Thoroughly rinse your entire body in the shower. Make sure that all body creases and crevices are rinsed well.  14. Dry off with a clean towel. Do not put any substances on your body afterward, such as powder, lotion, or perfume.  15. Put on clean clothes or pajamas.  16. If it is the night before  your surgery, sleep in clean sheets.    What are the risks?  Risks of using CHG include:  · A skin reaction.  · Hearing loss, if CHG gets in your ears.  · Eye injury, if CHG gets in your eyes and is not rinsed out.  · The CHG product catching fire.  Make sure that you avoid smoking and flames after applying CHG to your skin.  Do not use CHG:  · If you have a chlorhexidine allergy or have previously reacted to chlorhexidine.  · On babies younger than 2 months of age.      On the day of surgery, when you are taken to your room in Outpatient Surgery you will be given a CHG prepackaged cloth to wipe the site for your surgery.  How to use CHG prepackaged cloths  · Follow the instructions on the label.  · Use the CHG cloth on clean, dry skin. Follow these steps when using a CHG cloth (unless your health care provider gives you different instructions):  1. Using the CHG cloth, vigorously scrub the part of your body where you will be having surgery. Scrub using a back-and-forth motion for 3 minutes. The area on your body should be completely wet with CHG when you are finished scrubbing.  2. Do not rinse. Discard the cloth and let the area air-dry for 1 minute. Do not put any substances on your body afterward, such as powder, lotion, or perfume.  Contact a health care provider if:  · Your skin gets irritated after scrubbing.  · You have questions about using your solution or cloth.  Get help right away if:  · Your eyes become very red or swollen.  · Your eyes itch badly.  · Your skin itches badly and is red or swollen.  · Your hearing changes.  · You have trouble seeing.  · You have swelling or tingling in your mouth or throat.  · You have trouble breathing.  · You swallow any chlorhexidine.  Summary  · Chlorhexidine gluconate (CHG) is a germ-killing (antiseptic) solution that is used to clean the skin. Cleaning your skin with CHG before surgery helps lower the risk for infection after surgery.  · You may be given CHG to use  at home. It may be in a bottle or in a prepackaged cloth to use on your skin. Carefully follow your health care provider's instructions and the instructions on the product label.  · Do not use CHG if you have a chlorhexidine allergy.  · Contact your health care provider if your skin gets irritated after scrubbing.  This information is not intended to replace advice given to you by your health care provider. Make sure you discuss any questions you have with your health care provider.  Document Released: 09/11/2013 Document Revised: 11/15/2018 Document Reviewed: 11/15/2018  [employer] Interactive Patient Education © 2019 [employer]    PATIENT/FAMILY/RESPONSIBLE PARTY VERBALIZES UNDERSTANDING OF ABOVE EDUCATION.  COPY OF PAIN SCALE GIVEN AND REVIEWED WITH VERBALIZED UNDERSTANDING.         Nasal Turnover Hinge Flap Text: The defect edges were debeveled with a #15 scalpel blade.  Given the size, depth, location of the defect and the defect being full thickness a nasal turnover hinge flap was deemed most appropriate.  Using a sterile surgical marker, an appropriate hinge flap was drawn incorporating the defect. The area thus outlined was incised with a #15 scalpel blade. The flap was designed to recreate the nasal mucosal lining and the alar rim. The skin margins were undermined to an appropriate distance in all directions utilizing iris scissors.

## 2022-11-18 NOTE — TELEPHONE ENCOUNTER
Take as needed for pain like she was doing with the tramadol. Ill send in more than 7 days if she needs it.  Or if she wants to take the tramadol as she has but trade a dose for norco maybe at bedtime to help her rest. That is ok too

## 2022-11-28 ENCOUNTER — HOSPITAL ENCOUNTER (INPATIENT)
Age: 47
LOS: 3 days | Discharge: HOME | DRG: 390 | End: 2022-12-01
Attending: EMERGENCY MEDICINE | Admitting: INTERNAL MEDICINE
Payer: COMMERCIAL

## 2022-11-28 ENCOUNTER — APPOINTMENT (OUTPATIENT)
Dept: CT IMAGING | Age: 47
DRG: 390 | End: 2022-11-28
Payer: COMMERCIAL

## 2022-11-28 DIAGNOSIS — K56.609 SBO (SMALL BOWEL OBSTRUCTION) (HCC): Primary | ICD-10-CM

## 2022-11-28 DIAGNOSIS — K52.9 ENTERITIS: ICD-10-CM

## 2022-11-28 PROBLEM — B34.8 RHINOVIRUS: Status: ACTIVE | Noted: 2022-11-28

## 2022-11-28 LAB
ADENOVIRUS BY PCR: NOT DETECTED
ALBUMIN SERPL-MCNC: 4.4 G/DL (ref 3.5–5.2)
ALP BLD-CCNC: 96 U/L (ref 35–104)
ALT SERPL-CCNC: 26 U/L (ref 5–33)
ANION GAP SERPL CALCULATED.3IONS-SCNC: 12 MMOL/L (ref 7–19)
AST SERPL-CCNC: 26 U/L (ref 5–32)
BASOPHILS ABSOLUTE: 0 K/UL (ref 0–0.2)
BASOPHILS RELATIVE PERCENT: 0.4 % (ref 0–1)
BILIRUB SERPL-MCNC: 1.8 MG/DL (ref 0.2–1.2)
BILIRUBIN URINE: NEGATIVE
BLOOD, URINE: NEGATIVE
BORDETELLA PARAPERTUSSIS BY PCR: NOT DETECTED
BORDETELLA PERTUSSIS BY PCR: NOT DETECTED
BUN BLDV-MCNC: 15 MG/DL (ref 6–20)
CALCIUM SERPL-MCNC: 9.5 MG/DL (ref 8.6–10)
CHLAMYDOPHILIA PNEUMONIAE BY PCR: NOT DETECTED
CHLORIDE BLD-SCNC: 103 MMOL/L (ref 98–111)
CLARITY: ABNORMAL
CO2: 27 MMOL/L (ref 22–29)
COLOR: YELLOW
CORONAVIRUS 229E BY PCR: NOT DETECTED
CORONAVIRUS HKU1 BY PCR: NOT DETECTED
CORONAVIRUS NL63 BY PCR: NOT DETECTED
CORONAVIRUS OC43 BY PCR: NOT DETECTED
CREAT SERPL-MCNC: 0.5 MG/DL (ref 0.5–0.9)
EOSINOPHILS ABSOLUTE: 0 K/UL (ref 0–0.6)
EOSINOPHILS RELATIVE PERCENT: 0.4 % (ref 0–5)
GFR SERPL CREATININE-BSD FRML MDRD: >60 ML/MIN/{1.73_M2}
GLUCOSE BLD-MCNC: 130 MG/DL (ref 70–99)
GLUCOSE BLD-MCNC: 192 MG/DL (ref 74–109)
GLUCOSE URINE: =>1000 MG/DL
HBA1C MFR BLD: 8 % (ref 4–6)
HCG QUALITATIVE: NEGATIVE
HCT VFR BLD CALC: 49.7 % (ref 37–47)
HEMOGLOBIN: 16 G/DL (ref 12–16)
HUMAN METAPNEUMOVIRUS BY PCR: NOT DETECTED
HUMAN RHINOVIRUS/ENTEROVIRUS BY PCR: DETECTED
IMMATURE GRANULOCYTES #: 0 K/UL
INFLUENZA A BY PCR: NOT DETECTED
INFLUENZA B BY PCR: NOT DETECTED
KETONES, URINE: 40 MG/DL
LEUKOCYTE ESTERASE, URINE: NEGATIVE
LIPASE: 33 U/L (ref 13–60)
LYMPHOCYTES ABSOLUTE: 0.8 K/UL (ref 1.1–4.5)
LYMPHOCYTES RELATIVE PERCENT: 10 % (ref 20–40)
MCH RBC QN AUTO: 28.8 PG (ref 27–31)
MCHC RBC AUTO-ENTMCNC: 32.2 G/DL (ref 33–37)
MCV RBC AUTO: 89.5 FL (ref 81–99)
MONOCYTES ABSOLUTE: 1.2 K/UL (ref 0–0.9)
MONOCYTES RELATIVE PERCENT: 14.9 % (ref 0–10)
MYCOPLASMA PNEUMONIAE BY PCR: NOT DETECTED
NEUTROPHILS ABSOLUTE: 5.7 K/UL (ref 1.5–7.5)
NEUTROPHILS RELATIVE PERCENT: 73.9 % (ref 50–65)
NITRITE, URINE: NEGATIVE
PARAINFLUENZA VIRUS 1 BY PCR: NOT DETECTED
PARAINFLUENZA VIRUS 2 BY PCR: NOT DETECTED
PARAINFLUENZA VIRUS 3 BY PCR: NOT DETECTED
PARAINFLUENZA VIRUS 4 BY PCR: NOT DETECTED
PDW BLD-RTO: 13.4 % (ref 11.5–14.5)
PERFORMED ON: ABNORMAL
PH UA: 5.5 (ref 5–8)
PLATELET # BLD: 199 K/UL (ref 130–400)
PMV BLD AUTO: 12 FL (ref 9.4–12.3)
POTASSIUM REFLEX MAGNESIUM: 4.6 MMOL/L (ref 3.5–5)
PROTEIN UA: NEGATIVE MG/DL
RBC # BLD: 5.55 M/UL (ref 4.2–5.4)
REASON FOR REJECTION: NORMAL
REJECTED TEST: NORMAL
RESPIRATORY SYNCYTIAL VIRUS BY PCR: NOT DETECTED
SARS-COV-2, PCR: NOT DETECTED
SODIUM BLD-SCNC: 142 MMOL/L (ref 136–145)
SPECIFIC GRAVITY UA: >=1.045 (ref 1–1.03)
TOTAL PROTEIN: 6.3 G/DL (ref 6.6–8.7)
UROBILINOGEN, URINE: 0.2 E.U./DL
WBC # BLD: 7.7 K/UL (ref 4.8–10.8)

## 2022-11-28 PROCEDURE — 81003 URINALYSIS AUTO W/O SCOPE: CPT

## 2022-11-28 PROCEDURE — 74177 CT ABD & PELVIS W/CONTRAST: CPT

## 2022-11-28 PROCEDURE — 83690 ASSAY OF LIPASE: CPT

## 2022-11-28 PROCEDURE — 96374 THER/PROPH/DIAG INJ IV PUSH: CPT

## 2022-11-28 PROCEDURE — 6360000002 HC RX W HCPCS

## 2022-11-28 PROCEDURE — 83036 HEMOGLOBIN GLYCOSYLATED A1C: CPT

## 2022-11-28 PROCEDURE — 0202U NFCT DS 22 TRGT SARS-COV-2: CPT

## 2022-11-28 PROCEDURE — 2580000003 HC RX 258

## 2022-11-28 PROCEDURE — 1210000000 HC MED SURG R&B

## 2022-11-28 PROCEDURE — 99285 EMERGENCY DEPT VISIT HI MDM: CPT

## 2022-11-28 PROCEDURE — 85025 COMPLETE CBC W/AUTO DIFF WBC: CPT

## 2022-11-28 PROCEDURE — 82947 ASSAY GLUCOSE BLOOD QUANT: CPT

## 2022-11-28 PROCEDURE — 96375 TX/PRO/DX INJ NEW DRUG ADDON: CPT

## 2022-11-28 PROCEDURE — 84703 CHORIONIC GONADOTROPIN ASSAY: CPT

## 2022-11-28 PROCEDURE — 2580000003 HC RX 258: Performed by: PHYSICIAN ASSISTANT

## 2022-11-28 PROCEDURE — 6360000004 HC RX CONTRAST MEDICATION: Performed by: PHYSICIAN ASSISTANT

## 2022-11-28 PROCEDURE — 6360000002 HC RX W HCPCS: Performed by: PHYSICIAN ASSISTANT

## 2022-11-28 PROCEDURE — 36415 COLL VENOUS BLD VENIPUNCTURE: CPT

## 2022-11-28 PROCEDURE — 80053 COMPREHEN METABOLIC PANEL: CPT

## 2022-11-28 PROCEDURE — 74177 CT ABD & PELVIS W/CONTRAST: CPT | Performed by: RADIOLOGY

## 2022-11-28 RX ORDER — METOCLOPRAMIDE HYDROCHLORIDE 5 MG/ML
10 INJECTION INTRAMUSCULAR; INTRAVENOUS ONCE
Status: COMPLETED | OUTPATIENT
Start: 2022-11-28 | End: 2022-11-28

## 2022-11-28 RX ORDER — SODIUM CHLORIDE 9 MG/ML
INJECTION, SOLUTION INTRAVENOUS PRN
Status: DISCONTINUED | OUTPATIENT
Start: 2022-11-28 | End: 2022-12-01 | Stop reason: HOSPADM

## 2022-11-28 RX ORDER — INSULIN LISPRO 100 [IU]/ML
0-4 INJECTION, SOLUTION INTRAVENOUS; SUBCUTANEOUS NIGHTLY
Status: DISCONTINUED | OUTPATIENT
Start: 2022-11-28 | End: 2022-12-01 | Stop reason: HOSPADM

## 2022-11-28 RX ORDER — ENOXAPARIN SODIUM 100 MG/ML
40 INJECTION SUBCUTANEOUS DAILY
Status: DISCONTINUED | OUTPATIENT
Start: 2022-11-28 | End: 2022-12-01 | Stop reason: HOSPADM

## 2022-11-28 RX ORDER — SODIUM CHLORIDE 9 MG/ML
INJECTION, SOLUTION INTRAVENOUS CONTINUOUS
Status: DISCONTINUED | OUTPATIENT
Start: 2022-11-28 | End: 2022-12-01 | Stop reason: HOSPADM

## 2022-11-28 RX ORDER — ONDANSETRON 2 MG/ML
INJECTION INTRAMUSCULAR; INTRAVENOUS
Status: COMPLETED
Start: 2022-11-28 | End: 2022-11-28

## 2022-11-28 RX ORDER — DEXTROSE MONOHYDRATE 100 MG/ML
INJECTION, SOLUTION INTRAVENOUS CONTINUOUS PRN
Status: DISCONTINUED | OUTPATIENT
Start: 2022-11-28 | End: 2022-12-01 | Stop reason: HOSPADM

## 2022-11-28 RX ORDER — ACETAMINOPHEN 650 MG/1
650 SUPPOSITORY RECTAL EVERY 6 HOURS PRN
Status: DISCONTINUED | OUTPATIENT
Start: 2022-11-28 | End: 2022-12-01 | Stop reason: HOSPADM

## 2022-11-28 RX ORDER — DROPERIDOL 2.5 MG/ML
0.62 INJECTION, SOLUTION INTRAMUSCULAR; INTRAVENOUS ONCE
Status: COMPLETED | OUTPATIENT
Start: 2022-11-28 | End: 2022-11-28

## 2022-11-28 RX ORDER — ONDANSETRON 2 MG/ML
4 INJECTION INTRAMUSCULAR; INTRAVENOUS ONCE
Status: COMPLETED | OUTPATIENT
Start: 2022-11-28 | End: 2022-11-28

## 2022-11-28 RX ORDER — SODIUM CHLORIDE 0.9 % (FLUSH) 0.9 %
5-40 SYRINGE (ML) INJECTION PRN
Status: DISCONTINUED | OUTPATIENT
Start: 2022-11-28 | End: 2022-12-01 | Stop reason: HOSPADM

## 2022-11-28 RX ORDER — MORPHINE SULFATE 2 MG/ML
2 INJECTION, SOLUTION INTRAMUSCULAR; INTRAVENOUS EVERY 4 HOURS PRN
Status: DISCONTINUED | OUTPATIENT
Start: 2022-11-28 | End: 2022-12-01 | Stop reason: HOSPADM

## 2022-11-28 RX ORDER — PROCHLORPERAZINE EDISYLATE 5 MG/ML
10 INJECTION INTRAMUSCULAR; INTRAVENOUS ONCE
Status: COMPLETED | OUTPATIENT
Start: 2022-11-28 | End: 2022-11-28

## 2022-11-28 RX ORDER — ONDANSETRON 2 MG/ML
4 INJECTION INTRAMUSCULAR; INTRAVENOUS EVERY 6 HOURS PRN
Status: DISCONTINUED | OUTPATIENT
Start: 2022-11-28 | End: 2022-12-01 | Stop reason: HOSPADM

## 2022-11-28 RX ORDER — PROCHLORPERAZINE EDISYLATE 5 MG/ML
10 INJECTION INTRAMUSCULAR; INTRAVENOUS EVERY 6 HOURS PRN
Status: DISCONTINUED | OUTPATIENT
Start: 2022-11-28 | End: 2022-12-01 | Stop reason: HOSPADM

## 2022-11-28 RX ORDER — INSULIN LISPRO 100 [IU]/ML
0-4 INJECTION, SOLUTION INTRAVENOUS; SUBCUTANEOUS
Status: DISCONTINUED | OUTPATIENT
Start: 2022-11-28 | End: 2022-12-01 | Stop reason: HOSPADM

## 2022-11-28 RX ORDER — PROMETHAZINE HYDROCHLORIDE 25 MG/ML
6.25 INJECTION, SOLUTION INTRAMUSCULAR; INTRAVENOUS EVERY 8 HOURS PRN
Status: DISCONTINUED | OUTPATIENT
Start: 2022-11-28 | End: 2022-12-01 | Stop reason: HOSPADM

## 2022-11-28 RX ORDER — SODIUM CHLORIDE 0.9 % (FLUSH) 0.9 %
5-40 SYRINGE (ML) INJECTION EVERY 12 HOURS SCHEDULED
Status: DISCONTINUED | OUTPATIENT
Start: 2022-11-28 | End: 2022-12-01 | Stop reason: HOSPADM

## 2022-11-28 RX ORDER — ONDANSETRON 4 MG/1
4 TABLET, ORALLY DISINTEGRATING ORAL EVERY 8 HOURS PRN
Status: DISCONTINUED | OUTPATIENT
Start: 2022-11-28 | End: 2022-12-01 | Stop reason: HOSPADM

## 2022-11-28 RX ORDER — ACETAMINOPHEN 325 MG/1
650 TABLET ORAL EVERY 6 HOURS PRN
Status: DISCONTINUED | OUTPATIENT
Start: 2022-11-28 | End: 2022-12-01 | Stop reason: HOSPADM

## 2022-11-28 RX ORDER — POLYETHYLENE GLYCOL 3350 17 G/17G
17 POWDER, FOR SOLUTION ORAL DAILY PRN
Status: DISCONTINUED | OUTPATIENT
Start: 2022-11-28 | End: 2022-12-01 | Stop reason: HOSPADM

## 2022-11-28 RX ADMIN — ONDANSETRON 4 MG: 2 INJECTION INTRAMUSCULAR; INTRAVENOUS at 12:11

## 2022-11-28 RX ADMIN — PROCHLORPERAZINE EDISYLATE 10 MG: 5 INJECTION INTRAMUSCULAR; INTRAVENOUS at 21:31

## 2022-11-28 RX ADMIN — METOCLOPRAMIDE 10 MG: 5 INJECTION, SOLUTION INTRAMUSCULAR; INTRAVENOUS at 13:33

## 2022-11-28 RX ADMIN — SODIUM CHLORIDE, PRESERVATIVE FREE 10 ML: 5 INJECTION INTRAVENOUS at 21:46

## 2022-11-28 RX ADMIN — PROCHLORPERAZINE EDISYLATE 10 MG: 5 INJECTION INTRAMUSCULAR; INTRAVENOUS at 15:07

## 2022-11-28 RX ADMIN — SODIUM CHLORIDE: 9 INJECTION, SOLUTION INTRAVENOUS at 15:09

## 2022-11-28 RX ADMIN — SODIUM CHLORIDE: 9 INJECTION, SOLUTION INTRAVENOUS at 22:45

## 2022-11-28 RX ADMIN — IOPAMIDOL 70 ML: 755 INJECTION, SOLUTION INTRAVENOUS at 15:28

## 2022-11-28 RX ADMIN — MORPHINE SULFATE 2 MG: 2 INJECTION, SOLUTION INTRAMUSCULAR; INTRAVENOUS at 21:31

## 2022-11-28 RX ADMIN — DROPERIDOL 0.62 MG: 2.5 INJECTION, SOLUTION INTRAMUSCULAR; INTRAVENOUS at 19:05

## 2022-11-28 ASSESSMENT — ENCOUNTER SYMPTOMS
APNEA: 0
COUGH: 0
EYE DISCHARGE: 0
ABDOMINAL PAIN: 1
EYE PAIN: 0
PHOTOPHOBIA: 0
ABDOMINAL DISTENTION: 0
COLOR CHANGE: 0
SHORTNESS OF BREATH: 0
NAUSEA: 1
BACK PAIN: 0
SORE THROAT: 0
RESPIRATORY NEGATIVE: 1
ABDOMINAL DISTENTION: 1
VOMITING: 1
RHINORRHEA: 0

## 2022-11-28 ASSESSMENT — PAIN DESCRIPTION - DESCRIPTORS: DESCRIPTORS: ACHING;CRAMPING

## 2022-11-28 ASSESSMENT — PAIN DESCRIPTION - LOCATION: LOCATION: UMBILICUS;ABDOMEN

## 2022-11-28 ASSESSMENT — PAIN SCALES - GENERAL: PAINLEVEL_OUTOF10: 8

## 2022-11-28 ASSESSMENT — PAIN DESCRIPTION - ORIENTATION: ORIENTATION: RIGHT;LEFT

## 2022-11-28 NOTE — ED PROVIDER NOTES
[facility] EMERGENCY DEPT  eMERGENCYdEPARTMENT eNCOUnter      Pt Name: Zainab Condon  MRN: [id number]  [place] 1975  Date of evaluation: 11/28/2022  Provider:SALAS Alberto    CHIEF COMPLAINT       Chief Complaint   Patient presents with    Emesis         HISTORY OF PRESENT ILLNESS  (Location/Symptom, Timing/Onset, Context/Setting, Quality, Duration, Modifying Factors, Severity.)   Zainab Condon is a 52 y.o. female who presents to the emergency department with complaints of nausea vomiting generalized abdominal pain 2 day onset. She endorses fever and congestion started Thursday. She has bowel perf hx and reversal colostomy 2012 with rebecca. Negative home covid test.    HPI    Nursing Notes were reviewed and I agree. REVIEW OF SYSTEMS    (2-9 systems for level 4, 10 or more for level 5)     Review of Systems   Constitutional:  Positive for fever. Negative for activity change, appetite change and chills. HENT:  Negative for congestion, postnasal drip, rhinorrhea and sore throat. Eyes:  Negative for photophobia, pain, discharge and visual disturbance. Respiratory:  Negative for apnea, cough and shortness of breath. Cardiovascular:  Negative for chest pain and leg swelling. Gastrointestinal:  Positive for abdominal pain, nausea and vomiting. Negative for abdominal distention. Genitourinary:  Negative for vaginal bleeding. Musculoskeletal:  Negative for arthralgias, back pain, joint swelling, neck pain and neck stiffness. Skin:  Negative for color change and rash. Neurological:  Negative for dizziness, syncope, facial asymmetry and headaches. Hematological:  Negative for adenopathy. Does not bruise/bleed easily. Psychiatric/Behavioral:  Negative for agitation, behavioral problems and confusion. Except as noted above the remainder of the review of systems was reviewed and negative.        PAST MEDICAL HISTORY     Past Medical History:   Diagnosis Date    COVID     Diabetes ([facility] [address] Headache(784.0)     Insomnia     Multiple sclerosis ([facility] [address]          SURGICAL HISTORY       Past Surgical History:   Procedure Laterality Date    APPENDECTOMY      BUNIONECTOMY  2020    and infection    CHOLECYSTECTOMY      REVISION COLOSTOMY      SMALL INTESTINE SURGERY      resectionX2         CURRENT MEDICATIONS       Previous Medications    ARMODAFINIL 250 MG TABS    Take 250 mg by mouth daily for 90 days. BACLOFEN (LIORESAL) 10 MG TABLET    TAKE 1/2 TO 1 TABLET TWICE DAILY AS NEEDED FOR MUSCLE SPASMS    BREXPIPRAZOLE (REXULTI) 0.5 MG TABS TABLET    Take 1 tablet by mouth daily    CLONAZEPAM (KLONOPIN) 0.5 MG TABLET    Take 1 tablet by mouth in the morning and 1 tablet before bedtime. Do all this for 30 days. CYANOCOBALAMIN 1000 MCG/ML INJECTION    1ml monthly im    DAPAGLIFLOZIN (FARXIGA) 10 MG TABLET    Take 1 tablet by mouth every morning    DOXEPIN (SINEQUAN) 50 MG CAPSULE    Take 1 capsule by mouth nightly    FREMANEZUMAB-VFRM (AJOVY) 225 MG/1.5ML SOAJ    Inject 225 mg into the skin every 30 days    FUROSEMIDE (LASIX) 20 MG TABLET    Take 1 tablet by mouth daily as needed (swelling) Take in the morning    GABAPENTIN (NEURONTIN) 100 MG CAPSULE    Take 1 capsule by mouth 3 times daily for 120 days. INSULIN GLARGINE (BASAGLAR KWIKPEN) 100 UNIT/ML INJECTION PEN    Inject 10 Units into the skin nightly    INSULIN PEN NEEDLE 31G X 5 MM MISC    To use with insulin pen    LEVOCETIRIZINE (XYZAL) 5 MG TABLET    Take 1 tablet by mouth nightly    MONTELUKAST (SINGULAIR) 10 MG TABLET    Take 1 tablet by mouth nightly With xyzal for allergies    OMEGA-3 ACID ETHYL ESTERS (LOVAZA) 1 G CAPSULE    Take 2 capsules by mouth in the morning and 2 capsules before bedtime.     ONDANSETRON (ZOFRAN-ODT) 4 MG DISINTEGRATING TABLET    Take 1 tablet by mouth 3 times daily as needed for Nausea or Vomiting    ONDANSETRON (ZOFRAN-ODT) 8 MG TBDP DISINTEGRATING TABLET    Place 1 tablet under the tongue every 6-8 hours as needed for Nausea or Vomiting    PIOGLITAZONE (ACTOS) 30 MG TABLET    Take 1 tablet by mouth daily    PROPRANOLOL (INDERAL LA) 60 MG EXTENDED RELEASE CAPSULE    Take 1 capsule by mouth daily    ROSUVASTATIN (CRESTOR) 10 MG TABLET    Take 1 tablet by mouth nightly    SITAGLIPTIN (JANUVIA) 100 MG TABLET    Take 1 tablet by mouth daily    SYRINGE/NEEDLE, DISP, (SYRINGE 3CC/25GX1\") 25G X 1\" 3 ML MISC    Use to inject b12 once weekly for 1 month, then monthly    TIZANIDINE (ZANAFLEX) 2 MG TABLET    Take 1 tablet by mouth every 8 hours as needed (muscle pain)    TRAMADOL (ULTRAM) 50 MG TABLET    Take 1 tablet by mouth every 8 hours as needed for Pain for up to 30 days.     VENLAFAXINE (EFFEXOR XR) 150 MG EXTENDED RELEASE CAPSULE    Take 1 capsule by mouth daily    VITAMIN D (ERGOCALCIFEROL) 1.25 MG (29040 UT) CAPS CAPSULE    Take 1 capsule by mouth once a week       ALLERGIES     Ampicillin, Biaxin [clarithromycin], Flagyl [metronidazole], Saxenda [liraglutide -weight management], Albuterol, and Doxycycline    FAMILY HISTORY       Family History   Problem Relation Age of Onset    Heart Attack Father     No Known Problems Sister     Other Brother           SOCIAL HISTORY       Social History     Socioeconomic History    Marital status:    Tobacco Use    Smoking status: Never    Smokeless tobacco: Never   Vaping Use    Vaping Use: Never used   Substance and Sexual Activity    Alcohol use: No     Alcohol/week: 0.0 standard drinks    Drug use: No    Sexual activity: Never     Social Determinants of Health     Financial Resource Strain: Low Risk     Difficulty of Paying Living Expenses: Not hard at all   Food Insecurity: No Food Insecurity    Worried About Running Out of Food in the Last Year: Never true    Ran Out of Food in the Last Year: Never true       SCREENINGS    [place] Coma Scale  Eye Opening: Spontaneous  Best Verbal Response: Oriented  Best Motor Response: Obeys commands  Akbar Coma Scale Score: 15      PHYSICAL EXAM (up to 7 forlevel 4, 8 or more for level 5)     ED Triage Vitals [11/28/22 1111]   BP Temp Temp Source Pulse Resp SpO2 Height Weight   -- 97.2 °F (36.2 °C) Oral -- -- -- -- --       Physical Exam  Vitals and nursing note reviewed. Constitutional:       General: She is not in acute distress. Appearance: She is well-developed. She is not diaphoretic. HENT:      Head: Normocephalic and atraumatic. Right Ear: External ear normal.      Left Ear: External ear normal.      Mouth/Throat:      Pharynx: No oropharyngeal exudate. Eyes:      General:         Right eye: No discharge. Left eye: No discharge. Pupils: Pupils are equal, round, and reactive to light. Neck:      Thyroid: No thyromegaly. Cardiovascular:      Rate and Rhythm: Normal rate and regular rhythm. Pulses: Normal pulses. Heart sounds: Normal heart sounds. No murmur heard. No friction rub. Pulmonary:      Effort: Pulmonary effort is normal. No respiratory distress. Breath sounds: Normal breath sounds. No stridor. No wheezing. Abdominal:      General: Bowel sounds are normal. There is no distension. Palpations: Abdomen is soft. Tenderness: There is no abdominal tenderness. Musculoskeletal:         General: Normal range of motion. Cervical back: Normal range of motion and neck supple. Skin:     General: Skin is warm and dry. Capillary Refill: Capillary refill takes less than 2 seconds. Findings: No rash. Neurological:      Mental Status: She is alert and oriented to person, place, and time. Cranial Nerves: No cranial nerve deficit. Sensory: No sensory deficit. Coordination: Coordination normal.   Psychiatric:         Behavior: Behavior normal.         Thought Content:  Thought content normal.         DIAGNOSTIC RESULTS     RADIOLOGY:   Non-plain film images such as CT, Ultrasound and MRI are read by the radiologist. Plain radiographic images are visualized and preliminarilyinterpreted by Mary Ochoa MD with the below findings:        Interpretation per the Radiologist below, if available at the time of this note:    CT ABDOMEN PELVIS W IV CONTRAST Additional Contrast? None   Final Result   1. At least moderate small bowel obstruction involving the proximal and mid bowel loops. Decompressed distal small bowel. Most pronounced mucosal thickening is anteriorly involving the mid small bowel. Stranding of the fat without fluid. This can be from    inflammatory infectious process. 2. No true intraperitoneal free air. Postsurgical air in the soft tissues within the right paramedian extraperitoneal soft tissues as described above. 3. Prior cholecystectomy. 4. Stool filled colon. Scattered diverticulosis. No acute diverticulitis. Recommendation: Follow up as clinically indicated. All CT scans at this facility utilize dose modulation, iterative reconstruction, and/or weight based dosing when appropriate to reduce radiation dose to as low as reasonably achievable.    Amended by Stephanie Dobbs MD, SA CERTIFIED at [id number] 06:19:36 PM EST   Electronically Signed by Stephanie Dobbs MD, SA CERTIFIED at [id number] 06:12:09 PM                   LABS:  Labs Reviewed   RESPIRATORY PANEL, MOLECULAR, WITH COVID-19 - Abnormal; Notable for the following components:       Result Value    Human Rhinovirus/Enterovirus by PCR DETECTED (*)     All other components within normal limits   URINALYSIS WITH REFLEX TO CULTURE - Abnormal; Notable for the following components:    Clarity, UA CLOUDY (*)     Ketones, Urine 40 (*)     All other components within normal limits   CBC WITH AUTO DIFFERENTIAL - Abnormal; Notable for the following components:    RBC 5.55 (*)     Hematocrit 49.7 (*)     MCHC 32.2 (*)     Neutrophils % 73.9 (*)     Lymphocytes % 10.0 (*)     Monocytes % 14.9 (*)     Lymphocytes Absolute 0.8 (*)     Monocytes Absolute 1.20 (*)     All other components within normal limits   COMPREHENSIVE METABOLIC PANEL W/ REFLEX TO MG FOR LOW K - Abnormal; Notable for the following components:    Glucose 192 (*)     Total Protein 6.3 (*)     Total Bilirubin 1.8 (*)     All other components within normal limits    Narrative:     recollect-previous specimen hemolysis:388   HEMOGLOBIN A1C - Abnormal; Notable for the following components:    Hemoglobin A1C 8.0 (*)     All other components within normal limits   HCG, SERUM, QUALITATIVE   SPECIMEN REJECTION   LIPASE    Narrative:     recollect-previous specimen hemolysis:388   CBC   COMPREHENSIVE METABOLIC PANEL W/ REFLEX TO MG FOR LOW K   POCT GLUCOSE       All other labs were within normal range or notreturned as of this dictation. RE-ASSESSMENT          EMERGENCY DEPARTMENT COURSE and DIFFERENTIAL DIAGNOSIS/MDM:   Vitals:    Vitals:    11/28/22 1111 11/28/22 1305   BP:  126/66   Pulse:  86   Resp:  18   Temp: 97.2 °F (36.2 °C)    TempSrc: Oral    SpO2:  97%   Weight:  170 lb (77.1 kg)           MDM  I spoke with Dr. Tacos Solomon with general surgery recommends consult to general surgery with NG tube placement and will see in the morning. Will admit to medicine I spoke with Korina River the nurse practitioner will accept under her care. The plan will be for attempted decompression and monitoring. She did come back positive for both rhinovirus and enterovirus which could play a role in both fever and GI symptoms I think she is probably more prone to obstruction based on her prior surgical history Dr. Tacos Solomon felt this was likely just enteritis related and for now there is no indication for surgical intervention our goal is to treat nausea and get the NG tube placed she does meet intractability status that she is had multiple rounds of nausea vomiting medication seems to continue to not improve as well. PROCEDURES:    Procedures      FINAL IMPRESSION      1. SBO (small bowel obstruction) ([facility] [address]    2.  Enteritis          DISPOSITION/PLAN   DISPOSITION Admitted 11/28/2022 06:00:00 PM      PATIENT REFERRED TO:  No follow-up provider specified.     DISCHARGE MEDICATIONS:  New Prescriptions    No medications on file       (Please note that portions of this note were completed with a voice recognition program.  Efforts were made to edit the dictations but occasionallywords are mis-transcribed.)    Poli Walton, [address] [place] [address]  11/28/22 1911

## 2022-11-28 NOTE — ED NOTES
PT still c/o nausea at this time, no emesis reported since pt returned from CT. PT says she is still unable to tolerate moving from LT lateral position without severe nausea and dry heaving.       Ayse Mariano RN  11/28/22 2020

## 2022-11-28 NOTE — ED NOTES
SALAS Garcia aware that pt is now vomiting again, no new orders obtained.       Danial Patel RN  11/28/22 3156

## 2022-11-28 NOTE — ED NOTES
[facility], [address] [place] made aware that pt's BUN and creatinine were WDL and pt hasn't had emesis in 30 minutes.      Bonita Colby RN  11/28/22 5587

## 2022-11-28 NOTE — ED NOTES
Blood drawn via straight stick to RAC, pt tolerated well. Clear, yellow urine sample obtained.           Jimmy Teixeira RN  11/28/22 1559

## 2022-11-28 NOTE — ED NOTES
PT began vomiting again at this time after sitting on side of the bed prior attempting to go to [address], RN  11/28/22 1631

## 2022-11-28 NOTE — ED NOTES
Attempted to draw repeat red/green top via straight stick to LAC, pt began vomiting and ambulated to bathroom.  Collection hat placed on toilet     Veronica Thomas RN  11/28/22 5818

## 2022-11-29 LAB
ALBUMIN SERPL-MCNC: 3.9 G/DL (ref 3.5–5.2)
ALP BLD-CCNC: 79 U/L (ref 35–104)
ALT SERPL-CCNC: 21 U/L (ref 5–33)
ANION GAP SERPL CALCULATED.3IONS-SCNC: 14 MMOL/L (ref 7–19)
AST SERPL-CCNC: 21 U/L (ref 5–32)
BILIRUB SERPL-MCNC: 1.5 MG/DL (ref 0.2–1.2)
BUN BLDV-MCNC: 19 MG/DL (ref 6–20)
CALCIUM SERPL-MCNC: 8.9 MG/DL (ref 8.6–10)
CHLORIDE BLD-SCNC: 105 MMOL/L (ref 98–111)
CO2: 24 MMOL/L (ref 22–29)
CREAT SERPL-MCNC: 0.6 MG/DL (ref 0.5–0.9)
GFR SERPL CREATININE-BSD FRML MDRD: >60 ML/MIN/{1.73_M2}
GLUCOSE BLD-MCNC: 109 MG/DL (ref 70–99)
GLUCOSE BLD-MCNC: 129 MG/DL (ref 70–99)
GLUCOSE BLD-MCNC: 134 MG/DL (ref 70–99)
GLUCOSE BLD-MCNC: 138 MG/DL (ref 70–99)
GLUCOSE BLD-MCNC: 147 MG/DL (ref 74–109)
HCT VFR BLD CALC: 43.7 % (ref 37–47)
HEMOGLOBIN: 14.3 G/DL (ref 12–16)
MCH RBC QN AUTO: 29.4 PG (ref 27–31)
MCHC RBC AUTO-ENTMCNC: 32.7 G/DL (ref 33–37)
MCV RBC AUTO: 89.9 FL (ref 81–99)
PDW BLD-RTO: 13.4 % (ref 11.5–14.5)
PERFORMED ON: ABNORMAL
PLATELET # BLD: 173 K/UL (ref 130–400)
PMV BLD AUTO: 12.1 FL (ref 9.4–12.3)
POTASSIUM REFLEX MAGNESIUM: 3.7 MMOL/L (ref 3.5–5)
RBC # BLD: 4.86 M/UL (ref 4.2–5.4)
SODIUM BLD-SCNC: 143 MMOL/L (ref 136–145)
TOTAL PROTEIN: 5.6 G/DL (ref 6.6–8.7)
WBC # BLD: 7 K/UL (ref 4.8–10.8)

## 2022-11-29 PROCEDURE — 2580000003 HC RX 258

## 2022-11-29 PROCEDURE — 85027 COMPLETE CBC AUTOMATED: CPT

## 2022-11-29 PROCEDURE — 99221 1ST HOSP IP/OBS SF/LOW 40: CPT | Performed by: SURGERY

## 2022-11-29 PROCEDURE — 6370000000 HC RX 637 (ALT 250 FOR IP): Performed by: INTERNAL MEDICINE

## 2022-11-29 PROCEDURE — 6370000000 HC RX 637 (ALT 250 FOR IP): Performed by: STUDENT IN AN ORGANIZED HEALTH CARE EDUCATION/TRAINING PROGRAM

## 2022-11-29 PROCEDURE — 36415 COLL VENOUS BLD VENIPUNCTURE: CPT

## 2022-11-29 PROCEDURE — 6360000002 HC RX W HCPCS

## 2022-11-29 PROCEDURE — 80053 COMPREHEN METABOLIC PANEL: CPT

## 2022-11-29 PROCEDURE — 82947 ASSAY GLUCOSE BLOOD QUANT: CPT

## 2022-11-29 PROCEDURE — 1210000000 HC MED SURG R&B

## 2022-11-29 PROCEDURE — 87507 IADNA-DNA/RNA PROBE TQ 12-25: CPT

## 2022-11-29 RX ORDER — MONTELUKAST SODIUM 10 MG/1
10 TABLET ORAL NIGHTLY
Status: DISCONTINUED | OUTPATIENT
Start: 2022-11-29 | End: 2022-12-01 | Stop reason: HOSPADM

## 2022-11-29 RX ORDER — TRAMADOL HYDROCHLORIDE 50 MG/1
50 TABLET ORAL EVERY 8 HOURS PRN
Status: DISCONTINUED | OUTPATIENT
Start: 2022-11-29 | End: 2022-12-01 | Stop reason: HOSPADM

## 2022-11-29 RX ORDER — MAGNESIUM HYDROXIDE/ALUMINUM HYDROXICE/SIMETHICONE 120; 1200; 1200 MG/30ML; MG/30ML; MG/30ML
30 SUSPENSION ORAL EVERY 4 HOURS
Status: DISPENSED | OUTPATIENT
Start: 2022-11-29 | End: 2022-11-30

## 2022-11-29 RX ORDER — ROSUVASTATIN CALCIUM 10 MG/1
10 TABLET, COATED ORAL NIGHTLY
Status: DISCONTINUED | OUTPATIENT
Start: 2022-11-29 | End: 2022-12-01 | Stop reason: HOSPADM

## 2022-11-29 RX ORDER — BACLOFEN 10 MG/1
5 TABLET ORAL 2 TIMES DAILY PRN
Status: DISCONTINUED | OUTPATIENT
Start: 2022-11-29 | End: 2022-12-01 | Stop reason: HOSPADM

## 2022-11-29 RX ORDER — PROPRANOLOL HCL 60 MG
60 CAPSULE, EXTENDED RELEASE 24HR ORAL DAILY
Status: DISCONTINUED | OUTPATIENT
Start: 2022-11-29 | End: 2022-12-01 | Stop reason: HOSPADM

## 2022-11-29 RX ORDER — TIZANIDINE 4 MG/1
2 TABLET ORAL EVERY 8 HOURS PRN
Status: DISCONTINUED | OUTPATIENT
Start: 2022-11-29 | End: 2022-12-01 | Stop reason: HOSPADM

## 2022-11-29 RX ORDER — ARMODAFINIL 50 MG/1
250 TABLET ORAL DAILY
Status: DISCONTINUED | OUTPATIENT
Start: 2022-11-29 | End: 2022-12-01 | Stop reason: HOSPADM

## 2022-11-29 RX ORDER — DOXEPIN HYDROCHLORIDE 50 MG/1
50 CAPSULE ORAL NIGHTLY
Status: DISCONTINUED | OUTPATIENT
Start: 2022-11-29 | End: 2022-12-01 | Stop reason: HOSPADM

## 2022-11-29 RX ORDER — VENLAFAXINE HYDROCHLORIDE 75 MG/1
150 CAPSULE, EXTENDED RELEASE ORAL DAILY
Status: DISCONTINUED | OUTPATIENT
Start: 2022-11-29 | End: 2022-12-01 | Stop reason: HOSPADM

## 2022-11-29 RX ORDER — NALOXONE HYDROCHLORIDE 4 MG/.1ML
1 SPRAY NASAL PRN
Qty: 1 EACH | Refills: 0 | Status: SHIPPED | OUTPATIENT
Start: 2022-11-29

## 2022-11-29 RX ORDER — GABAPENTIN 100 MG/1
100 CAPSULE ORAL 3 TIMES DAILY
Status: DISCONTINUED | OUTPATIENT
Start: 2022-11-29 | End: 2022-12-01 | Stop reason: HOSPADM

## 2022-11-29 RX ADMIN — BREXPIPRAZOLE 0.5 MG: 1 TABLET ORAL at 20:12

## 2022-11-29 RX ADMIN — ALUMINUM HYDROXIDE, MAGNESIUM HYDROXIDE, AND SIMETHICONE 30 ML: 200; 200; 20 SUSPENSION ORAL at 22:07

## 2022-11-29 RX ADMIN — GABAPENTIN 100 MG: 100 CAPSULE ORAL at 11:09

## 2022-11-29 RX ADMIN — GABAPENTIN 100 MG: 100 CAPSULE ORAL at 20:12

## 2022-11-29 RX ADMIN — SODIUM CHLORIDE: 9 INJECTION, SOLUTION INTRAVENOUS at 08:16

## 2022-11-29 RX ADMIN — ALUMINUM HYDROXIDE, MAGNESIUM HYDROXIDE, AND SIMETHICONE 30 ML: 200; 200; 20 SUSPENSION ORAL at 18:28

## 2022-11-29 RX ADMIN — DOXEPIN HYDROCHLORIDE 50 MG: 50 CAPSULE ORAL at 20:11

## 2022-11-29 RX ADMIN — GABAPENTIN 100 MG: 100 CAPSULE ORAL at 13:58

## 2022-11-29 RX ADMIN — PROPRANOLOL HYDROCHLORIDE 60 MG: 60 CAPSULE, EXTENDED RELEASE ORAL at 11:09

## 2022-11-29 RX ADMIN — TRAMADOL HYDROCHLORIDE 50 MG: 50 TABLET, COATED ORAL at 14:02

## 2022-11-29 RX ADMIN — SODIUM CHLORIDE: 9 INJECTION, SOLUTION INTRAVENOUS at 18:28

## 2022-11-29 RX ADMIN — MONTELUKAST 10 MG: 10 TABLET, FILM COATED ORAL at 20:12

## 2022-11-29 RX ADMIN — ENOXAPARIN SODIUM 40 MG: 100 INJECTION SUBCUTANEOUS at 13:58

## 2022-11-29 RX ADMIN — PROCHLORPERAZINE EDISYLATE 10 MG: 5 INJECTION INTRAMUSCULAR; INTRAVENOUS at 14:02

## 2022-11-29 RX ADMIN — ROSUVASTATIN CALCIUM 10 MG: 10 TABLET, FILM COATED ORAL at 20:11

## 2022-11-29 RX ADMIN — VENLAFAXINE HYDROCHLORIDE 150 MG: 75 CAPSULE, EXTENDED RELEASE ORAL at 11:10

## 2022-11-29 RX ADMIN — MORPHINE SULFATE 2 MG: 2 INJECTION, SOLUTION INTRAMUSCULAR; INTRAVENOUS at 08:11

## 2022-11-29 RX ADMIN — ONDANSETRON 4 MG: 2 INJECTION INTRAMUSCULAR; INTRAVENOUS at 08:11

## 2022-11-29 ASSESSMENT — PAIN DESCRIPTION - DESCRIPTORS
DESCRIPTORS: CRAMPING
DESCRIPTORS: SORE

## 2022-11-29 ASSESSMENT — PAIN DESCRIPTION - ORIENTATION
ORIENTATION: RIGHT
ORIENTATION: ANTERIOR

## 2022-11-29 ASSESSMENT — ENCOUNTER SYMPTOMS
DIARRHEA: 1
SHORTNESS OF BREATH: 0
ABDOMINAL PAIN: 1
ABDOMINAL DISTENTION: 1
NAUSEA: 1
COUGH: 1
BACK PAIN: 0
VOMITING: 1
EYE PAIN: 0
CONSTIPATION: 0
EYE REDNESS: 0

## 2022-11-29 ASSESSMENT — PAIN SCALES - GENERAL
PAINLEVEL_OUTOF10: 9
PAINLEVEL_OUTOF10: 4
PAINLEVEL_OUTOF10: 6
PAINLEVEL_OUTOF10: 2
PAINLEVEL_OUTOF10: 1

## 2022-11-29 ASSESSMENT — PAIN - FUNCTIONAL ASSESSMENT: PAIN_FUNCTIONAL_ASSESSMENT: ACTIVITIES ARE NOT PREVENTED

## 2022-11-29 ASSESSMENT — PAIN DESCRIPTION - LOCATION
LOCATION: ABDOMEN
LOCATION: ABDOMEN

## 2022-11-29 NOTE — H&P
[address] [facility] - History & Physical      PCP: CLINTON Cardoso    Date of Admission: 11/28/2022    Date of Service: 11/28/2022    Chief Complaint:  Emesis     History Of Present Illness: The patient is a 52 y.o. female who presented to [facility] ER with PMH DM, Multiple Sclerosis, complaining of emesis. Patient has had prior history of bowel perforation with reversal colostomy in 2012 with [address]. Patient states that she has had nausea, vomiting, abdominal pain ongoing for the past two days. She states that she has had fever, congestion, decreased appetite, and congestion since Thursday. She denies shortness of breath and chest pain. Work up in ER lipase 33, urinalysis negative, and Respiratory panel rhinovirus detected. Received Reglan 10 mg IV, Zofran 4 mg IV, and Compazine 10 mg IV while in ER. ER physician discussed case with  who felt likely enteritis related and would not require surgical intervention. Patient is to be admitted to the hospital service with consultation to general surgery due to small bowel obstruction. Past Medical History:        Diagnosis Date    COVID     Diabetes ([facility] [address]     Headache(784.0)     Insomnia     Multiple sclerosis ([facility] [address]        Past Surgical History:        Procedure Laterality Date    APPENDECTOMY      BUNIONECTOMY  2020    and infection    CHOLECYSTECTOMY      REVISION COLOSTOMY      SMALL INTESTINE SURGERY      resectionX2       Home Medications:  Prior to Admission medications    Medication Sig Start Date End Date Taking? Authorizing Provider   traMADol (ULTRAM) 50 MG tablet Take 1 tablet by mouth every 8 hours as needed for Pain for up to 30 days.  11/18/22 12/18/22  CLINTON Barnes   tiZANidine (ZANAFLEX) 2 MG tablet Take 1 tablet by mouth every 8 hours as needed (muscle pain) 11/15/22   CLINTON Goel   montelukast (SINGULAIR) 10 MG tablet Take 1 tablet by mouth nightly With xyzal for allergies 11/4/22   Clarisa Silvestre CLINTON   doxepin (SINEQUAN) 50 MG capsule Take 1 capsule by mouth nightly 11/4/22 11/4/23  [facility] CLINTON Narvaez   pioglitazone (ACTOS) 30 MG tablet Take 1 tablet by mouth daily 11/4/22   Jackson CLINTON Pfeiffer   ondansetron (ZOFRAN-ODT) 8 MG TBDP disintegrating tablet Place 1 tablet under the tongue every 6-8 hours as needed for Nausea or Vomiting 10/20/22   Jayjay CLINTON Pfeiffer   propranolol (INDERAL LA) 60 MG extended release capsule Take 1 capsule by mouth daily 10/10/22 4/8/23  [facility] OKSANA Narvaez, CLINTON   furosemide (LASIX) 20 MG tablet Take 1 tablet by mouth daily as needed (swelling) Take in the morning 9/29/22   [facility] Spence   Armodafinil 250 MG TABS Take 250 mg by mouth daily for 90 days. 9/15/22 12/14/22  Marval BarCLINTON Maloney   gabapentin (NEURONTIN) 100 MG capsule Take 1 capsule by mouth 3 times daily for 120 days. 9/13/22 1/11/23  [facility]   ondansetron (ZOFRAN-ODT) 4 MG disintegrating tablet Take 1 tablet by mouth 3 times daily as needed for Nausea or Vomiting 8/22/22   CLINTON Albert   levocetirizine (XYZAL) 5 MG tablet Take 1 tablet by mouth nightly 8/5/22   CLINTON Albert   Insulin Pen Needle 31G X 5 MM MISC To use with insulin pen 8/5/22   [facility]   omega-3 acid ethyl esters (LOVAZA) 1 g capsule Take 2 capsules by mouth in the morning and 2 capsules before bedtime. 8/5/22   CLINTON lAbert   clonazePAM (KLONOPIN) 0.5 MG tablet Take 1 tablet by mouth in the morning and 1 tablet before bedtime. Do all this for 30 days.  7/26/22 8/25/22  CLINTON Albert   insulin glargine [facility]) 100 UNIT/ML injection pen Inject 10 Units into the skin nightly 7/12/22   CLINTON Albert   dapagliflozin (FARXIGA) 10 MG tablet Take 1 tablet by mouth every morning 7/11/22   CLINTON Albert   brexpiprazole (REXULTI) 0.5 MG TABS tablet Take 1 tablet by mouth daily 6/28/22   CLINTON Albert   SITagliptin (GUIUVIA) 100 MG tablet Take 1 tablet by mouth daily 5/13/22   CLINTON Geiger   rosuvastatin (CRESTOR) 10 MG tablet Take 1 tablet by mouth nightly 4/14/22   CLINTON Geiger   venlafaxine (EFFEXOR XR) 150 MG extended release capsule Take 1 capsule by mouth daily 3/1/22   CLINTON Geiger   cyanocobalamin 1000 MCG/ML injection 1ml monthly im 12/14/21   CLINTON Geiger   Fremanezumab-vfrm (AJOVY) 225 MG/1.5ML SOAJ Inject 225 mg into the skin every 30 days    Historical Provider, MD   vitamin D (ERGOCALCIFEROL) 1.25 MG (01572 UT) CAPS capsule Take 1 capsule by mouth once a week 6/21/21   CLINTON Geiger   baclofen (LIORESAL) 10 MG tablet TAKE 1/2 TO 1 TABLET TWICE DAILY AS NEEDED FOR MUSCLE SPASMS 6/16/20   Historical Provider, MD   Syringe/Needle, Disp, (SYRINGE 3CC/25GX1\") 25G X 1\" 3 ML MISC Use to inject b12 once weekly for 1 month, then monthly 1/14/20   CLINTON Geiger       Allergies:    Ampicillin, Biaxin [clarithromycin], Flagyl [metronidazole], Saxenda [liraglutide -weight management], Albuterol, and Doxycycline    Social History:    The patient currently lives home  Tobacco:   reports that she has never smoked. She has never used smokeless tobacco.  Alcohol:   reports no history of alcohol use. Illicit Drugs: denies    Family History:      Problem Relation Age of Onset    Heart Attack Father     No Known Problems Sister     Other Brother        Review of Systems:   Review of Systems   Constitutional:  Positive for activity change, appetite change, diaphoresis and fatigue. HENT: Negative. Respiratory: Negative. Gastrointestinal:  Positive for abdominal distention, abdominal pain, nausea and vomiting. Genitourinary: Negative. Musculoskeletal: Negative. Skin:  Positive for pallor. Neurological:  Positive for dizziness, weakness and light-headedness. 14 point review of systems is negative except as specifically addressed above.     Physical Examination:  /66   Pulse 86   Temp 97.2 °F (36.2 °C) (Oral)   Resp 18   Wt 170 lb (77.1 kg)   LMP 01/01/2020   SpO2 97%   BMI 29.18 kg/m²   Physical Exam  Vitals and nursing note reviewed. Constitutional:       General: She is not in acute distress. Appearance: She is ill-appearing. HENT:      Mouth/Throat:      Mouth: Mucous membranes are dry. Pharynx: Oropharynx is clear. Eyes:      Extraocular Movements: Extraocular movements intact. Conjunctiva/sclera: Conjunctivae normal.      Pupils: Pupils are equal, round, and reactive to light. Cardiovascular:      Rate and Rhythm: Normal rate and regular rhythm. Pulses: Normal pulses. Heart sounds: Normal heart sounds. No murmur heard. Pulmonary:      Effort: Pulmonary effort is normal. No respiratory distress. Breath sounds: Normal breath sounds. Chest:      Chest wall: No tenderness. Abdominal:      General: Bowel sounds are decreased. There is no distension. Tenderness: There is generalized abdominal tenderness. There is no guarding or rebound. Musculoskeletal:         General: No swelling. Normal range of motion. Cervical back: Normal range of motion and neck supple. No rigidity or tenderness. Right lower leg: No edema. Left lower leg: No edema. Skin:     General: Skin is warm and dry. Capillary Refill: Capillary refill takes less than 2 seconds. Coloration: Skin is pale. Neurological:      General: No focal deficit present. Mental Status: She is alert and oriented to person, place, and time. Cranial Nerves: No cranial nerve deficit. Motor: Weakness present.    Psychiatric:         Mood and Affect: Mood normal.         Behavior: Behavior normal.        Diagnostic Data:  CBC:  Recent Labs     11/28/22  1140   WBC 7.7   HGB 16.0   HCT 49.7*        BMP:  Recent Labs     11/28/22  1350      K 4.6      CO2 27   BUN 15   CREATININE 0.5   CALCIUM 9.5     Recent Labs     11/28/22  1350   AST 26   ALT 26 BILITOT 1.8*   ALKPHOS 96       Urinalysis:  Lab Results   Component Value Date/Time    NITRU Negative 11/28/2022 01:55 PM    BLOODU Negative 11/28/2022 01:55 PM    SPECGRAV >=1.045 11/28/2022 01:55 PM    GLUCOSEU =>1000 11/28/2022 01:55 PM         CT ABDOMEN PELVIS W IV CONTRAST Additional Contrast? None    Result Date: 11/28/2022  NO PRIOR REPORT AVAILABLE <Addendum Signed by Ga Kendrick MD, SA CERTIFIED at [id number] 06:19:36 PM Findings were communicated to Sammy Garrison on 11/28/22 at 6:18pm who confirms having received the report and confirms she is aware of the findings. No further action required by the reading radiologist. If the referring clinician has any further questions, please call [employer] service [phone], option 1. Critical Documentation Completed. PP Addendum End> Exam: CT OF THE ABDOMEN/PELVIS WITH IV CONTRAST Clinical data: Nausea and vomiting for three days. History of colostomy and reversal 10 years ago. Technique:Direct contiguousaxial CT images were acquired through the abdomen and pelvis with intravenouscontrastusing soft tissue and bone algorithms. Oral contrast was not administered. Reformatted/MPR images were performed. Radiation dose: CTDIvol =25.87 mGy, DLP =1265 mGy x cm. Limitations: Lack of oral contrast limits evaluation of the bowel loops. Prior Studies: No prior studies submitted. Findings: Lung bases: Clear Liver:Unremarkable size andcontour. Normal density. No evidence of mass. No evidence of dilated ducts. Gallbladder Fossa: Prior cholecystectomy. Spleen: Grossly unremarkable. Pancreas:  Grossly unremarkable. Adrenal glands: Grossly unremarkable size, contour and density. Kidneys: In anatomic position. Grossly unremarkablerenal size, contour and density. No renal or ureteral calculi. No evidence of a renal mass or cyst. Perinephric space is unremarkable. Retroperitoneum: No enlarged retroperitoneal lymphadenopathy. The aorta and IVC appear unremarkable.  Peritoneal cavity: Moderate dilatation of proximal and mid small bowel. Mucosal wall thickening. Mucosal thickening most pronounced anteriorly near the umbilicus involving the mid small bowel. Within the mid small bowel there is stranding of the mesenteric fat extending into the lower abdomen. Surgical sutures within the right abdomen involving the colon from prior colectomy. Minimal air in the right paramedian soft tissues can be related to prior intervention. Image number 43. It is likely extraperitoneal and does not represent free air. Sub adjacent hyper attenuated material. Decompression of the distal small bowel. Stool filled colon. Scattered diverticula. Pelvis: Solid and hollow viscera grossly unremarkable. Prior hysterectomy. Bladder is unremarkable. Osseous structures: No acute or destructive bony process identified. 1. At least moderate small bowel obstruction involving the proximal and mid bowel loops. Decompressed distal small bowel. Most pronounced mucosal thickening is anteriorly involving the mid small bowel. Stranding of the fat without fluid. This can be from  inflammatory infectious process. 2. No true intraperitoneal free air. Postsurgical air in the soft tissues within the right paramedian extraperitoneal soft tissues as described above. 3. Prior cholecystectomy. 4. Stool filled colon. Scattered diverticulosis. No acute diverticulitis. Recommendation: Follow up as clinically indicated. All CT scans at this facility utilize dose modulation, iterative reconstruction, and/or weight based dosing when appropriate to reduce radiation dose to as low as reasonably achievable.  Amended by Ga Kendrick MD, [facility] CERTIFIED at [id number] 06:19:36 PM EST Electronically Signed by Ga Kendrick MD, [facility] at [phone] 06:12:09 PM             Assessment/Plan:  Principal Problem:    Small bowel obstruction    -CT abdomen   -surgery consult  -NPO   -IVF   -follow lytes  -As needed anti-emetic   -As needed pain medication   -Attempted to place NGT while in ER, unsuccessful patient refuses re-attempt  Active Problems:    Rhinovirus/ Enteritis   -Respiratory panel    -Symptom management     Diabetes mellitus   -Accucheck  -A1c  -Sliding scale insulin   -Hypoglycemia protocol as warranted      DVt prophylactic: Lovenox     Signed:  CLINTON Collins - CNP, 11/28/2022 6:00 PM       EMR Dragon/Transcription disclaimer:   Much of this encounter note is an electronic transcription/translation of spoken language to printed text.  The electronic translation of spoken language may permit erroneous, or at times, nonsensical words or phrases to be inadvertently transcribed; although attempts have made to review the note for such errors, some may still exist.

## 2022-11-29 NOTE — ED NOTES
Pharmacy called, Droperidol  not available in ER omnicell. Pharmacy staff to bring medication. PT aware of orders for NG tube placement, pt and family updated on care plan.       Dexter Taylor RN  11/28/22 [id number]

## 2022-11-29 NOTE — CONSULTS
Ms. Casper Parry is a 52year old female with an extensive surgical history, who presented with a complaint of nausea, vomiting, and abdominal pain. She reports that last week she did not feel very well, had had some fever and upper respiratory symptoms. Then on Sunday, she started having nausea and vomiting, which continued all day until she came to the ER Monday morning. She started having some abdominal pain Sunday, which she reports is from throwing up. The pain is constant and cramping in nature. It worsens with movement. The patient reports that she has had continued nausea and some emesis since admission. An NGT was attempted in the ER, but the patient would not tolerate placement. The patient reports that she as not stopped having BMs or flatus during this time. She has passed flatus since admission and has had diarrhea. She thinks the diarrhea started yesterday. The patient reports that she does still have abdominal pain, but it is some better than yesterday. In the ER, a CT was done that was consistent with possible small bowel obstruction, as well as yun small bowel wall thickening. The patient was admitted for further evaluation and treatment. Further questioning- the patient had a bowel perforation in 2001 treated with resection and diverting ostomy. The ostomy was reversed about 4 months afterwards. Then she had something on her liver that they wanted biopsied, so this was attempted laparoscopically. From what the patient's  describes, there was a bowel injury at the time of port placement. This required another exlap and resection. Most recently, the patient had an open radical hysterectomy in [place] in March of this year.     Past Medical History:   Diagnosis Date    COVID     Diabetes ([facility] [address]     Headache(784.0)     Insomnia     Multiple sclerosis ([address]      Past Surgical History:   Procedure Laterality Date    APPENDECTOMY      BUNIONECTOMY  2020    and infection    CHOLECYSTECTOMY HYSTERECTOMY (CERVIX STATUS UNKNOWN)      REVISION COLOSTOMY      SMALL INTESTINE SURGERY      resectionX2     Current Facility-Administered Medications   Medication Dose Route Frequency Provider Last Rate Last Admin    baclofen (LIORESAL) tablet 5 mg  5 mg Oral BID PRN Sterling Portillo MD        brexpiprazole (REXULTI) tablet 0.5 mg  0.5 mg Oral Daily Sterling Portillo MD        doxepin (SINEQUAN) capsule 50 mg  50 mg Oral Nightly Sterling Portillo MD        gabapentin (NEURONTIN) capsule 100 mg  100 mg Oral TID Sterling Portillo MD   100 mg at 11/29/22 1109    montelukast (SINGULAIR) tablet 10 mg  10 mg Oral Nightly Sterling Portillo MD        propranolol (INDERAL LA) extended release capsule 60 mg  60 mg Oral Daily Sterling Portillo MD   60 mg at 11/29/22 1109    rosuvastatin (CRESTOR) tablet 10 mg  10 mg Oral Nightly Sterling Portillo MD        tiZANidine (ZANAFLEX) tablet 2 mg  2 mg Oral Q8H PRN Sterling Portillo MD        traMADol Orval Rickers) tablet 50 mg  50 mg Oral Q8H PRN Sterling Portillo MD        venlafaxine (EFFEXOR XR) extended release capsule 150 mg  150 mg Oral Daily Sterling Portillo MD   150 mg at 11/29/22 1110    Armodafinil TABS 250 mg  250 mg Oral Daily Sterling Portillo MD        0.9 % sodium chloride infusion   IntraVENous Continuous SALAS Pope   Stopped at 11/28/22 1654    sodium chloride flush 0.9 % injection 5-40 mL  5-40 mL IntraVENous 2 times per day CLINTON Collins CNP   10 mL at 11/28/22 2146    sodium chloride flush 0.9 % injection 5-40 mL  5-40 mL IntraVENous PRN CLINTON Collins CNP        0.9 % sodium chloride infusion   IntraVENous PRN CLINTON Collins CNP        enoxaparin (LOVENOX) injection 40 mg  40 mg SubCUTAneous Daily CLINTON Collins CNP        ondansetron (ZOFRAN-ODT) disintegrating tablet 4 mg  4 mg Oral Q8H PRN CLINTON Collins CNP        Or    ondansetron [facility]) injection 4 mg  4 mg IntraVENous Q6H PRN Nivia Miranda, CLINTON - CNP   4 mg at 11/29/22 1131 polyethylene glycol (GLYCOLAX) packet 17 g  17 g Oral Daily PRN Marrie Allis, APRN - CNP        acetaminophen (TYLENOL) tablet 650 mg  650 mg Oral Q6H PRN Marrie Allis, APRN - CNP        Or    acetaminophen (TYLENOL) suppository 650 mg  650 mg Rectal Q6H PRN Marrie Allis, APRN - CNP        promethazine (PHENERGAN) injection 6.25 mg  6.25 mg IntraMUSCular Q8H PRN Marrie Allis, APRN - CNP        prochlorperazine (COMPAZINE) injection 10 mg  10 mg IntraVENous Q6H PRN Marrie Allis, APRN - CNP   10 mg at 11/28/22 2131    insulin lispro (HUMALOG) injection vial 0-4 Units  0-4 Units SubCUTAneous TID WC Marrie Allis, APRN - CNP        insulin lispro (HUMALOG) injection vial 0-4 Units  0-4 Units SubCUTAneous Nightly Marrie Allis, APRN - CNP        glucose chewable tablet 16 g  4 tablet Oral PRN Marrie Allis, APRN - CNP        dextrose bolus 10% 125 mL  125 mL IntraVENous PRN Marrie Allis, APRN - CNP        Or    dextrose bolus 10% 250 mL  250 mL IntraVENous PRN Marrie Allis, APRN - CNP        glucagon (rDNA) injection 1 mg  1 mg SubCUTAneous PRN Marrie Allis, APRN - CNP        dextrose 10 % infusion   IntraVENous Continuous PRN Marrie Allis, APRN - CNP        morphine (PF) injection 2 mg  2 mg IntraVENous Q4H PRN Marrie Allis, APRN - CNP   2 mg at 11/29/22 0811    0.9 % sodium chloride infusion   IntraVENous Continuous Marrie Allis, APRN -  mL/hr at 11/29/22 0816 New Bag at 11/29/22 0816     Allergies: Ampicillin, Biaxin [clarithromycin], Flagyl [metronidazole], Saxenda [liraglutide -weight management], Albuterol, and Doxycycline    Family History   Problem Relation Age of Onset    Heart Attack Father     No Known Problems Sister     Other Brother        Social History     Tobacco Use    Smoking status: Never    Smokeless tobacco: Never   Substance Use Topics    Alcohol use: No     Alcohol/week: 0.0 standard drinks       Review of Systems   Constitutional:  Positive for fever (last week). fat without fluid. This can be from    inflammatory infectious process. 2. No true intraperitoneal free air. Postsurgical air in the soft tissues within the right paramedian extraperitoneal soft tissues as described above. 3. Prior cholecystectomy. 4. Stool filled colon. Scattered diverticulosis. No acute diverticulitis. Recommendation: Follow up as clinically indicated. All CT scans at this facility utilize dose modulation, iterative reconstruction, and/or weight based dosing when appropriate to reduce radiation dose to as low as reasonably achievable. Amended by Dimas Rothman MD, MQSA CERTIFIED at [id number] 06:19:36 PM EST   Electronically Signed by Dimas Rothman MD, [facility] at [id number] 06:12:09 PM       Images personally reviewed by me. Agree with above. There is a small drop of air near the peritoneum, read as extra peritoneal, I agree with radiology that I do not think this represents an perforation. Rather, this is in the area where the patient gives herself insulin injections.     CBC:   Lab Results   Component Value Date/Time    WBC 7.0 11/29/2022 01:46 AM    RBC 4.86 11/29/2022 01:46 AM    HGB 14.3 11/29/2022 01:46 AM    HCT 43.7 11/29/2022 01:46 AM    MCV 89.9 11/29/2022 01:46 AM    MCH 29.4 11/29/2022 01:46 AM    MCHC 32.7 11/29/2022 01:46 AM    RDW 13.4 11/29/2022 01:46 AM     11/29/2022 01:46 AM    MPV 12.1 11/29/2022 01:46 AM     BMP:    Lab Results   Component Value Date/Time     11/29/2022 01:46 AM    K 3.7 11/29/2022 01:46 AM     11/29/2022 01:46 AM    CO2 24 11/29/2022 01:46 AM    BUN 19 11/29/2022 01:46 AM    LABALBU 3.9 11/29/2022 01:46 AM    CREATININE 0.6 11/29/2022 01:46 AM    CALCIUM 8.9 11/29/2022 01:46 AM    GFRAA >59 05/20/2022 02:53 PM    LABGLOM >60 11/29/2022 01:46 AM    GLUCOSE 147 11/29/2022 01:46 AM       Assessment and plan:  52year old female with nausea, vomiting, and abdominal pain  The patient has been passing stool and flatus, so I do not think she has a true obstruction. I will order a GI panel to check for an infectious source of nausea, vomiting, and diarrhea- although she is already positive for rhinovirus/enterovirus by respiratory panel. Recommend continued NPO, IVF, and PRN pain and nausea medications. Monitor for any worsening abdominal pain. Depending on condition tomorrow morning, will consider SBFT. Other cares per the medicine teams.     Rudy Coronel MD  11/29/2022  1:44 PM

## 2022-11-29 NOTE — ED NOTES
Report called to Sabiha, RN.  Pt to go to [address] [place] [address], RAFFAELE  11/28/22 1949

## 2022-11-29 NOTE — ED NOTES
Bleeding stopped to LT nares, no distress noted, pt apologetic and anxious. Emotional support provided, pt to 4th floor via w/c with PCA at this time without incident.       Cosme Baekr RN  11/28/22 [id number]

## 2022-11-29 NOTE — PLAN OF CARE
Problem: Safety - Adult  Goal: Free from fall injury  11/29/2022 1602 by Terrace Riedel, RN  Outcome: Progressing  11/29/2022 0240 by Pranav Nelson RN  Outcome: Progressing     Problem: Pain  Goal: Verbalizes/displays adequate comfort level or baseline comfort level  11/29/2022 1602 by Terrace Riedel, RN  Outcome: Progressing  11/29/2022 0240 by Pranav Nelson RN  Outcome: Progressing     Problem: Skin/Tissue Integrity  Goal: Absence of new skin breakdown  Description: 1. Monitor for areas of redness and/or skin breakdown  2. Assess vascular access sites hourly  3. Every 4-6 hours minimum:  Change oxygen saturation probe site  4. Every 4-6 hours:  If on nasal continuous positive airway pressure, respiratory therapy assess nares and determine need for appliance change or resting period.   11/29/2022 1602 by Terrace Riedel, RN  Outcome: Progressing  11/29/2022 0240 by Pranav Nelson RN  Outcome: Progressing     Problem: Cardiovascular - Adult  Goal: Maintains optimal cardiac output and hemodynamic stability  Outcome: Progressing  Goal: Absence of cardiac dysrhythmias or at baseline  Outcome: Progressing     Problem: Musculoskeletal - Adult  Goal: Return mobility to safest level of function  Outcome: Progressing  Goal: Maintain proper alignment of affected body part  Outcome: Progressing  Goal: Return ADL status to a safe level of function  Outcome: Progressing     Problem: Gastrointestinal - Adult  Goal: Minimal or absence of nausea and vomiting  Outcome: Progressing  Goal: Maintains or returns to baseline bowel function  Outcome: Progressing  Goal: Maintains adequate nutritional intake  Outcome: Progressing  Goal: Establish and maintain optimal ostomy function  Outcome: Progressing     Problem: Infection - Adult  Goal: Absence of infection at discharge  Outcome: Progressing  Goal: Absence of infection during hospitalization  Outcome: Progressing  Goal: Absence of fever/infection during anticipated neutropenic period  Outcome: Progressing     Problem: Metabolic/Fluid and Electrolytes - Adult  Goal: Electrolytes maintained within normal limits  Outcome: Progressing  Goal: Hemodynamic stability and optimal renal function maintained  Outcome: Progressing  Goal: Glucose maintained within prescribed range  Outcome: Progressing     Problem: Skin/Tissue Integrity - Adult  Goal: Skin integrity remains intact  Outcome: Progressing  Goal: Incisions, wounds, or drain sites healing without S/S of infection  Outcome: Progressing  Goal: Oral mucous membranes remain intact  Outcome: Progressing     Problem: Anxiety  Goal: Will report anxiety at manageable levels  Description: INTERVENTIONS:  1. Administer medication as ordered  2. Teach and rehearse alternative coping skills  3.  Provide emotional support with 1:1 interaction with staff  Outcome: Progressing

## 2022-11-29 NOTE — PROGRESS NOTES
Sarthak Valdovinos arrived to room # [id number]. Presented with: Nausea, Vomiting, & Abdominal Pain  Mental Status: Patient is oriented and alert. Vitals:    11/28/22 2015   BP: 127/83   Pulse: 85   Resp: 20   Temp: 97.5 °F (36.4 °C)   SpO2: 97%     Patient safety contract and falls prevention contract reviewed with patient Yes. Oriented Patient to room. Call light within reach. Yes.   Needs, issues or concerns expressed at this time: no.      Electronically signed by eBrenice Taylor RN on 11/28/2022 at 9:17 PM

## 2022-11-29 NOTE — CARE COORDINATION
11/29/22 7688   Service Assessment   Patient Orientation Alert and Oriented;Person;Place;Situation;Self   Cognition Alert   History Provided By Patient   Support Systems Spouse/Significant Other   Patient's Healthcare Decision Maker is: Legal Next of Kin   PCP Verified by CM Yes   Last Visit to PCP Within last 6 months   Prior Functional Level Independent in ADLs/IADLs   Current Functional Level Independent in ADLs/IADLs   Can patient return to prior living arrangement Yes   Ability to make needs known: Good   Family able to assist with home care needs: Yes   Would you like for me to discuss the discharge plan with any other family members/significant others, and if so, who? Yes   Financial Resources Other (Comment)  (Pt reports medical bills are high and Pt needs help paying them.)   Social/Functional History   Lives With Spouse   Type of [address] One level   Home Access Stairs to enter with rails   Entrance Stairs - Number of Steps 3   Entrance Stairs - Rails Left   Bathroom Shower/Tub Tub/Shower unit   Bathroom Toilet Standard   Receives Help From Family   ADL Assistance Independent   Homemaking Assistance Independent   Homemaking Responsibilities Yes   Ambulation Assistance Independent   Transfer Assistance Independent   Active  Yes   Mode of Transportation Car   Occupation Full time employment   Discharge Planning   Living Arrangements Spouse/Significant Other   One/Two Story Residence One story   Services At/After Discharge   Transition of Care Consult ([address]) [facility]   Mode of Transport at Discharge Other (see comment)  (spouse will take Pt home when medically stable.)   Confirm Follow Up Transport Family   Condition of Participation: Discharge Planning   The Patient and/or Patient Representative was provided with a Choice of Provider? Patient   The Patient and/Or Patient Representative agree with the Discharge Plan?  Yes   Freedom of Choice list was provided with basic dialogue that supports the patient's individualized plan of care/goals, treatment preferences, and shares the quality data associated with the providers? Yes   Patient Contact Information:  [phone] [facility]  [phone] (home)   Above information verified? [x]   Yes  []   No    Had HOME OXYGEN prior to admission:    [address] 262:   [facility]    Has a pulse oximetry unit at home:   [x]   Yes  []   No    Informed of need to bring portable home O2 tank to hospital on day of DISCHARGE:    []   Yes  [x]   No    Name of person committed to bringing portable tank at discharge:       Have you been vaccinated for COVID-19 (SARS-CoV-2)? [x]   Yes  []   No                   If so, when? Which :  []   [employer]  [x]   Moderna  []   Hazel Salinas  []   Other:       Pharmacy:    [address] [place] [address] Gabriella [address] [phone]  [address] [address]  Phone: [phone] Fax: [phone]    [facility] [address] YAZ ALTAMIRANO [address], [address] [phone] [facility] [phone]  [address] [facility]  Phone: [phone] Fax: [phone]    [facility] [address] [address] [phone] - F [phone]  [address] [place] [address] RD.   [address] [address]  Phone: [phone] Fax: [phone]      Active with HD/PD prior to admission:           []   Yes  [x]   No  HD Center:       Financial:  Payor: Dwayne Sweet / Plan: [employer] / Product Type: *No Product type* /     Pre-Cert required for SNF:   []   Yes  [x]   No    Patient Deficits:  []   Yes   [x]   No    If yes:  []   Confusion/Memory  []   Visual  []   Motor/Sensory         []   Right arm         []   Right leg         []   Left arm         []   Left leg  []   Language/Speech         []   Aphasia         []   Dysarthria         []   Swallow         Akbar Coma Scale  Eye Opening: Spontaneous  Best Verbal Response: Oriented  Best Motor Response: Obeys commands  Akbar Coma Scale Score: 15        Additional CM/SW Notes:     Pt reports that her  Lindy Burdick will take her home when Pt is medically stable. Pt is agreeable to [facility] if needed. Pt prefers [facility] for any medical equipment. Pt does not report any medical equipment in use currently.    Electronically signed by YADIRA Hurley on 11/29/2022 at 9:45 AM      YADIRA Hurley  [address]

## 2022-11-30 ENCOUNTER — APPOINTMENT (OUTPATIENT)
Dept: GENERAL RADIOLOGY | Age: 47
DRG: 390 | End: 2022-11-30
Payer: COMMERCIAL

## 2022-11-30 LAB
ADENOVIRUS F 40 41 PCR: NOT DETECTED
ALBUMIN SERPL-MCNC: 3.3 G/DL (ref 3.5–5.2)
ALP BLD-CCNC: 63 U/L (ref 35–104)
ALT SERPL-CCNC: 16 U/L (ref 5–33)
ANION GAP SERPL CALCULATED.3IONS-SCNC: 11 MMOL/L (ref 7–19)
AST SERPL-CCNC: 19 U/L (ref 5–32)
ASTROVIRUS PCR: NOT DETECTED
BILIRUB SERPL-MCNC: 1 MG/DL (ref 0.2–1.2)
BUN BLDV-MCNC: 15 MG/DL (ref 6–20)
CALCIUM SERPL-MCNC: 8 MG/DL (ref 8.6–10)
CAMPYLOBACTER PCR: NOT DETECTED
CHLORIDE BLD-SCNC: 105 MMOL/L (ref 98–111)
CLOSTRIDIUM DIFFICILE, PCR: NOT DETECTED
CO2: 22 MMOL/L (ref 22–29)
CREAT SERPL-MCNC: 0.5 MG/DL (ref 0.5–0.9)
CRYPTOSPORIDIUM PCR: NOT DETECTED
CYCLOSPORA CAYETANENSIS PCR: NOT DETECTED
E COLI ENTEROAGGREGATIVE PCR: NOT DETECTED
E COLI ENTEROPATHOGENIC PCR: NOT DETECTED
E COLI ENTEROTOXIGENIC PCR: NOT DETECTED
E COLI SHIGELLA/ENTEROINVASIVE PCR: NOT DETECTED
ENTAMOEBA HISTOLYTICA PCR: NOT DETECTED
GFR SERPL CREATININE-BSD FRML MDRD: >60 ML/MIN/{1.73_M2}
GIARDIA LAMBLIA PCR: NOT DETECTED
GLUCOSE BLD-MCNC: 114 MG/DL (ref 74–109)
GLUCOSE BLD-MCNC: 125 MG/DL (ref 70–99)
GLUCOSE BLD-MCNC: 132 MG/DL (ref 70–99)
GLUCOSE BLD-MCNC: 161 MG/DL (ref 70–99)
GLUCOSE BLD-MCNC: 185 MG/DL (ref 70–99)
HCT VFR BLD CALC: 38.4 % (ref 37–47)
HEMOGLOBIN: 12.3 G/DL (ref 12–16)
MAGNESIUM: 1.7 MG/DL (ref 1.6–2.6)
MCH RBC QN AUTO: 28.8 PG (ref 27–31)
MCHC RBC AUTO-ENTMCNC: 32 G/DL (ref 33–37)
MCV RBC AUTO: 89.9 FL (ref 81–99)
NOROVIRUS GI GII PCR: NOT DETECTED
PDW BLD-RTO: 13.4 % (ref 11.5–14.5)
PERFORMED ON: ABNORMAL
PLATELET # BLD: 140 K/UL (ref 130–400)
PLESIOMONAS SHIGELLOIDES PCR: NOT DETECTED
PMV BLD AUTO: 11.4 FL (ref 9.4–12.3)
POTASSIUM REFLEX MAGNESIUM: 3.4 MMOL/L (ref 3.5–5)
RBC # BLD: 4.27 M/UL (ref 4.2–5.4)
ROTAVIRUS A PCR: NOT DETECTED
SALMONELLA PCR: NOT DETECTED
SAPOVIRUS PCR: NOT DETECTED
SHIGA-LIKE TOXIN-PRODUCING E. COLI (STEC) STX1/STX2: NOT DETECTED
SODIUM BLD-SCNC: 138 MMOL/L (ref 136–145)
TOTAL PROTEIN: 4.8 G/DL (ref 6.6–8.7)
VIBRIO CHOLERAE PCR: NOT DETECTED
VIBRIO PCR: NOT DETECTED
WBC # BLD: 4.9 K/UL (ref 4.8–10.8)
YERSINIA ENTEROCOLITICA PCR: NOT DETECTED

## 2022-11-30 PROCEDURE — 74250 X-RAY XM SM INT 1CNTRST STD: CPT

## 2022-11-30 PROCEDURE — 94760 N-INVAS EAR/PLS OXIMETRY 1: CPT

## 2022-11-30 PROCEDURE — 85027 COMPLETE CBC AUTOMATED: CPT

## 2022-11-30 PROCEDURE — 2580000003 HC RX 258

## 2022-11-30 PROCEDURE — 1210000000 HC MED SURG R&B

## 2022-11-30 PROCEDURE — 80053 COMPREHEN METABOLIC PANEL: CPT

## 2022-11-30 PROCEDURE — 6370000000 HC RX 637 (ALT 250 FOR IP): Performed by: STUDENT IN AN ORGANIZED HEALTH CARE EDUCATION/TRAINING PROGRAM

## 2022-11-30 PROCEDURE — 6360000002 HC RX W HCPCS

## 2022-11-30 PROCEDURE — 99232 SBSQ HOSP IP/OBS MODERATE 35: CPT | Performed by: SURGERY

## 2022-11-30 PROCEDURE — 83735 ASSAY OF MAGNESIUM: CPT

## 2022-11-30 PROCEDURE — 6370000000 HC RX 637 (ALT 250 FOR IP): Performed by: INTERNAL MEDICINE

## 2022-11-30 PROCEDURE — 36415 COLL VENOUS BLD VENIPUNCTURE: CPT

## 2022-11-30 PROCEDURE — 82947 ASSAY GLUCOSE BLOOD QUANT: CPT

## 2022-11-30 PROCEDURE — 6360000002 HC RX W HCPCS: Performed by: INTERNAL MEDICINE

## 2022-11-30 RX ORDER — POTASSIUM CHLORIDE 20 MEQ/1
40 TABLET, EXTENDED RELEASE ORAL PRN
Status: DISCONTINUED | OUTPATIENT
Start: 2022-11-30 | End: 2022-12-01 | Stop reason: HOSPADM

## 2022-11-30 RX ORDER — MAGNESIUM SULFATE 1 G/100ML
2000 INJECTION INTRAVENOUS PRN
Status: DISCONTINUED | OUTPATIENT
Start: 2022-11-30 | End: 2022-12-01 | Stop reason: HOSPADM

## 2022-11-30 RX ORDER — POTASSIUM CHLORIDE 7.45 MG/ML
10 INJECTION INTRAVENOUS PRN
Status: DISCONTINUED | OUTPATIENT
Start: 2022-11-30 | End: 2022-12-01 | Stop reason: HOSPADM

## 2022-11-30 RX ADMIN — MONTELUKAST 10 MG: 10 TABLET, FILM COATED ORAL at 21:39

## 2022-11-30 RX ADMIN — VENLAFAXINE HYDROCHLORIDE 150 MG: 75 CAPSULE, EXTENDED RELEASE ORAL at 10:11

## 2022-11-30 RX ADMIN — DOXEPIN HYDROCHLORIDE 50 MG: 50 CAPSULE ORAL at 21:39

## 2022-11-30 RX ADMIN — ALUMINUM HYDROXIDE, MAGNESIUM HYDROXIDE, AND SIMETHICONE 30 ML: 200; 200; 20 SUSPENSION ORAL at 06:07

## 2022-11-30 RX ADMIN — GABAPENTIN 100 MG: 100 CAPSULE ORAL at 21:38

## 2022-11-30 RX ADMIN — ENOXAPARIN SODIUM 40 MG: 100 INJECTION SUBCUTANEOUS at 10:12

## 2022-11-30 RX ADMIN — SODIUM CHLORIDE: 9 INJECTION, SOLUTION INTRAVENOUS at 04:05

## 2022-11-30 RX ADMIN — TRAMADOL HYDROCHLORIDE 50 MG: 50 TABLET, COATED ORAL at 23:47

## 2022-11-30 RX ADMIN — GABAPENTIN 100 MG: 100 CAPSULE ORAL at 10:11

## 2022-11-30 RX ADMIN — BREXPIPRAZOLE 0.5 MG: 1 TABLET ORAL at 21:38

## 2022-11-30 RX ADMIN — ALUMINUM HYDROXIDE, MAGNESIUM HYDROXIDE, AND SIMETHICONE 30 ML: 200; 200; 20 SUSPENSION ORAL at 02:10

## 2022-11-30 RX ADMIN — POTASSIUM BICARBONATE 40 MEQ: 782 TABLET, EFFERVESCENT ORAL at 10:17

## 2022-11-30 RX ADMIN — PROMETHAZINE HYDROCHLORIDE 6.25 MG: 25 INJECTION INTRAMUSCULAR; INTRAVENOUS at 10:26

## 2022-11-30 RX ADMIN — PROPRANOLOL HYDROCHLORIDE 60 MG: 60 CAPSULE, EXTENDED RELEASE ORAL at 10:12

## 2022-11-30 RX ADMIN — GABAPENTIN 100 MG: 100 CAPSULE ORAL at 16:05

## 2022-11-30 RX ADMIN — TRAMADOL HYDROCHLORIDE 50 MG: 50 TABLET, COATED ORAL at 10:17

## 2022-11-30 ASSESSMENT — PAIN SCALES - GENERAL
PAINLEVEL_OUTOF10: 7
PAINLEVEL_OUTOF10: 6
PAINLEVEL_OUTOF10: 2
PAINLEVEL_OUTOF10: 1
PAINLEVEL_OUTOF10: 4
PAINLEVEL_OUTOF10: 2

## 2022-11-30 ASSESSMENT — PAIN DESCRIPTION - ORIENTATION
ORIENTATION: ANTERIOR
ORIENTATION: ANTERIOR

## 2022-11-30 ASSESSMENT — PAIN DESCRIPTION - DESCRIPTORS
DESCRIPTORS: DISCOMFORT;SORE
DESCRIPTORS: ACHING;DISCOMFORT
DESCRIPTORS: ACHING;SHARP

## 2022-11-30 ASSESSMENT — PAIN DESCRIPTION - LOCATION
LOCATION: ABDOMEN
LOCATION: BACK
LOCATION: ABDOMEN

## 2022-11-30 ASSESSMENT — PAIN - FUNCTIONAL ASSESSMENT
PAIN_FUNCTIONAL_ASSESSMENT: ACTIVITIES ARE NOT PREVENTED
PAIN_FUNCTIONAL_ASSESSMENT: ACTIVITIES ARE NOT PREVENTED

## 2022-11-30 NOTE — PLAN OF CARE
Problem: Safety - Adult  Goal: Free from fall injury  11/29/2022 2322 by Doris Dubois RN  Outcome: Progressing  11/29/2022 1602 by Beatriz Potts RN  Outcome: Progressing     Problem: Pain  Goal: Verbalizes/displays adequate comfort level or baseline comfort level  11/29/2022 2322 by Doris Dubois RN  Outcome: Progressing  11/29/2022 1602 by Beatriz Potts RN  Outcome: Progressing     Problem: Skin/Tissue Integrity  Goal: Absence of new skin breakdown  Description: 1. Monitor for areas of redness and/or skin breakdown  2. Assess vascular access sites hourly  3. Every 4-6 hours minimum:  Change oxygen saturation probe site  4. Every 4-6 hours:  If on nasal continuous positive airway pressure, respiratory therapy assess nares and determine need for appliance change or resting period.   11/29/2022 2322 by Doris Dubois RN  Outcome: Progressing  11/29/2022 1602 by Beatriz Potts RN  Outcome: Progressing     Problem: Cardiovascular - Adult  Goal: Maintains optimal cardiac output and hemodynamic stability  11/29/2022 2322 by Doris Dubois RN  Outcome: Progressing  11/29/2022 1602 by Beatriz Potts RN  Outcome: Progressing  Goal: Absence of cardiac dysrhythmias or at baseline  11/29/2022 2322 by Doris Dubois RN  Outcome: Progressing  11/29/2022 1602 by Beatriz Potts RN  Outcome: Progressing     Problem: Musculoskeletal - Adult  Goal: Return mobility to safest level of function  11/29/2022 2322 by Doris Dubois RN  Outcome: Progressing  [address] [facility] (Taken 11/29/2022 2010)  Return Mobility to Safest Level of Function: Assess patient stability and activity tolerance for standing, transferring and ambulating with or without assistive devices  11/29/2022 1602 by Beatriz Potts RN  Outcome: Progressing  Goal: Maintain proper alignment of affected body part  11/29/2022 2322 by Doris Dubois RN  Outcome: Progressing  11/29/2022 1602 by Beatriz Potts RN  Outcome: Progressing  Goal: Return ADL status to a safe level of function  11/29/2022 2322 by Glo Pena RN  Outcome: Progressing  11/29/2022 1602 by Sarah Woo RN  Outcome: Progressing     Problem: Gastrointestinal - Adult  Goal: Minimal or absence of nausea and vomiting  11/29/2022 2322 by Glo Pena RN  Outcome: Progressing  11/29/2022 1602 by Sarah Woo RN  Outcome: Progressing  Goal: Maintains or returns to baseline bowel function  11/29/2022 2322 by Glo Pena RN  Outcome: Progressing  11/29/2022 1602 by Sarah Woo RN  Outcome: Progressing  Goal: Maintains adequate nutritional intake  11/29/2022 2322 by Glo Pena RN  Outcome: Progressing  11/29/2022 1602 by Sarah Woo RN  Outcome: Progressing  Goal: Establish and maintain optimal ostomy function  11/29/2022 2322 by Glo Pena RN  Outcome: Progressing  11/29/2022 1602 by Sarah Woo RN  Outcome: Progressing     Problem: Infection - Adult  Goal: Absence of infection at discharge  11/29/2022 2322 by Glo Pena RN  Outcome: Progressing  11/29/2022 1602 by Sarah Woo RN  Outcome: Progressing  Goal: Absence of infection during hospitalization  11/29/2022 2322 by Glo Pena RN  Outcome: Progressing  11/29/2022 1602 by Sarah Woo RN  Outcome: Progressing  Goal: Absence of fever/infection during anticipated neutropenic period  11/29/2022 2322 by Glo Pena RN  Outcome: Progressing  11/29/2022 1602 by Sarah Woo RN  Outcome: Progressing     Problem: Metabolic/Fluid and Electrolytes - Adult  Goal: Electrolytes maintained within normal limits  11/29/2022 2322 by Glo Pena RN  Outcome: Progressing  [address] (Taken 11/29/2022 2010)  Electrolytes maintained within normal limits: Monitor labs and assess patient for signs and symptoms of electrolyte imbalances  11/29/2022 1602 by Sarah Woo RN  Outcome: Progressing  Goal: Hemodynamic stability and optimal renal function maintained  11/29/2022 2322 by Glo Pena RN  Outcome: Progressing  11/29/2022 1602 by Sarah Woo RN  Outcome: Progressing  Goal: Glucose maintained within prescribed range  11/29/2022 2322 by Merlyn Marsh RN  Outcome: Progressing  11/29/2022 1602 by Megan Schmitt RN  Outcome: Progressing     Problem: Skin/Tissue Integrity - Adult  Goal: Skin integrity remains intact  11/29/2022 2322 by Merlyn Marsh RN  Outcome: Progressing  [facility] (Taken 11/29/2022 2010)  Skin Integrity Remains Intact: Monitor for areas of redness and/or skin breakdown  11/29/2022 1602 by Megan Schmitt RN  Outcome: Progressing  Goal: Incisions, wounds, or drain sites healing without S/S of infection  11/29/2022 2322 by Merlyn Marsh RN  Outcome: Progressing  11/29/2022 1602 by Megan Schmitt RN  Outcome: Progressing  Goal: Oral mucous membranes remain intact  11/29/2022 2322 by Merlyn Marsh RN  Outcome: Progressing  11/29/2022 1602 by Megan Schmitt RN  Outcome: Progressing     Problem: Anxiety  Goal: Will report anxiety at manageable levels  Description: INTERVENTIONS:  1. Administer medication as ordered  2. Teach and rehearse alternative coping skills  3.  Provide emotional support with 1:1 interaction with staff  11/29/2022 2322 by Merlyn Marsh RN  Outcome: Progressing  11/29/2022 1602 by Megan Schmitt RN  Outcome: Progressing

## 2022-11-30 NOTE — PROGRESS NOTES
[facility] Progress Note    Patient:  Malu Alvarez  YOB: 1975  Date of Service: 11/30/2022  MRN: [id number]   Acct: [de-identified]   Primary Care Physician: CLINTON Soni  Advance Directive: Full Code  Admit Date: 11/28/2022       Hospital Day: 2        CHIEF COMPLAINT:     Chief Complaint   Patient presents with    Emesis       11/30/2022 11:01 AM  Subjective / Interval History:   11/30/2022  Patient seen and examined this AM.  Doing well. No new complaints. Laying comfortably in bed in no acute distress. No acute changes or acute overnight event reported. Denies any acute complaints or distress at this time. Review of Systems:   Review of Systems  ROS: 14 point review of systems is negative except as specifically addressed above.     Diet NPO    Intake/Output Summary (Last 24 hours) at 11/30/2022 1101  Last data filed at 11/30/2022 0944  Gross per 24 hour   Intake 4702.76 ml   Output --   Net 4702.76 ml       Medications:   sodium chloride Stopped (11/28/22 1654)    sodium chloride      dextrose      sodium chloride 125 mL/hr at 11/30/22 0405     Current Facility-Administered Medications   Medication Dose Route Frequency Provider Last Rate Last Admin    baclofen (LIORESAL) tablet 5 mg  5 mg Oral BID PRN Jorje Lieberman MD        doxepin (SINEQUAN) capsule 50 mg  50 mg Oral Nightly Jorje Lieberman MD   50 mg at 11/29/22 2011    gabapentin (NEURONTIN) capsule 100 mg  100 mg Oral TID Jorje Lieberman MD   100 mg at 11/30/22 1011    montelukast (SINGULAIR) tablet 10 mg  10 mg Oral Nightly Jorje Lieberman MD   10 mg at 11/29/22 2012    propranolol (INDERAL LA) extended release capsule 60 mg  60 mg Oral Daily Jorje Lieberman MD   60 mg at 11/30/22 1012    rosuvastatin (CRESTOR) tablet 10 mg  10 mg Oral Nightly Jorje Lieberman MD   10 mg at 11/29/22 2011    tiZANidine (ZANAFLEX) tablet 2 mg  2 mg Oral Q8H PRN Jorje Lieberman MD        traMADol Pinkey Maw) tablet 50 mg  50 mg Oral Q8H PRN Hafsa Glaser Trey Trevino MD   50 mg at 11/30/22 1017    venlafaxine (EFFEXOR XR) extended release capsule 150 mg  150 mg Oral Daily Asia James MD   150 mg at 11/30/22 1011    Armodafinil TABS 250 mg  250 mg Oral Daily Asia James MD        brexpiprazole (REXULTI) tablet 0.5 mg  0.5 mg Oral Nightly Owen Yates MD   0.5 mg at 11/29/22 2012    aluminum & magnesium hydroxide-simethicone (MAALOX) 200-200-20 MG/5ML suspension 30 mL  30 mL Oral Q4H Owen Yates MD   30 mL at 11/30/22 0607    0.9 % sodium chloride infusion   IntraVENous Continuous SALAS Wells   Stopped at 11/28/22 1654    sodium chloride flush 0.9 % injection 5-40 mL  5-40 mL IntraVENous 2 times per day [facility], APRN - CNP   10 mL at 11/28/22 2146    sodium chloride flush 0.9 % injection 5-40 mL  5-40 mL IntraVENous PRN [facility], APRN - CNP        0.9 % sodium chloride infusion   IntraVENous PRN [place] [facility], APRN - CNP        enoxaparin (LOVENOX) injection 40 mg  40 mg SubCUTAneous Daily [facility], APRN - CNP   40 mg at 11/30/22 1012    ondansetron (ZOFRAN-ODT) disintegrating tablet 4 mg  4 mg Oral Q8H PRN Owen Yates MD        Or    ondansetron (ZOFRAN) injection 4 mg  4 mg IntraVENous Q6H PRN Owen Yates MD   4 mg at 11/29/22 0811    polyethylene glycol (GLYCOLAX) packet 17 g  17 g Oral Daily PRN [place] [facility], APR[facility] - CNP        acetaminophen (TYLENOL) tablet 650 mg  650 mg Oral Q6H PRN [place] [facility], APRN - CNP        Or    acetaminophen (TYLENOL) suppository 650 mg  650 mg Rectal Q6H PRN [place] [facility], APRN - CNP        promethazine (PHENERGAN) injection 6.25 mg  6.25 mg IntraMUSCular Q8H PRN Owen Yates MD   6.25 mg at 11/30/22 1026    prochlorperazine (COMPAZINE) injection 10 mg  10 mg IntraVENous Q6H PRN Owen Yates MD   10 mg at 11/29/22 1402    insulin lispro (HUMALOG) injection vial 0-4 Units  0-4 Units SubCUTAneous TID  Talha Jason APRN - [place]        insulin lispro (HUMALOG) injection vial 0-4 Units  0-4 Units SubCUTAneous Nightly Read Du[place]ney, APRN - CNP        glucose chewable tablet 16 g  4 tablet Oral PRN Read Du[place]ney, APRN - CNP        dextrose bolus 10% 125 mL  125 mL IntraVENous PRN Read [place], APRN - CNP        Or    dextrose bolus 10% 250 mL  250 mL IntraVENous PRN Read [place], APRN - CNP        glucagon (rDNA) injection 1 mg  1 mg SubCUTAneous PRN Read Du[place]ney, APRN - CNP        dextrose 10 % infusion   IntraVENous Continuous PRN Read [place], APRN - CNP        morphine (PF) injection 2 mg  2 mg IntraVENous Q4H PRN Read Du[place]ney, APRN - CNP   2 mg at 11/29/22 0811    0.9 % sodium chloride infusion   IntraVENous Continuous Read Duverney, APRN -  mL/hr at 11/30/22 0405 New Bag at 11/30/22 0405         sodium chloride Stopped (11/28/22 1654)    sodium chloride      dextrose      sodium chloride 125 mL/hr at 11/30/22 0405      doxepin  50 mg Oral Nightly    gabapentin  100 mg Oral TID    montelukast  10 mg Oral Nightly    propranolol  60 mg Oral Daily    rosuvastatin  10 mg Oral Nightly    venlafaxine  150 mg Oral Daily    Armodafinil  250 mg Oral Daily    brexpiprazole  0.5 mg Oral Nightly    aluminum & magnesium hydroxide-simethicone  30 mL Oral Q4H    sodium chloride flush  5-40 mL IntraVENous 2 times per day    enoxaparin  40 mg SubCUTAneous Daily    insulin lispro  0-4 Units SubCUTAneous TID WC    insulin lispro  0-4 Units SubCUTAneous Nightly     baclofen, tiZANidine, traMADol, sodium chloride flush, sodium chloride, ondansetron **OR** ondansetron, polyethylene glycol, acetaminophen **OR** acetaminophen, promethazine, prochlorperazine, glucose, dextrose bolus **OR** dextrose bolus, glucagon (rDNA), dextrose, morphine  Diet NPO       Labs:   CBC with DIFF:  Recent Labs     11/28/22  1140 11/29/22  0146 11/30/22  0240   WBC 7.7 7.0 4.9   RBC 5.55* 4.86 4.27   HGB 16.0 14.3 12.3   HCT 49.7* 43.7 38.4   MCV 89.5 89.9 89.9   MCH 28.8 29.4 28.8   MCHC 32.2* 32.7* 32.0*   RDW 13.4 13.4 13.4    173 140   MPV 12.0 12.1 11.4   NEUTOPHILPCT 73.9*  --   --    LYMPHOPCT 10.0*  --   --    MONOPCT 14.9*  --   --    EOSRELPCT 0.4  --   --    BASOPCT 0.4  --   --    NEUTROABS 5.7  --   --    LYMPHSABS 0.8*  --   --    MONOSABS 1.20*  --   --    EOSABS 0.00  --   --    BASOSABS 0.00  --   --        CMP/BMP:  Recent Labs     11/28/22  1350 11/29/22  0146 11/30/22  0240    143 138   K 4.6 3.7 3.4*    105 105   CO2 27 24 22   ANIONGAP 12 14 11   GLUCOSE 192* 147* 114*   BUN 15 19 15   CREATININE 0.5 0.6 0.5   LABGLOM >60 >60 >60   CALCIUM 9.5 8.9 8.0*   PROT 6.3* 5.6* 4.8*   LABALBU 4.4 3.9 3.3*   BILITOT 1.8* 1.5* 1.0   ALKPHOS 96 79 63   ALT 26 21 16   AST 26 21 19         CRP:  No results for input(s): CRP in the last 72 hours. Sed Rate:  No results for input(s): SEDRATE in the last 72 hours. HgBA1c:  No components found for: HGBA1C  FLP:    Lab Results   Component Value Date/Time    TRIG 1,006 05/20/2022 02:53 PM    HDL 40 05/20/2022 02:53 PM    LDLCALC see below 05/20/2022 02:53 PM    LDLDIRECT 59 05/20/2022 02:53 PM     TSH:    Lab Results   Component Value Date/Time    TSH 1.170 05/20/2022 02:53 PM     Troponin T: No results for input(s): TROPONINI in the last 72 hours. Pro-BNP: No results for input(s): BNP in the last 72 hours. INR: No results for input(s): INR in the last 72 hours. ABGs: No results found for: PHART, PO2ART, WRK9STT  UA:  Recent Labs     11/28/22  1355   COLORU YELLOW   PHUR 5.5   CLARITYU CLOUDY*   SPECGRAV >=1.045   LEUKOCYTESUR Negative   UROBILINOGEN 0.2   BILIRUBINUR Negative   BLOODU Negative   GLUCOSEU =>1000         Culture Results:    No results for input(s): CXSURG in the last 720 hours. Blood Culture Recent: No results for input(s): BC in the last 720 hours. No results for input(s): BC, BLOODCULT2, ORG in the last 72 hours. Cultures:   No results for input(s): CULTURE in the last 72 hours.   No results for input(s): BC, Shira Flaming in the last 72 hours. No results for input(s): CXSURG in the last 72 hours. Recent Labs     11/30/22  0240   MG 1.7     Recent Labs     11/28/22  1350 11/29/22  0146 11/30/22  0240   AST 26 21 19   ALT 26 21 16   BILITOT 1.8* 1.5* 1.0   ALKPHOS 96 79 63         RAD:   CT ABDOMEN PELVIS W IV CONTRAST Additional Contrast? None    Result Date: 11/28/2022  NO PRIOR REPORT AVAILABLE <Addendum Signed by Saadia Cantu MD, SA CERTIFIED at [id number] 06:19:36 PM Findings were communicated to Susannah Benitez [place] on 11/28/22 at 6:18pm who confirms having received the report and confirms she is aware of the findings. No further action required by the reading radiologist. If the referring clinician has any further questions, please call customer service [phone], option 1. Critical Documentation Completed. PP Addendum End> Exam: CT OF THE ABDOMEN/PELVIS WITH IV CONTRAST Clinical data: Nausea and vomiting for three days. History of colostomy and reversal 10 years ago. Technique:Direct contiguousaxial CT images were acquired through the abdomen and pelvis with intravenouscontrastusing soft tissue and bone algorithms. Oral contrast was not administered. Reformatted/MPR images were performed. Radiation dose: CTDIvol =25.87 mGy, DLP =1265 mGy x cm. Limitations: Lack of oral contrast limits evaluation of the bowel loops. Prior Studies: No prior studies submitted. Findings: Lung bases: Clear Liver:Unremarkable size andcontour. Normal density. No evidence of mass. No evidence of dilated ducts. Gallbladder Fossa: Prior cholecystectomy. Spleen: Grossly unremarkable. Pancreas:  Grossly unremarkable. Adrenal glands: Grossly unremarkable size, contour and density. Kidneys: In anatomic position. Grossly unremarkablerenal size, contour and density. No renal or ureteral calculi. No evidence of a renal mass or cyst. Perinephric space is unremarkable. Retroperitoneum: No enlarged retroperitoneal lymphadenopathy. The aorta and IVC appear unremarkable. Peritoneal cavity: Moderate dilatation of proximal and mid small bowel. Mucosal wall thickening. Mucosal thickening most pronounced anteriorly near the umbilicus involving the mid small bowel. Within the mid small bowel there is stranding of the mesenteric fat extending into the lower abdomen. Surgical sutures within the right abdomen involving the colon from prior colectomy. Minimal air in the right paramedian soft tissues can be related to prior intervention. Image number 43. It is likely extraperitoneal and does not represent free air. Sub adjacent hyper attenuated material. Decompression of the distal small bowel. Stool filled colon. Scattered diverticula. Pelvis: Solid and hollow viscera grossly unremarkable. Prior hysterectomy. Bladder is unremarkable. Osseous structures: No acute or destructive bony process identified. 1. At least moderate small bowel obstruction involving the proximal and mid bowel loops. Decompressed distal small bowel. Most pronounced mucosal thickening is anteriorly involving the mid small bowel. Stranding of the fat without fluid. This can be from  inflammatory infectious process. 2. No true intraperitoneal free air. Postsurgical air in the soft tissues within the right paramedian extraperitoneal soft tissues as described above. 3. Prior cholecystectomy. 4. Stool filled colon. Scattered diverticulosis. No acute diverticulitis. Recommendation: Follow up as clinically indicated. All CT scans at this facility utilize dose modulation, iterative reconstruction, and/or weight based dosing when appropriate to reduce radiation dose to as low as reasonably achievable.  Amended by Loan Fang MD, [facility] CERTIFIED at [email or web address] 06:19:36 PM EST Electronically Signed by Loan Fang MD, [address] [place] [address] at [id number] 06:12:09 PM                 Objective:   Vitals:   /78   Pulse 85   Temp 97.6 °F (36.4 °C) (Temporal)   Resp 20   Ht 5' 4\" (1.626 m)   Wt 178 lb 4 oz (80.9 kg)   LMP 01/01/2020   SpO2 96%   BMI 30.60 kg/m²     Patient Vitals for the past 24 hrs:   BP Temp Temp src Pulse Resp SpO2   11/30/22 0850 135/78 97.6 °F (36.4 °C) Temporal 85 20 96 %   11/30/22 0052 125/70 97 °F (36.1 °C) Temporal 84 20 96 %   11/29/22 1900 108/61 96.8 °F (36 °C) -- 76 18 95 %   11/29/22 1706 120/74 97.3 °F (36.3 °C) Temporal 79 17 96 %       24HR INTAKE/OUTPUT:    Intake/Output Summary (Last 24 hours) at 11/30/2022 1101  Last data filed at 11/30/2022 0944  Gross per 24 hour   Intake 4702.76 ml   Output --   Net 4702.76 ml       Physical Exam  Vitals and nursing note reviewed. Constitutional:       General: She is not in acute distress. Appearance: Normal appearance. She is not ill-appearing, toxic-appearing or diaphoretic. HENT:      Head: Normocephalic and atraumatic. Right Ear: External ear normal.      Left Ear: External ear normal.      Nose: Nose normal. No congestion or rhinorrhea. Mouth/Throat:      Mouth: Mucous membranes are moist.      Pharynx: Oropharynx is clear. No oropharyngeal exudate or posterior oropharyngeal erythema. Eyes:      General: No scleral icterus. Right eye: No discharge. Left eye: No discharge. Extraocular Movements: Extraocular movements intact. Conjunctiva/sclera: Conjunctivae normal.      Pupils: Pupils are equal, round, and reactive to light. Cardiovascular:      Rate and Rhythm: Normal rate and regular rhythm. Pulses: Normal pulses. Heart sounds: Normal heart sounds. No murmur heard. No friction rub. No gallop. Pulmonary:      Effort: Pulmonary effort is normal. No respiratory distress. Breath sounds: Normal breath sounds. No stridor. No wheezing, rhonchi or rales. Chest:      Chest wall: No tenderness. Abdominal:      General: Bowel sounds are normal. There is no distension. Palpations: Abdomen is soft. Tenderness: There is no abdominal tenderness. There is no guarding or rebound. Musculoskeletal:         General: No swelling, tenderness, deformity or signs of injury. Normal range of motion. Cervical back: Normal range of motion and neck supple. No rigidity. No muscular tenderness. Right lower leg: No edema. Left lower leg: No edema. Skin:     General: Skin is warm and dry. Capillary Refill: Capillary refill takes less than 2 seconds. Coloration: Skin is not jaundiced or pale. Findings: No bruising, erythema, lesion or rash. Neurological:      General: No focal deficit present. Mental Status: She is alert and oriented to person, place, and time. Cranial Nerves: No cranial nerve deficit. Sensory: No sensory deficit. Motor: No weakness. Coordination: Coordination normal.   Psychiatric:         Mood and Affect: Mood normal.         Behavior: Behavior normal.         Thought Content:  Thought content normal.         Judgment: Judgment normal.     Assessment/plan:     Patient Active Problem List    Diagnosis Date Noted    Rhinovirus 11/28/2022     Priority: Medium    Enteritis 11/28/2022     Priority: Medium    Small bowel obstruction ([address] 11/28/2022     Priority: Medium    Nausea and vomiting 10/03/2021    Partial small bowel obstruction ([address] 10/03/2021    COVID-19 09/28/2021    Diabetes mellitus ([address] 01/12/2021    Moderate episode of recurrent major depressive disorder ([address] 05/25/2018    Hypertriglyceridemia 08/18/2016    Vitamin D deficiency 08/18/2016    Multiple sclerosis ([address]     Chronic headache disorder 06/25/2014    Insomnia 06/25/2014    Headache        Hospital Problems             Last Modified POA    * (Principal) Small bowel obstruction ([address] 11/28/2022 Yes    Rhinovirus 11/28/2022 Yes    Enteritis 11/28/2022 Yes    Diabetes mellitus ([address] 11/28/2022 Yes       Principal Problem:    Small bowel obstruction ([address]  Active Problems:    Rhinovirus    Enteritis    Diabetes mellitus ([address]  Resolved Problems:    * No resolved hospital problems. *          Brief History/Summary:   Ms Andrew Liu, a \"52 y.o. female who presented to [facility] ER with PMH DM, Multiple Sclerosis, complaining of emesis. Patient has had prior history of bowel perforation with reversal colostomy in 2012 with [facility]. Patient states that she has had nausea, vomiting, abdominal pain ongoing for the past two days. \"    Patient admitted to [facility] (11/28/2022), further work-up and management      Small bowel obstruction   General surgery on board-appreciate recommendations  Reportedly attempted to place NGT while in ER, unsuccessful patient refuses re-attempt  CT abdomen pelvis W IV Con (11/28/2022): Impression: Moderate small bowel obstruction involving the proximal and mid bowel loops. Decompressed distal small bowel. Most pronounced mucosal thickening is anteriorly involving the mid small bowel. Stranding of the fat without fluid. This can be from inflammatory infectious process. No true intraperitoneal free air. Postsurgical air in the soft tissues within the right paramedian extraperitoneal soft tissues as described above. . Prior cholecystectomy. Stool filled colon. Scattered diverticulosis. No acute diverticulitis. FL small bowel follow-through (11/30/2022): Impression: Normal Gastrografin small bowel follow-through  Continue symptomatic and supportive management. Further work-up and management as per general surgery        Rhinovirus  Continue symptomatic and supportive management          Hypokalemia  Replace as per protocol        Continue management of other chronic medical conditions - see above and orders. Advance Directive: Full Code    Diet NPO         Consults Made:   IP CONSULT TO GENERAL SURGERY      DVT prophylaxis: Enoxaparin          Discharge planning: tbd    Time Spent is  35 mins  in the examination, evaluation, counseling and review of medications, assessment and plan.      Electronically signed by   Seferino Adkins MD, MPH, MD,   Internal Medicine Hospitalist   11/30/2022 11:01 AM

## 2022-11-30 NOTE — PROGRESS NOTES
Subjective:  No acute events or changes. Patient reports that she has had numerous bowel movements. Nausea is much better. She is feeling a lit better today. Objective:  /78   Pulse 85   Temp 97.6 °F (36.4 °C) (Temporal)   Resp 20   Ht 5' 4\" (1.626 m)   Wt 178 lb 4 oz (80.9 kg)   LMP 01/01/2020   SpO2 96%   BMI 30.60 kg/m²   Date 11/30/22 0000 - 11/30/22 2359   Shift 2944-1133 1946-1170 4530-8355 24 Hour Total   INTAKE   P.O.(mL/kg/hr)  0  0   I. V.(mL/kg) [id number])   9230(00.1)   Shift Total(mL/kg) 6117(67.6) 0(0)  7837(04.7)   OUTPUT   Shift Total(mL/kg)       Weight (kg) 80.9 80.9 80.9 80.9     General: NAD, AAO- color much better, looks like she feels much better  Chest: Regular, non-labored  Abdomen: Soft, mild distention, minimal TTP    SBFT- read not done, but basically no contrast in the small bowel on 2 hours film, all in colon    Assessment and plan:  52year old female with PSBO vs enteritis   Patient feeling much better. Will start clear liquids and plan fulls for breakfast. If she is continuing to feel better, should be able to go home tomorrow.  Other cares per the medicine team.

## 2022-11-30 NOTE — PROGRESS NOTES
Date:2022  Patient: Jolie Dominguez  : 1975  [facility]:[id number]  CODE:                                                                        PCP:CLINTON Valentine    Admit Date: 2022 10:59 AM   LOS: 2 days     Hospital course : HPI The patient is a 52 y.o. female who presented to [facility] ER with PMH DM, Multiple Sclerosis, complaining of emesis. Patient has had prior history of bowel perforation with reversal colostomy in  with [facility]. Patient states that she has had nausea, vomiting, abdominal pain ongoing for the past two days. Subjective:  seen at the bedside along with RN patient is feeling much better no further nausea and vomiting. passing flatus. She is having heart burn sym from multiple vomiting , discuss about liquid antacids . Review of Systems    Reviewed 12 system and found most of them negative except as stated above in subjective note    Objective:      Vital signs in last 24 hours:  Patient Vitals for the past 24 hrs:   BP Temp Temp src Pulse Resp SpO2   22 0052 125/70 97 °F (36.1 °C) Temporal 84 20 96 %   22 1900 108/61 96.8 °F (36 °C) -- 76 18 95 %   22 1706 120/74 97.3 °F (36.3 °C) Temporal 79 17 96 %       Patient examined with appropriate PPE  Physical Exam:  Vital Signs: /70   Pulse 84   Temp 97 °F (36.1 °C) (Temporal)   Resp 20   Ht 5' 4\" (1.626 m)   Wt 178 lb 4 oz (80.9 kg)   LMP 2020   SpO2 96%   BMI 30.60 kg/m²   General appearance:. Lying comfortably in bed ,   HEENT: Normocephalic , Atraumatic, PERRL, JVP not raised  Chest: On inspection no use of accessory muscles of respiration, on auscultation vesicular breath sounds equal bilaterally no rales rhonchi or wheezing   cardiac: Regular rate and rhythm, S1, S2 normal. No murmurs, gallops, or rubs auscultated. Abdomen  soft, multiple surgical scar,mild generalized tendrness; diminished bowel sounds, no masses, no organomegaly.   Urogenital : no noonan, no suprapubic tenderness, no CVA tenderness  Extremities: No clubbing or cyanosis. No peripheral edema. Peripheral pulses palpable. Neurologic: Alert and Cooperative , cranial nerves grossly intact, DTR equal, Power  5/5   Psychology: No hallucination, no delusion, appropriate mood          Lab Review   Recent Results (from the past 24 hour(s))   POCT Glucose    Collection Time: 11/29/22 11:35 AM   Result Value Ref Range    POC Glucose 134 (H) 70 - 99 mg/dl    Performed on AccuChek    POCT Glucose    Collection Time: 11/29/22  5:04 PM   Result Value Ref Range    POC Glucose 129 (H) 70 - 99 mg/dl    Performed on AccuChek    POCT Glucose    Collection Time: 11/29/22 10:06 PM   Result Value Ref Range    POC Glucose 109 (H) 70 - 99 mg/dl    Performed on AccuChek    CBC    Collection Time: 11/30/22  2:40 AM   Result Value Ref Range    WBC 4.9 4.8 - 10.8 K/uL    RBC 4.27 4.20 - 5.40 M/uL    Hemoglobin 12.3 12.0 - 16.0 g/dL    Hematocrit 38.4 37.0 - 47.0 %    MCV 89.9 81.0 - 99.0 fL    MCH 28.8 27.0 - 31.0 pg    MCHC 32.0 (L) 33.0 - 37.0 g/dL    RDW 13.4 11.5 - 14.5 %    Platelets 134 886 - 953 K/uL    MPV 11.4 9.4 - 12.3 fL   Comprehensive Metabolic Panel w/ Reflex to MG    Collection Time: 11/30/22  2:40 AM   Result Value Ref Range    Sodium 138 136 - 145 mmol/L    Potassium reflex Magnesium 3.4 (L) 3.5 - 5.0 mmol/L    Chloride 105 98 - 111 mmol/L    CO2 22 22 - 29 mmol/L    Anion Gap 11 7 - 19 mmol/L    Glucose 114 (H) 74 - 109 mg/dL    BUN 15 6 - 20 mg/dL    Creatinine 0.5 0.5 - 0.9 mg/dL    Est, Glom Filt Rate >60 >60    Calcium 8.0 (L) 8.6 - 10.0 mg/dL    Total Protein 4.8 (L) 6.6 - 8.7 g/dL    Albumin 3.3 (L) 3.5 - 5.2 g/dL    Total Bilirubin 1.0 0.2 - 1.2 mg/dL    Alkaline Phosphatase 63 35 - 104 U/L    ALT 16 5 - 33 U/L    AST 19 5 - 32 U/L   Magnesium    Collection Time: 11/30/22  2:40 AM   Result Value Ref Range    Magnesium 1.7 1.6 - 2.6 mg/dL        I/O last 3 completed shifts:   In: 4702.8 [I.V.:4702.8]  Out: - doxepin  50 mg Oral Nightly    gabapentin  100 mg Oral TID    montelukast  10 mg Oral Nightly    propranolol  60 mg Oral Daily    rosuvastatin  10 mg Oral Nightly    venlafaxine  150 mg Oral Daily    Armodafinil  250 mg Oral Daily    brexpiprazole  0.5 mg Oral Nightly    aluminum & magnesium hydroxide-simethicone  30 mL Oral Q4H    sodium chloride flush  5-40 mL IntraVENous 2 times per day    enoxaparin  40 mg SubCUTAneous Daily    insulin lispro  0-4 Units SubCUTAneous TID WC    insulin lispro  0-4 Units SubCUTAneous Nightly          Assessment & Plan     Small bowel obstruction               -CT abdomen reviewed  -surgery consult  -NPO   -IVF   -follow lytes  -As needed anti-emetic   -As needed pain medication   -Attempted to place NGT while in ER, unsuccessful patient refuses re-attempt  11/29 likely resolving, Malox for heart burn , will cont conservative mtx    Active Problems:    Rhinovirus/ Enteritis              -Respiratory panel               -Symptom management     Diabetes mellitus   -Accucheck  -A1c  -Sliding scale insulin   -Hypoglycemia protocol as warranted       DVt prophylactic: Lovenox        POA  Full Code   Case d/w the RN taking care of the patient  RN  notes reviewed  Consultant notes reviewed     DISPOSITION:    D/C: Home  Estimated D/C Date: 12/1      Chitra Lundy MD 11/30/2022 7:34 AM    EMR Dragon/Transcription disclaimer: This dictation was performed using [address] [place] [employer] [place]. Mistake and misspelling may have been created without  realizing them, although attempts have made to review the note for such errors. Please notify me for clarification.   Thank you deangelo/juan manuel

## 2022-12-01 VITALS
DIASTOLIC BLOOD PRESSURE: 67 MMHG | OXYGEN SATURATION: 98 % | HEIGHT: 64 IN | HEART RATE: 85 BPM | SYSTOLIC BLOOD PRESSURE: 137 MMHG | BODY MASS INDEX: 30.39 KG/M2 | RESPIRATION RATE: 18 BRPM | WEIGHT: 178 LBS | TEMPERATURE: 96.9 F

## 2022-12-01 LAB
ALBUMIN SERPL-MCNC: 3.4 G/DL (ref 3.5–5.2)
ALP BLD-CCNC: 66 U/L (ref 35–104)
ALT SERPL-CCNC: 14 U/L (ref 5–33)
ANION GAP SERPL CALCULATED.3IONS-SCNC: 12 MMOL/L (ref 7–19)
AST SERPL-CCNC: 17 U/L (ref 5–32)
BILIRUB SERPL-MCNC: 0.9 MG/DL (ref 0.2–1.2)
BUN BLDV-MCNC: 10 MG/DL (ref 6–20)
CALCIUM SERPL-MCNC: 8.4 MG/DL (ref 8.6–10)
CHLORIDE BLD-SCNC: 107 MMOL/L (ref 98–111)
CO2: 23 MMOL/L (ref 22–29)
CREAT SERPL-MCNC: 0.4 MG/DL (ref 0.5–0.9)
GFR SERPL CREATININE-BSD FRML MDRD: >60 ML/MIN/{1.73_M2}
GLUCOSE BLD-MCNC: 106 MG/DL (ref 74–109)
GLUCOSE BLD-MCNC: 140 MG/DL (ref 70–99)
GLUCOSE BLD-MCNC: 175 MG/DL (ref 70–99)
GLUCOSE BLD-MCNC: 211 MG/DL (ref 70–99)
HCT VFR BLD CALC: 40 % (ref 37–47)
HEMOGLOBIN: 13.1 G/DL (ref 12–16)
MCH RBC QN AUTO: 29.4 PG (ref 27–31)
MCHC RBC AUTO-ENTMCNC: 32.8 G/DL (ref 33–37)
MCV RBC AUTO: 89.9 FL (ref 81–99)
PDW BLD-RTO: 13.2 % (ref 11.5–14.5)
PERFORMED ON: ABNORMAL
PLATELET # BLD: 144 K/UL (ref 130–400)
PMV BLD AUTO: 11.7 FL (ref 9.4–12.3)
POTASSIUM REFLEX MAGNESIUM: 3.6 MMOL/L (ref 3.5–5)
RBC # BLD: 4.45 M/UL (ref 4.2–5.4)
SODIUM BLD-SCNC: 142 MMOL/L (ref 136–145)
TOTAL PROTEIN: 5.1 G/DL (ref 6.6–8.7)
WBC # BLD: 5 K/UL (ref 4.8–10.8)

## 2022-12-01 PROCEDURE — 97535 SELF CARE MNGMENT TRAINING: CPT

## 2022-12-01 PROCEDURE — 36415 COLL VENOUS BLD VENIPUNCTURE: CPT

## 2022-12-01 PROCEDURE — 6370000000 HC RX 637 (ALT 250 FOR IP): Performed by: STUDENT IN AN ORGANIZED HEALTH CARE EDUCATION/TRAINING PROGRAM

## 2022-12-01 PROCEDURE — 97161 PT EVAL LOW COMPLEX 20 MIN: CPT

## 2022-12-01 PROCEDURE — 97165 OT EVAL LOW COMPLEX 30 MIN: CPT

## 2022-12-01 PROCEDURE — 82947 ASSAY GLUCOSE BLOOD QUANT: CPT

## 2022-12-01 PROCEDURE — 94760 N-INVAS EAR/PLS OXIMETRY 1: CPT

## 2022-12-01 PROCEDURE — 97530 THERAPEUTIC ACTIVITIES: CPT

## 2022-12-01 PROCEDURE — 2580000003 HC RX 258

## 2022-12-01 PROCEDURE — 97110 THERAPEUTIC EXERCISES: CPT

## 2022-12-01 PROCEDURE — 85027 COMPLETE CBC AUTOMATED: CPT

## 2022-12-01 PROCEDURE — 6360000002 HC RX W HCPCS

## 2022-12-01 PROCEDURE — 6370000000 HC RX 637 (ALT 250 FOR IP)

## 2022-12-01 PROCEDURE — 80053 COMPREHEN METABOLIC PANEL: CPT

## 2022-12-01 RX ADMIN — SODIUM CHLORIDE, PRESERVATIVE FREE 10 ML: 5 INJECTION INTRAVENOUS at 09:38

## 2022-12-01 RX ADMIN — GABAPENTIN 100 MG: 100 CAPSULE ORAL at 15:30

## 2022-12-01 RX ADMIN — VENLAFAXINE HYDROCHLORIDE 150 MG: 75 CAPSULE, EXTENDED RELEASE ORAL at 09:37

## 2022-12-01 RX ADMIN — INSULIN LISPRO 1 UNITS: 100 INJECTION, SOLUTION INTRAVENOUS; SUBCUTANEOUS at 09:38

## 2022-12-01 RX ADMIN — PROPRANOLOL HYDROCHLORIDE 60 MG: 60 CAPSULE, EXTENDED RELEASE ORAL at 09:37

## 2022-12-01 RX ADMIN — ARMODAFINIL 250 MG: 50 TABLET ORAL at 09:37

## 2022-12-01 RX ADMIN — ENOXAPARIN SODIUM 40 MG: 100 INJECTION SUBCUTANEOUS at 09:37

## 2022-12-01 RX ADMIN — GABAPENTIN 100 MG: 100 CAPSULE ORAL at 09:37

## 2022-12-01 NOTE — PROGRESS NOTES
Physical Therapy  Facility/Department: [facility] ONCOLOGY UNIT  Physical Therapy Initial Assessment    Name: Yancy Walden  : 1975  MRN: [id number]  Date of Service: 2022    Discharge Recommendations:  Home with assist PRN, Home independently          Patient Diagnosis(es): The primary encounter diagnosis was SBO (small bowel obstruction) ([facility] [address] A diagnosis of Enteritis was also pertinent to this visit. Past Medical History:  has a past medical history of COVID, Diabetes ([facility] [address] Headache(784.0), Insomnia, and Multiple sclerosis ([facility] [address] Past Surgical History:  has a past surgical history that includes Cholecystectomy; Small intestine surgery; Revision Colostomy; Appendectomy; Bunionectomy (); and Hysterectomy. Assessment   Assessment: Pt. fully indep with all mobility and transfers. Pt. safe to be up ad svetlana and safe to d/c home with spouse. Pt. anticipating d/c later today. Treatment Diagnosis: indep mobility and gait  Therapy Prognosis: Excellent  Decision Making: Low Complexity  Barriers to Learning: none  Requires PT Follow-Up: No  Activity Tolerance  Activity Tolerance: Patient tolerated treatment well     Plan   Physcial Therapy Plan  General Plan: Discharge with evaluation only  Safety Devices  Type of Devices: Left in chair, Call light within reach     Restrictions  Restrictions/Precautions  Restrictions/Precautions: General Precautions  Required Braces or Orthoses?: No     Subjective   General  Chart Reviewed: Yes  Patient assessed for rehabilitation services?: Yes  Response To Previous Treatment: Not applicable  Family / Caregiver Present: No  Referring Practitioner: Weston Sneed MD  Referral Date : 22  Diagnosis: SBO, enteritis, rhinovirus  Follows Commands: Within Functional Limits  General Comment  Comments: RNLavon, aware pt getting PT. Subjective  Subjective: Pt. willing to work with therapy. States she feels weak, but she has been up to the bathroom. 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  Vision/Hearing  Vision  Vision: Within Functional Limits  Hearing  Hearing: Within functional limits    Cognition   Orientation  Overall Orientation Status: Within Functional Limits  Cognition  Overall Cognitive Status: WFL     Objective   Heart Rate: 79  BP: 128/65  MAP (Calculated): 86  Resp: 18  SpO2: 97 %  O2 Device: None (Room air)     Observation/Palpation  Posture: Good        AROM RLE (degrees)  RLE AROM: WNL  AROM LLE (degrees)  LLE AROM : WNL  Strength RLE  Strength RLE: WFL  Strength LLE  Strength LLE: WFL           Bed mobility  Bridging: Independent  Rolling to Left: Independent  Rolling to Right: Independent  Supine to Sit: Independent  Sit to Supine: Independent  Scooting: Independent  Transfers  Sit to Stand: Independent  Stand to Sit: Independent  Bed to Chair: Independent  Ambulation  Surface: Level tile  Device: No Device  Assistance: Independent  Quality of Gait: steady, no LOB  Gait Deviations: None  Distance: 30'     Balance  Posture: Good  Sitting - Static: Good;+  Sitting - Dynamic: Good;+  Standing - Static: Good;+  Standing - Dynamic: Good;+           OutComes Score                                                  AM-PAC Score             Tinneti Score       Goals  Patient Goals   Patient Goals : go home today Education  Patient Education  Education Given To: Patient  Education Provided: Role of Therapy  Education Method: Verbal  Barriers to Learning: None  Education Outcome: Verbalized understanding      Therapy Time   Individual Concurrent Group Co-treatment   Time In           Time Out           Minutes                   Elinor Marvin PT   Electronically signed by Elinor Marvin PT on 12/1/2022 at 12:34 PM

## 2022-12-01 NOTE — PLAN OF CARE
Problem: Safety - Adult  Goal: Free from fall injury  12/1/2022 1550 by Zuleima Solis RN  Outcome: Completed  12/1/2022 1234 by Zuleima Solis RN  Outcome: Progressing     Problem: Pain  Goal: Verbalizes/displays adequate comfort level or baseline comfort level  12/1/2022 1550 by Zuleima Solis RN  Outcome: Completed  12/1/2022 1234 by Zuleima Solis RN  Outcome: Progressing     Problem: Skin/Tissue Integrity  Goal: Absence of new skin breakdown  Description: 1. Monitor for areas of redness and/or skin breakdown  2. Assess vascular access sites hourly  3. Every 4-6 hours minimum:  Change oxygen saturation probe site  4. Every 4-6 hours:  If on nasal continuous positive airway pressure, respiratory therapy assess nares and determine need for appliance change or resting period.   12/1/2022 1550 by Zuleima Solis RN  Outcome: Completed  12/1/2022 1234 by Zuleima Solis RN  Outcome: Progressing     Problem: Cardiovascular - Adult  Goal: Maintains optimal cardiac output and hemodynamic stability  12/1/2022 1550 by Zuleima Solis RN  Outcome: Completed  12/1/2022 1234 by Zuleima Solis RN  Outcome: Progressing  Goal: Absence of cardiac dysrhythmias or at baseline  12/1/2022 1550 by Zuleima Solis RN  Outcome: Completed  12/1/2022 1234 by Zuleima Solis RN  Outcome: Progressing     Problem: Musculoskeletal - Adult  Goal: Return mobility to safest level of function  12/1/2022 1550 by Zuleima Solis RN  Outcome: Completed  12/1/2022 1234 by Zuleima Solis RN  Outcome: Progressing  Goal: Maintain proper alignment of affected body part  12/1/2022 1550 by Zuleima Solis RN  Outcome: Completed  12/1/2022 1234 by Zuleima Solis RN  Outcome: Progressing  Goal: Return ADL status to a safe level of function  12/1/2022 1550 by Zuleima Solis RN  Outcome: Completed  12/1/2022 1234 by Zuleima Solis RN  Outcome: Progressing     Problem: Gastrointestinal - Adult  Goal: Minimal or absence of nausea and vomiting  12/1/2022 1550 by Henny Arce RN  Outcome: Completed  12/1/2022 1234 by Henny Arce RN  Outcome: Progressing  Goal: Maintains or returns to baseline bowel function  12/1/2022 1550 by Henny Arce RN  Outcome: Completed  12/1/2022 1234 by Henny Arce RN  Outcome: Progressing  Goal: Maintains adequate nutritional intake  12/1/2022 1550 by Henny Arce RN  Outcome: Completed  12/1/2022 1234 by Henny Arce RN  Outcome: Progressing  Goal: Establish and maintain optimal ostomy function  12/1/2022 1550 by Henny Arce RN  Outcome: Completed  12/1/2022 1234 by Henny Arce RN  Outcome: Progressing     Problem: Infection - Adult  Goal: Absence of infection at discharge  12/1/2022 1550 by Henny Arce RN  Outcome: Completed  12/1/2022 1234 by Henny Arce RN  Outcome: Progressing  Goal: Absence of infection during hospitalization  12/1/2022 1550 by Henny Arce RN  Outcome: Completed  12/1/2022 1234 by Henny Arce RN  Outcome: Progressing  Goal: Absence of fever/infection during anticipated neutropenic period  12/1/2022 1550 by Henny Arce RN  Outcome: Completed  12/1/2022 1234 by Henny Arce RN  Outcome: Progressing     Problem: Metabolic/Fluid and Electrolytes - Adult  Goal: Electrolytes maintained within normal limits  12/1/2022 1550 by Henny Arce RN  Outcome: Completed  12/1/2022 1234 by Henny Arce RN  Outcome: Progressing  Goal: Hemodynamic stability and optimal renal function maintained  12/1/2022 1550 by Henny Arce RN  Outcome: Completed  12/1/2022 1234 by Henny Arce RN  Outcome: Progressing  Goal: Glucose maintained within prescribed range  12/1/2022 1550 by Henny Arce RN  Outcome: Completed  12/1/2022 1234 by Henny Arce RN  Outcome: Progressing     Problem: Skin/Tissue Integrity - Adult  Goal: Skin integrity remains intact  12/1/2022 1550 by Henny Arce RN  Outcome: Completed  12/1/2022 1234 by Henny Arce RN  Outcome: Progressing  Goal: Incisions, wounds, or drain sites healing without S/S of infection  12/1/2022 1550 by Jackie Shannon RN  Outcome: Completed  12/1/2022 1234 by Jackie Shannon RN  Outcome: Progressing  Goal: Oral mucous membranes remain intact  12/1/2022 1550 by Jackie Shannon RN  Outcome: Completed  12/1/2022 1234 by Jackie Shannon RN  Outcome: Progressing     Problem: Anxiety  Goal: Will report anxiety at manageable levels  Description: INTERVENTIONS:  1. Administer medication as ordered  2. Teach and rehearse alternative coping skills  3.  Provide emotional support with 1:1 interaction with staff  12/1/2022 1550 by Jackie Shannon RN  Outcome: Completed  12/1/2022 1234 by Jackie Shannon RN  Outcome: Progressing     Problem: Chronic Conditions and Co-morbidities  Goal: Patient's chronic conditions and co-morbidity symptoms are monitored and maintained or improved  12/1/2022 1550 by Jackie Shannon RN  Outcome: Completed  12/1/2022 1234 by Jackie Shannon RN  Outcome: Progressing     Problem: ABCDS Injury Assessment  Goal: Absence of physical injury  12/1/2022 1550 by Jackie Shannon RN  Outcome: Completed  12/1/2022 1234 by Jackie Shannon RN  Outcome: Progressing

## 2022-12-01 NOTE — DISCHARGE SUMMARY
[place], [place], [address]  DEPARTMENT OF HOSPITALIST MEDICINE    DISCHARGE SUMMARY:        Albertina Mckinnon  :  1975  MRN:  [id number]    Admit date:  2022  Discharge date:  2022      Admitting Physician:  No admitting provider for patient encounter. Advance Directive: Full Code    Consults Made:   IP CONSULT TO GENERAL SURGERY      Primary Care Physician:  CLINTON Galvan    Discharge Diagnoses:  Principal Problem:    Small bowel obstruction ([facility] [facility]  Active Problems:    Rhinovirus    Enteritis    Diabetes mellitus ([facility] [address]  Resolved Problems:    * No resolved hospital problems. *          Pertinent Labs:  CBC with DIFF:  Recent Labs     22   WBC 7.0 4.9 5.0   RBC 4.86 4.27 4.45   HGB 14.3 12.3 13.1   HCT 43.7 38.4 40.0   MCV 89.9 89.9 89.9   MCH 29.4 28.8 29.4   MCHC 32.7* 32.0* 32.8*   RDW 13.4 13.4 13.2    140 144   MPV 12.1 11.4 11.7       CMP/BMP:  Recent Labs     22    138 142   K 3.7 3.4* 3.6    105 107   CO2 24 22 23   ANIONGAP 14 11 12   GLUCOSE 147* 114* 106   BUN 19 15 10   CREATININE 0.6 0.5 0.4*   LABGLOM >60 >60 >60   CALCIUM 8.9 8.0* 8.4*   PROT 5.6* 4.8* 5.1*   LABALBU 3.9 3.3* 3.4*   BILITOT 1.5* 1.0 0.9   ALKPHOS 79 63 66   ALT 21 16 14   AST 21 19 17         CRP:  No results for input(s): CRP in the last 72 hours. Sed Rate:  No results for input(s): SEDRATE in the last 72 hours. HgBA1c:  No components found for: HGBA1C  FLP:    Lab Results   Component Value Date/Time    TRIG 1,006 2022 02:53 PM    HDL 40 2022 02:53 PM    LDLCALC see below 2022 02:53 PM    LDLDIRECT 59 2022 02:53 PM     TSH:    Lab Results   Component Value Date/Time    TSH 1.170 2022 02:53 PM     Troponin T: No results for input(s): TROPONINI in the last 72 hours. Pro-BNP: No results for input(s): BNP in the last 72 hours.   INR: No results for input(s): INR in the last 72 hours. ABGs: No results found for: PHART, PO2ART, JGS8EIL  UA:No results for input(s): NITRITE, COLORU, PHUR, LABCAST, WBCUA, RBCUA, MUCUS, TRICHOMONAS, YEAST, BACTERIA, CLARITYU, SPECGRAV, LEUKOCYTESUR, UROBILINOGEN, BILIRUBINUR, BLOODU, GLUCOSEU, AMORPHOUS in the last 72 hours. Invalid input(s): Arnulfo Ends      Culture Results:    No results for input(s): CXSURG in the last 720 hours. Blood Culture Recent: No results for input(s): BC in the last 720 hours. Cultures:   No results for input(s): CULTURE in the last 72 hours. No results for input(s): BC, Sandrine Zhong in the last 72 hours. No results for input(s): CXSURG in the last 72 hours. Recent Labs     11/30/22  0240   MG 1.7     Recent Labs     11/29/22  0146 11/30/22  0240 12/01/22  0312   AST 21 19 17   ALT 21 16 14   BILITOT 1.5* 1.0 0.9   ALKPHOS 79 63 66           Significant Diagnostic Studies:   CT ABDOMEN PELVIS W IV CONTRAST Additional Contrast? None    Result Date: 11/28/2022  NO PRIOR REPORT AVAILABLE <Addendum Signed by Yoli Richardson MD, SA CERTIFIED at [phone] 06:19:36 PM Findings were communicated to [place], [place] on 11/28/22 at 6:18pm who confirms having received the report and confirms she is aware of the findings. No further action required by the reading radiologist. If the referring clinician has any further questions, please call customer service [phone], option 1. Critical Documentation Completed. PP Addendum End> Exam: CT OF THE ABDOMEN/PELVIS WITH IV CONTRAST Clinical data: Nausea and vomiting for three days. History of colostomy and reversal 10 years ago. Technique:Direct contiguousaxial CT images were acquired through the abdomen and pelvis with intravenouscontrastusing soft tissue and bone algorithms. Oral contrast was not administered. Reformatted/MPR images were performed. Radiation dose: CTDIvol =25.87 mGy, DLP =1265 mGy x cm.  Limitations: Lack of oral contrast limits evaluation of the bowel loops. Prior Studies: No prior studies submitted. Findings: Lung bases: Clear Liver:Unremarkable size andcontour. Normal density. No evidence of mass. No evidence of dilated ducts. Gallbladder Fossa: Prior cholecystectomy. Spleen: Grossly unremarkable. Pancreas:  Grossly unremarkable. Adrenal glands: Grossly unremarkable size, contour and density. Kidneys: In anatomic position. Grossly unremarkablerenal size, contour and density. No renal or ureteral calculi. No evidence of a renal mass or cyst. Perinephric space is unremarkable. Retroperitoneum: No enlarged retroperitoneal lymphadenopathy. The aorta and IVC appear unremarkable. Peritoneal cavity: Moderate dilatation of proximal and mid small bowel. Mucosal wall thickening. Mucosal thickening most pronounced anteriorly near the umbilicus involving the mid small bowel. Within the mid small bowel there is stranding of the mesenteric fat extending into the lower abdomen. Surgical sutures within the right abdomen involving the colon from prior colectomy. Minimal air in the right paramedian soft tissues can be related to prior intervention. Image number 43. It is likely extraperitoneal and does not represent free air. Sub adjacent hyper attenuated material. Decompression of the distal small bowel. Stool filled colon. Scattered diverticula. Pelvis: Solid and hollow viscera grossly unremarkable. Prior hysterectomy. Bladder is unremarkable. Osseous structures: No acute or destructive bony process identified. 1. At least moderate small bowel obstruction involving the proximal and mid bowel loops. Decompressed distal small bowel. Most pronounced mucosal thickening is anteriorly involving the mid small bowel. Stranding of the fat without fluid. This can be from  inflammatory infectious process. 2. No true intraperitoneal free air. Postsurgical air in the soft tissues within the right paramedian extraperitoneal soft tissues as described above. 3. Prior cholecystectomy.  4. Stool filled colon. Scattered diverticulosis. No acute diverticulitis. Recommendation: Follow up as clinically indicated. All CT scans at this facility utilize dose modulation, iterative reconstruction, and/or weight based dosing when appropriate to reduce radiation dose to as low as reasonably achievable. Amended by Benedicto Almonte MD, MQSA CERTIFIED at [phone] 06:19:36 PM EST Electronically Signed by Benedicto Almonte MD, [address] [facility] at [phone] 06:12:09 PM                   Hospital Course:   Ms Shala Duke, a \"52 y.o. female who presented to [facility] ER with PMH DM, Multiple Sclerosis, complaining of emesis. Patient has had prior history of bowel perforation with reversal colostomy in 2012 with [facility]. Patient states that she has had nausea, vomiting, abdominal pain ongoing for the past two days. \"     Patient admitted to [facility] (11/28/2022), further work-up and management        Small bowel obstruction   General surgery on board-appreciate recommendations  Reportedly attempted to place NGT while in ER, unsuccessful patient refuses re-attempt  CT abdomen pelvis W IV Con (11/28/2022): Impression: Moderate small bowel obstruction involving the proximal and mid bowel loops. Decompressed distal small bowel. Most pronounced mucosal thickening is anteriorly involving the mid small bowel. Stranding of the fat without fluid. This can be from inflammatory infectious process. No true intraperitoneal free air. Postsurgical air in the soft tissues within the right paramedian extraperitoneal soft tissues as described above. . Prior cholecystectomy. Stool filled colon. Scattered diverticulosis. No acute diverticulitis. FL small bowel follow-through (11/30/2022): Impression: Normal Gastrografin small bowel follow-through  Continue symptomatic and supportive management. Further work-up and management as per general surgery  Okay for discharge from general surgery standpoint  Tolerating p.o.   Follow-up outpatient as needed Rhinovirus  Continue symptomatic and supportive management          Hypokalemia  Replace as per protocol         Continue management of other chronic medical conditions         Physical Exam:  Vital Signs: /65   Pulse 79   Temp (!) 96.6 °F (35.9 °C)   Resp 18   Ht 5' 4\" (1.626 m)   Wt 178 lb (80.7 kg)   LMP 01/01/2020   SpO2 97%   BMI 30.55 kg/m²   Physical Exam  Vitals and nursing note reviewed. Constitutional:       General: She is not in acute distress. Appearance: Normal appearance. She is obese. She is not ill-appearing, toxic-appearing or diaphoretic. HENT:      Head: Normocephalic and atraumatic. Right Ear: External ear normal.      Left Ear: External ear normal.      Nose: Nose normal. No congestion or rhinorrhea. Mouth/Throat:      Mouth: Mucous membranes are moist.      Pharynx: Oropharynx is clear. No oropharyngeal exudate or posterior oropharyngeal erythema. Eyes:      General: No scleral icterus. Right eye: No discharge. Left eye: No discharge. Extraocular Movements: Extraocular movements intact. Conjunctiva/sclera: Conjunctivae normal.      Pupils: Pupils are equal, round, and reactive to light. Cardiovascular:      Rate and Rhythm: Normal rate and regular rhythm. Pulses: Normal pulses. Heart sounds: Normal heart sounds. No murmur heard. No friction rub. No gallop. Pulmonary:      Effort: Pulmonary effort is normal. No respiratory distress. Breath sounds: Normal breath sounds. No stridor. No wheezing, rhonchi or rales. Chest:      Chest wall: No tenderness. Abdominal:      General: Bowel sounds are normal. There is no distension. Palpations: Abdomen is soft. Tenderness: There is no abdominal tenderness. There is no guarding or rebound. Musculoskeletal:         General: No swelling, tenderness, deformity or signs of injury. Normal range of motion. Cervical back: Normal range of motion and neck supple. No rigidity. No muscular tenderness. Right lower leg: No edema. Left lower leg: No edema. Skin:     General: Skin is warm and dry. Capillary Refill: Capillary refill takes less than 2 seconds. Coloration: Skin is not jaundiced or pale. Findings: No bruising, erythema, lesion or rash. Neurological:      General: No focal deficit present. Mental Status: She is alert and oriented to person, place, and time. Cranial Nerves: No cranial nerve deficit. Sensory: No sensory deficit. Motor: No weakness. Coordination: Coordination normal.   Psychiatric:         Mood and Affect: Mood normal.         Behavior: Behavior normal.         Thought Content: Thought content normal.         Judgment: Judgment normal.       Discharge Medications:        Medication List        START taking these medications      naloxone 4 MG/0.1ML Liqd nasal spray  Commonly known as: Narcan  1 spray by Nasal route as needed for Opioid Reversal            CHANGE how you take these medications      brexpiprazole 0.5 MG Tabs tablet  Commonly known as: REXULTI  Take 1 tablet by mouth daily  What changed: when to take this     ondansetron 4 MG disintegrating tablet  Commonly known as: ZOFRAN-ODT  Take 1 tablet by mouth 3 times daily as needed for Nausea or Vomiting  What changed: Another medication with the same name was removed. Continue taking this medication, and follow the directions you see here. CONTINUE taking these medications      Ajovy 225 MG/1.5ML Soaj  Generic drug: Fremanezumab-vfrm     Armodafinil 250 MG Tabs  Take 250 mg by mouth daily for 90 days. baclofen 10 MG tablet  Commonly known as: LIORESAL     clonazePAM 0.5 MG tablet  Commonly known as: KlonoPIN  Take 1 tablet by mouth in the morning and 1 tablet before bedtime. Do all this for 30 days.      cyanocobalamin 1000 MCG/ML injection  1ml monthly im     dapagliflozin 10 MG tablet  Commonly known as: [place]  Take 1 tablet by mouth every morning     doxepin 50 MG capsule  Commonly known as: SINEQUAN  Take 1 capsule by mouth nightly     furosemide 20 MG tablet  Commonly known as: Lasix  Take 1 tablet by mouth daily as needed (swelling) Take in the morning     gabapentin 100 MG capsule  Commonly known as: NEURONTIN  Take 1 capsule by mouth 3 times daily for 120 days. Insulin Pen Needle 31G X 5 MM Misc  To use with insulin pen     levocetirizine 5 MG tablet  Commonly known as: XYZAL  Take 1 tablet by mouth nightly     montelukast 10 MG tablet  Commonly known as: SINGULAIR  Take 1 tablet by mouth nightly With xyzal for allergies     omega-3 acid ethyl esters 1 g capsule  Commonly known as: LOVAZA  Take 2 capsules by mouth in the morning and 2 capsules before bedtime. pioglitazone 30 MG tablet  Commonly known as: Actos  Take 1 tablet by mouth daily     propranolol 60 MG extended release capsule  Commonly known as: INDERAL LA  Take 1 capsule by mouth daily     rosuvastatin 10 MG tablet  Commonly known as: Crestor  Take 1 tablet by mouth nightly     SITagliptin 100 MG tablet  Commonly known as: Januvia  Take 1 tablet by mouth daily     SYRINGE 3CC/25GX1\" 25G X 1\" 3 ML Misc  Use to inject b12 once weekly for 1 month, then monthly     tiZANidine 2 MG tablet  Commonly known as: ZANAFLEX  Take 1 tablet by mouth every 8 hours as needed (muscle pain)     traMADol 50 MG tablet  Commonly known as: ULTRAM  Take 1 tablet by mouth every 8 hours as needed for Pain for up to 30 days.      venlafaxine 150 MG extended release capsule  Commonly known as: EFFEXOR XR  Take 1 capsule by mouth daily     vitamin D 1.25 MG (23403 UT) Caps capsule  Commonly known as: ERGOCALCIFEROL  Take 1 capsule by mouth once a week            ASK your doctor about these medications      Linaaglar KwikPen 100 UNIT/ML injection pen  Generic drug: insulin glargine  Inject 10 Units into the skin nightly               Where to Get Your Medications        You can get these medications from any pharmacy    Bring a paper prescription for each of these medications  naloxone 4 MG/0.1ML Liqd nasal spray           Discharge Instructions: Follow up with CLINTON Monzon in 7 days. Take medications as directed. Resume activity as tolerated. Recommended Follow Up:  No follow-up provider specified. Followup Appointments Scheduled at Time of Discharge:  No future appointments. Diet: ADULT DIET; Dysphagia - Soft and Bite Sized        DISCHARGE STATUS:    Condition on Discharge: stable    Disposition: Patient is medically stable and will be discharged Home      Extended Emergency Contact Information  Primary Emergency Contact: [facility]  Address: [address]  Phone: [phone]  Mobile Phone: [phone]  Relation: Spouse         Time Spent on discharge is   34 mins  in the examination, evaluation, counseling and review of medications and discharge plan. Electronically signed by   Federico Matias MD, MPH, MD,   Internal Medicine Hospitalist   12/1/2022 3:16 PM      Thank you CLINTON Monzon for the opportunity to be involved in this patient's care. If you have any questions or concerns please feel free to contact me at [phone]        EMR Dragon/Transcription disclaimer:   Much of this encounter note is an electronic transcription/translation of spoken language to printed text.  The electronic translation of spoken language may permit erroneous, or at times, nonsensical words or phrases to be inadvertently transcribed; although attempts have made to review the note for such errors, some may still exist.

## 2022-12-01 NOTE — PROGRESS NOTES
Occupational Therapy  Facility/Department: [address] Initial Assessment    Name: Zoltan Roy  : 1975  MRN: [id number]  Date of Service: 2022    Discharge Recommendations:  Home with assist PRN  OT Equipment Recommendations  Equipment Needed: Yes  Other: tub transfer bench       Patient Diagnosis(es): The primary encounter diagnosis was SBO (small bowel obstruction) ([address] A diagnosis of Enteritis was also pertinent to this visit. Past Medical History:  has a past medical history of COVID, Diabetes ([address] Headache(784.0), Insomnia, and Multiple sclerosis ([address] Past Surgical History:  has a past surgical history that includes Cholecystectomy; Small intestine surgery; Revision Colostomy; Appendectomy; Bunionectomy (); and Hysterectomy. Assessment   Assessment: Pt is mod I with ADL/functional transfers. Ed pt in DME for tub shower to increase safety and independence at home. No further OT recommended at this time. REQUIRES OT FOLLOW-UP: No  Activity Tolerance  Activity Tolerance: Patient Tolerated treatment well        Plan   Occupational Therapy Plan  Additional Comments: eval only     Restrictions       Subjective   Subjective  Subjective: Pt states she is fine, just weak, and has been ambulating in room independently     Social/Functional History  Social/Functional History  Lives With: Spouse  Type of Home: House  Home Layout: One level  Home Access: Stairs to enter with rails  Entrance Stairs - Number of Steps: 3  Entrance Stairs - Rails: Left  Bathroom Shower/Tub: Tub/Shower unit  Bathroom Toilet: Standard  Home Equipment: Boubacar Dudley Help From: Family  ADL Assistance: Independent  Homemaking Assistance: Independent  Homemaking Responsibilities: Yes  Ambulation Assistance: Independent  Transfer Assistance: Independent  Active : Yes  Mode of Transportation: Car  Occupation: Full time employment  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       Objective   Heart Rate: 79  BP: 128/65  MAP (Calculated): 86  Resp: 18  SpO2: 97 %  O2 Device: None (Room air)             Safety Devices  Type of Devices: Left in chair;Call light within reach (left with PT)     Toilet Transfers  Toilet - Technique: Ambulating  Toilet Transfer: Modified independent  AROM: Within functional limits  Strength: Generally decreased, functional  ADL  Feeding: Independent  Grooming: Independent  UE Bathing: Modified independent   LE Bathing: Modified independent   UE Dressing: Modified independent   LE Dressing: Modified independent   Toileting: Modified independent            Transfers  Sit to stand: Modified independent  Stand to sit: Modified independent  Vision  Vision: Within Functional Limits  Hearing  Hearing: Within functional limits  Cognition  Overall Cognitive Status: WFL  Orientation  Overall Orientation Status: Within Functional Limits                                             Goals  Short Term Goals  Short Term Goal 1: Pt will verbalize understanding of DME recommendations after ed - MET         Marilyn Oropeza OT  Electronically signed by Marilyn Oropeza OT on 12/1/2022 at 12:04 PM

## 2022-12-01 NOTE — PLAN OF CARE
Problem: Safety - Adult  Goal: Free from fall injury  Outcome: Progressing     Problem: Pain  Goal: Verbalizes/displays adequate comfort level or baseline comfort level  Outcome: Progressing     Problem: Skin/Tissue Integrity  Goal: Absence of new skin breakdown  Description: 1. Monitor for areas of redness and/or skin breakdown  2. Assess vascular access sites hourly  3. Every 4-6 hours minimum:  Change oxygen saturation probe site  4. Every 4-6 hours:  If on nasal continuous positive airway pressure, respiratory therapy assess nares and determine need for appliance change or resting period.   Outcome: Progressing     Problem: Cardiovascular - Adult  Goal: Maintains optimal cardiac output and hemodynamic stability  Outcome: Progressing  Goal: Absence of cardiac dysrhythmias or at baseline  Outcome: Progressing     Problem: Musculoskeletal - Adult  Goal: Return mobility to safest level of function  Outcome: Progressing  Goal: Maintain proper alignment of affected body part  Outcome: Progressing  Goal: Return ADL status to a safe level of function  Outcome: Progressing     Problem: Gastrointestinal - Adult  Goal: Minimal or absence of nausea and vomiting  Outcome: Progressing  Goal: Maintains or returns to baseline bowel function  Outcome: Progressing  Goal: Maintains adequate nutritional intake  Outcome: Progressing  Goal: Establish and maintain optimal ostomy function  Outcome: Progressing     Problem: Infection - Adult  Goal: Absence of infection at discharge  Outcome: Progressing  Goal: Absence of infection during hospitalization  Outcome: Progressing  Goal: Absence of fever/infection during anticipated neutropenic period  Outcome: Progressing     Problem: Metabolic/Fluid and Electrolytes - Adult  Goal: Electrolytes maintained within normal limits  Outcome: Progressing  Goal: Hemodynamic stability and optimal renal function maintained  Outcome: Progressing  Goal: Glucose maintained within prescribed range  Outcome: Progressing     Problem: Skin/Tissue Integrity - Adult  Goal: Skin integrity remains intact  Outcome: Progressing  Goal: Incisions, wounds, or drain sites healing without S/S of infection  Outcome: Progressing  Goal: Oral mucous membranes remain intact  Outcome: Progressing     Problem: Anxiety  Goal: Will report anxiety at manageable levels  Description: INTERVENTIONS:  1. Administer medication as ordered  2. Teach and rehearse alternative coping skills  3.  Provide emotional support with 1:1 interaction with staff  Outcome: Progressing     Problem: Chronic Conditions and Co-morbidities  Goal: Patient's chronic conditions and co-morbidity symptoms are monitored and maintained or improved  Outcome: Progressing     Problem: ABCDS Injury Assessment  Goal: Absence of physical injury  Outcome: Progressing

## 2022-12-02 ENCOUNTER — HOSPITAL ENCOUNTER (OUTPATIENT)
Dept: GENERAL RADIOLOGY | Age: 47
Discharge: HOME | End: 2022-12-02
Payer: COMMERCIAL

## 2022-12-02 ENCOUNTER — TELEPHONE (OUTPATIENT)
Dept: PRIMARY CARE CLINIC | Age: 47
End: 2022-12-02

## 2022-12-02 ENCOUNTER — OFFICE VISIT (OUTPATIENT)
Dept: PRIMARY CARE CLINIC | Age: 47
End: 2022-12-02
Payer: COMMERCIAL

## 2022-12-02 VITALS
HEART RATE: 81 BPM | HEIGHT: 64 IN | BODY MASS INDEX: 30.05 KG/M2 | DIASTOLIC BLOOD PRESSURE: 70 MMHG | SYSTOLIC BLOOD PRESSURE: 122 MMHG | TEMPERATURE: 98.6 F | WEIGHT: 176 LBS | OXYGEN SATURATION: 97 %

## 2022-12-02 DIAGNOSIS — R06.2 WHEEZING: ICD-10-CM

## 2022-12-02 DIAGNOSIS — J06.9 URI WITH COUGH AND CONGESTION: Primary | ICD-10-CM

## 2022-12-02 DIAGNOSIS — J06.9 URI WITH COUGH AND CONGESTION: ICD-10-CM

## 2022-12-02 PROCEDURE — 71046 X-RAY EXAM CHEST 2 VIEWS: CPT

## 2022-12-02 PROCEDURE — 99213 OFFICE O/P EST LOW 20 MIN: CPT | Performed by: NURSE PRACTITIONER

## 2022-12-02 RX ORDER — CEFDINIR 300 MG/1
300 CAPSULE ORAL 2 TIMES DAILY
Qty: 20 CAPSULE | Refills: 0 | Status: SHIPPED | OUTPATIENT
Start: 2022-12-02 | End: 2022-12-12

## 2022-12-02 ASSESSMENT — ENCOUNTER SYMPTOMS
SHORTNESS OF BREATH: 0
EYES NEGATIVE: 1
COUGH: 1
WHEEZING: 1
ALLERGIC/IMMUNOLOGIC NEGATIVE: 1
GASTROINTESTINAL NEGATIVE: 1

## 2022-12-02 NOTE — TELEPHONE ENCOUNTER
Care Transitions Initial Follow Up Call    Outreach made within 2 business days of discharge: Yes    Patient: Pattie Garcia Patient : 1975   MRN: [id number]  Reason for Admission: There are no discharge diagnoses documented for the most recent discharge.   Discharge Date: 22       Spoke with: Leo    Discharge department/facility: [address] [place] [address]        Scheduled appointment with PCP within 7-14 days    Follow Up  Future Appointments   Date Time Provider Monik Jerez   2022  1:00 PM CLINTON Mayers [place], [place]

## 2022-12-02 NOTE — PROGRESS NOTES
Chana Alexander (:  1975) is a 52 y.o. female,Established patient, here for evaluation of the following chief complaint(s):  Cough and Congestion (Concerned about pneumonia)    Patient presents today complaining of cough, congestion, wheezing and rattling in her lungs. Denies fever or SOB. Patient was recently admitted to the hospital for a small bowel obstruction and under went surgery. She was released yesterday. She states that while at the hospital she had mentioned that she was wheezing and could hear and feel rattling in her chest, but this was not addressed. Discussed with patient that I will order a chest x-ray. I will prescribe cefdinir antibiotic as per notes she has tolerated in the past.  She is to wait on chest x-ray results prior to starting. Instructed patient to do deep breathing exercises several times daily, and to get up and walk around the house every few hours when possible. Patient is to continue taking Mucinex. Care instructions discussed. Patient verbalized understanding and agrees to plan of care. ASSESSMENT/PLAN:  1. URI with cough and congestion  -     XR CHEST STANDARD (2 VW); Future  2. Wheezing  -     XR CHEST STANDARD (2 VW); Future   Orders Placed This Encounter   Medications    cefdinir (OMNICEF) 300 MG capsule     Sig: Take 1 capsule by mouth 2 times daily for 10 days     Dispense:  20 capsule     Refill:  0   Cefdinir prescribed as patient is allergic to penicillins, Biaxin, and doxycycline. Patient has a history of C. difficile. Looking back at notes I see that patient has tolerated cefdinir for previous sinus infection. Return if symptoms worsen or fail to improve. Subjective   SUBJECTIVE/OBJECTIVE:  Cough  Associated symptoms include wheezing. Pertinent negatives include no shortness of breath. Review of Systems   Constitutional: Negative. HENT: Negative. Eyes: Negative. Respiratory:  Positive for cough and wheezing.  Negative for shortness of breath. Cardiovascular: Negative. Gastrointestinal: Negative. Endocrine: Negative. Genitourinary: Negative. Musculoskeletal: Negative. Skin: Negative. Allergic/Immunologic: Negative. Neurological: Negative. Hematological: Negative. Psychiatric/Behavioral: Negative. Objective   Physical Exam  Vitals reviewed. Cardiovascular:      Rate and Rhythm: Normal rate and regular rhythm. Heart sounds: Normal heart sounds. Pulmonary:      Effort: Pulmonary effort is normal.      Breath sounds: Examination of the left-lower field reveals wheezing and rhonchi. Wheezing and rhonchi present. Neurological:      Mental Status: She is alert. Patient Instructions     You will receive a phone call regarding your chest xray results. Completely finish antibiotic. Continue taking mucinex for congestion. Follow up with your PCP if symptoms persist or worsen. An electronic signature was used to authenticate this note. --CLINTON Key - CNP     EMR Dragon/translation disclaimer: Much of this encounter note is an electronic transcription/translation of spoken language to printed text. The electronic translation of spoken language may be erroneous, or at times, nonsensical words or phrases may be inadvertently transcribed.   Although I have reviewed the note for such errors, some may still exist.

## 2022-12-02 NOTE — PATIENT INSTRUCTIONS
You will receive a phone call regarding your chest xray results. Completely finish antibiotic. Continue taking mucinex for congestion. Follow up with your PCP if symptoms persist or worsen.

## 2022-12-05 NOTE — TELEPHONE ENCOUNTER
Care Transitions Initial Follow Up Call    Outreach made within 2 business days of discharge: Yes    Patient: Ivory Rossi Patient : 1975   MRN: [id number]  Reason for Admission: There are no discharge diagnoses documented for the most recent discharge.   Discharge Date: 22       Spoke with: Erika Meredith number you are calling is not accepting calls at this time\"    Discharge department/facility: [facility]        Scheduled appointment with PCP within 7-14 days    Follow Up  Future Appointments   Date Time Provider Monik Jerez   2022  1:00 PM CLINTON Hernadez [place], [place]

## 2022-12-06 DIAGNOSIS — F33.1 MODERATE EPISODE OF RECURRENT MAJOR DEPRESSIVE DISORDER (HCC): ICD-10-CM

## 2022-12-12 ENCOUNTER — TELEMEDICINE (OUTPATIENT)
Dept: PRIMARY CARE CLINIC | Age: 47
End: 2022-12-12

## 2022-12-12 DIAGNOSIS — K56.600 PARTIAL SMALL BOWEL OBSTRUCTION (HCC): ICD-10-CM

## 2022-12-12 DIAGNOSIS — G89.4 CHRONIC PAIN SYNDROME: ICD-10-CM

## 2022-12-12 DIAGNOSIS — G89.29 CHRONIC NONINTRACTABLE HEADACHE, UNSPECIFIED HEADACHE TYPE: ICD-10-CM

## 2022-12-12 DIAGNOSIS — E11.69 TYPE 2 DIABETES MELLITUS WITH OTHER SPECIFIED COMPLICATION, WITHOUT LONG-TERM CURRENT USE OF INSULIN (HCC): ICD-10-CM

## 2022-12-12 DIAGNOSIS — G35 MULTIPLE SCLEROSIS (HCC): ICD-10-CM

## 2022-12-12 DIAGNOSIS — R51.9 CHRONIC NONINTRACTABLE HEADACHE, UNSPECIFIED HEADACHE TYPE: ICD-10-CM

## 2022-12-12 DIAGNOSIS — Z09 HOSPITAL DISCHARGE FOLLOW-UP: Primary | ICD-10-CM

## 2022-12-12 RX ORDER — TRAMADOL HYDROCHLORIDE 100 MG/1
100 TABLET, EXTENDED RELEASE ORAL DAILY
Qty: 30 TABLET | Refills: 0 | Status: SHIPPED | OUTPATIENT
Start: 2022-12-12 | End: 2023-01-11

## 2022-12-12 ASSESSMENT — ENCOUNTER SYMPTOMS
CONSTIPATION: 0
WHEEZING: 0
TROUBLE SWALLOWING: 0
VOMITING: 0
NAUSEA: 0
COUGH: 0
BACK PAIN: 1
ABDOMINAL PAIN: 0
EYE DISCHARGE: 0
BLOOD IN STOOL: 0
RHINORRHEA: 0
SORE THROAT: 0
EYE REDNESS: 0
SHORTNESS OF BREATH: 0
SINUS PRESSURE: 0
DIARRHEA: 0

## 2022-12-12 NOTE — PROGRESS NOTES
Post-Discharge Transitional Care Follow Up      Hermann Navarro   YOB: 1975    Date of Office Visit:  12/12/2022  Date of Hospital Admission: 11/28/22  Date of Hospital Discharge: 12/1/22  Readmission Risk Score(high >=14%. Medium >=10%):Readmission Risk Score: 11.2      Care management risk score Rising risk (score 2-5) and Complex Care (Scores >=6): No Risk Score On File     Non face to face  following discharge, date last encounter closed (first attempt may have been earlier): 12/02/2022     Call initiated 2 business days of discharge: Yes     Below is the assessment and plan developed based on review of pertinent history, physical exam, labs, studies, and medications. Hospital discharge follow-up  -     HI DISCHARGE MEDS RECONCILED W/ CURRENT OUTPATIENT MED LIST  Partial small bowel obstruction (HCC)  Type 2 diabetes mellitus with other specified complication, without long-term current use of insulin (HCC)  Multiple sclerosis (HCC)  -     traMADol (ULTRAM ER) 100 MG extended release tablet; Take 1 tablet by mouth daily for 30 days. , Disp-30 tablet, R-0Normal  Chronic nonintractable headache, unspecified headache type  -     traMADol (ULTRAM ER) 100 MG extended release tablet; Take 1 tablet by mouth daily for 30 days. , Disp-30 tablet, R-0Normal  Chronic pain syndrome  -     traMADol (ULTRAM ER) 100 MG extended release tablet; Take 1 tablet by mouth daily for 30 days. , Disp-30 tablet, R-0Normal    Medical Decision Making: high complexity  Return in about 1 month (around 1/12/2023). Subjective:   HPI    Inpatient course: Discharge summary reviewed- see chart. Hospital Course:   Ms Manuel Lagunas, a \"52 y.o. female who presented to [facility] ER with PMH DM, Multiple Sclerosis, complaining of emesis. Patient has had prior history of bowel perforation with reversal colostomy in 2012 with [facility]. Patient states that she has had nausea, vomiting, abdominal pain ongoing for the past two days. \"    Small bowel obstruction   General surgery on board-appreciate recommendations  Reportedly attempted to place NGT while in ER, unsuccessful patient refuses re-attempt  CT abdomen pelvis W IV Con (11/28/2022): Impression: Moderate small bowel obstruction involving the proximal and mid bowel loops. Decompressed distal small bowel. Most pronounced mucosal thickening is anteriorly involving the mid small bowel. Stranding of the fat without fluid. This can be from inflammatory infectious process. No true intraperitoneal free air. Postsurgical air in the soft tissues within the right paramedian extraperitoneal soft tissues as described above. . Prior cholecystectomy. Stool filled colon. Scattered diverticulosis. No acute diverticulitis. FL small bowel follow-through (11/30/2022): Impression: Normal Gastrografin small bowel follow-through  Continue symptomatic and supportive management. Further work-up and management as per general surgery  Okay for discharge from general surgery standpoint  Tolerating p.o. Follow-up outpatient as needed     Rhinovirus  Continue symptomatic and supportive management          Hypokalemia  Replace as per protocol         Continue management of other chronic medical conditions   Interval history/Current status: feeling better . Patient Active Problem List   Diagnosis    Headache    Chronic headache disorder    Insomnia    Multiple sclerosis (HCC)    Hypertriglyceridemia    Vitamin D deficiency    Moderate episode of recurrent major depressive disorder (HCC)    Diabetes mellitus (HCC)    COVID-19    Nausea and vomiting    Partial small bowel obstruction (HCC)    Rhinovirus    Enteritis    Small bowel obstruction (HCC)       Medications listed as started at the time of discharge from hospital  Cannot display discharge medications since this is not an admission.           Medications marked \"taking\" at this time  Outpatient Medications Marked as Taking for the 12/12/22 encounter (Telemedicine) with Katy Bowels CLINTON Narvaez   Medication Sig Dispense Refill    traMADol (ULTRAM ER) 100 MG extended release tablet Take 1 tablet by mouth daily for 30 days. 30 tablet 0        Medications patient taking as of now reconciled against medications ordered at time of hospital discharge: Yes    Review of Systems   Constitutional:  Positive for fatigue. Negative for activity change, appetite change, chills, fever and unexpected weight change. HENT:  Negative for congestion, hearing loss, postnasal drip, rhinorrhea, sinus pressure, sore throat and trouble swallowing. Eyes:  Negative for discharge, redness and visual disturbance. Respiratory:  Negative for cough, shortness of breath and wheezing. Cardiovascular:  Negative for chest pain, palpitations and leg swelling. Gastrointestinal:  Negative for abdominal pain, blood in stool, constipation, diarrhea, nausea and vomiting. Endocrine: Negative for cold intolerance and heat intolerance. Genitourinary:  Negative for dysuria, flank pain, menstrual problem, pelvic pain, urgency and vaginal discharge. Musculoskeletal:  Positive for arthralgias, back pain, myalgias and neck pain. Skin:  Negative for rash. Neurological:  Negative for dizziness, weakness and headaches. Hematological:  Negative for adenopathy. Psychiatric/Behavioral:  Negative for dysphoric mood, sleep disturbance and suicidal ideas. The patient is not nervous/anxious.       Objective:    Patient-Reported Vitals  No data recorded    Physical Exam  [INSTRUCTIONS:  \"[x]\" Indicates a positive item  \"[]\" Indicates a negative item  -- DELETE ALL ITEMS NOT EXAMINED]    Constitutional: [x] Appears well-developed and well-nourished [x] No apparent distress      [] Abnormal -     Mental status: [x] Alert and awake  [x] Oriented to person/place/time [x] Able to follow commands    [] Abnormal -     Eyes:   EOM    [x]  Normal    [] Abnormal -   Sclera  [x]  Normal    [] Abnormal -          Discharge [x] None visible   [] Abnormal -     HENT: [x] Normocephalic, atraumatic  [] Abnormal -   [x] Mouth/Throat: Mucous membranes are moist    External Ears [x] Normal  [] Abnormal -    Neck: [x] No visualized mass [] Abnormal -     Pulmonary/Chest: [x] Respiratory effort normal   [x] No visualized signs of difficulty breathing or respiratory distress        [] Abnormal -      Musculoskeletal:   [x] Normal gait with no signs of ataxia         [x] Normal range of motion of neck        [] Abnormal -     Neurological:        [x] No Facial Asymmetry (Cranial nerve 7 motor function) (limited exam due to video visit)          [x] No gaze palsy        [] Abnormal -          Skin:        [x] No significant exanthematous lesions or discoloration noted on facial skin         [] Abnormal -            Psychiatric:       [x] Normal Affect [] Abnormal -        [x] No Hallucinations    Other pertinent observable physical exam findings:-      Chana Catherine, was evaluated through a synchronous (real-time) audio-video encounter. The patient (or guardian if applicable) is aware that this is a billable service, which includes applicable co-pays. This Virtual Visit was conducted with patient's (and/or legal guardian's) consent. The visit was conducted pursuant to the emergency declaration under the [address] [place] [address] and the [employer] Act. Patient identification was verified, and a caregiver was present when appropriate. The patient was located at Home: [address] [place] [id number]. Provider was located at [facility] ([place] [address]): [address],  [address] [facility]. An electronic signature was used to authenticate this note.   --CLINTON Borrego

## 2022-12-13 ENCOUNTER — TELEPHONE (OUTPATIENT)
Dept: PRIMARY CARE CLINIC | Age: 47
End: 2022-12-13

## 2022-12-13 DIAGNOSIS — G35 MULTIPLE SCLEROSIS (HCC): ICD-10-CM

## 2022-12-13 DIAGNOSIS — G89.4 CHRONIC PAIN SYNDROME: ICD-10-CM

## 2022-12-13 DIAGNOSIS — G89.29 CHRONIC NONINTRACTABLE HEADACHE, UNSPECIFIED HEADACHE TYPE: ICD-10-CM

## 2022-12-13 DIAGNOSIS — R51.9 CHRONIC NONINTRACTABLE HEADACHE, UNSPECIFIED HEADACHE TYPE: ICD-10-CM

## 2022-12-13 RX ORDER — HYDROCODONE BITARTRATE AND ACETAMINOPHEN 5; 325 MG/1; MG/1
1 TABLET ORAL EVERY 6 HOURS PRN
Qty: 28 TABLET | Refills: 0 | Status: SHIPPED | OUTPATIENT
Start: 2022-12-13 | End: 2022-12-20

## 2022-12-28 ENCOUNTER — OFFICE VISIT (OUTPATIENT)
Dept: GASTROENTEROLOGY | Age: 47
End: 2022-12-28
Payer: COMMERCIAL

## 2022-12-28 VITALS
DIASTOLIC BLOOD PRESSURE: 80 MMHG | WEIGHT: 178 LBS | HEART RATE: 68 BPM | BODY MASS INDEX: 30.39 KG/M2 | OXYGEN SATURATION: 98 % | HEIGHT: 64 IN | SYSTOLIC BLOOD PRESSURE: 120 MMHG

## 2022-12-28 DIAGNOSIS — K59.00 CONSTIPATION, UNSPECIFIED CONSTIPATION TYPE: Primary | ICD-10-CM

## 2022-12-28 PROCEDURE — 99204 OFFICE O/P NEW MOD 45 MIN: CPT | Performed by: NURSE PRACTITIONER

## 2022-12-28 RX ORDER — HYDROCODONE BITARTRATE AND ACETAMINOPHEN 5; 325 MG/1; MG/1
1 TABLET ORAL DAILY
COMMUNITY

## 2022-12-28 RX ORDER — ARMODAFINIL 250 MG/1
250 TABLET ORAL DAILY
COMMUNITY

## 2022-12-28 RX ORDER — SUMATRIPTAN 100 MG/1
100 TABLET, FILM COATED ORAL
COMMUNITY

## 2022-12-28 RX ORDER — DOCUSATE SODIUM 100 MG/1
100 CAPSULE, LIQUID FILLED ORAL 2 TIMES DAILY PRN
COMMUNITY

## 2022-12-28 ASSESSMENT — ENCOUNTER SYMPTOMS
ABDOMINAL DISTENTION: 0
ABDOMINAL PAIN: 0
ANAL BLEEDING: 0
DIARRHEA: 0
RECTAL PAIN: 0
SHORTNESS OF BREATH: 0
BLOOD IN STOOL: 0
NAUSEA: 0
TROUBLE SWALLOWING: 0
CONSTIPATION: 0
COUGH: 0
CHOKING: 0
VOMITING: 0

## 2022-12-28 NOTE — PROGRESS NOTES
Subjective:     Patient ID: Sarthak Valdovinos is a 52 y.o. female  PCP: CLINTON Berger  Referring Provider: CLINTON Medrano    [facility]  Patient presents to the office today with the following complaints: New Patient and Abdominal Pain  Patient seen in the office today, she was in the hospital with a bowel obstruction 11/28/22. Reports she is taking benefiber daily and colace, reports she is having normal daily bowel movements. Discuss adding a pro/prebiotic. Patient is due for colonoscopy however she does not want to do this at this time due to her history of colon issues. Reports she is going to start seeing a counselor due to her multiple health issues. Small bowel follow through 11/30/22  Impression   Impression:   Normal Gastrografin small bowel follow-through. Signed by Dr Vida Lopez 11/28/2022    Narrative  NO PRIOR REPORT AVAILABLE  <Addendum Signed by Luz Storm MD, [facility] at [id number] 06:19:36 PM  Findings were communicated to Sammy Mendoza on 11/28/22 at 6:18pm who confirms having received the report and confirms she is aware of the findings. No further action required by the reading radiologist. If the referring clinician has any further  questions, please call customer service [phone], option 1. Critical Documentation Completed. PP  Addendum End>  Exam: CT OF THE ABDOMEN/PELVIS WITH IV CONTRAST  Clinical data: Nausea and vomiting for three days. History of colostomy and reversal 10 years ago. Technique:Direct contiguousaxial CT images were acquired through the abdomen and pelvis with intravenouscontrastusing soft tissue and bone algorithms. Oral contrast was not administered. Reformatted/MPR images were performed. Radiation dose: CTDIvol  =25.87 mGy, DLP =1265 mGy x cm. Limitations: Lack of oral contrast limits evaluation of the bowel loops. Prior Studies: No prior studies submitted.   Findings: Lung bases: Clear  Liver:Unremarkable size andcontour. Normal density. No evidence of mass. No evidence of dilated ducts. Gallbladder Fossa: Prior cholecystectomy. Spleen: Grossly unremarkable. Pancreas:  Grossly unremarkable. Adrenal glands: Grossly unremarkable size, contour and density. Kidneys: In anatomic position. Grossly unremarkablerenal size, contour and density. No renal or ureteral calculi. No evidence of a renal mass or cyst. Perinephric space is unremarkable. Retroperitoneum: No enlarged retroperitoneal lymphadenopathy. The aorta and IVC appear unremarkable. Peritoneal cavity: Moderate dilatation of proximal and mid small bowel. Mucosal wall thickening. Mucosal thickening most pronounced anteriorly near the umbilicus involving the mid small bowel. Within the mid small bowel there is stranding of the  mesenteric fat extending into the lower abdomen. Surgical sutures within the right abdomen involving the colon from prior colectomy. Minimal air in the right paramedian soft tissues can be related to prior intervention. Image number 43. It is likely extraperitoneal and does not represent free air. Sub adjacent hyper attenuated material. Decompression of the distal small bowel. Stool  filled colon. Scattered diverticula. Pelvis: Solid and hollow viscera grossly unremarkable. Prior hysterectomy. Bladder is unremarkable. Osseous structures: No acute or destructive bony process identified. Impression  1. At least moderate small bowel obstruction involving the proximal and mid bowel loops. Decompressed distal small bowel. Most pronounced mucosal thickening is anteriorly involving the mid small bowel. Stranding of the fat without fluid. This can be from  inflammatory infectious process. 2. No true intraperitoneal free air. Postsurgical air in the soft tissues within the right paramedian extraperitoneal soft tissues as described above. 3. Prior cholecystectomy. 4. Stool filled colon. Scattered diverticulosis. No acute diverticulitis. Recommendation: Follow up as clinically indicated. All CT scans at this facility utilize dose modulation, iterative reconstruction, and/or weight based dosing when appropriate to reduce radiation dose to as low as reasonably achievable. Amended by Benedicto Almonte MD, [facility] CERTIFIED at [id number] 06:19:36 PM EST  Electronically Signed by Benedicto Almonte MD, [address] at [id number] 06:12:09 PM      Assessment:     1. Constipation, unspecified constipation type         Plan:   Continue with current medications and add probiotic. May add miralax as needed. Follow up as needed  Orders  No orders of the defined types were placed in this encounter. Medications  No orders of the defined types were placed in this encounter.         Patient History:     Past Medical History:   Diagnosis Date    COVID     Diabetes ([address]     Headache(784.0)     Insomnia     Multiple sclerosis ([address]        Past Surgical History:   Procedure Laterality Date    APPENDECTOMY      BUNIONECTOMY  2020    and infection    CHOLECYSTECTOMY      COLON SURGERY  2012    COLONOSCOPY  2012    HYSTERECTOMY (CERVIX STATUS UNKNOWN)      REVISION COLOSTOMY      SMALL INTESTINE SURGERY      resectionX2    UPPER GASTROINTESTINAL ENDOSCOPY  2012       Family History   Problem Relation Age of Onset    Heart Attack Father     No Known Problems Sister     Other Brother     Colon Polyps Neg Hx     Crohn's Disease Neg Hx        Social History     Socioeconomic History    Marital status:    Tobacco Use    Smoking status: Never    Smokeless tobacco: Never   Vaping Use    Vaping Use: Never used   Substance and Sexual Activity    Alcohol use: No     Alcohol/week: 0.0 standard drinks    Drug use: No    Sexual activity: Never     Social Determinants of Health     Financial Resource Strain: Low Risk     Difficulty of Paying Living Expenses: Not hard at all   Food Insecurity: No Food Insecurity    Worried About Running Out of Food in the Last Year: Never true    Ran Out of Food in the Last Year: Never true       Current Outpatient Medications   Medication Sig Dispense Refill    SUMAtriptan (IMITREX) 100 MG tablet Take 100 mg by mouth once as needed for Migraine      docusate sodium (COLACE) 100 MG capsule Take 100 mg by mouth 2 times daily as needed for Constipation      Armodafinil 250 MG TABS Take 250 mg by mouth daily. HYDROcodone-acetaminophen (NORCO) 5-325 MG per tablet Take 1 tablet by mouth at bedtime. traMADol (ULTRAM ER) 100 MG extended release tablet Take 1 tablet by mouth daily for 30 days. 30 tablet 0    brexpiprazole (REXULTI) 0.5 MG TABS tablet Take 1 tablet by mouth at bedtime 30 tablet 5    montelukast (SINGULAIR) 10 MG tablet Take 1 tablet by mouth nightly With xyzal for allergies 30 tablet 5    doxepin (SINEQUAN) 50 MG capsule Take 1 capsule by mouth nightly 30 capsule 5    pioglitazone (ACTOS) 30 MG tablet Take 1 tablet by mouth daily 30 tablet 5    propranolol (INDERAL LA) 60 MG extended release capsule Take 1 capsule by mouth daily 30 capsule 5    furosemide (LASIX) 20 MG tablet Take 1 tablet by mouth daily as needed (swelling) Take in the morning 30 tablet 3    gabapentin (NEURONTIN) 100 MG capsule Take 1 capsule by mouth 3 times daily for 120 days.  90 capsule 3    ondansetron (ZOFRAN-ODT) 4 MG disintegrating tablet Take 1 tablet by mouth 3 times daily as needed for Nausea or Vomiting 30 tablet 1    levocetirizine (XYZAL) 5 MG tablet Take 1 tablet by mouth nightly 90 tablet 3    Insulin Pen Needle 31G X 5 MM MISC To use with insulin pen 100 each 3    dapagliflozin (FARXIGA) 10 MG tablet Take 1 tablet by mouth every morning 90 tablet 3    SITagliptin (JANUVIA) 100 MG tablet Take 1 tablet by mouth daily 30 tablet 11    rosuvastatin (CRESTOR) 10 MG tablet Take 1 tablet by mouth nightly 90 tablet 3    venlafaxine (EFFEXOR XR) 150 MG extended release capsule Take 1 capsule by mouth daily 90 capsule 3    cyanocobalamin 1000 MCG/ML injection 1ml monthly im 1 mL 11    Fremanezumab-vfrm (AJOVY) 225 MG/1.5ML SOAJ Inject 225 mg into the skin every 30 days      vitamin D (ERGOCALCIFEROL) 1.25 MG (63546 UT) CAPS capsule Take 1 capsule by mouth once a week 12 capsule 1    baclofen (LIORESAL) 10 MG tablet TAKE 1/2 TO 1 TABLET TWICE DAILY AS NEEDED FOR MUSCLE SPASMS      Syringe/Needle, Disp, (SYRINGE 3CC/25GX1\") 25G X 1\" 3 ML MISC Use to inject b12 once weekly for 1 month, then monthly 12 each 1    tiZANidine (ZANAFLEX) 2 MG tablet Take 1 tablet by mouth every 8 hours as needed (muscle pain) (Patient not taking: Reported on 12/28/2022) 90 tablet 5    clonazePAM (KLONOPIN) 0.5 MG tablet Take 1 tablet by mouth in the morning and 1 tablet before bedtime. Do all this for 30 days. 60 tablet 0    insulin glargine (BASAGLAR KWIKPEN) 100 UNIT/ML injection pen Inject 10 Units into the skin nightly (Patient not taking: Reported on 12/28/2022) 3 pen 3     No current facility-administered medications for this visit. Allergies   Allergen Reactions    Ampicillin     Biaxin [Clarithromycin]     Flagyl [Metronidazole]     Saxenda [Liraglutide -Weight Management]      Nausea and vomiting    Albuterol Rash and Other (See Comments)     migraines    Doxycycline Rash       Review of Systems   Constitutional:  Negative for activity change, appetite change, fatigue, fever and unexpected weight change. HENT:  Negative for trouble swallowing. Respiratory:  Negative for cough, choking and shortness of breath. Cardiovascular:  Negative for chest pain. Gastrointestinal:  Negative for abdominal distention, abdominal pain, anal bleeding, blood in stool, constipation, diarrhea, nausea, rectal pain and vomiting. Allergic/Immunologic: Negative for food allergies. All other systems reviewed and are negative.       Objective:     /80 (Site: Left Upper Arm)   Pulse 68   Ht 5' 4\" (1.626 m)   Wt 178 lb (80.7 kg)   LMP 01/01/2020   SpO2 98%   BMI 30.55 kg/m²     Physical Exam  Vitals reviewed. Constitutional:       General: She is not in acute distress. Appearance: She is well-developed. HENT:      Head: Normocephalic and atraumatic. Right Ear: External ear normal.      Left Ear: External ear normal.      Nose: Nose normal.   Eyes:      General: No scleral icterus. Right eye: No discharge. Left eye: No discharge. Conjunctiva/sclera: Conjunctivae normal.      Pupils: Pupils are equal, round, and reactive to light. Cardiovascular:      Rate and Rhythm: Normal rate and regular rhythm. Heart sounds: Normal heart sounds. No murmur heard. Pulmonary:      Effort: Pulmonary effort is normal. No respiratory distress. Breath sounds: Normal breath sounds. No wheezing or rales. Abdominal:      General: Bowel sounds are normal. There is no distension. Palpations: Abdomen is soft. There is no mass. Tenderness: There is no abdominal tenderness. There is no guarding or rebound. Musculoskeletal:         General: Normal range of motion. Cervical back: Normal range of motion and neck supple. Skin:     General: Skin is warm and dry. Coloration: Skin is not pale. Neurological:      Mental Status: She is alert and oriented to person, place, and time.    Psychiatric:         Behavior: Behavior normal.

## 2022-12-29 ENCOUNTER — OFFICE VISIT (OUTPATIENT)
Dept: PRIMARY CARE CLINIC | Age: 47
End: 2022-12-29
Payer: COMMERCIAL

## 2022-12-29 VITALS
TEMPERATURE: 98 F | SYSTOLIC BLOOD PRESSURE: 112 MMHG | BODY MASS INDEX: 30.26 KG/M2 | WEIGHT: 177.25 LBS | OXYGEN SATURATION: 98 % | HEIGHT: 64 IN | HEART RATE: 80 BPM | DIASTOLIC BLOOD PRESSURE: 58 MMHG

## 2022-12-29 DIAGNOSIS — Z12.31 ENCOUNTER FOR SCREENING MAMMOGRAM FOR MALIGNANT NEOPLASM OF BREAST: ICD-10-CM

## 2022-12-29 DIAGNOSIS — Z79.4 TYPE 2 DIABETES MELLITUS WITH OTHER SPECIFIED COMPLICATION, WITH LONG-TERM CURRENT USE OF INSULIN (HCC): Primary | ICD-10-CM

## 2022-12-29 DIAGNOSIS — F41.1 GAD (GENERALIZED ANXIETY DISORDER): ICD-10-CM

## 2022-12-29 DIAGNOSIS — E11.69 TYPE 2 DIABETES MELLITUS WITH OTHER SPECIFIED COMPLICATION, WITH LONG-TERM CURRENT USE OF INSULIN (HCC): Primary | ICD-10-CM

## 2022-12-29 PROCEDURE — 99214 OFFICE O/P EST MOD 30 MIN: CPT | Performed by: NURSE PRACTITIONER

## 2022-12-29 PROCEDURE — 3052F HG A1C>EQUAL 8.0%<EQUAL 9.0%: CPT | Performed by: NURSE PRACTITIONER

## 2022-12-29 RX ORDER — PROPRANOLOL HCL 60 MG
60 CAPSULE, EXTENDED RELEASE 24HR ORAL DAILY
Qty: 30 CAPSULE | Refills: 5 | Status: SHIPPED | OUTPATIENT
Start: 2022-12-29 | End: 2023-06-27

## 2022-12-29 RX ORDER — WHEAT DEXTRIN 3 G/3.8 G
4 POWDER (GRAM) ORAL DAILY
COMMUNITY

## 2022-12-29 RX ORDER — TIRZEPATIDE 2.5 MG/.5ML
2.5 INJECTION, SOLUTION SUBCUTANEOUS WEEKLY
Qty: 4 ADJUSTABLE DOSE PRE-FILLED PEN SYRINGE | Refills: 2 | Status: SHIPPED | OUTPATIENT
Start: 2022-12-29

## 2022-12-29 RX ORDER — VENLAFAXINE HYDROCHLORIDE 150 MG/1
150 CAPSULE, EXTENDED RELEASE ORAL DAILY
Qty: 90 CAPSULE | Refills: 3 | Status: SHIPPED | OUTPATIENT
Start: 2022-12-29

## 2022-12-29 RX ORDER — CLONAZEPAM 0.5 MG/1
0.5 TABLET ORAL 2 TIMES DAILY
Qty: 60 TABLET | Refills: 2 | Status: SHIPPED | OUTPATIENT
Start: 2022-12-29 | End: 2023-01-28

## 2022-12-29 RX ORDER — POLYETHYLENE GLYCOL 3350 17 G/17G
17 POWDER, FOR SOLUTION ORAL DAILY
COMMUNITY

## 2022-12-29 RX ORDER — BIFIDOBACTERIUM LONGUM SUBSP. INFANTIS, AVOBENZONE, HOMOSALATE, OCTISALATE, OCTOCRYLENE, AND OXYBENZONE
KIT
COMMUNITY

## 2022-12-30 ASSESSMENT — ENCOUNTER SYMPTOMS
SORE THROAT: 0
COUGH: 0
SINUS PRESSURE: 0
NAUSEA: 0
EYE DISCHARGE: 0
ABDOMINAL PAIN: 0
DIARRHEA: 0
CONSTIPATION: 0
WHEEZING: 0
SHORTNESS OF BREATH: 0
EYE REDNESS: 0
RHINORRHEA: 0
BLOOD IN STOOL: 0
TROUBLE SWALLOWING: 0
VOMITING: 0
BACK PAIN: 1

## 2022-12-30 NOTE — PROGRESS NOTES
Chrystine Gottron (:  1975) is a 52 y.o. female,Established patient, here for evaluation of the following chief complaint(s):  Follow-up (Patient would like to review medication and has since seen Gastro. )      ASSESSMENT/PLAN:    ICD-10-CM    1. Type 2 diabetes mellitus with other specified complication, with long-term current use of insulin (HCC)  E11.69 Tirzepatide (MOUNJARO) 2.5 MG/0.5ML SOPN SC injection    Z79.4       2. Encounter for screening mammogram for malignant neoplasm of breast  Z12.31 BELKIS DIGITAL SCREEN W OR WO CAD BILATERAL      3. NERY (generalized anxiety disorder)  F41.1 clonazePAM (KLONOPIN) 0.5 MG tablet        Add mounjaro. Refill klonopin  Return in about 1 month (around 2023). SUBJECTIVE/OBJECTIVE:  HPI  Diabetes  Last a1c was 8. 1 mo ago. Reports that she has not been taking her insulin. Chronic pain  Has MS has chronic pain related to this. Takes 1-2 ultram per day. Also takes gabapentin. Has tried cymbalta in the past.  Cant take NSAIDs any more due to CKD  Chronic neck pain  MRI scanned in media  She has been in a lot more pain recently. We switch her to tramadol ER and norco prn at night. States she hasn't noticed much difference. NERY  She has a lot of stress going on at work. Hasn't had klnolopin filled in several months. Review of Systems   Constitutional:  Positive for fatigue. Negative for activity change, appetite change, chills, fever and unexpected weight change. HENT:  Negative for congestion, hearing loss, postnasal drip, rhinorrhea, sinus pressure, sore throat and trouble swallowing. Eyes:  Negative for discharge, redness and visual disturbance. Respiratory:  Negative for cough, shortness of breath and wheezing. Cardiovascular:  Negative for chest pain, palpitations and leg swelling. Gastrointestinal:  Negative for abdominal pain, blood in stool, constipation, diarrhea, nausea and vomiting.    Endocrine: Negative for cold intolerance and heat intolerance. Genitourinary:  Negative for dysuria, flank pain, menstrual problem, pelvic pain, urgency and vaginal discharge. Musculoskeletal:  Positive for arthralgias, back pain, myalgias and neck pain. Skin:  Negative for rash. Neurological:  Negative for dizziness, weakness and headaches. Hematological:  Negative for adenopathy. Psychiatric/Behavioral:  Negative for dysphoric mood, sleep disturbance and suicidal ideas. The patient is not nervous/anxious. BP (!) 112/58 (Site: Left Upper Arm, Position: Sitting, Cuff Size: Large Adult)   Pulse 80   Temp 98 °F (36.7 °C) (Temporal)   Ht 5' 4\" (1.626 m)   Wt 177 lb 4 oz (80.4 kg)   LMP 01/01/2020   SpO2 98%   BMI 30.42 kg/m²    Physical Exam  Vitals reviewed. Constitutional:       General: She is not in acute distress. Appearance: Normal appearance. She is well-developed. She is not ill-appearing. HENT:      Head: Normocephalic. Right Ear: There is no impacted cerumen. Left Ear: There is no impacted cerumen. Nose: No rhinorrhea. Mouth/Throat:      Pharynx: No posterior oropharyngeal erythema. Eyes:      General:         Right eye: No discharge. Left eye: No discharge. Conjunctiva/sclera: Conjunctivae normal.   Cardiovascular:      Rate and Rhythm: Normal rate and regular rhythm. Heart sounds: Normal heart sounds. No murmur heard. Pulmonary:      Effort: Pulmonary effort is normal.      Breath sounds: Normal breath sounds. No wheezing, rhonchi or rales. Abdominal:      General: Bowel sounds are normal.      Palpations: Abdomen is soft. Tenderness: There is no abdominal tenderness. Musculoskeletal:         General: No swelling. Normal range of motion. Cervical back: Normal range of motion and neck supple. Skin:     General: Skin is warm and dry. Capillary Refill: Capillary refill takes less than 2 seconds. Findings: No rash.    Neurological:      General: No focal deficit present. Mental Status: She is alert and oriented to person, place, and time. Psychiatric:         Mood and Affect: Mood normal.         Behavior: Behavior normal.               An electronic signature was used to authenticate this note.     --CLINTON Moser

## 2023-01-02 ENCOUNTER — PATIENT MESSAGE (OUTPATIENT)
Dept: PRIMARY CARE CLINIC | Age: 48
End: 2023-01-02

## 2023-01-02 DIAGNOSIS — Z79.4 TYPE 2 DIABETES MELLITUS WITH OTHER SPECIFIED COMPLICATION, WITH LONG-TERM CURRENT USE OF INSULIN (HCC): ICD-10-CM

## 2023-01-02 DIAGNOSIS — E11.69 TYPE 2 DIABETES MELLITUS WITH OTHER SPECIFIED COMPLICATION, WITH LONG-TERM CURRENT USE OF INSULIN (HCC): ICD-10-CM

## 2023-01-03 ENCOUNTER — TELEPHONE (OUTPATIENT)
Dept: PRIMARY CARE CLINIC | Age: 48
End: 2023-01-03

## 2023-01-03 RX ORDER — TIRZEPATIDE 2.5 MG/.5ML
2.5 INJECTION, SOLUTION SUBCUTANEOUS WEEKLY
Qty: 4 ADJUSTABLE DOSE PRE-FILLED PEN SYRINGE | Refills: 2 | Status: SHIPPED | OUTPATIENT
Start: 2023-01-03

## 2023-01-03 NOTE — TELEPHONE ENCOUNTER
Received a denial from [facility] on pts Mounjaro. This medication cannot be approved because         I will send this to provider for further recommendations.

## 2023-01-03 NOTE — TELEPHONE ENCOUNTER
Pt was last seen in office on 12/29/2022  and has a follow up scheduled for 1/3/2023. Will send request to  Jeremy Mullins  for authorization. Requested Prescriptions     Pending Prescriptions Disp Refills    Tirzepatide (MOUNJARO) 2.5 MG/0.5ML SOPN SC injection 4 Adjustable Dose Pre-filled Pen Syringe 2     Sig: Inject 0.5 mLs into the skin once a week      Patient needs medication sent to [facility] in [place]. Would you like me to inform patient of nightly insulin dosage?

## 2023-01-05 DIAGNOSIS — G89.4 CHRONIC PAIN SYNDROME: ICD-10-CM

## 2023-01-05 DIAGNOSIS — G35 MULTIPLE SCLEROSIS (HCC): ICD-10-CM

## 2023-01-05 DIAGNOSIS — G89.29 CHRONIC NONINTRACTABLE HEADACHE, UNSPECIFIED HEADACHE TYPE: ICD-10-CM

## 2023-01-05 DIAGNOSIS — R51.9 CHRONIC NONINTRACTABLE HEADACHE, UNSPECIFIED HEADACHE TYPE: ICD-10-CM

## 2023-01-05 RX ORDER — HYDROCODONE BITARTRATE AND ACETAMINOPHEN 5; 325 MG/1; MG/1
1 TABLET ORAL EVERY 6 HOURS PRN
Qty: 30 TABLET | Refills: 0 | Status: SHIPPED | OUTPATIENT
Start: 2023-01-05 | End: 2023-01-13

## 2023-01-09 DIAGNOSIS — R53.82 CHRONIC FATIGUE: ICD-10-CM

## 2023-01-09 DIAGNOSIS — G35 MULTIPLE SCLEROSIS (HCC): ICD-10-CM

## 2023-01-09 RX ORDER — ARMODAFINIL 250 MG/1
250 TABLET ORAL DAILY
Qty: 30 TABLET | Refills: 2 | Status: SHIPPED | OUTPATIENT
Start: 2023-01-09 | End: 2023-01-11 | Stop reason: SDUPTHER

## 2023-01-09 NOTE — TELEPHONE ENCOUNTER
Received fax from pharmacy requesting refill on pts medication(s). Pt was last seen in office on 12/29/2022  and has a follow up scheduled for Visit date not found. Will send request to  Fabrizio Eckert  for authorization. Requested Prescriptions     Pending Prescriptions Disp Refills    Armodafinil 250 MG TABS 30 tablet 2     Sig: Take 250 mg by mouth daily for 90 days.

## 2023-01-11 DIAGNOSIS — G35 MULTIPLE SCLEROSIS (HCC): ICD-10-CM

## 2023-01-11 DIAGNOSIS — R53.82 CHRONIC FATIGUE: ICD-10-CM

## 2023-01-11 RX ORDER — ARMODAFINIL 250 MG/1
250 TABLET ORAL DAILY
Qty: 30 TABLET | Refills: 2 | Status: SHIPPED | OUTPATIENT
Start: 2023-01-11 | End: 2023-01-12 | Stop reason: SDUPTHER

## 2023-01-11 NOTE — TELEPHONE ENCOUNTER
Medication was sent to [facility] and needs to be sent to [address] [place] [address]. Called and canceled script. Requested Prescriptions     Pending Prescriptions Disp Refills    Armodafinil 250 MG TABS 30 tablet 2     Sig: Take 250 mg by mouth daily for 90 days.

## 2023-01-12 ENCOUNTER — TELEPHONE (OUTPATIENT)
Dept: PRIMARY CARE CLINIC | Age: 48
End: 2023-01-12

## 2023-01-12 DIAGNOSIS — R53.82 CHRONIC FATIGUE: ICD-10-CM

## 2023-01-12 DIAGNOSIS — G35 MULTIPLE SCLEROSIS (HCC): ICD-10-CM

## 2023-01-12 RX ORDER — ARMODAFINIL 250 MG/1
250 TABLET ORAL DAILY
Qty: 30 TABLET | Refills: 2 | Status: SHIPPED | OUTPATIENT
Start: 2023-01-12 | End: 2023-04-12

## 2023-01-12 NOTE — TELEPHONE ENCOUNTER
Patients prescription was sent to [facility] and they are wanting it sent to [facility] in [place].      Armodafinil 250mg tabs

## 2023-01-12 NOTE — TELEPHONE ENCOUNTER
Called and cancelled rx with [facility]. Called pt and advised her that this was sent to [facility] as well.

## 2023-01-18 ENCOUNTER — TELEPHONE (OUTPATIENT)
Dept: FAMILY MEDICINE CLINIC | Age: 48
End: 2023-01-18

## 2023-01-18 DIAGNOSIS — G35 MULTIPLE SCLEROSIS (HCC): ICD-10-CM

## 2023-01-18 DIAGNOSIS — G89.4 CHRONIC PAIN SYNDROME: ICD-10-CM

## 2023-01-18 DIAGNOSIS — R51.9 CHRONIC NONINTRACTABLE HEADACHE, UNSPECIFIED HEADACHE TYPE: ICD-10-CM

## 2023-01-18 DIAGNOSIS — G89.29 CHRONIC NONINTRACTABLE HEADACHE, UNSPECIFIED HEADACHE TYPE: ICD-10-CM

## 2023-01-18 RX ORDER — TRAMADOL HYDROCHLORIDE 100 MG/1
300 TABLET, EXTENDED RELEASE ORAL DAILY
Qty: 90 TABLET | Refills: 0 | Status: CANCELLED | OUTPATIENT
Start: 2023-01-18 | End: 2023-02-17

## 2023-01-18 RX ORDER — TRAMADOL HYDROCHLORIDE 300 MG/1
300 TABLET, EXTENDED RELEASE ORAL DAILY
Qty: 30 TABLET | Refills: 0 | Status: SHIPPED | OUTPATIENT
Start: 2023-01-18 | End: 2023-02-17

## 2023-01-18 NOTE — TELEPHONE ENCOUNTER
Pt called in regards to her pain med. She states that you advised her to take 2 of the tramadol xr at a time and now she is out. Also, she has been taking norco at bedtime and has been waking up in excruciating pain. Wants to know if maybe needs to do the tramadol at bedtime as well?

## 2023-01-18 NOTE — TELEPHONE ENCOUNTER
I am sending in script, but patient needs to be aware that she does not need to take Norco with this medication as well.

## 2023-01-19 ENCOUNTER — TELEPHONE (OUTPATIENT)
Dept: FAMILY MEDICINE CLINIC | Age: 48
End: 2023-01-19

## 2023-01-24 ENCOUNTER — TELEPHONE (OUTPATIENT)
Dept: FAMILY MEDICINE CLINIC | Age: 48
End: 2023-01-24

## 2023-01-25 ENCOUNTER — TELEPHONE (OUTPATIENT)
Dept: FAMILY MEDICINE CLINIC | Age: 48
End: 2023-01-25

## 2023-01-25 NOTE — TELEPHONE ENCOUNTER
Pt called stating that the pharmacy told her to call us cause she can't get her tramadol 300 mg. She is unsure if it needs a PA or what but she is out and having to take 3 ibuprofen a day and doesn't need to be doing this cause she has stage 3 kidney disease.      I will send this to [place], [place] and have her check on this and call pt back

## 2023-01-30 ENCOUNTER — PATIENT MESSAGE (OUTPATIENT)
Dept: FAMILY MEDICINE CLINIC | Age: 48
End: 2023-01-30

## 2023-01-30 DIAGNOSIS — E11.69 TYPE 2 DIABETES MELLITUS WITH OTHER SPECIFIED COMPLICATION, WITHOUT LONG-TERM CURRENT USE OF INSULIN (HCC): Primary | ICD-10-CM

## 2023-01-30 RX ORDER — TIRZEPATIDE 5 MG/.5ML
5 INJECTION, SOLUTION SUBCUTANEOUS WEEKLY
Qty: 1 ADJUSTABLE DOSE PRE-FILLED PEN SYRINGE | Refills: 11 | Status: SHIPPED | OUTPATIENT
Start: 2023-01-30 | End: 2023-02-02 | Stop reason: SDUPTHER

## 2023-01-30 NOTE — TELEPHONE ENCOUNTER
From: Mago Terrell  To: Carlie James  Sent: 1/30/2023 6:43 AM CST  Subject: Mounjaro 2.5    Hey! I'm out of Mounjaro. I think we are supposed to up it this time. I usually inject on Wednesdays. We may need a prior authorization. [facility] [place]. Thank you so much.

## 2023-02-01 ENCOUNTER — PATIENT MESSAGE (OUTPATIENT)
Dept: FAMILY MEDICINE CLINIC | Age: 48
End: 2023-02-01

## 2023-02-01 ENCOUNTER — TELEPHONE (OUTPATIENT)
Dept: FAMILY MEDICINE CLINIC | Age: 48
End: 2023-02-01

## 2023-02-01 DIAGNOSIS — E11.69 TYPE 2 DIABETES MELLITUS WITH OTHER SPECIFIED COMPLICATION, WITHOUT LONG-TERM CURRENT USE OF INSULIN (HCC): ICD-10-CM

## 2023-02-01 NOTE — TELEPHONE ENCOUNTER
Spoke with [facility]. They are able to get the [facility] 5 currently. Pending refill and sending to ANDERSON Smith for approval.     Requested Prescriptions     Pending Prescriptions Disp Refills    Tirzepatide (MOUNJARO) 5 MG/0.5ML SOPN SC injection 1 Adjustable Dose Pre-filled Pen Syringe 11     Sig: Inject 0.5 mLs into the skin once a week

## 2023-02-01 NOTE — TELEPHONE ENCOUNTER
Spoke with [facility] dharmesh and they stated their warehouse sent them communications that the [facility] 5 would no longer be produced. I have reach out to the [facility] and waiting to hear back.

## 2023-02-01 NOTE — TELEPHONE ENCOUNTER
[facility] called stating [facility] informed [facility] that [facility] would no longer be available.

## 2023-02-02 DIAGNOSIS — E53.8 VITAMIN B12 DEFICIENCY: ICD-10-CM

## 2023-02-02 DIAGNOSIS — R53.82 CHRONIC FATIGUE: ICD-10-CM

## 2023-02-02 DIAGNOSIS — G89.4 CHRONIC PAIN SYNDROME: ICD-10-CM

## 2023-02-02 RX ORDER — GABAPENTIN 100 MG/1
100 CAPSULE ORAL 3 TIMES DAILY
Qty: 90 CAPSULE | Refills: 0 | Status: SHIPPED | OUTPATIENT
Start: 2023-02-02 | End: 2023-06-02

## 2023-02-02 RX ORDER — TIRZEPATIDE 5 MG/.5ML
5 INJECTION, SOLUTION SUBCUTANEOUS WEEKLY
Qty: 1 ADJUSTABLE DOSE PRE-FILLED PEN SYRINGE | Refills: 11 | Status: SHIPPED | OUTPATIENT
Start: 2023-02-02

## 2023-02-02 RX ORDER — CYANOCOBALAMIN 1000 UG/ML
INJECTION, SOLUTION INTRAMUSCULAR; SUBCUTANEOUS
Qty: 1 ML | Refills: 1 | Status: SHIPPED | OUTPATIENT
Start: 2023-02-02

## 2023-02-02 NOTE — TELEPHONE ENCOUNTER
Received fax from pharmacy requesting refill on pts medication(s). Pt was last seen in office on 22 and has a follow up scheduled for Visit date not found. Will send request to   Ewelina Stockton for authorization. Requested Prescriptions     Pending Prescriptions Disp Refills    gabapentin (NEURONTIN) 100 MG capsule 90 capsule 3     Sig: Take 1 capsule by mouth 3 times daily for 120 days.     cyanocobalamin 1000 MCG/ML injection 1 mL 11     Siml monthly im

## 2023-02-06 ENCOUNTER — OFFICE VISIT (OUTPATIENT)
Dept: PRIMARY CARE CLINIC | Age: 48
End: 2023-02-06
Payer: COMMERCIAL

## 2023-02-06 VITALS
BODY MASS INDEX: 31.76 KG/M2 | DIASTOLIC BLOOD PRESSURE: 70 MMHG | HEART RATE: 107 BPM | SYSTOLIC BLOOD PRESSURE: 122 MMHG | TEMPERATURE: 99 F | WEIGHT: 185 LBS | OXYGEN SATURATION: 96 %

## 2023-02-06 DIAGNOSIS — E11.69 TYPE 2 DIABETES MELLITUS WITH OTHER SPECIFIED COMPLICATION, WITHOUT LONG-TERM CURRENT USE OF INSULIN (HCC): ICD-10-CM

## 2023-02-06 DIAGNOSIS — R50.9 FEVER, UNSPECIFIED FEVER CAUSE: ICD-10-CM

## 2023-02-06 DIAGNOSIS — Z20.822 CLOSE EXPOSURE TO COVID-19 VIRUS: ICD-10-CM

## 2023-02-06 DIAGNOSIS — R06.2 WHEEZING: ICD-10-CM

## 2023-02-06 DIAGNOSIS — J06.9 URI WITH COUGH AND CONGESTION: Primary | ICD-10-CM

## 2023-02-06 DIAGNOSIS — R06.02 SOB (SHORTNESS OF BREATH): ICD-10-CM

## 2023-02-06 LAB
INFLUENZA A ANTIBODY: NORMAL
INFLUENZA B ANTIBODY: NORMAL
S PYO AG THROAT QL: NORMAL

## 2023-02-06 PROCEDURE — 87804 INFLUENZA ASSAY W/OPTIC: CPT | Performed by: NURSE PRACTITIONER

## 2023-02-06 PROCEDURE — 99213 OFFICE O/P EST LOW 20 MIN: CPT | Performed by: NURSE PRACTITIONER

## 2023-02-06 PROCEDURE — 87880 STREP A ASSAY W/OPTIC: CPT | Performed by: NURSE PRACTITIONER

## 2023-02-06 RX ORDER — CEFDINIR 300 MG/1
300 CAPSULE ORAL 2 TIMES DAILY
Qty: 20 CAPSULE | Refills: 0 | Status: SHIPPED | OUTPATIENT
Start: 2023-02-06 | End: 2023-02-16

## 2023-02-06 RX ORDER — TIRZEPATIDE 5 MG/.5ML
5 INJECTION, SOLUTION SUBCUTANEOUS WEEKLY
Qty: 1 ADJUSTABLE DOSE PRE-FILLED PEN SYRINGE | Refills: 11 | Status: SHIPPED | OUTPATIENT
Start: 2023-02-06

## 2023-02-06 RX ORDER — METHYLPREDNISOLONE 4 MG/1
TABLET ORAL
Qty: 1 KIT | Refills: 0 | Status: SHIPPED | OUTPATIENT
Start: 2023-02-06 | End: 2023-02-10

## 2023-02-06 RX ORDER — LEVALBUTEROL TARTRATE 45 UG/1
1-2 AEROSOL, METERED ORAL EVERY 4 HOURS PRN
Qty: 1 EACH | Refills: 3 | Status: SHIPPED | OUTPATIENT
Start: 2023-02-06

## 2023-02-06 ASSESSMENT — ENCOUNTER SYMPTOMS
SHORTNESS OF BREATH: 1
ALLERGIC/IMMUNOLOGIC NEGATIVE: 1
EYES NEGATIVE: 1
WHEEZING: 1
SORE THROAT: 0
COUGH: 1
RHINORRHEA: 1
GASTROINTESTINAL NEGATIVE: 1

## 2023-02-06 NOTE — PROGRESS NOTES
Rosalva Lopez (:  1975) is a 52 y.o. female,Established patient, here for evaluation of the following chief complaint(s):  Cough and Congestion (Started Friday night,  had covid last week but her home test is negative)    Patient presents today complaining of cough, congestion, fatigue, body aches, chills, wheezing, shortness of breath, and low-grade fever. Denies GI symptoms. Patient's  was positive for covid last week. She did quarantine from him. She also works at a school and on a school bus. Patient has MS, and multiple health issues. Explained to patient that her rapid flu and strep test were both negative. Due to the amount of wheezing and shortness of breath patient is experiencing IM prescribing an antibiotic. She states she is able to tolerate a steroid pack. I am prescribing a Medrol Dosepak. Discussed with patient the importance of keeping a close check on her glucose levels while taking steroids. If she develops extreme shortness of breath she is to go to the emergency department. Patient has requested a refill on her inhaler today. Per notes it seems that her PCP MA has pended the medication and sent it to her PCP. COVID testing will be sent. Care instructions discussed. Patient verbalized understanding and agrees to plan of care. ASSESSMENT/PLAN:  1. URI with cough and congestion  -     POCT Influenza A/B  -     POCT rapid strep A  2. Close exposure to COVID-19 virus  -     COVID-19  3. Wheezing  4. SOB (shortness of breath)  5. Fever, unspecified fever cause     Orders Placed This Encounter   Medications    cefdinir (OMNICEF) 300 MG capsule     Sig: Take 1 capsule by mouth 2 times daily for 10 days     Dispense:  20 capsule     Refill:  0    methylPREDNISolone (MEDROL DOSEPACK) 4 MG tablet     Sig: Take by mouth. Dispense:  1 kit     Refill:  0        Return if symptoms worsen or fail to improve.          Subjective SUBJECTIVE/OBJECTIVE:  Cough  Associated symptoms include chills, a fever, myalgias, postnasal drip, rhinorrhea, shortness of breath and wheezing. Pertinent negatives include no ear pain or sore throat. Review of Systems   Constitutional:  Positive for chills, fatigue and fever. HENT:  Positive for congestion, postnasal drip and rhinorrhea. Negative for ear pain and sore throat. Eyes: Negative. Respiratory:  Positive for cough, shortness of breath and wheezing. Cardiovascular: Negative. Gastrointestinal: Negative. Endocrine: Negative. Genitourinary: Negative. Musculoskeletal:  Positive for myalgias. Skin: Negative. Allergic/Immunologic: Negative. Neurological: Negative. Hematological: Negative. Psychiatric/Behavioral: Negative. Objective   Physical Exam  Vitals reviewed. HENT:      Right Ear: Tympanic membrane, ear canal and external ear normal.      Left Ear: Tympanic membrane, ear canal and external ear normal.      Nose:      Right Sinus: No maxillary sinus tenderness or frontal sinus tenderness. Left Sinus: No maxillary sinus tenderness or frontal sinus tenderness. Mouth/Throat:      Lips: Pink. Mouth: Mucous membranes are moist.      Pharynx: Posterior oropharyngeal erythema (postnasal drainage) present. No oropharyngeal exudate. Cardiovascular:      Rate and Rhythm: Normal rate and regular rhythm. Heart sounds: Normal heart sounds. Pulmonary:      Breath sounds: Examination of the right-upper field reveals wheezing. Examination of the left-upper field reveals wheezing. Examination of the right-lower field reveals wheezing. Examination of the left-lower field reveals wheezing. Wheezing present. Comments: Patient appears short of breath in office. Wheezing is heard in upper and lower bilateral lobes. When patient was seen at clinic in December she was also short of breath at that time and wheezing was present.   Lymphadenopathy: Head:      Right side of head: No submandibular or tonsillar adenopathy. Left side of head: No submandibular or tonsillar adenopathy. Cervical:      Right cervical: No superficial cervical adenopathy. Left cervical: No superficial cervical adenopathy. Skin:     General: Skin is warm and dry. Neurological:      Mental Status: She is alert. Patient Instructions     Completely finished antibiotic. Take steroid pack as directed. Closely monitor blood glucose levels while taking steroids as it may increase your glucose. Contact PCP in the morning regarding refill on inhaler that is pending. You will receive a phone call regarding your COVID-19 test results and [facility] Follow-up with your PCP in 3 days if symptoms do not improve or if worsening; if symptoms suddenly change or worsen, including shortness of breath or difficulty swallowing, go to the emergency department. Patient verbalized understanding. Tylenol or Motrin for fever or pain as needed. Rest and increase fluid intake. Coolmist humidifier. Start Claritin or Zyrtec daily. Start Flonase daily. Gargle with warm salt water several times daily for sore throat. An electronic signature was used to authenticate this note. --CLINTON Zuniga - CNP     EMR Dragon/translation disclaimer: Much of this encounter note is an electronic transcription/translation of spoken language to printed text. The electronic translation of spoken language may be erroneous, or at times, nonsensical words or phrases may be inadvertently transcribed.   Although I have reviewed the note for such errors, some may still exist.

## 2023-02-06 NOTE — TELEPHONE ENCOUNTER
Spoke with patient, aware and acknowledged medication would be sent to [facility] in [place].

## 2023-02-06 NOTE — LETTER
[facility]  [address] [address] [facility]  Phone: [phone]  Fax: [phone]    CLINTON Zuniga CNP        February 6, 2023     Patient: Scott Vu   YOB: 1975   Date of Visit: 2/6/2023       To Whom it May Concern:    Scott Vu was seen in my clinic on 2/6/2023. She may return to work on 2/8/2023. If you have any questions or concerns, please don't hesitate to call.     Sincerely,         CLINTON Zuniga CNP

## 2023-02-06 NOTE — TELEPHONE ENCOUNTER
Patient called stating [facility] was out of [employer] [facility] after stating they had it. [facility] [place] [facility] stated they [facility] one and would put back for patient.      Requested Prescriptions     Pending Prescriptions Disp Refills    Tirzepatide (MOUNJARO) 5 MG/0.5ML SOPN SC injection 1 Adjustable Dose Pre-filled Pen Syringe 11     Sig: Inject 0.5 mLs into the skin once a week

## 2023-02-06 NOTE — TELEPHONE ENCOUNTER
Patient called stating she has not felt well over the weekend, I informed patient our sick clinic was full and [facility] was [facility]. Patient stated she has been taking 2 of her Tramadol (one in morning and 1 in evening) rather than taking Norco because she is out. Patient states that the Tramadol is helping her pain more. Patient is inquiring if she should continue to take the two Tramadol  mg or get a refill of the Norco. Please advise. Patient also states [facility] did not have Mounjaro 5, I informed patient I would reach out to pharmacies to check on this for her. Patient states she needs refill on inhaler. I will pend this and send to ANDERSON Flores for approval.     Requested Prescriptions     Pending Prescriptions Disp Refills    levalbuterol (XOPENEX HFA) 45 MCG/ACT inhaler 1 each 3     Sig: Inhale 1-2 puffs into the lungs every 4 hours as needed for Wheezing

## 2023-02-07 LAB — SARS-COV-2, PCR: NOT DETECTED

## 2023-02-07 NOTE — PATIENT INSTRUCTIONS
Completely finished antibiotic. Take steroid pack as directed. Closely monitor blood glucose levels while taking steroids as it may increase your glucose. Contact PCP in the morning regarding refill on inhaler that is pending. You will receive a phone call regarding your COVID-19 test results and [facility]. Follow-up with your PCP in 3 days if symptoms do not improve or if worsening; if symptoms suddenly change or worsen, including shortness of breath or difficulty swallowing, go to the emergency department. Patient verbalized understanding. Tylenol or Motrin for fever or pain as needed. Rest and increase fluid intake. Coolmist humidifier. Start Claritin or Zyrtec daily. Start Flonase daily. Gargle with warm salt water several times daily for sore throat.

## 2023-02-10 ENCOUNTER — OFFICE VISIT (OUTPATIENT)
Dept: FAMILY MEDICINE CLINIC | Age: 48
End: 2023-02-10

## 2023-02-10 ENCOUNTER — HOSPITAL ENCOUNTER (OUTPATIENT)
Dept: GENERAL RADIOLOGY | Age: 48
Discharge: HOME | End: 2023-02-10
Payer: COMMERCIAL

## 2023-02-10 VITALS
TEMPERATURE: 96.9 F | HEIGHT: 64 IN | OXYGEN SATURATION: 97 % | SYSTOLIC BLOOD PRESSURE: 130 MMHG | DIASTOLIC BLOOD PRESSURE: 80 MMHG | HEART RATE: 79 BPM | WEIGHT: 176 LBS | BODY MASS INDEX: 30.05 KG/M2

## 2023-02-10 DIAGNOSIS — Z79.4 TYPE 2 DIABETES MELLITUS WITH HYPERGLYCEMIA, WITH LONG-TERM CURRENT USE OF INSULIN (HCC): ICD-10-CM

## 2023-02-10 DIAGNOSIS — R06.2 WHEEZING: ICD-10-CM

## 2023-02-10 DIAGNOSIS — J40 BRONCHITIS: ICD-10-CM

## 2023-02-10 DIAGNOSIS — J40 BRONCHITIS: Primary | ICD-10-CM

## 2023-02-10 DIAGNOSIS — E11.65 TYPE 2 DIABETES MELLITUS WITH HYPERGLYCEMIA, WITH LONG-TERM CURRENT USE OF INSULIN (HCC): ICD-10-CM

## 2023-02-10 DIAGNOSIS — R06.02 SHORTNESS OF BREATH: ICD-10-CM

## 2023-02-10 PROCEDURE — 71046 X-RAY EXAM CHEST 2 VIEWS: CPT

## 2023-02-10 RX ORDER — LEVALBUTEROL INHALATION SOLUTION 0.63 MG/3ML
0.63 SOLUTION RESPIRATORY (INHALATION) EVERY 4 HOURS PRN
Qty: 120 EACH | Refills: 1 | Status: SHIPPED | OUTPATIENT
Start: 2023-02-10

## 2023-02-10 RX ORDER — ALBUTEROL SULFATE 2.5 MG/3ML
2.5 SOLUTION RESPIRATORY (INHALATION) ONCE
Status: COMPLETED | OUTPATIENT
Start: 2023-02-10 | End: 2023-02-10

## 2023-02-10 RX ORDER — PREDNISONE 20 MG/1
20 TABLET ORAL 2 TIMES DAILY
Qty: 10 TABLET | Refills: 0 | Status: SHIPPED | OUTPATIENT
Start: 2023-02-10 | End: 2023-02-15

## 2023-02-10 RX ADMIN — ALBUTEROL SULFATE 2.5 MG: 2.5 SOLUTION RESPIRATORY (INHALATION) at 15:10

## 2023-02-10 RX ADMIN — Medication 0.5 MG: at 15:08

## 2023-02-10 ASSESSMENT — ENCOUNTER SYMPTOMS
CONSTIPATION: 0
ABDOMINAL PAIN: 0
SHORTNESS OF BREATH: 1
RHINORRHEA: 0
VOMITING: 0
DIARRHEA: 0
TROUBLE SWALLOWING: 0
COUGH: 1
WHEEZING: 1
SINUS PRESSURE: 0
NAUSEA: 0
SORE THROAT: 0

## 2023-02-10 NOTE — PROGRESS NOTES
Mago Terrell (:  1975) is a 47 y.o. female,Established patient, here for evaluation of the following chief complaint(s):  Cough, Congestion, and Breathing Problem      ASSESSMENT/PLAN:    ICD-10-CM    1. Bronchitis  J40 XR CHEST STANDARD (2 VW)     predniSONE (DELTASONE) 20 MG tablet     levalbuterol (XOPENEX) 0.63 MG/3ML nebulization     D-Dimer, Quantitative    After nebulizer her breathing improved. Sat was never low. I am going to get d-dimer but if positive want her to go get evaluation at ER due to her breathing complaints.         2. Wheezing  R06.2 albuterol (PROVENTIL) nebulizer solution 2.5 mg     ipratropium (ATROVENT) 0.02 % nebulizer solution 0.5 mg     ipratropium (ATROVENT) 0.02 % nebulizer solution     D-Dimer, Quantitative     Comprehensive Metabolic Panel      3. Shortness of breath  R06.02 albuterol (PROVENTIL) nebulizer solution 2.5 mg     ipratropium (ATROVENT) 0.02 % nebulizer solution 0.5 mg     ipratropium (ATROVENT) 0.02 % nebulizer solution     D-Dimer, Quantitative     Comprehensive Metabolic Panel      4. Type 2 diabetes mellitus with hyperglycemia, with long-term current use of insulin (HCC)  E11.65 Comprehensive Metabolic Panel    Z79.4         Return in about 1 week (around 2023).    SUBJECTIVE/OBJECTIVE:  HPI  Here for cough, congestion and wheezing.   Seen on Monday at [facility]  On omnicef and steroids  She does not smoke, no hx of asthma  Feels shortness of breath especially on exertion.   Any time walk or talk cant breathe, start coughing.   She does not smoke.   Symptoms started last weekend.   Cough is productive yellow/green.   No fevers    She was covid and flu negative on Monday.     Diabetes  Hasnt been checking sugars. Before illness it was running better 130s.   Hemoglobin A1C   Date Value Ref Range Status   2022 8.0 (H) 4.0 - 6.0 % Final     Comment:     HbA1c levels >6% are an indication of hyperglycemia during the preceding 2  to 3 months or  longer. HbA1c levels may reach 20% or higher in poorly controlled diabetes. Therapeutic action is suggested at levels above 8%. Diabetes patients with HbA1c levels below 7% meet the goal of the American  Diabetes Association. HbA1c levels below the established reference range may indicate recent  episodes of hypoglycemia, the presence of Hb variants, or shortened lifetime  of erythrocytes. /80   Pulse 79   Temp 96.9 °F (36.1 °C) (Temporal)   Ht 5' 4\" (1.626 m)   Wt 176 lb (79.8 kg)   LMP 01/01/2020   SpO2 97%   BMI 30.21 kg/m²     Review of Systems   Constitutional:  Negative for activity change, appetite change, fatigue, fever and unexpected weight change. HENT:  Positive for congestion. Negative for hearing loss, rhinorrhea, sinus pressure, sore throat and trouble swallowing. Eyes:  Negative for visual disturbance. Respiratory:  Positive for cough, shortness of breath and wheezing. Cardiovascular:  Negative for chest pain, palpitations and leg swelling. Gastrointestinal:  Negative for abdominal pain, constipation, diarrhea, nausea and vomiting. Endocrine: Negative for cold intolerance and heat intolerance. Genitourinary:  Negative for flank pain, menstrual problem, pelvic pain, urgency and vaginal discharge. Musculoskeletal:  Negative for arthralgias. Skin:  Negative for rash. Neurological:  Negative for headaches. Psychiatric/Behavioral:  Negative for dysphoric mood and sleep disturbance. The patient is not nervous/anxious. Physical Exam  Vitals reviewed. Constitutional:       Appearance: Normal appearance. HENT:      Head: Normocephalic and atraumatic. Right Ear: Tympanic membrane, ear canal and external ear normal.      Left Ear: Tympanic membrane, ear canal and external ear normal.      Nose: Nose normal.      Mouth/Throat:      Mouth: Mucous membranes are moist.      Pharynx: Oropharynx is clear.    Eyes:      Conjunctiva/sclera: Conjunctivae normal.   Cardiovascular:      Rate and Rhythm: Normal rate and regular rhythm. Pulses: Normal pulses. Heart sounds: Normal heart sounds. Pulmonary:      Breath sounds: Wheezing present. Comments: tachypnea  Abdominal:      General: There is no distension. Palpations: Abdomen is soft. Tenderness: There is no abdominal tenderness. There is no guarding. Musculoskeletal:         General: Normal range of motion. Cervical back: Normal range of motion and neck supple. Skin:     General: Skin is warm. Neurological:      Mental Status: She is alert and oriented to person, place, and time. An electronic signature was used to authenticate this note.     --Bhavin Cook, CLINTON

## 2023-02-13 ENCOUNTER — TELEPHONE (OUTPATIENT)
Dept: FAMILY MEDICINE CLINIC | Age: 48
End: 2023-02-13

## 2023-02-13 ENCOUNTER — HOSPITAL ENCOUNTER (OUTPATIENT)
Dept: GENERAL RADIOLOGY | Age: 48
Discharge: HOME | End: 2023-02-13
Payer: COMMERCIAL

## 2023-02-13 ENCOUNTER — PATIENT MESSAGE (OUTPATIENT)
Dept: FAMILY MEDICINE CLINIC | Age: 48
End: 2023-02-13

## 2023-02-13 DIAGNOSIS — R06.03 ACUTE RESPIRATORY DISTRESS: ICD-10-CM

## 2023-02-13 DIAGNOSIS — G35 MULTIPLE SCLEROSIS (HCC): ICD-10-CM

## 2023-02-13 DIAGNOSIS — G89.4 CHRONIC PAIN SYNDROME: ICD-10-CM

## 2023-02-13 DIAGNOSIS — G89.29 CHRONIC NONINTRACTABLE HEADACHE, UNSPECIFIED HEADACHE TYPE: ICD-10-CM

## 2023-02-13 DIAGNOSIS — R51.9 CHRONIC NONINTRACTABLE HEADACHE, UNSPECIFIED HEADACHE TYPE: ICD-10-CM

## 2023-02-13 DIAGNOSIS — J40 BRONCHITIS: ICD-10-CM

## 2023-02-13 DIAGNOSIS — J40 BRONCHITIS: Primary | ICD-10-CM

## 2023-02-13 PROCEDURE — 71250 CT THORAX DX C-: CPT

## 2023-02-13 NOTE — TELEPHONE ENCOUNTER
Eliceo Arroyo wants patient to come back in for ct chest without contrast due to worsening symptoms. Tried to call patient twice to see when she could come. Left vm to call back. Auth for ct is [id number] good from 02/13/2023-03/14/2023. Will try patient back again.

## 2023-02-13 NOTE — TELEPHONE ENCOUNTER
----- Message from CLINTON Batista sent at 2/13/2023  7:39 AM CST -----  Please call patient and let them know results. Chest x-ray consistent with bronchitis.   There is no pneumonia

## 2023-02-13 NOTE — TELEPHONE ENCOUNTER
Called patient, spoke with: Patient regarding the results of the patients most recent CXR & Labs. I advised Patient of Salena Javier recommendations. Patient did voice understanding      I asked pt how she has been doing and she stated that she is getting better and is back at work but she has not slept in over 24hrs due to \" Saturday night she was laying on her left side and woke up to coughing and she went to the bathroom she began coughing so hard she could not get air in or out and her tongue turned purple. She finally got her  to wake up to do a nebulizer treatment but pt thought she was going to die\" she would like to know if there is somewhere she could be referred to to find out why this is getting this bad?

## 2023-02-13 NOTE — TELEPHONE ENCOUNTER
----- Message from CLINTON Joseph sent at 2/13/2023  7:37 AM CST -----  Please call patient and let them know results.    Metabolic panel normal which includes kidney and liver function  D-dimer negative    When call these results to patient please give me an update on how she is doing

## 2023-02-13 NOTE — TELEPHONE ENCOUNTER
Spoke with patient, she is coming in for ct chest today at 400 pm here in our office. Also called and left message for dr ferguson's office to try to get patient worked in for the soonest appt due to worsening condition. Awaiting call back from their office.

## 2023-02-14 ENCOUNTER — TELEPHONE (OUTPATIENT)
Dept: FAMILY MEDICINE CLINIC | Age: 48
End: 2023-02-14

## 2023-02-14 DIAGNOSIS — R06.03 ACUTE RESPIRATORY DISTRESS: Primary | ICD-10-CM

## 2023-02-14 NOTE — TELEPHONE ENCOUNTER
Spoke with VIKA at [facility] office they will call patient and get her in within the [id number].

## 2023-02-14 NOTE — TELEPHONE ENCOUNTER
----- Message from CLINTON Cowan sent at 2/13/2023  5:11 PM CST -----  Please call patient and let them know results. CAT scan shows mild groundglass opacities bilateral lower lobes.   Recommend referral to Dr. Forrest Obando

## 2023-02-14 NOTE — TELEPHONE ENCOUNTER
I spoke with the patient about this. Patient is needing to use her inhaler often and breathing treatments every 4 hours. Patient is growing concerned because she is having spells of coughing and difficulty breathing. She would like to see Dr Angelica rider.

## 2023-02-14 NOTE — TELEPHONE ENCOUNTER
VIKA with [address] called regarding pt referral and requested to speak to [facility], [facility], routed this call over     Call back # [phone]

## 2023-02-15 RX ORDER — TRAMADOL HYDROCHLORIDE 300 MG/1
300 TABLET, EXTENDED RELEASE ORAL DAILY
Qty: 30 TABLET | Refills: 0 | Status: SHIPPED | OUTPATIENT
Start: 2023-02-15 | End: 2023-02-17 | Stop reason: ALTCHOICE

## 2023-02-15 NOTE — TELEPHONE ENCOUNTER
Copied from husbands my chart message and placed in patients. Good morning Jelena. This is Geni Villegas. Morgan Lugo wanted to let you know that she is about out of 300 mg ER tramadol. She says that those work the best but she wants to know what to do at night. Norco? Tramadol. You can message her back on her My Chart. Thanks. Juan Merritt and spoke with patient to see how she was doing. She went to see MS doctor and he stopped one of her medications and started her on keslmeta due to the other medication possibly causing swelling in colon. She is still having shortness of breath. No help with inhaler but taking the neb tx every 4 hours with some help. Woke up coughing yesterday and has congestion. No fever. Will keep follow up this week with erika and call if anything gets worse. Also see note above from .

## 2023-02-15 NOTE — TELEPHONE ENCOUNTER
From: Oly Rodriguez  To: Sunitha Espinoza  Sent: 2/13/2023 2:47 AM CST  Subject: Thank you    Thank you for taking time to help me in your office last week. I believe the stronger steroids and my breathing treatments are helping the breathing. However, I can't sleep now bc of the steroids. However, I choose breathing over sleep any day. I'm going back to work today. I wanted to let you know that Saturday night I had an episode where I was on my left side, started coughing, couldn't get air despite the inhaler. My eyes started watering and my tongue was turning purple. I got Rebecca Tanner and we started the treatment immediately. It took some time to calm down, but I honestly thought I was going to die. I feel like we need to get to the root of this, because I'm afraid it will happen again. Rebecca Tanner suggested having oxygen at home. That's great. But what is causing this? Do I need an asthma Dr., Pulmonologist, talk to my MS Dr, do you handle this sort of thing? I've been hospitalized for pneumonia before and nothing is ever done. This was bronchitis? What caused it to get this   bad? Rebecca Tanner and I are at a loss and would appreciate any suggestions you have. Thank you. Oly Rodriguez.  [phone]

## 2023-02-17 ENCOUNTER — OFFICE VISIT (OUTPATIENT)
Dept: FAMILY MEDICINE CLINIC | Age: 48
End: 2023-02-17
Payer: COMMERCIAL

## 2023-02-17 VITALS
SYSTOLIC BLOOD PRESSURE: 122 MMHG | DIASTOLIC BLOOD PRESSURE: 73 MMHG | TEMPERATURE: 97.2 F | OXYGEN SATURATION: 98 % | HEART RATE: 89 BPM | BODY MASS INDEX: 30.21 KG/M2 | WEIGHT: 176 LBS

## 2023-02-17 DIAGNOSIS — G89.4 CHRONIC PAIN SYNDROME: ICD-10-CM

## 2023-02-17 DIAGNOSIS — J40 BRONCHITIS: Primary | ICD-10-CM

## 2023-02-17 DIAGNOSIS — G35 MULTIPLE SCLEROSIS (HCC): ICD-10-CM

## 2023-02-17 PROCEDURE — 99214 OFFICE O/P EST MOD 30 MIN: CPT | Performed by: NURSE PRACTITIONER

## 2023-02-17 RX ORDER — TRAMADOL HYDROCHLORIDE 200 MG/1
200 TABLET, EXTENDED RELEASE ORAL DAILY
Qty: 30 TABLET | Refills: 0 | Status: SHIPPED | OUTPATIENT
Start: 2023-02-17 | End: 2023-03-19

## 2023-02-17 RX ORDER — TRAMADOL HYDROCHLORIDE 50 MG/1
50 TABLET ORAL NIGHTLY
Qty: 30 TABLET | Refills: 0 | Status: SHIPPED | OUTPATIENT
Start: 2023-02-17 | End: 2023-03-19

## 2023-02-17 RX ORDER — BUDESONIDE AND FORMOTEROL FUMARATE DIHYDRATE 160; 4.5 UG/1; UG/1
2 AEROSOL RESPIRATORY (INHALATION) 2 TIMES DAILY
Qty: 10.2 G | Refills: 3 | Status: SHIPPED | OUTPATIENT
Start: 2023-02-17

## 2023-02-17 ASSESSMENT — ENCOUNTER SYMPTOMS
TROUBLE SWALLOWING: 0
ABDOMINAL PAIN: 0
NAUSEA: 0
RHINORRHEA: 0
CONSTIPATION: 0
SHORTNESS OF BREATH: 1
SINUS PRESSURE: 0
SORE THROAT: 0
VOMITING: 0
DIARRHEA: 0
COUGH: 1

## 2023-02-17 ASSESSMENT — PATIENT HEALTH QUESTIONNAIRE - PHQ9
2. FEELING DOWN, DEPRESSED OR HOPELESS: 0
SUM OF ALL RESPONSES TO PHQ QUESTIONS 1-9: 0
9. THOUGHTS THAT YOU WOULD BE BETTER OFF DEAD, OR OF HURTING YOURSELF: 0
8. MOVING OR SPEAKING SO SLOWLY THAT OTHER PEOPLE COULD HAVE NOTICED. OR THE OPPOSITE, BEING SO FIGETY OR RESTLESS THAT YOU HAVE BEEN MOVING AROUND A LOT MORE THAN USUAL: 0
SUM OF ALL RESPONSES TO PHQ9 QUESTIONS 1 & 2: 0
10. IF YOU CHECKED OFF ANY PROBLEMS, HOW DIFFICULT HAVE THESE PROBLEMS MADE IT FOR YOU TO DO YOUR WORK, TAKE CARE OF THINGS AT HOME, OR GET ALONG WITH OTHER PEOPLE: 0
7. TROUBLE CONCENTRATING ON THINGS, SUCH AS READING THE NEWSPAPER OR WATCHING TELEVISION: 0
5. POOR APPETITE OR OVEREATING: 0
SUM OF ALL RESPONSES TO PHQ QUESTIONS 1-9: 0
6. FEELING BAD ABOUT YOURSELF - OR THAT YOU ARE A FAILURE OR HAVE LET YOURSELF OR YOUR FAMILY DOWN: 0
4. FEELING TIRED OR HAVING LITTLE ENERGY: 0
1. LITTLE INTEREST OR PLEASURE IN DOING THINGS: 0
SUM OF ALL RESPONSES TO PHQ QUESTIONS 1-9: 0
SUM OF ALL RESPONSES TO PHQ QUESTIONS 1-9: 0
3. TROUBLE FALLING OR STAYING ASLEEP: 0

## 2023-02-17 NOTE — PROGRESS NOTES
Rabia Hung (:  1975) is a 52 y.o. female,Established patient, here for evaluation of the following chief complaint(s):  Follow-up (Follow up on bronchitis. /Feels like it not as tight. Feels like she is getting sick again.)      ASSESSMENT/PLAN:    ICD-10-CM    1. Bronchitis  J40 budesonide-formoterol (SYMBICORT) 160-4.5 MCG/ACT AERO    She needs to keep her appointment with pulmonology  She is much better than she was last week. 2. Multiple sclerosis ([facility] [address]  G35 Keep follow up with neurology. 3. Chronic pain syndrome  G89.4 traMADol (ULTRAM ER) 200 MG extended release tablet     traMADol (ULTRAM) 50 MG tablet    Going to decrease extended release tramadol to 200 mg and let her use an immediate release dose in the evening to help with pain at night. Patient states they 300 mg extended release dose wears off before night. Return in about 4 weeks (around 3/17/2023). SUBJECTIVE/OBJECTIVE:  HPI  Here for follow up on breathing  She was seen at [facility] and was on omnicef and steroids. Seen last week with increased shortness of breath   Obtained chest xray consistent with bronchitis. Gave her higher dose steroids and added ipratropium nebs. She does not smoke. Monday she sent message that said she really wasn't improving. Had a spell that she felt like she could not get her breath. CT chest was obtained showing ground glass opacities bilateral lower lobes. I referred to Dr Stefania Cash (pulmonology), appt 2023. She is here today for follow up. Symptoms have improved but still get winded easily. MS doctor said Immunoglobulins are really low and she is going to set up for IgG infusions. Waiting to hear about this. She is wearing mask full time. Have chronic bone pain  Taking tramadol but not lasting and nothing at night. Cant take ibuprofen or aleve  They wanted me on gabapentin and not helping.      /73 (Site: Right Upper Arm, Position: Sitting, Cuff Size: Large Adult)   Pulse 89   Temp 97.2 °F (36.2 °C) (Temporal)   Wt 176 lb (79.8 kg)   LMP 01/01/2020   SpO2 98%   BMI 30.21 kg/m²     Review of Systems   Constitutional:  Negative for activity change, appetite change, fatigue, fever and unexpected weight change. HENT:  Negative for congestion, hearing loss, rhinorrhea, sinus pressure, sore throat and trouble swallowing. Eyes:  Negative for visual disturbance. Respiratory:  Positive for cough and shortness of breath. Cardiovascular:  Negative for chest pain, palpitations and leg swelling. Gastrointestinal:  Negative for abdominal pain, constipation, diarrhea, nausea and vomiting. Endocrine: Negative for cold intolerance and heat intolerance. Genitourinary:  Negative for flank pain, menstrual problem, pelvic pain, urgency and vaginal discharge. Musculoskeletal:  Negative for arthralgias. Chronic back pain   Skin:  Negative for rash. Neurological:  Negative for headaches. Psychiatric/Behavioral:  Negative for dysphoric mood and sleep disturbance. The patient is not nervous/anxious. Physical Exam  Vitals reviewed. Constitutional:       Appearance: Normal appearance. HENT:      Head: Normocephalic and atraumatic. Eyes:      Conjunctiva/sclera: Conjunctivae normal.   Cardiovascular:      Rate and Rhythm: Normal rate and regular rhythm. Pulses: Normal pulses. Pulmonary:      Effort: Pulmonary effort is normal.      Breath sounds: Normal breath sounds. Abdominal:      General: There is no distension. Palpations: Abdomen is soft. Tenderness: There is no abdominal tenderness. There is no guarding. Musculoskeletal:         General: Normal range of motion. Cervical back: Normal range of motion. Skin:     General: Skin is warm. Neurological:      Mental Status: She is alert and oriented to person, place, and time.    Psychiatric:         Mood and Affect: Mood normal.         Behavior: Behavior normal. Thought Content: Thought content normal.         Judgment: Judgment normal.               An electronic signature was used to authenticate this note.     --CLINTON Guido

## 2023-02-21 ENCOUNTER — TELEPHONE (OUTPATIENT)
Dept: FAMILY MEDICINE CLINIC | Age: 48
End: 2023-02-21

## 2023-02-21 ENCOUNTER — TELEPHONE (OUTPATIENT)
Dept: ONCOLOGY | Facility: CLINIC | Age: 48
End: 2023-02-21

## 2023-02-21 NOTE — TELEPHONE ENCOUNTER
Caller: Jeannette Hitchcock    Relationship: Self    Best call back number: [phone]    What is the best time to reach you: ANYTIME    Who are you requesting to speak with (clinical staff, provider,  specific staff member):  CLINICAL    What was the call regarding: PT NEEDS TO FIND OUT WHO DR. NUNEZ REFERRED HER TO AT HER LAST VISIT, IT WAS SOMEONE CLOSER TO HER IN [place].    Do you require a callback: YES

## 2023-02-21 NOTE — TELEPHONE ENCOUNTER
Return call to patient Jeannette Naldo, patient questions if the Oncologist/Hema referral was sent to Dr Guerrero in [place]. Patient informed @ her Dec 2/2022 apt per patient request a referral was sent along with her medical records to Dr Guerrero to take over her care.   Patient says that she only changed providers office due to her residence in [place] and seeking a local provider to care for her vs travel time to [place].   Patient says that she was very pleased with the care she received here in our office as well as Dr Baldwin the switch was only due to locality.   Patient says she is followed by a Provider that treats her MS symptoms and after several test were performed there is a noted decline in her IgG levels.  Patient informed to contact Dr Guerrero office to [facility] apt for f/u and eval, he can provide her Immunotherapy infusion for her.Pt v/u

## 2023-02-22 ENCOUNTER — TELEPHONE (OUTPATIENT)
Dept: FAMILY MEDICINE CLINIC | Age: 48
End: 2023-02-22

## 2023-02-22 NOTE — TELEPHONE ENCOUNTER
Called patient regarding mammogram appointment that was missed at [facility]. Patient informed me that she has had a lot going on and will call to reschedule when it is convenient for her.

## 2023-02-28 ENCOUNTER — OFFICE VISIT (OUTPATIENT)
Dept: PULMONOLOGY | Age: 48
End: 2023-02-28
Payer: COMMERCIAL

## 2023-02-28 VITALS
DIASTOLIC BLOOD PRESSURE: 68 MMHG | HEIGHT: 64 IN | WEIGHT: 181.8 LBS | BODY MASS INDEX: 31.04 KG/M2 | SYSTOLIC BLOOD PRESSURE: 117 MMHG | OXYGEN SATURATION: 99 % | HEART RATE: 77 BPM | TEMPERATURE: 97.2 F

## 2023-02-28 DIAGNOSIS — E66.9 OBESITY (BMI 30-39.9): ICD-10-CM

## 2023-02-28 DIAGNOSIS — D83.9 CVID (COMMON VARIABLE IMMUNODEFICIENCY) (HCC): ICD-10-CM

## 2023-02-28 DIAGNOSIS — R06.2 WHEEZING: ICD-10-CM

## 2023-02-28 DIAGNOSIS — R93.89 ABNORMAL CT OF THE CHEST: Primary | ICD-10-CM

## 2023-02-28 DIAGNOSIS — R06.00 DYSPNEA, UNSPECIFIED TYPE: ICD-10-CM

## 2023-02-28 DIAGNOSIS — R91.8 GROUND GLASS OPACITY PRESENT ON IMAGING OF LUNG: ICD-10-CM

## 2023-02-28 PROCEDURE — 99204 OFFICE O/P NEW MOD 45 MIN: CPT | Performed by: INTERNAL MEDICINE

## 2023-02-28 ASSESSMENT — ENCOUNTER SYMPTOMS
CHEST TIGHTNESS: 0
ANAL BLEEDING: 0
SHORTNESS OF BREATH: 1
ABDOMINAL DISTENTION: 0
BACK PAIN: 0
ABDOMINAL PAIN: 0
COUGH: 0
APNEA: 0
RHINORRHEA: 0
WHEEZING: 0

## 2023-02-28 NOTE — PROGRESS NOTES
Pulmonary and Sleep Medicine    Corine Vazquez (:  1975) is a 52 y.o. female,New patient, here for evaluation of the following chief complaint(s):  New Patient (Referred by- Peggy Coombs Abnormal CT, SOB )      Referring physician:  Tenzin Maydash 10,  [facility]     ASSESSMENT/PLAN:  1. Abnormal CT of the chest  -     Full PFT Study With Bronchodilator; Future  -     Echo 2d w doppler w color w contrast; Future  -     CT CHEST WO CONTRAST; Future  2. Wheezing  3. Dyspnea, unspecified type  4. Obesity (BMI 30-39.9)  5. Ground glass opacity present on imaging of lung  -     Echo 2d w doppler w color w contrast; Future  6. CVID (common variable immunodeficiency) ([address]      Groundglass opacity seen on the CT of the chest most likely postinflammatory. We will repeat CT of the chest in 6 weeks to assess for resolution. Will obtain pulmonary function testing echo. She is planned to have IGG replacement for CVID. Dianah Bosworth, MD, FCCP, DABSM    Return in about 6 weeks (around 2023). SUBJECTIVE/OBJECTIVE:  The patient is here for evaluation of abnormal CT of the chest. She was short of breath earlier this month and had a CXR done followed by a CT of the chest that showed ground glass opacities. She was told she had bronchitis. She continues to be short of breath. No PFT on file. She says she continues to feel short of breath with exertion. She has a complicated long history of multiple abdominal surgeries due to bowel perforations and enterotomy complicating liver biopsy. Denies ever smoking. Denies daily wheezing. Prior to Visit Medications    Medication Sig Taking? Authorizing Provider   budesonide-formoterol (SYMBICORT) 160-4.5 MCG/ACT AERO Inhale 2 puffs into the lungs 2 times daily Rinse mouth after use Yes TENZIN May   traMADol Renay Retort ER) 200 MG extended release tablet Take 1 tablet by mouth daily for 30 days. Max Daily Amount: 200 mg Yes CLINTON Lomax   traMADol (ULTRAM) 50 MG tablet Take 1 tablet by mouth at bedtime for 30 days. Intended supply: 7 days. Take lowest dose possible to manage pain Max Daily Amount: 50 mg Yes CLINTON Lomax   ipratropium (ATROVENT) 0.02 % nebulizer solution Take 2.5 mLs by nebulization every 4 hours as needed for Wheezing Yes CLINTON Lomax   levalbuterol [place] Pretzel) 0.63 MG/3ML nebulization Take 3 mLs by nebulization every 4 hours as needed for Wheezing Yes CLINTON Lomax   levalbuterol [place] Pretzel HFA) 45 MCG/ACT inhaler Inhale 1-2 puffs into the lungs every 4 hours as needed for Wheezing Yes CLINTON Gilbert   Tirzepatide [facility]) 5 MG/0.5ML SOPN SC injection Inject 0.5 mLs into the skin once a week Yes CLINTON Gilbert   gabapentin (NEURONTIN) 100 MG capsule Take 1 capsule by mouth 3 times daily for 120 days. Yes June CLINTON Mello   cyanocobalamin 1000 MCG/ML injection 1ml monthly im Yes June CLINTON Mello   Armodafinil 250 MG TABS Take 250 mg by mouth daily for 90 days. Yes CLINTON Gilbert   Wheat Dextrin (BENEFIBER) POWD Take 4 g by mouth daily Yes Historical Provider, MD   polyethylene glycol (GLYCOLAX) 17 GM/SCOOP powder Take 17 g by mouth daily . 5 capful daily Yes Historical Provider, MD   Bacillus Coagulans-Inulin (ALIGN PREBIOTIC-PROBIOTIC) 5-1.25 MG-GM CHEW Take by mouth Yes Historical Provider, MD   venlafaxine (EFFEXOR XR) 150 MG extended release capsule Take 1 capsule by mouth daily Yes CLINTON Valentine   propranolol (INDERAL LA) 60 MG extended release capsule Take 1 capsule by mouth daily Yes CLINTON Valentine   clonazePAM (KLONOPIN) 0.5 MG tablet Take 1 tablet by mouth 2 times daily for 30 days.  Yes CLINTON Olson   SUMAtriptan (IMITREX) 100 MG tablet Take 100 mg by mouth once as needed for Migraine Yes Historical Provider, MD   docusate sodium (COLACE) 100 MG capsule Take 100 mg by mouth 2 times daily as needed for Constipation Yes Historical Provider, MD   brexpiprazole (REXULTI) 0.5 MG TABS tablet Take 1 tablet by mouth at bedtime Yes CLINTON Valentine   tiZANidine (ZANAFLEX) 2 MG tablet Take 1 tablet by mouth every 8 hours as needed (muscle pain) Yes CLINTON Valentine   montelukast (SINGULAIR) 10 MG tablet Take 1 tablet by mouth nightly With xyzal for allergies Yes CLINTON Valentine   doxepin (SINEQUAN) 50 MG capsule Take 1 capsule by mouth nightly Yes CLINTON Valentine   pioglitazone (ACTOS) 30 MG tablet Take 1 tablet by mouth daily Yes CLINTON Valentine   furosemide (LASIX) 20 MG tablet Take 1 tablet by mouth daily as needed (swelling) Take in the morning Yes CLINTON Valentine   ondansetron (ZOFRAN-ODT) 4 MG disintegrating tablet Take 1 tablet by mouth 3 times daily as needed for Nausea or Vomiting Yes CLINTON Valentine   levocetirizine (XYZAL) 5 MG tablet Take 1 tablet by mouth nightly Yes CLINTON Valentine   Insulin Pen Needle 31G X 5 MM MISC To use with insulin pen Yes CLINTON Valentine   insulin glargine (BASAGLAR KWIKPEN) 100 UNIT/ML injection pen Inject 10 Units into the skin nightly Yes CLINTON Valentine   dapagliflozin (FARXIGA) 10 MG tablet Take 1 tablet by mouth every morning Yes CLINTON Valentine   rosuvastatin (CRESTOR) 10 MG tablet Take 1 tablet by mouth nightly Yes CLINTON Valentine   Fremanezumab-vfrm (AJOVY) 225 MG/1.5ML SOAJ Inject 225 mg into the skin every 30 days Yes Historical Provider, MD   vitamin D (ERGOCALCIFEROL) 1.25 MG (48957 UT) CAPS capsule Take 1 capsule by mouth once a week Yes CLINTON Valentine   baclofen (LIORESAL) 10 MG tablet TAKE 1/2 TO 1 TABLET TWICE DAILY AS NEEDED FOR MUSCLE SPASMS Yes Historical Provider, MD   Syringe/Needle, Disp, (SYRINGE 3CC/25GX1\") 25G X 1\" 3 ML MISC Use to inject b12 once weekly for 1 month, then monthly Yes Roberta Martinez, CLINTON        Review of Systems Constitutional:  Negative for activity change, appetite change, chills, diaphoresis and fatigue. HENT:  Negative for congestion, dental problem, drooling, ear discharge, postnasal drip and rhinorrhea. Eyes:  Negative for visual disturbance. Respiratory:  Positive for shortness of breath. Negative for apnea, cough, chest tightness and wheezing. Gastrointestinal:  Negative for abdominal distention, abdominal pain and anal bleeding. Endocrine: Negative for cold intolerance, heat intolerance and polydipsia. Genitourinary:  Negative for difficulty urinating, dysuria, enuresis and flank pain. Musculoskeletal:  Negative for arthralgias, back pain and gait problem. Allergic/Immunologic: Negative for environmental allergies. Neurological:  Negative for dizziness, facial asymmetry, light-headedness and headaches. Vitals:    02/28/23 0932   BP: 117/68   Pulse: 77   Temp: 97.2 °F (36.2 °C)   SpO2: 99%     BMI Readings from Last 1 Encounters:   02/28/23 31.21 kg/m²         Physical Exam  Vitals reviewed. Constitutional:       Appearance: Normal appearance. HENT:      Head: Normocephalic and atraumatic. Nose: Nose normal.   Eyes:      Extraocular Movements: Extraocular movements intact. Conjunctiva/sclera: Conjunctivae normal.   Cardiovascular:      Rate and Rhythm: Normal rate and regular rhythm. Heart sounds: No murmur heard. No friction rub. Pulmonary:      Effort: Pulmonary effort is normal. No respiratory distress. Breath sounds: Normal breath sounds. No stridor. No wheezing, rhonchi or rales. Abdominal:      General: There is no distension. Palpations: There is no mass. Tenderness: There is no abdominal tenderness. There is no guarding or rebound. Musculoskeletal:      Cervical back: Normal range of motion and neck supple. Neurological:      Mental Status: She is alert and oriented to person, place, and time.            This note was generated using a voice recognition software. Errors in voice recognition may have occurred. An electronic signature was used to authenticate this note.     --Zuhair Bradley MD

## 2023-03-06 ENCOUNTER — HOSPITAL ENCOUNTER (OUTPATIENT)
Dept: NON INVASIVE DIAGNOSTICS | Age: 48
Discharge: HOME | End: 2023-03-06
Payer: COMMERCIAL

## 2023-03-06 DIAGNOSIS — R91.8 GROUND GLASS OPACITY PRESENT ON IMAGING OF LUNG: ICD-10-CM

## 2023-03-06 DIAGNOSIS — R93.89 ABNORMAL CT OF THE CHEST: ICD-10-CM

## 2023-03-06 LAB
LV EF: 60 %
LVEF MODALITY: NORMAL

## 2023-03-06 PROCEDURE — 6360000004 HC RX CONTRAST MEDICATION: Performed by: INTERNAL MEDICINE

## 2023-03-06 PROCEDURE — C8929 TTE W OR WO FOL WCON,DOPPLER: HCPCS

## 2023-03-06 RX ADMIN — PERFLUTREN 1.5 ML: 6.52 INJECTION, SUSPENSION INTRAVENOUS at 15:03

## 2023-03-10 ENCOUNTER — HOSPITAL ENCOUNTER (OUTPATIENT)
Dept: PULMONOLOGY | Age: 48
Discharge: HOME | End: 2023-03-10

## 2023-03-17 ENCOUNTER — TELEPHONE (OUTPATIENT)
Dept: FAMILY MEDICINE CLINIC | Age: 48
End: 2023-03-17

## 2023-03-17 ENCOUNTER — TELEMEDICINE (OUTPATIENT)
Dept: FAMILY MEDICINE CLINIC | Age: 48
End: 2023-03-17
Payer: COMMERCIAL

## 2023-03-17 DIAGNOSIS — G35 MULTIPLE SCLEROSIS (HCC): ICD-10-CM

## 2023-03-17 DIAGNOSIS — G89.4 CHRONIC PAIN SYNDROME: ICD-10-CM

## 2023-03-17 DIAGNOSIS — E11.69 TYPE 2 DIABETES MELLITUS WITH OTHER SPECIFIED COMPLICATION, WITHOUT LONG-TERM CURRENT USE OF INSULIN (HCC): Primary | ICD-10-CM

## 2023-03-17 DIAGNOSIS — R53.82 CHRONIC FATIGUE: ICD-10-CM

## 2023-03-17 PROCEDURE — 99214 OFFICE O/P EST MOD 30 MIN: CPT | Performed by: NURSE PRACTITIONER

## 2023-03-17 RX ORDER — TRAMADOL HYDROCHLORIDE 50 MG/1
100 TABLET ORAL NIGHTLY
Qty: 100 TABLET | Refills: 0 | Status: SHIPPED | OUTPATIENT
Start: 2023-03-17 | End: 2023-04-16

## 2023-03-17 RX ORDER — TRAMADOL HYDROCHLORIDE 200 MG/1
200 TABLET, EXTENDED RELEASE ORAL DAILY
Qty: 30 TABLET | Refills: 0 | Status: SHIPPED | OUTPATIENT
Start: 2023-03-17 | End: 2023-04-16

## 2023-03-17 RX ORDER — NALOXONE HYDROCHLORIDE 2 MG/.4ML
2 INJECTION, SOLUTION INTRAMUSCULAR; SUBCUTANEOUS
Qty: 0.4 ML | Refills: 3 | Status: SHIPPED | OUTPATIENT
Start: 2023-03-17 | End: 2023-03-17

## 2023-03-17 RX ORDER — ARMODAFINIL 250 MG/1
250 TABLET ORAL DAILY
Qty: 30 TABLET | Refills: 2 | Status: SHIPPED | OUTPATIENT
Start: 2023-03-17 | End: 2023-06-15

## 2023-03-17 SDOH — ECONOMIC STABILITY: FOOD INSECURITY: WITHIN THE PAST 12 MONTHS, THE FOOD YOU BOUGHT JUST DIDN'T LAST AND YOU DIDN'T HAVE MONEY TO GET MORE.: PATIENT DECLINED

## 2023-03-17 SDOH — ECONOMIC STABILITY: TRANSPORTATION INSECURITY
IN THE PAST 12 MONTHS, HAS LACK OF TRANSPORTATION KEPT YOU FROM MEETINGS, WORK, OR FROM GETTING THINGS NEEDED FOR DAILY LIVING?: NO

## 2023-03-17 SDOH — ECONOMIC STABILITY: INCOME INSECURITY: HOW HARD IS IT FOR YOU TO PAY FOR THE VERY BASICS LIKE FOOD, HOUSING, MEDICAL CARE, AND HEATING?: NOT HARD AT ALL

## 2023-03-17 SDOH — ECONOMIC STABILITY: FOOD INSECURITY: WITHIN THE PAST 12 MONTHS, YOU WORRIED THAT YOUR FOOD WOULD RUN OUT BEFORE YOU GOT MONEY TO BUY MORE.: SOMETIMES TRUE

## 2023-03-17 SDOH — ECONOMIC STABILITY: HOUSING INSECURITY
IN THE LAST 12 MONTHS, WAS THERE A TIME WHEN YOU DID NOT HAVE A STEADY PLACE TO SLEEP OR SLEPT IN A SHELTER (INCLUDING NOW)?: NO

## 2023-03-17 ASSESSMENT — ENCOUNTER SYMPTOMS
BACK PAIN: 1
WHEEZING: 0
COUGH: 1
SHORTNESS OF BREATH: 0
EYE REDNESS: 0
EYE DISCHARGE: 0
CONSTIPATION: 0
DIARRHEA: 0
TROUBLE SWALLOWING: 0
SINUS PRESSURE: 0
BLOOD IN STOOL: 0
NAUSEA: 0
VOMITING: 0
SORE THROAT: 0
ABDOMINAL PAIN: 0
RHINORRHEA: 1

## 2023-03-17 NOTE — PROGRESS NOTES
Yancy Walden (:  1975) is a Established patient, here for evaluation of the following:    Assessment & Plan   Below is the assessment and plan developed based on review of pertinent history, physical exam, labs, studies, and medications. 1. Type 2 diabetes mellitus with other specified complication, without long-term current use of insulin ([facility] [address]  2. Chronic pain syndrome  -     traMADol (ULTRAM) 50 MG tablet; Take 2 tablets by mouth at bedtime for 30 days. Intended supply: 7 days. Take lowest dose possible to manage pain Max Daily Amount: 100 mg, Disp-100 tablet, R-0Normal  -     traMADol (ULTRAM ER) 200 MG extended release tablet; Take 1 tablet by mouth daily for 30 days. Max Daily Amount: 200 mg, Disp-30 tablet, R-0Normal  3. Multiple sclerosis (HCC)  -     Armodafinil 250 MG TABS; Take 250 mg by mouth daily for 90 days. , Disp-30 tablet, R-2Normal  4. Chronic fatigue  -     Armodafinil 250 MG TABS; Take 250 mg by mouth daily for 90 days. , Disp-30 tablet, R-2Normal  Controlled Substance Monitoring:    Acute and Chronic Pain Monitoring:   RX Monitoring 3/17/2023   Attestation -   Periodic Controlled Substance Monitoring Possible medication side effects, risk of tolerance/dependence & alternative treatments discussed. ;No signs of potential drug abuse or diversion identified. Chronic Pain > 50 MEDD -   Chronic Pain > 80 MEDD -         Return in about 1 month (around 2023) for diabetes. Subjective   Medication Refill  Associated symptoms include arthralgias, chills, coughing, fatigue, myalgias and neck pain. Pertinent negatives include no abdominal pain, chest pain, congestion, fever, headaches, nausea, rash, sore throat, vomiting or weakness. Positive For Covid-19  Associated symptoms include arthralgias, chills, coughing, fatigue, myalgias and neck pain. Pertinent negatives include no abdominal pain, chest pain, congestion, fever, headaches, nausea, rash, sore throat, vomiting or weakness. She currently has covid. Breathing is ok but she is very tired. Chronic pain  She has MS and has chronic pain related to this. States tramadol er 300 mg was too much . The 200 mg was better but needs to increase the 50 to 100 mg at bedtime for break through pain  Also takes gabapentin. Has tried cymbalta in the past.  Cant take NSAIDs any more due to CKD  Chronic neck pain  MRI scanned in media     diabetes  She has been tolerating mounjaro well. Since she has been sick the last couple of weeks, she hasn't checked her blood sugar but has taken her mounjaro. Chronic fatigue  This is related to MS. She is currently taking armodafinil daily     Review of Systems   Constitutional:  Positive for chills and fatigue. Negative for activity change, appetite change, fever and unexpected weight change. HENT:  Positive for rhinorrhea. Negative for congestion, hearing loss, postnasal drip, sinus pressure, sore throat and trouble swallowing. Eyes:  Negative for discharge, redness and visual disturbance. Respiratory:  Positive for cough. Negative for shortness of breath and wheezing. Cardiovascular:  Negative for chest pain, palpitations and leg swelling. Gastrointestinal:  Negative for abdominal pain, blood in stool, constipation, diarrhea, nausea and vomiting. Endocrine: Negative for cold intolerance and heat intolerance. Genitourinary:  Negative for dysuria, flank pain, menstrual problem, pelvic pain, urgency and vaginal discharge. Musculoskeletal:  Positive for arthralgias, back pain, myalgias and neck pain. Skin:  Negative for rash. Neurological:  Negative for dizziness, weakness and headaches. Hematological:  Negative for adenopathy. Psychiatric/Behavioral:  Negative for dysphoric mood, sleep disturbance and suicidal ideas. The patient is not nervous/anxious.          Objective   Patient-Reported Vitals  No data recorded     Physical Exam  [INSTRUCTIONS:  \"[x]\" Indicates a positive item \"[]\" Indicates a negative item  -- DELETE ALL ITEMS NOT EXAMINED]    Constitutional: [x] Appears well-developed and well-nourished [x] No apparent distress      [] Abnormal -     Mental status: [x] Alert and awake  [x] Oriented to person/place/time [x] Able to follow commands    [] Abnormal -     Eyes:   EOM    [x]  Normal    [] Abnormal -   Sclera  [x]  Normal    [] Abnormal -          Discharge [x]  None visible   [] Abnormal -     HENT: [x] Normocephalic, atraumatic  [] Abnormal -   [x] Mouth/Throat: Mucous membranes are moist    External Ears [x] Normal  [] Abnormal -    Neck: [x] No visualized mass [] Abnormal -     Pulmonary/Chest: [x] Respiratory effort normal   [x] No visualized signs of difficulty breathing or respiratory distress        [] Abnormal -      Musculoskeletal:   [x] Normal gait with no signs of ataxia         [x] Normal range of motion of neck        [] Abnormal -     Neurological:        [x] No Facial Asymmetry (Cranial nerve 7 motor function) (limited exam due to video visit)          [x] No gaze palsy        [] Abnormal -          Skin:        [x] No significant exanthematous lesions or discoloration noted on facial skin         [] Abnormal -            Psychiatric:       [x] Normal Affect [] Abnormal -        [x] No Hallucinations    Other pertinent observable physical exam findings:-             Maria Fernanda Baltimore, was evaluated through a synchronous (real-time) audio-video encounter. The patient (or guardian if applicable) is aware that this is a billable service, which includes applicable co-pays. This Virtual Visit was conducted with patient's (and/or legal guardian's) consent. The visit was conducted pursuant to the emergency declaration under the [address] [place] [address], [address] [place] [address] authority and the [employer] and [employer] General Act. Patient identification was verified, and a caregiver was present when appropriate.    The patient was located at Home: [id number] [facility] [id number]  Provider was located at [facility] (Appt Dept): [address] [facility],  [address] [place] [address]

## 2023-03-20 DIAGNOSIS — R53.82 CHRONIC FATIGUE: ICD-10-CM

## 2023-03-20 DIAGNOSIS — G35 MULTIPLE SCLEROSIS (HCC): ICD-10-CM

## 2023-03-20 RX ORDER — ARMODAFINIL 250 MG/1
250 TABLET ORAL DAILY
Qty: 30 TABLET | Refills: 2 | Status: SHIPPED | OUTPATIENT
Start: 2023-03-20 | End: 2023-06-18

## 2023-03-20 NOTE — TELEPHONE ENCOUNTER
Lorie Dietz  to [facility] Staff (supporting CLINTON Garay)    ALISSON    1:54 PM  Hello. Would you please send Magos armodafinil prescription to [facility] in [place].       Thanks     Nayeli Philippe

## 2023-03-30 DIAGNOSIS — E11.69 TYPE 2 DIABETES MELLITUS WITH OTHER SPECIFIED COMPLICATION, WITHOUT LONG-TERM CURRENT USE OF INSULIN (HCC): ICD-10-CM

## 2023-03-30 DIAGNOSIS — E78.2 MIXED HYPERLIPIDEMIA: ICD-10-CM

## 2023-03-30 RX ORDER — ROSUVASTATIN CALCIUM 10 MG/1
10 TABLET, COATED ORAL NIGHTLY
Qty: 90 TABLET | Refills: 3 | Status: SHIPPED | OUTPATIENT
Start: 2023-03-30

## 2023-03-30 NOTE — TELEPHONE ENCOUNTER
Received fax from pharmacy requesting refill on pts medication(s). Pt was last seen in office on 3/17/2023  and has a follow up scheduled for Visit date not found. Will send request to  Elvin Macias  for patient.      Requested Prescriptions     Pending Prescriptions Disp Refills    rosuvastatin (CRESTOR) 10 MG tablet 90 tablet 3     Sig: Take 1 tablet by mouth nightly

## 2023-04-03 LAB — SARS-COV-2 N GENE RESP QL NAA+PROBE: NOT DETECTED

## 2023-04-06 ENCOUNTER — TELEPHONE (OUTPATIENT)
Dept: FAMILY MEDICINE CLINIC | Age: 48
End: 2023-04-06

## 2023-04-06 ENCOUNTER — HOSPITAL ENCOUNTER (OUTPATIENT)
Dept: PULMONOLOGY | Age: 48
Discharge: HOME | End: 2023-04-06
Payer: COMMERCIAL

## 2023-04-06 ENCOUNTER — HOSPITAL ENCOUNTER (OUTPATIENT)
Dept: CT IMAGING | Age: 48
Discharge: HOME | End: 2023-04-06
Payer: COMMERCIAL

## 2023-04-06 VITALS — BODY MASS INDEX: 30.39 KG/M2 | OXYGEN SATURATION: 99 % | WEIGHT: 178 LBS | HEIGHT: 64 IN

## 2023-04-06 DIAGNOSIS — R93.89 ABNORMAL CT OF THE CHEST: ICD-10-CM

## 2023-04-06 DIAGNOSIS — Z12.31 ENCOUNTER FOR SCREENING MAMMOGRAM FOR MALIGNANT NEOPLASM OF BREAST: ICD-10-CM

## 2023-04-06 PROCEDURE — 6360000002 HC RX W HCPCS

## 2023-04-06 PROCEDURE — 71250 CT THORAX DX C-: CPT | Performed by: RADIOLOGY

## 2023-04-06 PROCEDURE — 94060 EVALUATION OF WHEEZING: CPT

## 2023-04-06 PROCEDURE — 94726 PLETHYSMOGRAPHY LUNG VOLUMES: CPT | Performed by: INTERNAL MEDICINE

## 2023-04-06 PROCEDURE — 71250 CT THORAX DX C-: CPT

## 2023-04-06 PROCEDURE — 94726 PLETHYSMOGRAPHY LUNG VOLUMES: CPT

## 2023-04-06 PROCEDURE — 94729 DIFFUSING CAPACITY: CPT | Performed by: INTERNAL MEDICINE

## 2023-04-06 PROCEDURE — 94729 DIFFUSING CAPACITY: CPT

## 2023-04-06 PROCEDURE — 94060 EVALUATION OF WHEEZING: CPT | Performed by: INTERNAL MEDICINE

## 2023-04-06 RX ORDER — PROCHLORPERAZINE MALEATE 5 MG/1
5 TABLET ORAL EVERY 6 HOURS PRN
Qty: 60 TABLET | Refills: 1 | Status: SHIPPED | OUTPATIENT
Start: 2023-04-06

## 2023-04-06 RX ORDER — ALBUTEROL SULFATE 2.5 MG/3ML
2.5 SOLUTION RESPIRATORY (INHALATION) EVERY 6 HOURS PRN
Status: DISCONTINUED | OUTPATIENT
Start: 2023-04-06 | End: 2023-04-08 | Stop reason: HOSPADM

## 2023-04-06 RX ADMIN — ALBUTEROL SULFATE 2.5 MG: 2.5 SOLUTION RESPIRATORY (INHALATION) at 09:48

## 2023-04-06 NOTE — TELEPHONE ENCOUNTER
----- Message from CLINTON Valentin sent at 4/6/2023  1:43 PM CDT -----  Please inform patient results show  Mammogram is normal repeat in 1 year

## 2023-04-06 NOTE — PROCEDURES
Media Information      Pulmonary Function Study    Interpretation:    The FVC is moderately reduced. FEV1 is moderately reduced. FEV1/FVC ratio is Normal. After bronchodilator therapy there was no significant improvement in FEV1. Total lung capacity is mildly reduced. Residual volume is Normal.      Diffusing lung capacity when corrected for alveolar volume is Normal.         Impression:    Mild restrictive lung disease.          Phong Colmenares MD, FCCP, [facility]

## 2023-04-12 ENCOUNTER — TELEPHONE (OUTPATIENT)
Dept: PULMONOLOGY | Age: 48
End: 2023-04-12

## 2023-04-13 ENCOUNTER — TELEPHONE (OUTPATIENT)
Dept: FAMILY MEDICINE CLINIC | Age: 48
End: 2023-04-13

## 2023-04-18 ENCOUNTER — TELEPHONE (OUTPATIENT)
Dept: FAMILY MEDICINE CLINIC | Age: 48
End: 2023-04-18

## 2023-04-18 NOTE — TELEPHONE ENCOUNTER
Called patient to ensure she received message regarding CT of chest. Left message with appointment information, 4/19/23 @ our office @8:30 NPO 4 hours before.

## 2023-04-18 NOTE — TELEPHONE ENCOUNTER
Left message to let her know we do not do virtual appointments at this time and the follow up would need to be in office

## 2023-04-19 ENCOUNTER — TELEPHONE (OUTPATIENT)
Dept: FAMILY MEDICINE CLINIC | Age: 48
End: 2023-04-19

## 2023-04-19 ENCOUNTER — HOSPITAL ENCOUNTER (OUTPATIENT)
Dept: GENERAL RADIOLOGY | Age: 48
Discharge: HOME | End: 2023-04-19
Payer: COMMERCIAL

## 2023-04-19 DIAGNOSIS — G89.4 CHRONIC PAIN SYNDROME: ICD-10-CM

## 2023-04-19 DIAGNOSIS — E11.69 TYPE 2 DIABETES MELLITUS WITH OTHER SPECIFIED COMPLICATION, WITHOUT LONG-TERM CURRENT USE OF INSULIN (HCC): ICD-10-CM

## 2023-04-19 DIAGNOSIS — I72.8 SPLENIC ARTERY ANEURYSM (HCC): ICD-10-CM

## 2023-04-19 LAB
25(OH)D3 SERPL-MCNC: 43.6 NG/ML
ALBUMIN SERPL-MCNC: 4 G/DL (ref 3.5–5.2)
ALP SERPL-CCNC: 104 U/L (ref 35–104)
ALT SERPL-CCNC: 11 U/L (ref 5–33)
ANION GAP SERPL CALCULATED.3IONS-SCNC: 15 MMOL/L (ref 7–19)
AST SERPL-CCNC: 15 U/L (ref 5–32)
BASOPHILS # BLD: 0 K/UL (ref 0–0.2)
BASOPHILS NFR BLD: 0.8 % (ref 0–1)
BILIRUB SERPL-MCNC: 0.9 MG/DL (ref 0.2–1.2)
BUN SERPL-MCNC: 16 MG/DL (ref 6–20)
CALCIUM SERPL-MCNC: 9.3 MG/DL (ref 8.6–10)
CHLORIDE SERPL-SCNC: 105 MMOL/L (ref 98–111)
CO2 SERPL-SCNC: 24 MMOL/L (ref 22–29)
CREAT SERPL-MCNC: 0.9 MG/DL (ref 0.5–0.9)
CRP SERPL HS-MCNC: <0.3 MG/DL (ref 0–0.5)
EOSINOPHIL # BLD: 0.2 K/UL (ref 0–0.6)
EOSINOPHIL NFR BLD: 4.1 % (ref 0–5)
ERYTHROCYTE [DISTWIDTH] IN BLOOD BY AUTOMATED COUNT: 12.9 % (ref 11.5–14.5)
ERYTHROCYTE [SEDIMENTATION RATE] IN BLOOD BY WESTERGREN METHOD: 7 MM/HR (ref 0–20)
GLUCOSE SERPL-MCNC: 131 MG/DL (ref 74–109)
HBA1C MFR BLD: 5.8 % (ref 4–6)
HCT VFR BLD AUTO: 42.5 % (ref 37–47)
HGB BLD-MCNC: 13.8 G/DL (ref 12–16)
IGA SERPL-MCNC: <50 MG/DL (ref 70–400)
IGG SERPL-MCNC: 489 MG/DL (ref 700–1600)
IGM SERPL-MCNC: 11 MG/DL (ref 40–230)
IMM GRANULOCYTES # BLD: 0 K/UL
LYMPHOCYTES # BLD: 1.1 K/UL (ref 1.1–4.5)
LYMPHOCYTES NFR BLD: 26.9 % (ref 20–40)
MCH RBC QN AUTO: 29.6 PG (ref 27–31)
MCHC RBC AUTO-ENTMCNC: 32.5 G/DL (ref 33–37)
MCV RBC AUTO: 91 FL (ref 81–99)
MONOCYTES # BLD: 0.5 K/UL (ref 0–0.9)
MONOCYTES NFR BLD: 11.5 % (ref 0–10)
NEUTROPHILS # BLD: 2.2 K/UL (ref 1.5–7.5)
NEUTS SEG NFR BLD: 56.2 % (ref 50–65)
PLATELET # BLD AUTO: 200 K/UL (ref 130–400)
PMV BLD AUTO: 11.2 FL (ref 9.4–12.3)
POTASSIUM SERPL-SCNC: 3.9 MMOL/L (ref 3.5–5)
PROT SERPL-MCNC: 6.2 G/DL (ref 6.6–8.7)
RBC # BLD AUTO: 4.67 M/UL (ref 4.2–5.4)
RHEUMATOID FACT SER NEPH-ACNC: <10 IU/ML
SODIUM SERPL-SCNC: 144 MMOL/L (ref 136–145)
TSH SERPL DL<=0.005 MIU/L-ACNC: 2.45 UIU/ML (ref 0.27–4.2)
WBC # BLD AUTO: 3.9 K/UL (ref 4.8–10.8)

## 2023-04-19 PROCEDURE — 71275 CT ANGIOGRAPHY CHEST: CPT | Performed by: RADIOLOGY

## 2023-04-19 PROCEDURE — 71275 CT ANGIOGRAPHY CHEST: CPT

## 2023-04-19 PROCEDURE — 6360000004 HC RX CONTRAST MEDICATION: Performed by: NURSE PRACTITIONER

## 2023-04-19 RX ADMIN — IOPAMIDOL 90 ML: 755 INJECTION, SOLUTION INTRAVENOUS at 09:47

## 2023-04-19 NOTE — TELEPHONE ENCOUNTER
----- Message from CLINTON Olmstead sent at 4/19/2023  4:22 PM CDT -----  Please let patient know that CTA has returned. Splenic artery aneurysm was still noted without significant changes from Nov scan. Will continue to monitor.

## 2023-04-20 ENCOUNTER — TELEPHONE (OUTPATIENT)
Dept: FAMILY MEDICINE CLINIC | Age: 48
End: 2023-04-20

## 2023-04-20 DIAGNOSIS — I72.8 SPLENIC ARTERY ANEURYSM (HCC): Primary | ICD-10-CM

## 2023-04-20 NOTE — TELEPHONE ENCOUNTER
----- Message from CLINTON Langston sent at 4/20/2023 10:31 AM CDT -----  Please inform patient results show  There is a splenic artery aneurysm   Please put delfin  referral to [facility] vascular so this can be monitored.

## 2023-04-20 NOTE — TELEPHONE ENCOUNTER
Tried calling pt with CLINTON Page, recommendations. No answer and No vm. Sent pt a my chart msg with recommendations.

## 2023-04-20 NOTE — TELEPHONE ENCOUNTER
Patient is worried that she needs surgery, she is afraid that she is going to get hit and it rupture. She would also like to know what needs to be done with her low immunoglobulin. She states she is not able to take anymore MS medications due to it lowering it even more. She is in a great deal of pain and struggling with MS symptoms.

## 2023-04-20 NOTE — TELEPHONE ENCOUNTER
She needs to see an immunologist.  She has an appointment set up with one at [facility]. Does she want me to put in a referral to Dr. Luis Demarco or someone in [place] as well?

## 2023-04-20 NOTE — TELEPHONE ENCOUNTER
See the other message about imm. Typically they have to be above 3 cm to warrant any intervention.  Please call vascular and see when they can see her

## 2023-04-20 NOTE — TELEPHONE ENCOUNTER
I called pt about results and she is aware, pt was asking about her immunoglobulin levels. Pt is concerned since they are down she can not do any of her MS treatments without this going up, pt would like to know what she could do to improve these levels?

## 2023-04-20 NOTE — TELEPHONE ENCOUNTER
----- Message from CLINTON Greene sent at 4/20/2023 10:19 AM CDT -----  So the best news is your a1c is 5.8 this is amazing!!!  Protein is better than it has at times but increase this  Crp is an inflammatory marker, this is normal at this time. All of the immunology labs are off. Take these to your [facility] appt.

## 2023-04-21 LAB
IGE SERPL-ACNC: 3 KU/L
NUCLEAR IGG SER QL IA: NORMAL

## 2023-04-25 ENCOUNTER — TELEPHONE (OUTPATIENT)
Dept: VASCULAR SURGERY | Age: 48
End: 2023-04-25

## 2023-04-26 ENCOUNTER — OFFICE VISIT (OUTPATIENT)
Dept: VASCULAR SURGERY | Age: 48
End: 2023-04-26
Payer: COMMERCIAL

## 2023-04-26 VITALS
HEART RATE: 94 BPM | OXYGEN SATURATION: 97 % | DIASTOLIC BLOOD PRESSURE: 80 MMHG | TEMPERATURE: 97.4 F | SYSTOLIC BLOOD PRESSURE: 128 MMHG

## 2023-04-26 DIAGNOSIS — I72.8 SPLENIC ARTERY ANEURYSM (HCC): Primary | ICD-10-CM

## 2023-04-26 PROCEDURE — 99203 OFFICE O/P NEW LOW 30 MIN: CPT | Performed by: PHYSICIAN ASSISTANT

## 2023-04-26 NOTE — PROGRESS NOTES
Patient Care Team:  CLINTON Mayers as PCP - General (Family Nurse Practitioner)  CLINTON Mayers as PCP - Empaneled Provider      History and Physical:    Pattie Garcia is a 52 y.o. female who presents today as a referral for evaluation of splenic artery aneurysm. Past medical history includes diabetes, lumbar disc issues and multiple sclerosis. She reports she is having generalized swelling, cardiac and lung issues and has an appointment to be evaluated by [facility]. She is on Crestor 10 mg daily. She reports pain in her left low back that radiates down into her buttocks. She denies any abdominal pain. She has had a Hysterectomy. She does not smoke.      (Chart reviewed including PMH, medications and allergies, previous imaging/tests for comparison, current imaging/tests, labs, other pertinent providers office/consult notes)     Pattie Garcia is a 52 y.o. female with the following history reviewed and recorded in [facility]:  Patient Active Problem List    Diagnosis Date Noted    Abnormal CT of the chest 02/28/2023     Priority: Medium    Wheezing 02/28/2023     Priority: Medium    Dyspnea 02/28/2023     Priority: Medium    Obesity (BMI 30-39.9) 02/28/2023     Priority: Medium    CVID (common variable immunodeficiency) ([address] 02/28/2023     Priority: Medium    Ground glass opacity present on imaging of lung 02/28/2023     Priority: Medium    Rhinovirus 11/28/2022     Priority: Medium    Enteritis 11/28/2022     Priority: Medium    Small bowel obstruction ([address] 11/28/2022     Priority: Medium    Nausea and vomiting 10/03/2021    Partial small bowel obstruction ([address] 10/03/2021    COVID-19 09/28/2021    Diabetes mellitus ([address] 01/12/2021    Moderate episode of recurrent major depressive disorder ([address] 05/25/2018    Hypertriglyceridemia 08/18/2016    Vitamin D deficiency 08/18/2016    Multiple sclerosis ([address]     Chronic headache disorder 06/25/2014    Insomnia 06/25/2014    Headache      Current

## 2023-05-11 ENCOUNTER — TELEPHONE (OUTPATIENT)
Dept: FAMILY MEDICINE CLINIC | Age: 48
End: 2023-05-11

## 2023-05-19 ENCOUNTER — TELEPHONE (OUTPATIENT)
Dept: FAMILY MEDICINE CLINIC | Age: 48
End: 2023-05-19

## 2023-05-30 ENCOUNTER — OFFICE VISIT (OUTPATIENT)
Dept: FAMILY MEDICINE CLINIC | Age: 48
End: 2023-05-30
Payer: COMMERCIAL

## 2023-05-30 VITALS
WEIGHT: 185.75 LBS | HEART RATE: 78 BPM | BODY MASS INDEX: 31.71 KG/M2 | TEMPERATURE: 97.3 F | OXYGEN SATURATION: 98 % | DIASTOLIC BLOOD PRESSURE: 62 MMHG | SYSTOLIC BLOOD PRESSURE: 98 MMHG | HEIGHT: 64 IN

## 2023-05-30 DIAGNOSIS — I72.8 SPLENIC ARTERY ANEURYSM (HCC): ICD-10-CM

## 2023-05-30 DIAGNOSIS — F33.1 MODERATE EPISODE OF RECURRENT MAJOR DEPRESSIVE DISORDER (HCC): ICD-10-CM

## 2023-05-30 DIAGNOSIS — M79.605 PAIN IN BOTH LOWER EXTREMITIES: ICD-10-CM

## 2023-05-30 DIAGNOSIS — E11.69 TYPE 2 DIABETES MELLITUS WITH OTHER SPECIFIED COMPLICATION, WITHOUT LONG-TERM CURRENT USE OF INSULIN (HCC): Primary | ICD-10-CM

## 2023-05-30 DIAGNOSIS — M79.604 PAIN IN BOTH LOWER EXTREMITIES: ICD-10-CM

## 2023-05-30 DIAGNOSIS — D83.9 CVID (COMMON VARIABLE IMMUNODEFICIENCY) (HCC): ICD-10-CM

## 2023-05-30 PROCEDURE — 99214 OFFICE O/P EST MOD 30 MIN: CPT | Performed by: NURSE PRACTITIONER

## 2023-05-30 PROCEDURE — 3044F HG A1C LEVEL LT 7.0%: CPT | Performed by: NURSE PRACTITIONER

## 2023-05-30 RX ORDER — TIRZEPATIDE 7.5 MG/.5ML
7.5 INJECTION, SOLUTION SUBCUTANEOUS WEEKLY
Qty: 4 ADJUSTABLE DOSE PRE-FILLED PEN SYRINGE | Refills: 5 | Status: SHIPPED | OUTPATIENT
Start: 2023-05-30

## 2023-05-30 RX ORDER — FREMANEZUMAB-VFRM 225 MG/1.5ML
INJECTION SUBCUTANEOUS
COMMUNITY

## 2023-05-30 RX ORDER — LISINOPRIL 5 MG/1
5 TABLET ORAL DAILY
Qty: 90 TABLET | Refills: 1 | Status: SHIPPED | OUTPATIENT
Start: 2023-05-30

## 2023-05-30 RX ORDER — PROCHLORPERAZINE 25 MG/1
SUPPOSITORY RECTAL
Qty: 3 EACH | Refills: 12 | Status: SHIPPED | OUTPATIENT
Start: 2023-05-30

## 2023-05-30 RX ORDER — IBUPROFEN 200 MG
400 TABLET ORAL EVERY 6 HOURS PRN
COMMUNITY

## 2023-05-30 RX ORDER — PROCHLORPERAZINE 25 MG/1
SUPPOSITORY RECTAL
Qty: 1 EACH | Refills: 3 | Status: SHIPPED | OUTPATIENT
Start: 2023-05-30

## 2023-05-30 RX ORDER — PROCHLORPERAZINE 25 MG/1
SUPPOSITORY RECTAL
Qty: 1 EACH | Refills: 0 | Status: SHIPPED | OUTPATIENT
Start: 2023-05-30

## 2023-05-30 ASSESSMENT — ENCOUNTER SYMPTOMS
NAUSEA: 0
WHEEZING: 0
DIARRHEA: 0
SINUS PRESSURE: 0
EYE DISCHARGE: 0
COUGH: 0
SORE THROAT: 0
EYE REDNESS: 0
VOMITING: 0
BLOOD IN STOOL: 0
CONSTIPATION: 0
SHORTNESS OF BREATH: 0
RHINORRHEA: 0
BACK PAIN: 1
ABDOMINAL PAIN: 0
TROUBLE SWALLOWING: 0

## 2023-05-30 NOTE — PROGRESS NOTES
General: Bowel sounds are normal.      Palpations: Abdomen is soft. Tenderness: There is no abdominal tenderness. Musculoskeletal:         General: No swelling. Normal range of motion. Cervical back: Normal range of motion and neck supple. Skin:     General: Skin is warm and dry. Capillary Refill: Capillary refill takes less than 2 seconds. Findings: No rash. Neurological:      General: No focal deficit present. Mental Status: She is alert and oriented to person, place, and time. Psychiatric:         Mood and Affect: Mood normal.         Behavior: Behavior normal.               An electronic signature was used to authenticate this note.     --CLINTON Leo

## 2023-05-31 ENCOUNTER — TELEPHONE (OUTPATIENT)
Dept: FAMILY MEDICINE CLINIC | Age: 48
End: 2023-05-31

## 2023-05-31 NOTE — TELEPHONE ENCOUNTER
Archie Pride      Thanks for listening yesterday. Chioma Stoddard looked back at the records I found and no where does it confirm MS. Has she been getting treatment for 8 years for a condition that she does not have?! Anyway Claudean Rothman says she needs refills on her gabapentin and Januvia. We forgot to go get that yesterday. Can she increase the dosage since she is no longer taking Tramadol? Thanks again.  You are the best!     Treva Bain

## 2023-06-01 DIAGNOSIS — G89.4 CHRONIC PAIN SYNDROME: ICD-10-CM

## 2023-06-01 RX ORDER — GABAPENTIN 300 MG/1
300 CAPSULE ORAL 3 TIMES DAILY
Qty: 90 CAPSULE | Refills: 3 | Status: SHIPPED | OUTPATIENT
Start: 2023-06-01 | End: 2023-09-29

## 2023-06-01 NOTE — TELEPHONE ENCOUNTER
Wow.  So years ago I requested to see her brain MRI and notes from neurology to put them in her records here and I wanted to look through them. They would not release them to me at the time. I didn't push further because I felt well she is getting the care that she needs but now I wish I had. I can increase the gabapentin and now that she is taking mounjaro, she doesn't need the [place] and [place]. They work on similar pathways for she doesn't need both. I increased gabapentin 300 mg tid . Stop [place] and continue Zoe Hey.

## 2023-06-07 ENCOUNTER — TELEPHONE (OUTPATIENT)
Dept: FAMILY MEDICINE CLINIC | Age: 48
End: 2023-06-07

## 2023-06-08 DIAGNOSIS — I73.9 INTERMITTENT CLAUDICATION (HCC): Primary | ICD-10-CM

## 2023-06-23 DIAGNOSIS — E11.69 TYPE 2 DIABETES MELLITUS WITH OTHER SPECIFIED COMPLICATION, WITHOUT LONG-TERM CURRENT USE OF INSULIN (HCC): ICD-10-CM

## 2023-06-23 NOTE — TELEPHONE ENCOUNTER
Received fax from pharmacy requesting refill on pts medication(s). Pt was last seen in office on 5/30/2023  and has a follow up scheduled for Visit date not found. Will send request to  Patricia Miner  for authorization.      Requested Prescriptions     Pending Prescriptions Disp Refills    dapagliflozin (FARXIGA) 10 MG tablet 90 tablet 3     Sig: Take 1 tablet by mouth every morning

## 2023-06-27 ENCOUNTER — HOSPITAL ENCOUNTER (OUTPATIENT)
Dept: NON INVASIVE DIAGNOSTICS | Age: 48
Discharge: HOME | End: 2023-06-27
Payer: COMMERCIAL

## 2023-06-27 DIAGNOSIS — M79.605 PAIN IN BOTH LOWER EXTREMITIES: ICD-10-CM

## 2023-06-27 DIAGNOSIS — M79.604 PAIN IN BOTH LOWER EXTREMITIES: ICD-10-CM

## 2023-06-27 PROCEDURE — 93923 UPR/LXTR ART STDY 3+ LVLS: CPT

## 2023-06-28 ENCOUNTER — TELEPHONE (OUTPATIENT)
Dept: FAMILY MEDICINE CLINIC | Age: 48
End: 2023-06-28

## 2023-07-03 DIAGNOSIS — F33.1 MODERATE EPISODE OF RECURRENT MAJOR DEPRESSIVE DISORDER (HCC): ICD-10-CM

## 2023-07-12 ENCOUNTER — HOSPITAL ENCOUNTER (OUTPATIENT)
Dept: SLEEP CENTER | Age: 48
Discharge: HOME | End: 2023-07-14
Payer: COMMERCIAL

## 2023-07-12 PROCEDURE — 95810 POLYSOM 6/> YRS 4/> PARAM: CPT

## 2023-07-13 NOTE — PROGRESS NOTES
[facility]  [address]  Phone [phone] Fax [phone]     Sleep Study Technician Review    Patient Name:  Cinthya Salgado  :   1975  Referring Provider: CLINTON Slade    Brief History:  Cinthya Salgado is a 50 y.o. female with a history of Multiple Sclerosis, Depression, Type 2 diabetes, and Fatigue  who presented for a split-night PSG. Height:   64\"  Weight: 185lbs  BMI:  31.9  Neck Circ: 14.0\"  Mallampati  2  ESS:  13    Type of Study: PSG  Time Stage Position Snore Hypopnea Obs Apnea Musa Apnea PAP O2   2200 N2 right No No No No N/A RA   2300 N3 right Yes No No No N/A RA   2400 N3 right Yes No No No N/A RA   0100 N2 right Yes No No No N/A RA   0200 N2 right Yes No No No N/A RA   0300 N2 right Yes Yes No No N/A RA   0400 REM right Yes Yes No No N/A RA     Summary: Pt arrived at the sleep center on time. Tech introduced self, verified pt's name and , and escorted pt to room. Tech explained procedure and answered questions. Pt verbalized understanding of procedure. Pt was prepared for PSG per protocol without complication. Pt was instructed in supine sleep. Pt stated that she had back pain thtat prevented her from sleeping on her back. Pt had a sort sleep latency. Mild-moderate snoring was noted. The AHI was < 5 with an SpO2 sonja of 87%. Pt did not qualify for a split-night study. Some limb movements were noted. EKG showed NSR. Pt was not up to use the restroom. DME: [facility]      The study was reviewed briefly with Cinthya Salgado. She will be notified of the formal results and recommendations after the study is scored and interpreted. The report will be sent to his referring provider.     Technician: Saleem Payton, CRT,RPSGT,RST

## 2023-07-18 ENCOUNTER — TELEPHONE (OUTPATIENT)
Dept: FAMILY MEDICINE CLINIC | Age: 48
End: 2023-07-18

## 2023-07-18 RX ORDER — LEVOCETIRIZINE DIHYDROCHLORIDE 5 MG/1
5 TABLET, FILM COATED ORAL NIGHTLY
Qty: 90 TABLET | Refills: 3 | Status: SHIPPED | OUTPATIENT
Start: 2023-07-18

## 2023-07-18 NOTE — TELEPHONE ENCOUNTER
Received fax from pharmacy requesting refill on pts medication(s). Pt was last seen in office on 5/30/2023  and has a follow up scheduled for Visit date not found. Will send request to  Yasmeen Salomon  for authorization.      Requested Prescriptions     Pending Prescriptions Disp Refills    levocetirizine (XYZAL) 5 MG tablet 90 tablet 3     Sig: Take 1 tablet by mouth nightly

## 2023-07-18 NOTE — TELEPHONE ENCOUNTER
Pt called, she is having issues with her blood sugar. It is set to alarm if out of the perimeters of . She usually runs 110-120. She states she usually eats a tablespoon of peanut butter for breakfast. She has been having immunoglobin infusions every other week. She has been drinking a lot of water before and after these and she drank so much that her BS bottomed out to 40. Ate peanut butter and it didn't come up. Called pharmacist and told her to eat a tsp of raw sugar and it finally went back up. Her sugar has been up and down a lot. After [employer] Sunday she had a chicken wrap and 2-3 french fries and her sugar only went up to 140. She drank water and it came back down. She is allowing 15 minutes before doing anything else. She has been taking Qutrophen ( I know I misspelt that) daily for 7 days for her MS flare. She states the blood sugar bottoming out has been going on even before starting this.

## 2023-08-07 DIAGNOSIS — G35 MULTIPLE SCLEROSIS (HCC): ICD-10-CM

## 2023-08-07 DIAGNOSIS — R53.82 CHRONIC FATIGUE: ICD-10-CM

## 2023-08-09 ENCOUNTER — TELEPHONE (OUTPATIENT)
Dept: SLEEP CENTER | Age: 48
End: 2023-08-09

## 2023-08-09 RX ORDER — ARMODAFINIL 250 MG/1
TABLET ORAL
Qty: 30 TABLET | Refills: 0 | OUTPATIENT
Start: 2023-08-09

## 2023-08-09 NOTE — TELEPHONE ENCOUNTER
Left voicemail with  regarding her PSG performed 07/12/2023. The PSG did not revel sleep apnea. Snoring and RLS was noted in the report from the interpreting physician.  was advised to follow-up with her order provider, Elva Sterling and discuss the recommendations made by the interpreting physician.

## 2023-08-10 DIAGNOSIS — G35 MULTIPLE SCLEROSIS (HCC): ICD-10-CM

## 2023-08-10 DIAGNOSIS — R53.82 CHRONIC FATIGUE: ICD-10-CM

## 2023-08-11 RX ORDER — ARMODAFINIL 250 MG/1
TABLET ORAL
Qty: 30 TABLET | Refills: 2 | Status: SHIPPED | OUTPATIENT
Start: 2023-08-11 | End: 2023-11-09

## 2023-08-11 NOTE — TELEPHONE ENCOUNTER
Received fax from pharmacy requesting refill on pts medication(s). Pt was last seen in office on 5/30/2023  and has a follow up scheduled for Visit date not found. Will send request to  Hermila Post  for authorization.      Requested Prescriptions     Pending Prescriptions Disp Refills    Armodafinil 250 MG TABS [Pharmacy Med Name: Armodafinil 250 MG Oral Tablet] 30 tablet 0     Sig: Take 1 tablet by mouth once daily

## 2023-08-15 DIAGNOSIS — R53.82 CHRONIC FATIGUE: ICD-10-CM

## 2023-08-15 DIAGNOSIS — E53.8 VITAMIN B12 DEFICIENCY: ICD-10-CM

## 2023-08-15 RX ORDER — CYANOCOBALAMIN 1000 UG/ML
INJECTION, SOLUTION INTRAMUSCULAR; SUBCUTANEOUS
Qty: 1 ML | Refills: 1 | Status: SHIPPED | OUTPATIENT
Start: 2023-08-15 | End: 2023-08-18 | Stop reason: SDUPTHER

## 2023-08-15 NOTE — TELEPHONE ENCOUNTER
Received fax from pharmacy requesting refill on pts medication(s). Pt was last seen in office on 5/30/2023  and has a follow up scheduled for Visit date not found. Will send request to  Lewis Perla  for authorization.      Requested Prescriptions     Pending Prescriptions Disp Refills    cyanocobalamin 1000 MCG/ML injection [Pharmacy Med Name: cyanocobalamin (vit B-12) 1,000 mcg/mL injection solution] 1 mL 1     Sig: INJECT 1ML INTRAMUSCULAR AS DIRECTED MONTHLY

## 2023-08-18 ENCOUNTER — OFFICE VISIT (OUTPATIENT)
Dept: FAMILY MEDICINE CLINIC | Age: 48
End: 2023-08-18
Payer: COMMERCIAL

## 2023-08-18 VITALS
HEART RATE: 95 BPM | TEMPERATURE: 97 F | WEIGHT: 180 LBS | HEIGHT: 64 IN | OXYGEN SATURATION: 97 % | SYSTOLIC BLOOD PRESSURE: 124 MMHG | BODY MASS INDEX: 30.73 KG/M2 | DIASTOLIC BLOOD PRESSURE: 72 MMHG

## 2023-08-18 DIAGNOSIS — J06.9 VIRAL URI: ICD-10-CM

## 2023-08-18 DIAGNOSIS — J40 BRONCHITIS: ICD-10-CM

## 2023-08-18 DIAGNOSIS — E11.69 TYPE 2 DIABETES MELLITUS WITH OTHER SPECIFIED COMPLICATION, WITHOUT LONG-TERM CURRENT USE OF INSULIN (HCC): Primary | ICD-10-CM

## 2023-08-18 DIAGNOSIS — R53.82 CHRONIC FATIGUE: ICD-10-CM

## 2023-08-18 DIAGNOSIS — F41.1 GAD (GENERALIZED ANXIETY DISORDER): ICD-10-CM

## 2023-08-18 DIAGNOSIS — E53.8 VITAMIN B12 DEFICIENCY: ICD-10-CM

## 2023-08-18 LAB
B PARAP IS1001 DNA NPH QL NAA+NON-PROBE: NOT DETECTED
B PERT.PT PRMT NPH QL NAA+NON-PROBE: NOT DETECTED
C PNEUM DNA NPH QL NAA+NON-PROBE: NOT DETECTED
FLUAV RNA NPH QL NAA+NON-PROBE: NOT DETECTED
FLUBV RNA NPH QL NAA+NON-PROBE: NOT DETECTED
HADV DNA NPH QL NAA+NON-PROBE: NOT DETECTED
HCOV 229E RNA NPH QL NAA+NON-PROBE: NOT DETECTED
HCOV HKU1 RNA NPH QL NAA+NON-PROBE: NOT DETECTED
HCOV NL63 RNA NPH QL NAA+NON-PROBE: NOT DETECTED
HCOV OC43 RNA NPH QL NAA+NON-PROBE: NOT DETECTED
HMPV RNA NPH QL NAA+NON-PROBE: NOT DETECTED
HPIV1 RNA NPH QL NAA+NON-PROBE: NOT DETECTED
HPIV2 RNA NPH QL NAA+NON-PROBE: NOT DETECTED
HPIV3 RNA NPH QL NAA+NON-PROBE: NOT DETECTED
HPIV4 RNA NPH QL NAA+NON-PROBE: NOT DETECTED
M PNEUMO DNA NPH QL NAA+NON-PROBE: NOT DETECTED
RSV RNA NPH QL NAA+NON-PROBE: NOT DETECTED
RV+EV RNA NPH QL NAA+NON-PROBE: NOT DETECTED
SARS-COV-2 RNA NPH QL NAA+NON-PROBE: NOT DETECTED

## 2023-08-18 PROCEDURE — 3044F HG A1C LEVEL LT 7.0%: CPT | Performed by: NURSE PRACTITIONER

## 2023-08-18 PROCEDURE — 99214 OFFICE O/P EST MOD 30 MIN: CPT | Performed by: NURSE PRACTITIONER

## 2023-08-18 RX ORDER — METHYLPREDNISOLONE 4 MG/1
TABLET ORAL
Qty: 1 KIT | Refills: 0 | Status: SHIPPED | OUTPATIENT
Start: 2023-08-18

## 2023-08-18 RX ORDER — CLONAZEPAM 0.5 MG/1
0.5 TABLET ORAL 2 TIMES DAILY
Qty: 60 TABLET | Refills: 0 | Status: SHIPPED | OUTPATIENT
Start: 2023-08-18 | End: 2023-09-17

## 2023-08-18 RX ORDER — PIOGLITAZONEHYDROCHLORIDE 15 MG/1
15 TABLET ORAL DAILY
Qty: 30 TABLET | Refills: 3 | Status: SHIPPED | OUTPATIENT
Start: 2023-08-18

## 2023-08-18 RX ORDER — SYRINGE W-NEEDLE,DISPOSAB,3 ML 25GX5/8"
SYRINGE, EMPTY DISPOSABLE MISCELLANEOUS
Qty: 12 EACH | Refills: 1 | Status: SHIPPED | OUTPATIENT
Start: 2023-08-18

## 2023-08-18 RX ORDER — CYANOCOBALAMIN 1000 UG/ML
INJECTION, SOLUTION INTRAMUSCULAR; SUBCUTANEOUS
Qty: 1 ML | Refills: 1 | Status: SHIPPED | OUTPATIENT
Start: 2023-08-18

## 2023-08-18 ASSESSMENT — ENCOUNTER SYMPTOMS
SORE THROAT: 0
VOMITING: 0
CONSTIPATION: 0
DIARRHEA: 0
NAUSEA: 0
SINUS PRESSURE: 0
RHINORRHEA: 1
SHORTNESS OF BREATH: 0
COUGH: 1
BACK PAIN: 1
ABDOMINAL PAIN: 0
EYE REDNESS: 0
EYE DISCHARGE: 0
WHEEZING: 0
BLOOD IN STOOL: 0
TROUBLE SWALLOWING: 0

## 2023-08-18 NOTE — PROGRESS NOTES
Griselda Brandy (:  1975) is a 50 y.o. female,Established patient, here for evaluation of the following chief complaint(s):  Pharyngitis (Began beginning of this week, worse today. Now loosing voice. ), Fatigue, Other (2 MS episodes in last 3 weeks. ), and Medication Refill (Needs Klonopin refilled. No longer taking Actos dropped bs to 40. )      ASSESSMENT/PLAN:    ICD-10-CM    1. Type 2 diabetes mellitus with other specified complication, without long-term current use of insulin (HCC)  E11.69 Syringe/Needle, Disp, (SYRINGE 3CC/25GX1\") 25G X 1\" 3 ML MISC     pioglitazone (ACTOS) 15 MG tablet      2. Chronic fatigue  R53.82 cyanocobalamin 1000 MCG/ML injection      3. Vitamin B12 deficiency  E53.8 cyanocobalamin 1000 MCG/ML injection      4. NERY (generalized anxiety disorder)  F41.1 clonazePAM (KLONOPIN) 0.5 MG tablet      5. Viral URI  J06.9 Respiratory Panel, Molecular, with COVID-19 (Restricted: peds pts or suitable admitted adults)      6. Bronchitis  J40 methylPREDNISolone (MEDROL, MAYANK,) 4 MG tablet      Add back actos at 15 mg. Return in about 1 month (around 2023) for diabetes 60 min. SUBJECTIVE/OBJECTIVE:  Pharyngitis  Associated symptoms include arthralgias, congestion, coughing, fatigue, myalgias and neck pain. Pertinent negatives include no abdominal pain, chest pain, chills, fever, headaches, nausea, rash, sore throat, vomiting or weakness. Fatigue  Associated symptoms include arthralgias, congestion, coughing, fatigue, myalgias and neck pain. Pertinent negatives include no abdominal pain, chest pain, chills, fever, headaches, nausea, rash, sore throat, vomiting or weakness. Other  Associated symptoms include arthralgias, congestion, coughing, fatigue, myalgias and neck pain. Pertinent negatives include no abdominal pain, chest pain, chills, fever, headaches, nausea, rash, sore throat, vomiting or weakness.    Medication Refill  Associated symptoms include arthralgias, congestion,

## 2023-08-19 DIAGNOSIS — E11.69 TYPE 2 DIABETES MELLITUS WITH OTHER SPECIFIED COMPLICATION, WITHOUT LONG-TERM CURRENT USE OF INSULIN (HCC): ICD-10-CM

## 2023-08-21 ENCOUNTER — TELEPHONE (OUTPATIENT)
Dept: FAMILY MEDICINE CLINIC | Age: 48
End: 2023-08-21

## 2023-08-21 RX ORDER — PROCHLORPERAZINE 25 MG/1
SUPPOSITORY RECTAL
Qty: 1 EACH | Refills: 0 | Status: SHIPPED | OUTPATIENT
Start: 2023-08-21

## 2023-08-21 NOTE — TELEPHONE ENCOUNTER
Briseyda and spoke to [address] [place] [address] is under chart review -Encounters- 7/12/23      On 8/9/23 Deneise Portales with the sleep study group LVM for pt stating:     Britt Allison     2:31 PM  Note        Left voicemail with  regarding her PSG performed 07/12/2023. The PSG did not revel sleep apnea. Snoring and RLS was noted in the report from the interpreting physician.  was advised to follow-up with her order provider, Erika Rodriguez and discuss the recommendations made by the interpreting physician.

## 2023-08-21 NOTE — TELEPHONE ENCOUNTER
----- Message from CLINTON Nugent sent at 8/21/2023 11:18 AM CDT -----  Please inform patient results show  Resp panel was negative

## 2023-08-21 NOTE — TELEPHONE ENCOUNTER
Received fax from pharmacy requesting refill on pts medication(s). Pt was last seen in office on 8/18/2023  and has a follow up scheduled for 8/21/2023. Will send request to  Diony Ledbetter  for authorization. Requested Prescriptions     Pending Prescriptions Disp Refills    Continuous Blood Gluc  ([address]) [address] [Pharmacy Med Name: DEXCOM G6   MIS] 1 each 0     Sig: USE AS DIRECTED EVERY 90 DAYS.

## 2023-08-23 NOTE — TELEPHONE ENCOUNTER
Pt aware    Pt would like to know if she could have something for the RLS she stated the pain in her legs get worse at night I let her know that's what RLS is

## 2023-08-24 RX ORDER — ROPINIROLE 0.25 MG/1
0.25 TABLET, FILM COATED ORAL NIGHTLY
Qty: 30 TABLET | Refills: 5 | Status: SHIPPED | OUTPATIENT
Start: 2023-08-24

## 2023-09-01 ENCOUNTER — PATIENT MESSAGE (OUTPATIENT)
Dept: FAMILY MEDICINE CLINIC | Age: 48
End: 2023-09-01

## 2023-09-01 DIAGNOSIS — E11.69 TYPE 2 DIABETES MELLITUS WITH OTHER SPECIFIED COMPLICATION, WITHOUT LONG-TERM CURRENT USE OF INSULIN (HCC): ICD-10-CM

## 2023-09-05 ENCOUNTER — TELEPHONE (OUTPATIENT)
Dept: FAMILY MEDICINE CLINIC | Age: 48
End: 2023-09-05

## 2023-09-05 DIAGNOSIS — G35 MULTIPLE SCLEROSIS (HCC): Primary | ICD-10-CM

## 2023-09-05 DIAGNOSIS — G89.4 CHRONIC PAIN SYNDROME: ICD-10-CM

## 2023-09-05 RX ORDER — PIOGLITAZONEHYDROCHLORIDE 30 MG/1
30 TABLET ORAL DAILY
Qty: 30 TABLET | Refills: 0 | Status: SHIPPED | OUTPATIENT
Start: 2023-09-05

## 2023-09-05 NOTE — TELEPHONE ENCOUNTER
I am going to put in a referral to pain management to see if they can help with her pain in better ways than what we have been doing so far.

## 2023-09-07 ENCOUNTER — TELEPHONE (OUTPATIENT)
Dept: FAMILY MEDICINE CLINIC | Age: 48
End: 2023-09-07

## 2023-09-07 RX ORDER — BACLOFEN 10 MG/1
TABLET ORAL
Qty: 60 TABLET | Refills: 3 | Status: SHIPPED | OUTPATIENT
Start: 2023-09-07

## 2023-09-07 RX ORDER — NALTREXONE HYDROCHLORIDE 50 MG/1
50 TABLET, FILM COATED ORAL DAILY
Qty: 30 TABLET | Refills: 1 | Status: SHIPPED | OUTPATIENT
Start: 2023-09-07

## 2023-09-07 NOTE — TELEPHONE ENCOUNTER
She wouldn't be able to take tramadol if she took naltraxone. We can increase the gabapentin. If she wants to do this ask what dose she is taking now. I wanted her to see brian in pm to see if she could do some trigger point injections to help her.

## 2023-09-07 NOTE — TELEPHONE ENCOUNTER
[facility] 62 Staff (supporting CLINTON Solis) 12 hours ago (7:34 PM)     ALISSON  Would low dose naltrexone help Isaías Bruce? Thanks. Pt called back, she said that pain mgmt will only do injections or PT. She has been doing basic stretching, it helps her muscles, but not bone pain. She said that she is exhausted. She is falling asleep during school. She fell asleep during a visit with another counselor and student yesterday. She even excused herself and set an alarm for 15 min to rest and woke up 2 hours later as the kids were leaving for the day. 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 She states that she is going for immunoglobin infusions every other week bc her body isnt producing any. She is taking 16 ibuprofen a day to help with the pain, but the bone pain is horrible. She states that she is taking more gabapentin daily. 2 in the morning, 2 in the afternoon and 3 at night to help with the feet and hand pain. She states that if this is not okay, let her know. If it is okay, she needs a new rx. She states that she is just trying whatever to manage this wildfire.

## 2023-09-11 DIAGNOSIS — F33.1 MODERATE EPISODE OF RECURRENT MAJOR DEPRESSIVE DISORDER (HCC): ICD-10-CM

## 2023-09-11 RX ORDER — NITROFURANTOIN 25; 75 MG/1; MG/1
100 CAPSULE ORAL 2 TIMES DAILY
Qty: 14 CAPSULE | Refills: 0 | Status: SHIPPED | OUTPATIENT
Start: 2023-09-11 | End: 2023-09-18

## 2023-09-11 NOTE — TELEPHONE ENCOUNTER
Pt called, she said that she has been dizzy all weekend, feels out of her head. Been in bed all weekend. She said that it helped with the sharp pain. Made sick to stomach. Tried half a pill first. She said that she would stop the med and just deal with the pain. Needs refill on rexulti. Has cried all weekend. Also thinks has a UTI. Burning. Marine Miguel got her some med OTC and it is helping a little. Would like an abx if you can.

## 2023-09-11 NOTE — TELEPHONE ENCOUNTER
Requested Prescriptions     Pending Prescriptions Disp Refills    nitrofurantoin, macrocrystal-monohydrate, (MACROBID) 100 MG capsule 14 capsule 0     Sig: Take 1 capsule by mouth 2 times daily for 7 days      Pending for ANDERSON Henning approval.

## 2023-09-11 NOTE — TELEPHONE ENCOUNTER
LVM for patient informing her that there are refills at [facility] for [facility].

## 2023-09-11 NOTE — TELEPHONE ENCOUNTER
Sent rx to pharmacy for pt    Requested Prescriptions     Signed Prescriptions Disp Refills    nitrofurantoin, macrocrystal-monohydrate, (MACROBID) 100 MG capsule 14 capsule 0     Sig: Take 1 capsule by mouth 2 times daily for 7 days     Authorizing Provider: Clarita Noonan     Ordering User: Nereida Mosqueda

## 2023-09-13 ENCOUNTER — TELEPHONE (OUTPATIENT)
Dept: FAMILY MEDICINE CLINIC | Age: 48
End: 2023-09-13

## 2023-09-13 DIAGNOSIS — G89.4 CHRONIC PAIN SYNDROME: ICD-10-CM

## 2023-09-13 RX ORDER — GABAPENTIN 300 MG/1
300 CAPSULE ORAL 3 TIMES DAILY
Status: CANCELLED | OUTPATIENT
Start: 2023-09-13 | End: 2024-01-11

## 2023-09-13 NOTE — TELEPHONE ENCOUNTER
Pt called stating that she has been taking her Gabapentin 2 in the morning, 2 in the afternoon and 3 at night. This works for her. She is needing a refill on this. Requested Prescriptions     Pending Prescriptions Disp Refills    gabapentin (NEURONTIN) 300 MG capsule       Sig: Take 1 capsule by mouth 3 times daily for 120 days. Max Daily Amount: 900 mg     She also said that the requip is helping if you want her to stay on this. Also, the disability paperwork. She is requesting that this be faxed along with her records from here and from [place] to her Neurologist. Francesca Schilling: Andra Page fax # [phone].

## 2023-09-14 DIAGNOSIS — G89.4 CHRONIC PAIN SYNDROME: ICD-10-CM

## 2023-09-14 RX ORDER — GABAPENTIN 300 MG/1
CAPSULE ORAL
Qty: 210 CAPSULE | Refills: 5 | Status: SHIPPED | OUTPATIENT
Start: 2023-09-14 | End: 2024-02-07

## 2023-09-14 NOTE — TELEPHONE ENCOUNTER
I have changed the directions to what she is taking. I am glad this is helping. Please fax her records there. As far as [place], she will need to ask for them to be sent from [place] to there if her neurologist isn't able to see them.

## 2023-09-15 NOTE — TELEPHONE ENCOUNTER
Got last couple of OV printed out and ready to fax to Antonino. Left message for pt that if need more to let us know. And will have to have other dr send their own notes.

## 2023-09-18 ENCOUNTER — TELEMEDICINE (OUTPATIENT)
Dept: FAMILY MEDICINE CLINIC | Age: 48
End: 2023-09-18
Payer: COMMERCIAL

## 2023-09-18 DIAGNOSIS — R53.82 CHRONIC FATIGUE: ICD-10-CM

## 2023-09-18 DIAGNOSIS — E11.69 TYPE 2 DIABETES MELLITUS WITH OTHER SPECIFIED COMPLICATION, WITHOUT LONG-TERM CURRENT USE OF INSULIN (HCC): Primary | ICD-10-CM

## 2023-09-18 DIAGNOSIS — E53.8 VITAMIN B12 DEFICIENCY: ICD-10-CM

## 2023-09-18 PROCEDURE — 3044F HG A1C LEVEL LT 7.0%: CPT | Performed by: NURSE PRACTITIONER

## 2023-09-18 PROCEDURE — 99214 OFFICE O/P EST MOD 30 MIN: CPT | Performed by: NURSE PRACTITIONER

## 2023-09-18 RX ORDER — CYANOCOBALAMIN 1000 UG/ML
INJECTION, SOLUTION INTRAMUSCULAR; SUBCUTANEOUS
Qty: 1 ML | Refills: 1 | Status: SHIPPED | OUTPATIENT
Start: 2023-09-18

## 2023-09-18 ASSESSMENT — ENCOUNTER SYMPTOMS
WHEEZING: 0
TROUBLE SWALLOWING: 0
DIARRHEA: 0
EYE REDNESS: 0
RHINORRHEA: 0
EYE DISCHARGE: 0
SORE THROAT: 0
ABDOMINAL PAIN: 0
CONSTIPATION: 0
BLOOD IN STOOL: 0
COUGH: 0

## 2023-09-18 NOTE — PROGRESS NOTES
Justin Newton, was evaluated through a synchronous (real-time) audio-video encounter. The patient (or guardian if applicable) is aware that this is a billable service, which includes applicable co-pays. This Virtual Visit was conducted with patient's (and/or legal guardian's) consent. Patient identification was verified, and a caregiver was present when appropriate. The patient was located at Home: [address] [place], [place] [id number]  Provider was located at [facility] (Appt Dept): [address],  [facility]      Justin Newton (:  1975) is a Established patient, presenting virtually for evaluation of the following:    Assessment & Plan   Below is the assessment and plan developed based on review of pertinent history, physical exam, labs, studies, and medications. 1. Type 2 diabetes mellitus with other specified complication, without long-term current use of insulin (HCC)  -     CBC with Auto Differential; Future  -     Comprehensive Metabolic Panel; Future  -     Hemoglobin A1C; Future  -     Lipid Panel; Future  -     T4, Free; Future  -     TSH; Future  -     Microalbumin / Creatinine Urine Ratio; Future  2. Chronic fatigue  -     cyanocobalamin 1000 MCG/ML injection; INJECT 1ML INTRAMUSCULAR AS DIRECTED MONTHLY, Disp-1 mL, R-1This prescription was filled on 2023. Any refills authorized will be placed on file. Normal  3. Vitamin B12 deficiency  -     cyanocobalamin 1000 MCG/ML injection; INJECT 1ML INTRAMUSCULAR AS DIRECTED MONTHLY, Disp-1 mL, R-1This prescription was filled on 2023. Any refills authorized will be placed on file. Normal    No follow-ups on file. Subjective   Diabetes  Pertinent negatives for diabetes include no chest pain and no weakness. Has appt with cardiology and vascular on the . Genetics oct 2nd at [facility]. Diabetes  She is monitoring her blood sugar with the dex com. Increased actos to 30 mg.      Review of Systems   Constitutional:  Negative

## 2023-09-22 ENCOUNTER — PATIENT MESSAGE (OUTPATIENT)
Dept: FAMILY MEDICINE CLINIC | Age: 48
End: 2023-09-22

## 2023-09-22 DIAGNOSIS — M79.641 RIGHT HAND PAIN: Primary | ICD-10-CM

## 2023-09-26 DIAGNOSIS — M79.641 RIGHT HAND PAIN: Primary | ICD-10-CM

## 2023-09-27 ENCOUNTER — HOSPITAL ENCOUNTER (OUTPATIENT)
Dept: GENERAL RADIOLOGY | Age: 48
Discharge: HOME | End: 2023-09-27
Payer: COMMERCIAL

## 2023-09-27 DIAGNOSIS — M79.641 RIGHT HAND PAIN: ICD-10-CM

## 2023-09-27 PROCEDURE — 73130 X-RAY EXAM OF HAND: CPT

## 2023-09-29 ENCOUNTER — TELEPHONE (OUTPATIENT)
Dept: FAMILY MEDICINE CLINIC | Age: 48
End: 2023-09-29

## 2023-10-05 DIAGNOSIS — E11.69 TYPE 2 DIABETES MELLITUS WITH OTHER SPECIFIED COMPLICATION, WITHOUT LONG-TERM CURRENT USE OF INSULIN (HCC): ICD-10-CM

## 2023-10-05 LAB
25(OH)D3 SERPL-MCNC: 53 NG/ML
ALBUMIN SERPL-MCNC: 4.5 G/DL (ref 3.5–5.2)
ALP SERPL-CCNC: 124 U/L (ref 35–104)
ALT SERPL-CCNC: 21 U/L (ref 5–33)
ANION GAP SERPL CALCULATED.3IONS-SCNC: 16 MMOL/L (ref 7–19)
AST SERPL-CCNC: 20 U/L (ref 5–32)
BASOPHILS # BLD: 0.1 K/UL (ref 0–0.2)
BASOPHILS NFR BLD: 1 % (ref 0–1)
BILIRUB SERPL-MCNC: 0.8 MG/DL (ref 0.2–1.2)
BILIRUB UR QL STRIP: NEGATIVE
BUN SERPL-MCNC: 19 MG/DL (ref 6–20)
CALCIUM SERPL-MCNC: 9.3 MG/DL (ref 8.6–10)
CHLORIDE SERPL-SCNC: 100 MMOL/L (ref 98–111)
CHOLEST SERPL-MCNC: 171 MG/DL (ref 160–199)
CLARITY UR: CLEAR
CO2 SERPL-SCNC: 25 MMOL/L (ref 22–29)
COLOR UR: YELLOW
CREAT SERPL-MCNC: 0.7 MG/DL (ref 0.5–0.9)
CREAT UR-MCNC: 135.5 MG/DL (ref 28–217)
EOSINOPHIL # BLD: 0.1 K/UL (ref 0–0.6)
EOSINOPHIL NFR BLD: 2.2 % (ref 0–5)
ERYTHROCYTE [DISTWIDTH] IN BLOOD BY AUTOMATED COUNT: 12.1 % (ref 11.5–14.5)
GLUCOSE SERPL-MCNC: 136 MG/DL (ref 74–109)
GLUCOSE UR STRIP.AUTO-MCNC: =>1000 MG/DL
HBA1C MFR BLD: 6.1 % (ref 4–6)
HCT VFR BLD AUTO: 45.3 % (ref 37–47)
HDLC SERPL-MCNC: 57 MG/DL (ref 65–121)
HGB BLD-MCNC: 14.3 G/DL (ref 12–16)
HGB UR STRIP.AUTO-MCNC: NEGATIVE MG/L
IMM GRANULOCYTES # BLD: 0 K/UL
KETONES UR STRIP.AUTO-MCNC: NEGATIVE MG/DL
LDLC SERPL CALC-MCNC: 78 MG/DL
LEUKOCYTE ESTERASE UR QL STRIP.AUTO: NEGATIVE
LYMPHOCYTES # BLD: 1.3 K/UL (ref 1.1–4.5)
LYMPHOCYTES NFR BLD: 25.3 % (ref 20–40)
MAGNESIUM SERPL-MCNC: 2.2 MG/DL (ref 1.6–2.6)
MCH RBC QN AUTO: 29.6 PG (ref 27–31)
MCHC RBC AUTO-ENTMCNC: 31.6 G/DL (ref 33–37)
MCV RBC AUTO: 93.8 FL (ref 81–99)
MONOCYTES # BLD: 0.6 K/UL (ref 0–0.9)
MONOCYTES NFR BLD: 11.1 % (ref 0–10)
NEUTROPHILS # BLD: 3 K/UL (ref 1.5–7.5)
NEUTS SEG NFR BLD: 60.2 % (ref 50–65)
NITRITE UR QL STRIP.AUTO: NEGATIVE
PH UR STRIP.AUTO: 6 [PH] (ref 5–8)
PHOSPHATE SERPL-MCNC: 3.7 MG/DL (ref 2.5–4.5)
PLATELET # BLD AUTO: 243 K/UL (ref 130–400)
PMV BLD AUTO: 11.4 FL (ref 9.4–12.3)
POTASSIUM SERPL-SCNC: 4.3 MMOL/L (ref 3.5–5)
PROT SERPL-MCNC: 7 G/DL (ref 6.6–8.7)
PROT UR STRIP.AUTO-MCNC: NEGATIVE MG/DL
PROT UR-MCNC: 12 MG/DL (ref 15–45)
PTH-INTACT SERPL-MCNC: 32.2 PG/ML (ref 15–65)
RBC # BLD AUTO: 4.83 M/UL (ref 4.2–5.4)
SODIUM SERPL-SCNC: 141 MMOL/L (ref 136–145)
SP GR UR STRIP.AUTO: 1.04 (ref 1–1.03)
T4 FREE SERPL-MCNC: 0.97 NG/DL (ref 0.93–1.7)
TRIGL SERPL-MCNC: 181 MG/DL (ref 0–149)
TSH SERPL DL<=0.005 MIU/L-ACNC: 2.24 UIU/ML (ref 0.27–4.2)
URATE SERPL-MCNC: 4.6 MG/DL (ref 2.4–5.7)
UROBILINOGEN UR STRIP.AUTO-MCNC: 0.2 E.U./DL
WBC # BLD AUTO: 4.9 K/UL (ref 4.8–10.8)

## 2023-10-06 ENCOUNTER — TELEPHONE (OUTPATIENT)
Dept: FAMILY MEDICINE CLINIC | Age: 48
End: 2023-10-06

## 2023-10-06 DIAGNOSIS — G89.29 CHRONIC NONINTRACTABLE HEADACHE, UNSPECIFIED HEADACHE TYPE: ICD-10-CM

## 2023-10-06 DIAGNOSIS — R51.9 CHRONIC NONINTRACTABLE HEADACHE, UNSPECIFIED HEADACHE TYPE: ICD-10-CM

## 2023-10-06 DIAGNOSIS — G35 MULTIPLE SCLEROSIS (HCC): ICD-10-CM

## 2023-10-06 DIAGNOSIS — G89.4 CHRONIC PAIN SYNDROME: ICD-10-CM

## 2023-10-06 LAB
CREAT UR-MCNC: 139.1 MG/DL (ref 28–217)
MICROALBUMIN UR-MCNC: <1.2 MG/DL (ref 0–19)
MICROALBUMIN/CREAT UR-RTO: NORMAL MG/G

## 2023-10-06 RX ORDER — HYDROCODONE BITARTRATE AND ACETAMINOPHEN 5; 325 MG/1; MG/1
1 TABLET ORAL EVERY 6 HOURS PRN
Qty: 30 TABLET | Refills: 0 | Status: SHIPPED | OUTPATIENT
Start: 2023-10-06 | End: 2023-10-14

## 2023-10-06 NOTE — TELEPHONE ENCOUNTER
Left leg is killing her per patient. Taking 6 ibuprofen a day. Stopped tramadol. Backed off gabapentin because she read itt can cause weight gain. Wanting to know what she can take for pain.

## 2023-10-10 ENCOUNTER — PATIENT MESSAGE (OUTPATIENT)
Dept: FAMILY MEDICINE CLINIC | Age: 48
End: 2023-10-10

## 2023-10-10 DIAGNOSIS — F41.1 GAD (GENERALIZED ANXIETY DISORDER): ICD-10-CM

## 2023-10-10 RX ORDER — CLONAZEPAM 0.5 MG/1
0.5 TABLET ORAL 2 TIMES DAILY
Qty: 60 TABLET | Refills: 0 | Status: SHIPPED | OUTPATIENT
Start: 2023-10-10 | End: 2023-11-09

## 2023-10-10 NOTE — TELEPHONE ENCOUNTER
From: Milka Spence  Sent: 10/10/2023 11:01 AM CDT  To: [facility] Clinical Staff  Subject: leg pain    Yes. It hurts like crazy. I'm taking a Norco in the morning and at night.  I go October 24 th to pain mgm

## 2023-10-18 ENCOUNTER — TELEPHONE (OUTPATIENT)
Dept: FAMILY MEDICINE CLINIC | Age: 48
End: 2023-10-18

## 2023-10-18 NOTE — TELEPHONE ENCOUNTER
Pt called, wants to know about getting an MRI for her right hand since you know it is soft tissue. It is her thumb joint.

## 2023-10-19 DIAGNOSIS — R53.82 CHRONIC FATIGUE: ICD-10-CM

## 2023-10-19 DIAGNOSIS — J30.1 SEASONAL ALLERGIC RHINITIS DUE TO POLLEN: ICD-10-CM

## 2023-10-19 DIAGNOSIS — G47.00 INSOMNIA, UNSPECIFIED TYPE: ICD-10-CM

## 2023-10-19 DIAGNOSIS — E11.69 TYPE 2 DIABETES MELLITUS WITH OTHER SPECIFIED COMPLICATION, WITHOUT LONG-TERM CURRENT USE OF INSULIN (HCC): ICD-10-CM

## 2023-10-19 DIAGNOSIS — E53.8 VITAMIN B12 DEFICIENCY: ICD-10-CM

## 2023-10-19 DIAGNOSIS — G43.109 MIGRAINE WITH AURA AND WITHOUT STATUS MIGRAINOSUS, NOT INTRACTABLE: ICD-10-CM

## 2023-10-19 RX ORDER — DOXEPIN HYDROCHLORIDE 50 MG/1
50 CAPSULE ORAL NIGHTLY
Qty: 30 CAPSULE | Refills: 5 | Status: SHIPPED | OUTPATIENT
Start: 2023-10-19 | End: 2024-10-18

## 2023-10-19 RX ORDER — SUMATRIPTAN 100 MG/1
100 TABLET, FILM COATED ORAL PRN
Qty: 9 TABLET | Refills: 5 | Status: SHIPPED | OUTPATIENT
Start: 2023-10-19

## 2023-10-19 RX ORDER — MONTELUKAST SODIUM 10 MG/1
10 TABLET ORAL NIGHTLY
Qty: 30 TABLET | Refills: 5 | Status: SHIPPED | OUTPATIENT
Start: 2023-10-19

## 2023-10-19 RX ORDER — CYANOCOBALAMIN 1000 UG/ML
INJECTION, SOLUTION INTRAMUSCULAR; SUBCUTANEOUS
Qty: 1 ML | Refills: 1 | Status: SHIPPED | OUTPATIENT
Start: 2023-10-19

## 2023-10-19 RX ORDER — PIOGLITAZONEHYDROCHLORIDE 30 MG/1
30 TABLET ORAL DAILY
Qty: 30 TABLET | Refills: 0 | Status: SHIPPED | OUTPATIENT
Start: 2023-10-19

## 2023-10-19 NOTE — TELEPHONE ENCOUNTER
I have ordered the MRI. Im not sure if the insurance will approve it. May need to be ordered by ortho but we will see. Her symtpms seem to be consistent with a UCL injury. I would like her to see Dr. Suzie Mejía. There was an appt made already for him but looks like she may have canceled it.

## 2023-10-19 NOTE — TELEPHONE ENCOUNTER
Received fax from pharmacy requesting refill on pts medication(s). Pt was last seen in office on 9/18/2023  and has a follow up scheduled for Visit date not found. Will send request to  Shaquille Crook  for authorization.      Requested Prescriptions     Pending Prescriptions Disp Refills    cyanocobalamin 1000 MCG/ML injection 1 mL 1     Sig: INJECT 1ML INTRAMUSCULAR AS DIRECTED MONTHLY    doxepin (SINEQUAN) 50 MG capsule 30 capsule 5     Sig: Take 1 capsule by mouth nightly    montelukast (SINGULAIR) 10 MG tablet 30 tablet 5     Sig: Take 1 tablet by mouth nightly With xyzal for allergies    pioglitazone (ACTOS) 30 MG tablet 30 tablet 0     Sig: Take 1 tablet by mouth daily    SUMAtriptan (IMITREX) 100 MG tablet 9 tablet 5     Sig: Take 1 tablet by mouth once as needed for Migraine

## 2023-10-20 NOTE — TELEPHONE ENCOUNTER
Call returned to pt and she would like to get this testing done asap cause her insurance ends on 10/31/23. I have send a msg to Chilango Crooks to see if we can get this approved sooner rather than later.

## 2023-10-24 ENCOUNTER — HOSPITAL ENCOUNTER (OUTPATIENT)
Dept: PAIN MANAGEMENT | Age: 48
Discharge: HOME | End: 2023-10-24
Payer: COMMERCIAL

## 2023-10-24 VITALS
TEMPERATURE: 98.3 F | HEART RATE: 109 BPM | DIASTOLIC BLOOD PRESSURE: 83 MMHG | BODY MASS INDEX: 30.39 KG/M2 | SYSTOLIC BLOOD PRESSURE: 135 MMHG | RESPIRATION RATE: 16 BRPM | HEIGHT: 64 IN | WEIGHT: 178 LBS | OXYGEN SATURATION: 95 %

## 2023-10-24 DIAGNOSIS — G35 MULTIPLE SCLEROSIS (HCC): Primary | ICD-10-CM

## 2023-10-24 PROCEDURE — 99215 OFFICE O/P EST HI 40 MIN: CPT

## 2023-10-24 RX ORDER — HYDROCODONE BITARTRATE AND ACETAMINOPHEN 5; 325 MG/1; MG/1
1 TABLET ORAL EVERY 6 HOURS PRN
COMMUNITY
End: 2023-10-24 | Stop reason: ALTCHOICE

## 2023-10-24 RX ORDER — FUROSEMIDE 40 MG/1
1 TABLET ORAL DAILY
COMMUNITY
Start: 2023-10-20

## 2023-10-24 RX ORDER — TAPENTADOL HYDROCHLORIDE 50 MG/1
50 TABLET, FILM COATED ORAL EVERY 8 HOURS PRN
Qty: 9 TABLET | Refills: 0 | Status: SHIPPED | OUTPATIENT
Start: 2023-10-24 | End: 2023-10-27

## 2023-10-24 ASSESSMENT — PAIN DESCRIPTION - LOCATION: LOCATION: GENERALIZED

## 2023-10-24 ASSESSMENT — PAIN DESCRIPTION - PAIN TYPE: TYPE: CHRONIC PAIN

## 2023-10-24 ASSESSMENT — PAIN SCALES - GENERAL: PAINLEVEL_OUTOF10: 6

## 2023-10-24 NOTE — PROGRESS NOTES
Clinic Documentation      Education Provided:  [x] Review of Margarita Mahoney  [x] Agreement Review  [x] PEG Score Calculated [x] PHQ Score Calculated [x] ORT Score Calculated    [] Compliance Issues Discussed [] Cognitive Behavior Needs [x] Exercise [] Review of Test [] Financial Issues  [x] Tobacco/Alcohol Use Reviewed [x] Teaching [x] New Patient [x] Picture Obtained    Physician Plan:  [] Outgoing Referral  [] Pharmacy Consult  [] Test Ordered [x] Prescription Ordered/Changed   [] Obtained Test Results / Consult Notes        Complete if patient is withholding blood thinner for procedure     Blood Thinner Patient is currently taking:      [] Plavix (Hold for 7 days)  [] Aspirin (Hold for 5 days)     [] Pletal (Hold for 2 days)  [] Pradaxa (Hold for 3 days)    [] Effient (Hold for 7 days)  [] Xarelto (Hold for 2 days)    [] Eliquis (Hold for 2 days)  [] Brilinta (Hold for 7 days)    [] Coumadin (Hold for 5 days) - (INR needs to be drawn the day prior to procedure- INR < 2.0)    [] Aggrenox (Hold for 7 days)        [] Patient will stop medication on their own.    [] Blood Thinner Form Faxed for approval to hold.    Provider form faxed to:     Assessment Completed by:  Electronically signed by Tony Hidalgo RN on 10/24/2023 at 4:18 PM

## 2023-10-24 NOTE — PROGRESS NOTES
[facility] Physical & Pain Medicine    History and Physical    Patient Name: Mynor Gipson    MR #: [id number]    Account [de-identified]    : 1975    Age: 50 y.o. Sex: female    Date: ***    PCP: CLINTON Galarza    Referring Provider:    Chief Complaint:   Chief Complaint   Patient presents with    Generalized Body Aches     6/10    New Patient       History of Present Illness: The patient is a 50 y.o. female who was referrred with primary complaints of ***. Does pain affect ADLs {YES / NO:30555} {AWV ADL FUNCTIONAL:2408489312}  Does pain affect quality of life? {YES / E}  Does pain affect activities of enjoyment? {YES / JV:34694}  Have you been on pain medication for your pain? {YES / YZ:40982}  If so, does the pain medication keep your pain tolerable to perform the tasks that affect your pain? {YES / IV:21361}     Of Note: This is a work related injury {YES / XF:10864}    HPI    Patient is on or has tried and failed following medications:   Narcotics: {Narcotic list:29109}; Anticonvulsants: {MEDS; ANTICONVULSANTS:86012}; Antidepressants:  {ANTIDEPRESSANTS:296160259}; Muscle Relaxants: {EVISIT MUSCLE RELAXANTS:9102285977},   NSAIDs: {NSAID:10266},   and Topicals: {EVISIT BACK PAIN TOPICAL RX:2954776021}.      Past Pain Management History  {PAIN MANAG PROCEDURES PAIN IXK:401284184}    Past Imaging  {brain imagin}    Screening Tools:     PEG: ***    Past PEG: NA    ORT: ***    PHQ-9: ***    Current Pain Assessment  Pain Assessment  Pain Assessment: 0-10  Pain Level: 6  Pain Location: Generalized  Pain Type: Chronic pain    Past Visit HPI:  na    Employment:     Past Medical History  Past Medical History:   Diagnosis Date    CKD (chronic kidney disease), stage III ([address])     COVID     Depression     Diabetes ([address])     Headache(784.0)     Hypogammaglobulinemia (HCC)     Indigestion     Insomnia     MGUS (monoclonal gammopathy of unknown significance)     Multiple

## 2023-10-24 NOTE — PROGRESS NOTES
Pain agreement reviewed with patient and signed per patient. Questions answered and patient states understanding of agreement. Copy given to patient. Also gave patient a copy of office phone number options.

## 2023-10-30 ENCOUNTER — TELEPHONE (OUTPATIENT)
Dept: PAIN MANAGEMENT | Age: 48
End: 2023-10-30

## 2023-10-30 PROBLEM — M79.605 LEFT LEG PAIN: Status: ACTIVE | Noted: 2023-10-30

## 2023-10-30 PROBLEM — I72.8 SPLENIC ARTERY ANEURYSM (HCC): Status: ACTIVE | Noted: 2023-10-30

## 2023-10-30 PROBLEM — M79.7 FIBROMYALGIA: Status: ACTIVE | Noted: 2023-10-30

## 2023-10-30 PROBLEM — I38 HEART VALVE REGURGITATION: Status: ACTIVE | Noted: 2023-10-30

## 2023-10-30 ASSESSMENT — ENCOUNTER SYMPTOMS
CONSTIPATION: 0
BACK PAIN: 0

## 2023-10-30 NOTE — TELEPHONE ENCOUNTER
Pt called wanted to know where we were with her PA for Micki Menjivar, she stated she has been taking 12-15 ibuprofen a day for her pain. Let me know what I can?

## 2023-10-31 DIAGNOSIS — G35 MULTIPLE SCLEROSIS (HCC): ICD-10-CM

## 2023-10-31 RX ORDER — TAPENTADOL HYDROCHLORIDE 50 MG/1
50 TABLET, FILM COATED ORAL EVERY 8 HOURS PRN
Qty: 90 TABLET | Refills: 0 | Status: SHIPPED | OUTPATIENT
Start: 2023-10-31 | End: 2023-11-30

## 2023-11-03 ENCOUNTER — TELEPHONE (OUTPATIENT)
Dept: FAMILY MEDICINE CLINIC | Age: 48
End: 2023-11-03

## 2023-11-03 DIAGNOSIS — M25.341 CHRONIC INSTABILITY OF METACARPOPHALANGEAL JOINT OF RIGHT THUMB: ICD-10-CM

## 2023-11-03 DIAGNOSIS — M54.42 CHRONIC MIDLINE LOW BACK PAIN WITH LEFT-SIDED SCIATICA: ICD-10-CM

## 2023-11-03 DIAGNOSIS — M79.641 RIGHT HAND PAIN: Primary | ICD-10-CM

## 2023-11-03 DIAGNOSIS — G89.29 CHRONIC MIDLINE LOW BACK PAIN WITH LEFT-SIDED SCIATICA: ICD-10-CM

## 2023-11-03 DIAGNOSIS — M79.644 THUMB PAIN, RIGHT: ICD-10-CM

## 2023-11-03 NOTE — TELEPHONE ENCOUNTER
----- Message from CLINTON Solis sent at 11/3/2023  2:35 PM CDT -----  Please inform patient results show  Mild arthritis but no nerve impingement

## 2023-11-03 NOTE — TELEPHONE ENCOUNTER
Called patient, spoke with: Patient regarding the results of the patients most recent mri. I advised Patient of Salvador Duron recommendations.    Patient did voice understanding

## 2023-11-03 NOTE — TELEPHONE ENCOUNTER
----- Message from CLINTON Bowers sent at 11/3/2023  2:30 PM CDT -----  Please inform patient results show  There is some edema in the soft tissue. This could be due to ligament strain.    Rec her to see dr. Chaz Olivas

## 2023-11-06 DIAGNOSIS — G35 MS (MULTIPLE SCLEROSIS) (HCC): Primary | ICD-10-CM

## 2023-11-06 DIAGNOSIS — M79.7 FIBROMYALGIA: ICD-10-CM

## 2023-11-06 RX ORDER — TRAMADOL HYDROCHLORIDE 50 MG/1
50-100 TABLET ORAL EVERY 6 HOURS PRN
Qty: 150 TABLET | Refills: 0 | Status: SHIPPED | OUTPATIENT
Start: 2023-11-06 | End: 2023-12-06

## 2023-11-06 NOTE — TELEPHONE ENCOUNTER
PA for Nucynta 50mg was denied, chart notes and letter from provider was submitted 10/31/23. Denial from insurance is in providers mailbox.

## 2023-11-07 ENCOUNTER — TELEPHONE (OUTPATIENT)
Dept: PAIN MANAGEMENT | Age: 48
End: 2023-11-07

## 2023-11-08 NOTE — TELEPHONE ENCOUNTER
Patient left message on nurse line regarding her tramadol.  She stated that the current dose works, however it only lasts three hours, then she is in excruciating pain again.  She is wanting to make provider aware that medication is only partly effective, and see what else could be done.

## 2023-11-16 DIAGNOSIS — M79.7 FIBROMYALGIA: Primary | ICD-10-CM

## 2023-11-16 RX ORDER — TRAMADOL HYDROCHLORIDE 200 MG/1
200 TABLET, EXTENDED RELEASE ORAL DAILY
Qty: 30 TABLET | Refills: 0 | OUTPATIENT
Start: 2023-11-16 | End: 2023-12-16

## 2023-11-16 NOTE — TELEPHONE ENCOUNTER
PA appeal was faxed to pt insurance on 11/14/23 for Nucynta 50mg, we have not heard back.  Appeals can take up to 15 business days.

## 2023-11-20 DIAGNOSIS — G35 MS (MULTIPLE SCLEROSIS) (HCC): Primary | ICD-10-CM

## 2023-11-21 RX ORDER — TRAMADOL HYDROCHLORIDE 200 MG/1
200 TABLET, EXTENDED RELEASE ORAL DAILY
Qty: 30 TABLET | Refills: 0 | Status: SHIPPED | OUTPATIENT
Start: 2023-11-21 | End: 2023-12-21

## 2023-11-22 DIAGNOSIS — E55.9 VITAMIN D DEFICIENCY: ICD-10-CM

## 2023-11-22 DIAGNOSIS — E11.69 TYPE 2 DIABETES MELLITUS WITH OTHER SPECIFIED COMPLICATION, WITHOUT LONG-TERM CURRENT USE OF INSULIN (HCC): ICD-10-CM

## 2023-11-22 RX ORDER — LISINOPRIL 5 MG/1
5 TABLET ORAL DAILY
Qty: 90 TABLET | Refills: 1 | Status: CANCELLED | OUTPATIENT
Start: 2023-11-22

## 2023-11-22 NOTE — TELEPHONE ENCOUNTER
Received fax from pharmacy requesting refill on pts medication(s). Pt was last seen in office on 9/18/2023  and has a follow up scheduled for Visit date not found. Will send request to  Bettey Dance  for patient.      Requested Prescriptions     Refused Prescriptions Disp Refills    vitamin D (ERGOCALCIFEROL) 1.25 MG (26728 UT) CAPS capsule 12 capsule 1     Sig: Take 1 capsule by mouth once a week    pioglitazone (ACTOS) 30 MG tablet 30 tablet 0     Sig: Take 1 tablet by mouth daily    pioglitazone (ACTOS) 15 MG tablet 30 tablet 3     Sig: Take 1 tablet by mouth daily   Lisinpril pending

## 2023-11-27 RX ORDER — PIOGLITAZONEHYDROCHLORIDE 15 MG/1
15 TABLET ORAL DAILY
Qty: 30 TABLET | Refills: 3 | OUTPATIENT
Start: 2023-11-27

## 2023-11-27 RX ORDER — LISINOPRIL 5 MG/1
5 TABLET ORAL DAILY
Qty: 90 TABLET | Refills: 1 | Status: SHIPPED | OUTPATIENT
Start: 2023-11-27

## 2023-11-27 RX ORDER — ERGOCALCIFEROL 1.25 MG/1
50000 CAPSULE ORAL WEEKLY
Qty: 12 CAPSULE | Refills: 1 | Status: SHIPPED | OUTPATIENT
Start: 2023-11-27

## 2023-11-27 RX ORDER — PIOGLITAZONEHYDROCHLORIDE 30 MG/1
30 TABLET ORAL DAILY
Qty: 30 TABLET | Refills: 0 | OUTPATIENT
Start: 2023-11-27

## 2023-11-29 DIAGNOSIS — G35 MULTIPLE SCLEROSIS (HCC): ICD-10-CM

## 2023-11-29 DIAGNOSIS — E11.69 TYPE 2 DIABETES MELLITUS WITH OTHER SPECIFIED COMPLICATION, WITHOUT LONG-TERM CURRENT USE OF INSULIN (HCC): ICD-10-CM

## 2023-11-29 DIAGNOSIS — R53.82 CHRONIC FATIGUE: ICD-10-CM

## 2023-11-29 RX ORDER — ARMODAFINIL 250 MG/1
1 TABLET ORAL DAILY
Qty: 30 TABLET | Refills: 0 | OUTPATIENT
Start: 2023-11-29

## 2023-11-29 NOTE — TELEPHONE ENCOUNTER
Received fax from pharmacy requesting refill on pts medication(s). Pt was last seen in office on 9/18/2023  and has a follow up scheduled for 11/29/2023. Will send request to  Bebe Larose  for authorization. Requested Prescriptions     Pending Prescriptions Disp Refills    MOUNJARO 7.5 MG/0.5ML SOPN SC injection [Pharmacy Med Name: Mounjaro 7.5 MG/0.5ML Subcutaneous Solution Pen-injector] 4 mL 0     Sig: INJECT 0.5 ML SUB Q ONCE WEEKLY.

## 2023-11-29 NOTE — TELEPHONE ENCOUNTER
Received fax from pharmacy requesting refill on pts medication(s). Pt was last seen in office on 9/18/2023  and has a follow up scheduled for Visit date not found. Will send request to  Nikolas Garcia  for authorization. Requested Prescriptions     Pending Prescriptions Disp Refills    Armodafinil 250 MG TABS 30 tablet 2     Sig: Take 1 tablet by mouth daily for 90 days. Max Daily Amount: 1 tablet     Refused Prescriptions Disp Refills    Armodafinil 250 MG TABS [Pharmacy Med Name: Armodafinil 250 MG Oral Tablet] 30 tablet 0     Sig: Take 1 tablet by mouth daily.      Refused By: Angelika Yoder     Reason for Refusal: Request already responded to by other means (e.g. phone or fax)

## 2023-11-30 RX ORDER — ARMODAFINIL 250 MG/1
1 TABLET ORAL DAILY
Qty: 30 TABLET | Refills: 2 | Status: SHIPPED | OUTPATIENT
Start: 2023-11-30 | End: 2024-02-28

## 2023-11-30 RX ORDER — TIRZEPATIDE 7.5 MG/.5ML
INJECTION, SOLUTION SUBCUTANEOUS
Qty: 4 ML | Refills: 0 | Status: SHIPPED | OUTPATIENT
Start: 2023-11-30

## 2023-12-05 LAB — IGG SERPL-MCNC: 940 MG/DL (ref 700–1600)

## 2023-12-07 NOTE — TELEPHONE ENCOUNTER
Call to patient and let her know that I spoke to Margaret and on her next refill, her medication will be increased to 4 times daily or q 6 hrs prn #120 tablets.  I explained that this would get her to her next office visit when Margaret can take a look at the medication and determine what changes to make.

## 2023-12-11 DIAGNOSIS — G89.4 CHRONIC PAIN SYNDROME: Primary | ICD-10-CM

## 2023-12-13 RX ORDER — TAPENTADOL HYDROCHLORIDE 50 MG/1
50 TABLET, FILM COATED ORAL EVERY 6 HOURS
Qty: 120 TABLET | Refills: 0 | Status: SHIPPED | OUTPATIENT
Start: 2023-12-13 | End: 2024-01-12

## 2024-01-06 DIAGNOSIS — F33.1 MODERATE EPISODE OF RECURRENT MAJOR DEPRESSIVE DISORDER (HCC): ICD-10-CM

## 2024-01-08 RX ORDER — BREXPIPRAZOLE 0.5 MG/1
0.5 TABLET ORAL NIGHTLY
Qty: 30 TABLET | Refills: 11 | Status: SHIPPED | OUTPATIENT
Start: 2024-01-08

## 2024-01-08 NOTE — TELEPHONE ENCOUNTER
Received fax from pharmacy requesting refill on pts medication(s). Pt was last seen in office on 9/18/2023  and has a follow up scheduled for Visit date not found. Will send request to  Laila Narvaez  for authorization.     Requested Prescriptions     Pending Prescriptions Disp Refills    REXULTI 0.5 MG TABS tablet [Pharmacy Med Name: Rexulti 0.5 MG Oral Tablet] 30 tablet 11     Sig: TAKE 1 TABLET BY MOUTH AT BEDTIME

## 2024-01-11 DIAGNOSIS — E11.69 TYPE 2 DIABETES MELLITUS WITH OTHER SPECIFIED COMPLICATION, WITHOUT LONG-TERM CURRENT USE OF INSULIN (HCC): ICD-10-CM

## 2024-01-12 RX ORDER — TIRZEPATIDE 7.5 MG/.5ML
INJECTION, SOLUTION SUBCUTANEOUS
Qty: 4 ML | Refills: 0 | Status: SHIPPED | OUTPATIENT
Start: 2024-01-12

## 2024-01-12 NOTE — TELEPHONE ENCOUNTER
Received fax from pharmacy requesting refill on pts medication(s). Pt was last seen in office on 9/18/2023  and has a follow up scheduled for Visit date not found. Will send request to  Laila Narvaez  for authorization.     Requested Prescriptions     Pending Prescriptions Disp Refills    MOUNJARO 7.5 MG/0.5ML SOPN SC injection [Pharmacy Med Name: Mounjaro 7.5 MG/0.5ML Subcutaneous Solution Pen-injector] 4 mL 0     Sig: INJECT 0.5 ML SUB-Q ONCE WEEKLY

## 2024-01-16 ENCOUNTER — TELEMEDICINE (OUTPATIENT)
Dept: FAMILY MEDICINE CLINIC | Age: 49
End: 2024-01-16
Payer: COMMERCIAL

## 2024-01-16 DIAGNOSIS — R53.82 CHRONIC FATIGUE: ICD-10-CM

## 2024-01-16 DIAGNOSIS — E11.69 TYPE 2 DIABETES MELLITUS WITH OTHER SPECIFIED COMPLICATION, WITHOUT LONG-TERM CURRENT USE OF INSULIN (HCC): Primary | ICD-10-CM

## 2024-01-16 DIAGNOSIS — E55.9 VITAMIN D DEFICIENCY: ICD-10-CM

## 2024-01-16 DIAGNOSIS — R11.0 NAUSEA: ICD-10-CM

## 2024-01-16 DIAGNOSIS — E78.1 HYPERTRIGLYCERIDEMIA: ICD-10-CM

## 2024-01-16 DIAGNOSIS — G35 MULTIPLE SCLEROSIS (HCC): ICD-10-CM

## 2024-01-16 DIAGNOSIS — Q79.63 VASCULAR EHLERS-DANLOS SYNDROME: ICD-10-CM

## 2024-01-16 PROCEDURE — 99214 OFFICE O/P EST MOD 30 MIN: CPT | Performed by: NURSE PRACTITIONER

## 2024-01-16 RX ORDER — PROCHLORPERAZINE MALEATE 5 MG/1
5 TABLET ORAL EVERY 6 HOURS PRN
Qty: 120 TABLET | Refills: 3 | Status: SHIPPED | OUTPATIENT
Start: 2024-01-16

## 2024-01-16 RX ORDER — ARMODAFINIL 250 MG/1
1 TABLET ORAL DAILY
Qty: 30 TABLET | Refills: 2 | Status: SHIPPED | OUTPATIENT
Start: 2024-01-16 | End: 2024-04-15

## 2024-01-16 RX ORDER — TIRZEPATIDE 10 MG/.5ML
10 INJECTION, SOLUTION SUBCUTANEOUS WEEKLY
Qty: 6 ML | Refills: 1 | Status: SHIPPED | OUTPATIENT
Start: 2024-01-16

## 2024-01-16 ASSESSMENT — PATIENT HEALTH QUESTIONNAIRE - PHQ9
9. THOUGHTS THAT YOU WOULD BE BETTER OFF DEAD, OR OF HURTING YOURSELF: NOT AT ALL
1. LITTLE INTEREST OR PLEASURE IN DOING THINGS: SEVERAL DAYS
2. FEELING DOWN, DEPRESSED OR HOPELESS: 1
SUM OF ALL RESPONSES TO PHQ QUESTIONS 1-9: 10
8. MOVING OR SPEAKING SO SLOWLY THAT OTHER PEOPLE COULD HAVE NOTICED. OR THE OPPOSITE, BEING SO FIGETY OR RESTLESS THAT YOU HAVE BEEN MOVING AROUND A LOT MORE THAN USUAL: 0
4. FEELING TIRED OR HAVING LITTLE ENERGY: 2
9. THOUGHTS THAT YOU WOULD BE BETTER OFF DEAD, OR OF HURTING YOURSELF: 0
2. FEELING DOWN, DEPRESSED OR HOPELESS: SEVERAL DAYS
3. TROUBLE FALLING OR STAYING ASLEEP: MORE THAN HALF THE DAYS
7. TROUBLE CONCENTRATING ON THINGS, SUCH AS READING THE NEWSPAPER OR WATCHING TELEVISION: SEVERAL DAYS
4. FEELING TIRED OR HAVING LITTLE ENERGY: MORE THAN HALF THE DAYS
SUM OF ALL RESPONSES TO PHQ QUESTIONS 1-9: 10
10. IF YOU CHECKED OFF ANY PROBLEMS, HOW DIFFICULT HAVE THESE PROBLEMS MADE IT FOR YOU TO DO YOUR WORK, TAKE CARE OF THINGS AT HOME, OR GET ALONG WITH OTHER PEOPLE: SOMEWHAT DIFFICULT
1. LITTLE INTEREST OR PLEASURE IN DOING THINGS: 1
8. MOVING OR SPEAKING SO SLOWLY THAT OTHER PEOPLE COULD HAVE NOTICED. OR THE OPPOSITE - BEING SO FIDGETY OR RESTLESS THAT YOU HAVE BEEN MOVING AROUND A LOT MORE THAN USUAL: NOT AT ALL
3. TROUBLE FALLING OR STAYING ASLEEP: 2
SUM OF ALL RESPONSES TO PHQ QUESTIONS 1-9: 10
5. POOR APPETITE OR OVEREATING: 2
7. TROUBLE CONCENTRATING ON THINGS, SUCH AS READING THE NEWSPAPER OR WATCHING TELEVISION: 1
10. IF YOU CHECKED OFF ANY PROBLEMS, HOW DIFFICULT HAVE THESE PROBLEMS MADE IT FOR YOU TO DO YOUR WORK, TAKE CARE OF THINGS AT HOME, OR GET ALONG WITH OTHER PEOPLE: 1
5. POOR APPETITE OR OVEREATING: MORE THAN HALF THE DAYS
6. FEELING BAD ABOUT YOURSELF - OR THAT YOU ARE A FAILURE OR HAVE LET YOURSELF OR YOUR FAMILY DOWN: 1
6. FEELING BAD ABOUT YOURSELF - OR THAT YOU ARE A FAILURE OR HAVE LET YOURSELF OR YOUR FAMILY DOWN: SEVERAL DAYS
SUM OF ALL RESPONSES TO PHQ QUESTIONS 1-9: 10
SUM OF ALL RESPONSES TO PHQ9 QUESTIONS 1 & 2: 2
SUM OF ALL RESPONSES TO PHQ QUESTIONS 1-9: 10

## 2024-01-16 ASSESSMENT — ENCOUNTER SYMPTOMS
RHINORRHEA: 0
ABDOMINAL PAIN: 0
BLOOD IN STOOL: 0
DIARRHEA: 0
EYE DISCHARGE: 0
TROUBLE SWALLOWING: 0
EYE REDNESS: 0
CONSTIPATION: 0
WHEEZING: 0
SORE THROAT: 0
COUGH: 0

## 2024-01-16 NOTE — PROGRESS NOTES
Mago Terrell, was evaluated through a synchronous (real-time) audio-video encounter. The patient (or guardian if applicable) is aware that this is a billable service, which includes applicable co-pays. This Virtual Visit was conducted with patient's (and/or legal guardian's) consent. Patient identification was verified, and a caregiver was present when appropriate.   The patient was located at Home:  [place] [address]  Provider was located at Facility (Appt Dept): [address] [place] [address]  [place] [address]      Mago Terrell (:  1975) is a Established patient, presenting virtually for evaluation of the following:    Assessment & Plan   Below is the assessment and plan developed based on review of pertinent history, physical exam, labs, studies, and medications.  1. Type 2 diabetes mellitus with other specified complication, without long-term current use of insulin (HCC)  -     Tirzepatide (MOUNJARO) 10 MG/0.5ML SOPN SC injection; Inject 0.5 mLs into the skin once a week, Disp-6 mL, R-1Normal  -     CBC with Auto Differential; Future  -     Comprehensive Metabolic Panel; Future  -     Hemoglobin A1C; Future  -     TSH; Future  -     T4, Free; Future  -     Vitamin B12; Future  -     Uric Acid; Future  2. Nausea  -     prochlorperazine (COMPAZINE) 5 MG tablet; Take 1 tablet by mouth every 6 hours as needed for Nausea, Disp-120 tablet, R-3Normal  3. Multiple sclerosis (HCC)  -     Armodafinil 250 MG TABS; Take 1 tablet by mouth daily for 90 days. Max Daily Amount: 1 tablet, Disp-30 tablet, R-2Normal  4. Chronic fatigue  -     Armodafinil 250 MG TABS; Take 1 tablet by mouth daily for 90 days. Max Daily Amount: 1 tablet, Disp-30 tablet, R-2Normal  5. Vascular Katherine-Danlos syndrome  6. Vitamin D deficiency  -     Vitamin D 25 Hydroxy; Future  7. Hypertriglyceridemia  -     Lipid Panel; Future  Seeing vascular at [facility]    Increase mounjaro to 10 mg  Return in about 3

## 2024-01-17 DIAGNOSIS — G89.4 CHRONIC PAIN SYNDROME: ICD-10-CM

## 2024-01-18 RX ORDER — TAPENTADOL HYDROCHLORIDE 50 MG/1
50 TABLET, FILM COATED ORAL EVERY 6 HOURS
Qty: 120 TABLET | Refills: 0 | Status: SHIPPED | OUTPATIENT
Start: 2024-01-18 | End: 2024-02-17

## 2024-01-23 DIAGNOSIS — G35 MULTIPLE SCLEROSIS (HCC): Primary | ICD-10-CM

## 2024-01-23 RX ORDER — MODAFINIL 200 MG/1
TABLET ORAL
Qty: 60 TABLET | Refills: 2 | Status: SHIPPED | OUTPATIENT
Start: 2024-01-23 | End: 2024-01-25

## 2024-01-23 NOTE — TELEPHONE ENCOUNTER
Received fax from pharmacy requesting refill on pts medication(s). Pt was last seen in office on 1/16/2024  and has a follow up scheduled for Visit date not found. Will send request to  Laila Narvaez  for authorization.     Requested Prescriptions     Pending Prescriptions Disp Refills    modafinil (PROVIGIL) 200 MG tablet  3     Sig: Take 1 tablet by mouth daily for 30 days. Max Daily Amount: 200 mg

## 2024-01-25 ENCOUNTER — HOSPITAL ENCOUNTER (OUTPATIENT)
Dept: PAIN MANAGEMENT | Age: 49
Discharge: HOME | End: 2024-01-25
Payer: COMMERCIAL

## 2024-01-25 VITALS
TEMPERATURE: 97.7 F | BODY MASS INDEX: 29.02 KG/M2 | WEIGHT: 170 LBS | RESPIRATION RATE: 16 BRPM | HEIGHT: 64 IN | DIASTOLIC BLOOD PRESSURE: 76 MMHG | SYSTOLIC BLOOD PRESSURE: 116 MMHG | OXYGEN SATURATION: 96 % | HEART RATE: 90 BPM

## 2024-01-25 DIAGNOSIS — I38 HEART VALVE REGURGITATION: ICD-10-CM

## 2024-01-25 DIAGNOSIS — E55.9 VITAMIN D DEFICIENCY: ICD-10-CM

## 2024-01-25 DIAGNOSIS — Z51.81 ENCOUNTER FOR MONITORING OPIOID MAINTENANCE THERAPY: ICD-10-CM

## 2024-01-25 DIAGNOSIS — E11.69 TYPE 2 DIABETES MELLITUS WITH OTHER SPECIFIED COMPLICATION, WITHOUT LONG-TERM CURRENT USE OF INSULIN (HCC): ICD-10-CM

## 2024-01-25 DIAGNOSIS — G89.4 CHRONIC PAIN SYNDROME: Primary | ICD-10-CM

## 2024-01-25 DIAGNOSIS — Q79.63 VASCULAR EHLERS-DANLOS SYNDROME: ICD-10-CM

## 2024-01-25 DIAGNOSIS — I72.8 SPLENIC ARTERY ANEURYSM (HCC): ICD-10-CM

## 2024-01-25 DIAGNOSIS — E78.1 HYPERTRIGLYCERIDEMIA: ICD-10-CM

## 2024-01-25 DIAGNOSIS — M79.7 FIBROMYALGIA: ICD-10-CM

## 2024-01-25 DIAGNOSIS — G35 MS (MULTIPLE SCLEROSIS) (HCC): ICD-10-CM

## 2024-01-25 DIAGNOSIS — Z79.891 ENCOUNTER FOR MONITORING OPIOID MAINTENANCE THERAPY: ICD-10-CM

## 2024-01-25 PROBLEM — M79.605 LEFT LEG PAIN: Status: RESOLVED | Noted: 2023-10-30 | Resolved: 2024-01-25

## 2024-01-25 LAB
25(OH)D3 SERPL-MCNC: 49.4 NG/ML
ALBUMIN SERPL-MCNC: 4.7 G/DL (ref 3.5–5.2)
ALP SERPL-CCNC: 132 U/L (ref 35–104)
ALT SERPL-CCNC: 20 U/L (ref 5–33)
ANION GAP SERPL CALCULATED.3IONS-SCNC: 13 MMOL/L (ref 7–19)
AST SERPL-CCNC: 20 U/L (ref 5–32)
BASOPHILS # BLD: 0 K/UL (ref 0–0.2)
BASOPHILS NFR BLD: 0.7 % (ref 0–1)
BILIRUB SERPL-MCNC: 0.8 MG/DL (ref 0.2–1.2)
BUN SERPL-MCNC: 12 MG/DL (ref 6–20)
CALCIUM SERPL-MCNC: 9.3 MG/DL (ref 8.6–10)
CHLORIDE SERPL-SCNC: 105 MMOL/L (ref 98–111)
CHOLEST SERPL-MCNC: 153 MG/DL (ref 160–199)
CO2 SERPL-SCNC: 23 MMOL/L (ref 22–29)
CREAT SERPL-MCNC: 0.7 MG/DL (ref 0.5–0.9)
EOSINOPHIL # BLD: 0 K/UL (ref 0–0.6)
EOSINOPHIL NFR BLD: 0.2 % (ref 0–5)
ERYTHROCYTE [DISTWIDTH] IN BLOOD BY AUTOMATED COUNT: 11.9 % (ref 11.5–14.5)
GLUCOSE SERPL-MCNC: 113 MG/DL (ref 74–109)
HBA1C MFR BLD: 6.3 % (ref 4–6)
HCT VFR BLD AUTO: 44.8 % (ref 37–47)
HDLC SERPL-MCNC: 57 MG/DL (ref 65–121)
HGB BLD-MCNC: 15.3 G/DL (ref 12–16)
IMM GRANULOCYTES # BLD: 0 K/UL
LDLC SERPL CALC-MCNC: 66 MG/DL
LYMPHOCYTES # BLD: 1.6 K/UL (ref 1.1–4.5)
LYMPHOCYTES NFR BLD: 27.8 % (ref 20–40)
MCH RBC QN AUTO: 29 PG (ref 27–31)
MCHC RBC AUTO-ENTMCNC: 34.2 G/DL (ref 33–37)
MCV RBC AUTO: 85 FL (ref 81–99)
MONOCYTES # BLD: 0.6 K/UL (ref 0–0.9)
MONOCYTES NFR BLD: 9.6 % (ref 0–10)
NEUTROPHILS # BLD: 3.6 K/UL (ref 1.5–7.5)
NEUTS SEG NFR BLD: 61.4 % (ref 50–65)
PLATELET # BLD AUTO: 243 K/UL (ref 130–400)
PMV BLD AUTO: 11.1 FL (ref 9.4–12.3)
POTASSIUM SERPL-SCNC: 3.6 MMOL/L (ref 3.5–5)
PROT SERPL-MCNC: 7.4 G/DL (ref 6.6–8.7)
RBC # BLD AUTO: 5.27 M/UL (ref 4.2–5.4)
SODIUM SERPL-SCNC: 141 MMOL/L (ref 136–145)
T4 FREE SERPL-MCNC: 1.01 NG/DL (ref 0.93–1.7)
TRIGL SERPL-MCNC: 152 MG/DL (ref 0–149)
TSH SERPL DL<=0.005 MIU/L-ACNC: 1.98 UIU/ML (ref 0.27–4.2)
URATE SERPL-MCNC: 3.7 MG/DL (ref 2.4–5.7)
VIT B12 SERPL-MCNC: 565 PG/ML (ref 211–946)
WBC # BLD AUTO: 5.8 K/UL (ref 4.8–10.8)

## 2024-01-25 PROCEDURE — 99214 OFFICE O/P EST MOD 30 MIN: CPT

## 2024-01-25 RX ORDER — NALOXONE HYDROCHLORIDE 4 MG/.1ML
SPRAY NASAL
COMMUNITY
Start: 2024-01-19

## 2024-01-25 ASSESSMENT — PAIN DESCRIPTION - LOCATION: LOCATION: GENERALIZED

## 2024-01-25 ASSESSMENT — ENCOUNTER SYMPTOMS
EYES NEGATIVE: 1
RESPIRATORY NEGATIVE: 1
CONSTIPATION: 0
GASTROINTESTINAL NEGATIVE: 1

## 2024-01-25 ASSESSMENT — PAIN DESCRIPTION - PAIN TYPE: TYPE: CHRONIC PAIN

## 2024-01-25 ASSESSMENT — PAIN SCALES - GENERAL: PAINLEVEL_OUTOF10: 7

## 2024-01-25 NOTE — TELEPHONE ENCOUNTER
1. Chronic pain syndrome    2. MS (multiple sclerosis) (HCC)    3. Fibromyalgia    4. Splenic artery aneurysm (HCC)    5. Vascular Katherine-Danlos syndrome      Requested Prescriptions     Pending Prescriptions Disp Refills    tapentadol (NUCYNTA ER) 100 MG TB12 extended release tablet 60 tablet 0     Sig: Take 1 tablet by mouth in the morning and 1 tablet in the evening. Do all this for 30 days. May fill 1/25/2024. Max Daily Amount: 200 mg.    tapentadol (NUCYNTA) 50 MG TABS 60 tablet 0     Sig: Take 1 tablet by mouth 2 times daily as needed for Pain for up to 30 days. May fill 1/25/2024 Max Daily Amount: 100 mg       Continue medication with refill sent at appointment yes; refill sent to medical director at appointment yes, see refill encounter dated 1/25/2024    Electronically signed by CLINTON Hollis CNP on 1/25/2024 at 12:04 PM

## 2024-01-25 NOTE — PROGRESS NOTES
Clinic Documentation      Education Provided:  [x] Review of Arun  [] Agreement Review  [x] PEG Score Calculated [] PHQ Score Calculated [] ORT Score Calculated    [] Compliance Issues Discussed [] Cognitive Behavior Needs [x] Exercise [] Review of Test [] Financial Issues  [x] Tobacco/Alcohol Use Reviewed [x] Teaching [] New Patient [] Picture Obtained    Physician Plan:  [] Outgoing Referral  [] Pharmacy Consult  [x] Test Ordered [x] Prescription Ordered/Changed   [] Obtained Test Results / Consult Notes        Complete if patient is withholding blood thinner for procedure     Blood Thinner Patient is currently taking:      [] Plavix (Hold for 7 days)  [] Aspirin (Hold for 5 days)     [] Pletal (Hold for 2 days)  [] Pradaxa (Hold for 3 days)    [] Effient (Hold for 7 days)  [] Xarelto (Hold for 2 days)    [] Eliquis (Hold for 2 days)  [] Brilinta (Hold for 7 days)    [] Coumadin (Hold for 5 days) - (INR needs to be drawn the day prior to procedure- INR < 2.0)    [] Aggrenox (Hold for 7 days)        [] Patient will stop medication on their own.    [] Blood Thinner Form Faxed for approval to hold.   Provider form faxed to:     Assessment Completed by:  Electronically signed by Marlee Hutton RN on 1/25/2024 at 12:10 PM  
daily as needed for Pain for up to 30 days. May fill 1/25/2024 Max Daily Amount: 100 mg     Nucynta is the first FDA approved opioid analgesic approved to treat Diabetic Peripheral Neuropathy. A clinical review of Nucynta was published in the Journal of Pain Research 2019; 12: 4053-9168, which indicated that Nucynta was affective to treat neuropathic pain. Nucynta has a dual mechanism of action. The medication has an effect on ?-opioid receptor agonism (MOR) and norepinephrine reuptake inhibition (NRI). This medication affects the ascending and descending pathways, while having minimal serotoninergic activity. This helps with the long term treatment of patients as serotoninergic activity promotes pain. Attached is more clinical research articles that supports my medical decision for why this medication is the better choice for the patient verses a traditional opioid medication. In addition, attached is clinical research that discusses the use of opioids causing hyperalgesia. Due to the mechanisms of action of nucynta this is less likely to occur.      Patient suffers from chronic pain which requires 24 hour around the clock pain medication. Patient educated on how to take medication properly. Patient educated on side effects such as but no limited to constipation, respiratory depression and Testerone suppression. Patient educated on not stopping medication abruptly due to potential withdrawal. Patient educated on no dosage adjustments for 6 - 8 weeks to allow medication to reach effective treatment levels.     [x] Follow up    [] 4 weeks   [] 6 weeks   [] 8 weeks   [x] 10 weeks   [] 3 months  [] Post procedure to evaluate effectiveness of treatment  [x] To evaluate medications changes made at office visit.   [] To review diagnostics ordered at last visit.   [] To evaluate response to therapy    [x] For management of controlled substance  [x] Random UDS as indicated by ORT score or if indicated by abberent behaviors

## 2024-01-26 ENCOUNTER — TELEPHONE (OUTPATIENT)
Dept: FAMILY MEDICINE CLINIC | Age: 49
End: 2024-01-26

## 2024-01-26 RX ORDER — TAPENTADOL HYDROCHLORIDE 50 MG/1
50 TABLET, FILM COATED ORAL 2 TIMES DAILY PRN
Qty: 60 TABLET | Refills: 0 | Status: SHIPPED | OUTPATIENT
Start: 2024-01-26 | End: 2024-02-25

## 2024-01-26 NOTE — TELEPHONE ENCOUNTER
----- Message from CLINTON Valentine sent at 1/26/2024 12:03 PM CST -----  Please inform patient results show  Labs look good.   Cholesterol has improved  A1c is 6.3  this is great.

## 2024-01-26 NOTE — TELEPHONE ENCOUNTER
Left message on patient's voicemail with normal results. Asked that patient call office back with any questions or concerns.

## 2024-02-08 ENCOUNTER — TELEPHONE (OUTPATIENT)
Dept: PAIN MANAGEMENT | Age: 49
End: 2024-02-08

## 2024-02-08 DIAGNOSIS — Q79.63 VASCULAR EHLERS-DANLOS SYNDROME: ICD-10-CM

## 2024-02-08 DIAGNOSIS — G89.4 CHRONIC PAIN SYNDROME: ICD-10-CM

## 2024-02-08 DIAGNOSIS — M79.7 FIBROMYALGIA: ICD-10-CM

## 2024-02-08 DIAGNOSIS — G35 MS (MULTIPLE SCLEROSIS) (HCC): ICD-10-CM

## 2024-02-08 DIAGNOSIS — I72.8 SPLENIC ARTERY ANEURYSM (HCC): ICD-10-CM

## 2024-02-08 NOTE — TELEPHONE ENCOUNTER
Patient left message on prescription line.  Call forwarded to nurse line.  Patient Nucynta  mg is still denied by insurance, 50 mg has been approved.  Patient spoke with her insurance.  They continue to decline the long acting medication and told her she needs to try an alternate medication.  Patient is currently taking Nucynta 50 mg twice daily which is supposed to be taken along with the extended release medication.  Patient states she is not sleeping at night and needs to find something to take for her pain.  She has taken Norco 5mg in the past.  She is willing to try whatever the provider can suggest at this time. Routing to Margaret

## 2024-02-09 RX ORDER — HYDROCODONE BITARTRATE AND ACETAMINOPHEN 7.5; 325 MG/1; MG/1
1 TABLET ORAL EVERY 6 HOURS PRN
Qty: 120 TABLET | Refills: 0 | Status: SHIPPED | OUTPATIENT
Start: 2024-02-09 | End: 2024-03-10

## 2024-02-12 ENCOUNTER — TELEPHONE (OUTPATIENT)
Dept: PAIN MANAGEMENT | Age: 49
End: 2024-02-12

## 2024-02-13 ENCOUNTER — TELEPHONE (OUTPATIENT)
Dept: PAIN MANAGEMENT | Age: 49
End: 2024-02-13

## 2024-02-13 NOTE — TELEPHONE ENCOUNTER
Attempted to call Dr. Aguilar regarding Peer to Peer to get long acting nucynta approved through insurance. This has been appealed. Letter of medical necessity has been sent twice. Attempting to get Peer to Peer to get medication approved. Patient has responded very well to short acting nucynta and patient has chronic pain requiring 24 hour coverage and patient needs long acting medication. Insurance has approved short acting at a lower dose and long acting was ordered. Since patient was unable to get long acting medication, she was given a script for Norco to take with short acting as shorting quantity was significantly less that what she was receiving prior to the long acting. Patient has numerous allergies and she has taken norco with no ill side effects. However, now pharmacy is not wanting to given patient the short acting nucynta to help cover her pain until long acting is approved. Electronically signed by CLINTON Darby CNP on 2/13/2024 at 8:02 AM

## 2024-02-14 DIAGNOSIS — G89.4 CHRONIC PAIN SYNDROME: ICD-10-CM

## 2024-02-14 DIAGNOSIS — G35 MS (MULTIPLE SCLEROSIS) (HCC): Primary | ICD-10-CM

## 2024-02-14 NOTE — TELEPHONE ENCOUNTER
Patient called back, Nucynta will always cost her 25% of the cost, the ER will be almost double of the IR and they can not afford it.  Her insurance will cover without a PA Oxymorphone ER, Morphine ER, Dilaudid and Dilaudid ER.  With a PA Xtampza is covered.  Patient stated whatever we think will work best for her, she has had Dilaudid in the past with no issues, pt doesn't feel comfortable with anything too additive. Pt is doing online schooling but is not held to a schedule, she logs on when she is feeling good.  I told patient I would call her when we had an answer.  If you would like to run a PA on any of these before a script is sent in let me know.

## 2024-02-14 NOTE — TELEPHONE ENCOUNTER
I spoke with Pt today to get more information on her Nucynta, Nucynta IR on her new insurance for 60 pills costs over 300.00, with her insurance and  coupon it costs 200.00.  Pt is going to call Nucynta to see if there is any financial assistance available, then pt is going to call her insurance company to see what her costs will be if both Nucynta is covered and what other options she might have for medication for her pain level.  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  I told the patient that [facility] had been calling concerned about her meds and was making suggestions, Patient said it was ok to talk to [facility] in [place] about her medications, they have known her since the start of her illness.

## 2024-02-15 RX ORDER — MORPHINE SULFATE 15 MG/1
15 TABLET, FILM COATED, EXTENDED RELEASE ORAL 2 TIMES DAILY
Qty: 6 TABLET | Refills: 0 | Status: SHIPPED | OUTPATIENT
Start: 2024-02-15 | End: 2024-02-18

## 2024-02-15 NOTE — TELEPHONE ENCOUNTER
Order placed for MS ER 15 mg BID for 3 day supply with 30 days supply routed to Dr. Jennings. Unsure which medication patient will be able to tolerate as all these medications are derivate of opium. If hydrocodone is causing itching than more than likely other medications will as well. Electronically signed by CLINTON Darby CNP on 2/15/2024 at 5:23 PM

## 2024-02-19 DIAGNOSIS — G89.4 CHRONIC PAIN SYNDROME: ICD-10-CM

## 2024-02-19 DIAGNOSIS — G35 MS (MULTIPLE SCLEROSIS) (HCC): ICD-10-CM

## 2024-02-19 RX ORDER — MORPHINE SULFATE 15 MG/1
15 TABLET, FILM COATED, EXTENDED RELEASE ORAL 2 TIMES DAILY
Qty: 60 TABLET | Refills: 0 | Status: SHIPPED | OUTPATIENT
Start: 2024-02-19 | End: 2024-03-20

## 2024-02-21 NOTE — TELEPHONE ENCOUNTER
Received fax from pharmacy requesting refill on pts medication(s). Pt was last seen in office on 1/16/2024  and has a follow up scheduled for Visit date not found. Will send request to  Laila Narvaez  for authorization.     Requested Prescriptions     Pending Prescriptions Disp Refills    rOPINIRole (REQUIP) 0.25 MG tablet [Pharmacy Med Name: rOPINIRole HCl 0.25 MG Oral Tablet] 30 tablet 0     Sig: Take 1 tablet by mouth nightly

## 2024-02-22 ENCOUNTER — TELEPHONE (OUTPATIENT)
Dept: PAIN MANAGEMENT | Age: 49
End: 2024-02-22

## 2024-02-22 RX ORDER — ROPINIROLE 0.25 MG/1
0.25 TABLET, FILM COATED ORAL NIGHTLY
Qty: 30 TABLET | Refills: 0 | Status: SHIPPED | OUTPATIENT
Start: 2024-02-22

## 2024-02-22 NOTE — TELEPHONE ENCOUNTER
Patient called nurse line and stated that she started taking the Morphine and the itching is a lot worse.  It is in the middle of her back and her hands.  Itching is bad enough that she is having difficulty concentrating.  She said that she will try to take it over the weekend and see how it goes.  Sending update to Margaret

## 2024-02-28 ENCOUNTER — TELEPHONE (OUTPATIENT)
Dept: PAIN MANAGEMENT | Age: 49
End: 2024-02-28

## 2024-02-28 NOTE — TELEPHONE ENCOUNTER
Patient left message on prescription line, call forwarded to nurse line.  Patient has tried to take the morphine, and tolerate the itching, but states it has gotten to the point that she has broken places on her skin from scratching.  She states the medication is effective for her pain, but she is unable to tolerate the itching.  Patient states she has stopped the morphine and is taking ibuprofen.  I have spoken with Margaret and let her know.  I called patient back at 1334 and left a voicemail to instruct her that Margaret is uncertain as to what can be tried next, and if insurance will pay for another medication this soon.  I explained to the patient that she may have to wait until closer to the refill date next month to try another medication.  I also let her know that Margaret stated if her pharmacy will take the medication to destroy and she can show documentation that it has been destroyed, she may be able to order something else.  I let patient know to call the nurse line or the prescription line back if she has further questions.

## 2024-03-04 ENCOUNTER — TELEPHONE (OUTPATIENT)
Dept: PAIN MANAGEMENT | Age: 49
End: 2024-03-04

## 2024-03-04 NOTE — TELEPHONE ENCOUNTER
Patient called prescription line.  Call forwarded to nurse line.  Patient states that she is returning her morphine to her pharmacy in [place].  She left message for provider concerning alternate medication.  She stated that she has taken Dilaudid in the past in the hospital, and it was the only medication that she did not have a reaction to.  Her spouse is suggesting that provider try that. Patient has not been able to take any other medication, since her Nucynta was denied by insurance.

## 2024-03-04 NOTE — TELEPHONE ENCOUNTER
Pt called the refill line about her Morphine (fill date of Morphine 02/19/24)  Call was tranferred to Diann since Pt was asking about Dilaudid.

## 2024-03-05 DIAGNOSIS — Q79.63 VASCULAR EHLERS-DANLOS SYNDROME: ICD-10-CM

## 2024-03-05 DIAGNOSIS — G35 MS (MULTIPLE SCLEROSIS) (HCC): Primary | ICD-10-CM

## 2024-03-05 NOTE — TELEPHONE ENCOUNTER
1. MS (multiple sclerosis) (HCC)    2. Vascular Katherine-Danlos syndrome      Requested Prescriptions     Pending Prescriptions Disp Refills    fentaNYL (DURAGESIC) 25 MCG/HR 10 patch 0     Sig: Place 1 patch onto the skin every 3 days for 30 days. Intended supply: 30 days Max Daily Amount: 1 patch     Per conversation with Dr. Jennings, will try patient on fentanyl. Other than Nucynta this is the only medication on that he feels comfortable prescribing for this patient.     Electronically signed by CLINTON Darby CNP on 3/5/2024 at 1:46 PM

## 2024-03-05 NOTE — TELEPHONE ENCOUNTER
Called patient and let her know that Dr. Jennings does not want to prescribe Dilaudid due to the highly addictive nature of the medication, and patient has expressed she does not want to take something that is addictive.  I explained to her that he will send a script for Fentanyl, and prescription staff will work on a PA.  Patient thanked provider for attempting to address the issue.  She stated that she has been taking Ibuprofen and it is helping however, she is afraid her blood is becoming too thin.  She had some bleeding at her infusion site that was difficult to control.  I let her know that we will be in contact with her.  She also stated that she has returned her morphine to the pharmacy, and the pharmacist will be calling the office to give us the pill count that was turned in, and account for the disposal of the medication.

## 2024-03-07 ENCOUNTER — PATIENT MESSAGE (OUTPATIENT)
Dept: FAMILY MEDICINE CLINIC | Age: 49
End: 2024-03-07

## 2024-03-07 ENCOUNTER — TELEPHONE (OUTPATIENT)
Dept: PAIN MANAGEMENT | Age: 49
End: 2024-03-07

## 2024-03-07 RX ORDER — FENTANYL 25 UG/1
1 PATCH TRANSDERMAL
Qty: 10 PATCH | Refills: 0 | Status: SHIPPED | OUTPATIENT
Start: 2024-03-07 | End: 2024-04-06

## 2024-03-07 NOTE — TELEPHONE ENCOUNTER
From: Mago Terrell  To: Laila Narvaez  Sent: 3/7/2024 3:08 PM CST  Subject: Effexor    Carol Ulloa. My script for effector is . Can you send in a new one to [facility] pharmacy in [facility], or do I need to see you first?

## 2024-03-08 RX ORDER — VENLAFAXINE HYDROCHLORIDE 150 MG/1
150 CAPSULE, EXTENDED RELEASE ORAL DAILY
Qty: 90 CAPSULE | Refills: 3 | Status: SHIPPED | OUTPATIENT
Start: 2024-03-08

## 2024-03-11 RX ORDER — LEVALBUTEROL INHALATION SOLUTION 0.63 MG/3ML
1 SOLUTION RESPIRATORY (INHALATION) EVERY 4 HOURS PRN
Qty: 300 ML | Refills: 3 | Status: SHIPPED | OUTPATIENT
Start: 2024-03-11

## 2024-03-11 NOTE — TELEPHONE ENCOUNTER
Received fax from pharmacy requesting refill on pts medication(s). Pt was last seen in office on 1/16/2024  and has a follow up scheduled for Visit date not found. Will send request to  Laila Narvaez  for authorization.     Requested Prescriptions     Pending Prescriptions Disp Refills    levalbuterol (XOPENEX) 0.63 MG/3ML nebulization [Pharmacy Med Name: Levalbuterol HCl 0.63 MG/3ML Inhalation Nebulization Solution] 300 mL 3     Sig: Take 3 mLs by nebulization every 4 hours as needed for Wheezing

## 2024-03-26 RX ORDER — ROPINIROLE 0.25 MG/1
0.25 TABLET, FILM COATED ORAL NIGHTLY
Qty: 30 TABLET | Refills: 0 | Status: SHIPPED | OUTPATIENT
Start: 2024-03-26

## 2024-03-26 NOTE — TELEPHONE ENCOUNTER
Received fax from pharmacy requesting refill on pts medication(s). Pt was last seen in office on 1/16/2024  and has a follow up scheduled for Visit date not found. Will send request to  Zulay Leigh  for authorization.     Requested Prescriptions     Pending Prescriptions Disp Refills    rOPINIRole (REQUIP) 0.25 MG tablet [Pharmacy Med Name: rOPINIRole HCl 0.25 MG Oral Tablet] 30 tablet 0     Sig: Take 1 tablet by mouth nightly

## 2024-03-28 DIAGNOSIS — E53.8 VITAMIN B12 DEFICIENCY: ICD-10-CM

## 2024-03-28 DIAGNOSIS — R53.82 CHRONIC FATIGUE: ICD-10-CM

## 2024-03-28 RX ORDER — CYANOCOBALAMIN 1000 UG/ML
INJECTION, SOLUTION INTRAMUSCULAR; SUBCUTANEOUS
Qty: 1 ML | Refills: 0 | Status: SHIPPED | OUTPATIENT
Start: 2024-03-28

## 2024-03-28 NOTE — TELEPHONE ENCOUNTER
Received fax from pharmacy requesting refill on pts medication(s). Pt was last seen in office on 1/16/2024  and has a follow up scheduled for Visit date not found. Will send request to  Laila Narvaez  for authorization.     Requested Prescriptions     Pending Prescriptions Disp Refills    cyanocobalamin 1000 MCG/ML injection [Pharmacy Med Name: Cyanocobalamin 1000 MCG/ML Injection Solution] 1 mL 0     Sig: INJECT 1 ML INTRAMUSCULARLY AS DIRECTED MONTHLY

## 2024-04-03 DIAGNOSIS — Q79.63 VASCULAR EHLERS-DANLOS SYNDROME: ICD-10-CM

## 2024-04-03 DIAGNOSIS — G35 MS (MULTIPLE SCLEROSIS) (HCC): ICD-10-CM

## 2024-04-03 RX ORDER — FENTANYL 25 UG/1
1 PATCH TRANSDERMAL
Qty: 10 PATCH | Refills: 0 | Status: SHIPPED | OUTPATIENT
Start: 2024-04-07 | End: 2024-05-07

## 2024-04-05 ENCOUNTER — TELEPHONE (OUTPATIENT)
Dept: FAMILY MEDICINE CLINIC | Age: 49
End: 2024-04-05

## 2024-04-05 NOTE — TELEPHONE ENCOUNTER
Outcome  Approved today  Request Reference Number: [id number]. MOUNJARO INJ 10MG/0.5 is approved through 04/05/2025. Your patient may now fill this prescription and it will be covered.  Authorization Expiration Date: 4/5/2025  Drug  Mounjaro 10MG/0.5ML pen-injectors    Form  [employer] Electronic Prior Authorization Form (2017 [employer])  Original Claim Info  [id number]

## 2024-04-08 DIAGNOSIS — E11.69 TYPE 2 DIABETES MELLITUS WITH OTHER SPECIFIED COMPLICATION, WITHOUT LONG-TERM CURRENT USE OF INSULIN (HCC): ICD-10-CM

## 2024-04-08 DIAGNOSIS — E78.2 MIXED HYPERLIPIDEMIA: ICD-10-CM

## 2024-04-08 RX ORDER — ROSUVASTATIN CALCIUM 10 MG/1
10 TABLET, COATED ORAL NIGHTLY
Qty: 60 TABLET | Refills: 0 | Status: SHIPPED | OUTPATIENT
Start: 2024-04-08

## 2024-04-08 NOTE — TELEPHONE ENCOUNTER
Received fax from pharmacy requesting refill on pts medication(s). Pt was last seen in office on 1/16/2024  and has a follow up scheduled for Visit date not found. Will send request to  Laila Narvaez  for authorization.     Requested Prescriptions     Pending Prescriptions Disp Refills    rosuvastatin (CRESTOR) 10 MG tablet [Pharmacy Med Name: Rosuvastatin Calcium 10 MG Oral Tablet] 60 tablet 0     Sig: Take 1 tablet by mouth nightly

## 2024-04-15 ENCOUNTER — HOSPITAL ENCOUNTER (OUTPATIENT)
Dept: PAIN MANAGEMENT | Age: 49
Discharge: HOME | End: 2024-04-15
Payer: COMMERCIAL

## 2024-04-15 VITALS
SYSTOLIC BLOOD PRESSURE: 113 MMHG | DIASTOLIC BLOOD PRESSURE: 71 MMHG | OXYGEN SATURATION: 100 % | TEMPERATURE: 97.7 F | HEART RATE: 96 BPM | RESPIRATION RATE: 16 BRPM

## 2024-04-15 PROCEDURE — 99213 OFFICE O/P EST LOW 20 MIN: CPT | Performed by: NURSE PRACTITIONER

## 2024-04-15 PROCEDURE — 99214 OFFICE O/P EST MOD 30 MIN: CPT

## 2024-04-15 PROCEDURE — 99213 OFFICE O/P EST LOW 20 MIN: CPT

## 2024-04-15 ASSESSMENT — ENCOUNTER SYMPTOMS
CONSTIPATION: 0
BACK PAIN: 0

## 2024-04-15 ASSESSMENT — PAIN DESCRIPTION - FREQUENCY: FREQUENCY: CONTINUOUS

## 2024-04-15 ASSESSMENT — PAIN SCALES - GENERAL: PAINLEVEL_OUTOF10: 3

## 2024-04-15 ASSESSMENT — PAIN DESCRIPTION - LOCATION: LOCATION: GENERALIZED

## 2024-04-15 ASSESSMENT — PAIN DESCRIPTION - PAIN TYPE: TYPE: CHRONIC PAIN

## 2024-04-15 ASSESSMENT — PAIN DESCRIPTION - ONSET: ONSET: ON-GOING

## 2024-04-15 ASSESSMENT — PAIN DESCRIPTION - DESCRIPTORS: DESCRIPTORS: ACHING

## 2024-04-15 NOTE — PROGRESS NOTES
Clinic Documentation      Education Provided:  [x] Review of Arun  [] Agreement Review  [x] PEG Score Calculated [] PHQ Score Calculated [] ORT Score Calculated    [] Compliance Issues Discussed [] Cognitive Behavior Needs [x] Exercise [] Review of Test [] Financial Issues  [x] Tobacco/Alcohol Use Reviewed [x] Teaching [] New Patient [] Picture Obtained    Physician Plan:  [] Outgoing Referral  [] Pharmacy Consult  [x] Test Ordered [] Prescription Ordered/Changed   [] Obtained Test Results / Consult Notes        Complete if patient is withholding blood thinner for procedure     Blood Thinner Patient is currently taking:      [] Plavix (Hold for 7 days)  [] Aspirin (Hold for 5 days)     [] Pletal (Hold for 2 days)  [] Pradaxa (Hold for 3 days)    [] Effient (Hold for 7 days)  [] Xarelto (Hold for 2 days)    [] Eliquis (Hold for 2 days)  [] Brilinta (Hold for 7 days)    [] Coumadin (Hold for 5 days) - (INR needs to be drawn the day prior to procedure- INR < 2.0)    [] Aggrenox (Hold for 7 days)        [] Patient will stop medication on their own.    [] Blood Thinner Form Faxed for approval to hold.   Provider form faxed to:     Assessment Completed by:  Electronically signed by Cherelle Abdalla RN on 4/15/2024 at 9:11 AM  
   Depression     Diabetes ([facility])     Headache(784.0)     Hypogammaglobulinemia ([facility])     Indigestion     Insomnia     MGUS (monoclonal gammopathy of unknown significance)     Multiple sclerosis ([facility])     Neuropathy     PVD (peripheral vascular disease) ([facility]) 2023    Sleep disturbance     Splenic artery aneurysm ([facility])     Urinary incontinence     Vascular Katherine-Danlos syndrome 2023       Allergies  Ampicillin, Penicillins, Albuterol, Other, Biaxin [clarithromycin], Flagyl [metronidazole], Penicillin g, Saxenda [liraglutide -weight management], Doxycycline, and Morphine    Current Medications  Current Outpatient Medications   Medication Sig Dispense Refill    rosuvastatin (CRESTOR) 10 MG tablet Take 1 tablet by mouth nightly 60 tablet 0    fentaNYL (DURAGESIC) 25 MCG/HR Place 1 patch onto the skin every 3 days for 30 days. May fill 04/07/24 Max Daily Amount: 1 patch 10 patch 0    cyanocobalamin 1000 MCG/ML injection INJECT 1 ML INTRAMUSCULARLY AS DIRECTED MONTHLY 1 mL 0    rOPINIRole (REQUIP) 0.25 MG tablet Take 1 tablet by mouth nightly 30 tablet 0    levalbuterol (XOPENEX) 0.63 MG/3ML nebulization Take 3 mLs by nebulization every 4 hours as needed for Wheezing 300 mL 3    venlafaxine (EFFEXOR XR) 150 MG extended release capsule Take 1 capsule by mouth daily 90 capsule 3    insulin glargine (LANTUS;BASAGLAR) 100 UNIT/ML injection pen 10 Units at bedtime PRN for bs >300      naloxone 4 MG/0.1ML LIQD nasal spray       prochlorperazine (COMPAZINE) 5 MG tablet Take 1 tablet by mouth every 6 hours as needed for Nausea 120 tablet 3    Tirzepatide (MOUNJARO) 10 MG/0.5ML SOPN SC injection Inject 0.5 mLs into the skin once a week 6 mL 1    Armodafinil 250 MG TABS Take 1 tablet by mouth daily for 90 days. Max Daily Amount: 1 tablet 30 tablet 2    REXULTI 0.5 MG TABS tablet TAKE 1 TABLET BY MOUTH AT BEDTIME 30 tablet 11    vitamin D (ERGOCALCIFEROL) 1.25 MG (73459 UT) CAPS capsule Take 1 capsule by mouth once a week 12 capsule

## 2024-04-22 RX ORDER — ROPINIROLE 0.25 MG/1
0.25 TABLET, FILM COATED ORAL NIGHTLY
Qty: 30 TABLET | Refills: 0 | Status: SHIPPED | OUTPATIENT
Start: 2024-04-22

## 2024-04-29 DIAGNOSIS — J30.1 SEASONAL ALLERGIC RHINITIS DUE TO POLLEN: ICD-10-CM

## 2024-04-29 DIAGNOSIS — G47.00 INSOMNIA, UNSPECIFIED TYPE: ICD-10-CM

## 2024-04-29 RX ORDER — DOXEPIN HYDROCHLORIDE 50 MG/1
50 CAPSULE ORAL NIGHTLY
Qty: 90 CAPSULE | Refills: 0 | OUTPATIENT
Start: 2024-04-29 | End: 2025-04-29

## 2024-04-29 RX ORDER — MONTELUKAST SODIUM 10 MG/1
10 TABLET ORAL NIGHTLY
Qty: 90 TABLET | Refills: 0 | Status: SHIPPED | OUTPATIENT
Start: 2024-04-29

## 2024-04-29 NOTE — TELEPHONE ENCOUNTER
Received fax from pharmacy requesting refill on pts medication(s). Pt was last seen in office on 1/16/2024  and has a follow up scheduled for Visit date not found. Will send request to  Laila Narvaez  for authorization.     Requested Prescriptions     Pending Prescriptions Disp Refills    montelukast (SINGULAIR) 10 MG tablet [Pharmacy Med Name: Montelukast Sodium 10 MG Oral Tablet] 90 tablet 0     Sig: TAKE 1 TABLET BY MOUTH NIGHTLY WITH  XYZAL  FOR  ALLERGIES     Refused Prescriptions Disp Refills    doxepin (SINEQUAN) 50 MG capsule [Pharmacy Med Name: Doxepin HCl 50 MG Oral Capsule] 90 capsule 0     Sig: Take 1 capsule by mouth nightly

## 2024-05-01 DIAGNOSIS — G47.00 INSOMNIA, UNSPECIFIED TYPE: ICD-10-CM

## 2024-05-01 NOTE — TELEPHONE ENCOUNTER
Received fax from pharmacy requesting refill on pts medication(s). Pt was last seen in office on 1/16/2024  and has a follow up scheduled for Visit date not found. Will send request to  Laila Narvaez  for patient.     Requested Prescriptions     Pending Prescriptions Disp Refills    doxepin (SINEQUAN) 50 MG capsule [Pharmacy Med Name: Doxepin HCl 50 MG Oral Capsule] 90 capsule 3     Sig: TAKE 1 CAPSULE BY MOUTH NIGHTLY

## 2024-05-02 DIAGNOSIS — G35 MS (MULTIPLE SCLEROSIS) (HCC): ICD-10-CM

## 2024-05-02 DIAGNOSIS — Q79.63 VASCULAR EHLERS-DANLOS SYNDROME: ICD-10-CM

## 2024-05-02 RX ORDER — DOXEPIN HYDROCHLORIDE 50 MG/1
50 CAPSULE ORAL NIGHTLY
Qty: 90 CAPSULE | Refills: 3 | Status: SHIPPED | OUTPATIENT
Start: 2024-05-02 | End: 2025-05-02

## 2024-05-03 RX ORDER — FENTANYL 25 UG/1
1 PATCH TRANSDERMAL
Qty: 10 PATCH | Refills: 0 | Status: SHIPPED | OUTPATIENT
Start: 2024-05-09 | End: 2024-06-08

## 2024-05-07 DIAGNOSIS — E11.69 TYPE 2 DIABETES MELLITUS WITH OTHER SPECIFIED COMPLICATION, WITHOUT LONG-TERM CURRENT USE OF INSULIN (HCC): ICD-10-CM

## 2024-05-07 RX ORDER — LISINOPRIL 5 MG/1
5 TABLET ORAL DAILY
Qty: 90 TABLET | Refills: 3 | Status: SHIPPED | OUTPATIENT
Start: 2024-05-07

## 2024-05-07 NOTE — TELEPHONE ENCOUNTER
Good morning. Would you please send in a refill request for Mago’s lisinopril.      Thanks!     Raman

## 2024-05-13 ENCOUNTER — TELEPHONE (OUTPATIENT)
Dept: PAIN MANAGEMENT | Age: 49
End: 2024-05-13

## 2024-05-13 NOTE — TELEPHONE ENCOUNTER
Patient called nurse line regarding her pain medication.  She stated that at her office visit she felt that everything was fine with the fentanyl patch.  Now she has begun having breakthrough pain.  She mentioned that she was at [employer] and had sharp pain resulting from only a handshake.  She has been taking Ibuprofen two tablets twice daily.  She also reports loss of sleep due to pain.  She would like to let the provider know.  Routing message to Margaret.

## 2024-05-14 ENCOUNTER — TELEPHONE (OUTPATIENT)
Dept: PAIN MANAGEMENT | Age: 49
End: 2024-05-14

## 2024-05-14 DIAGNOSIS — Q79.63 VASCULAR EHLERS-DANLOS SYNDROME: ICD-10-CM

## 2024-05-14 DIAGNOSIS — G35 MS (MULTIPLE SCLEROSIS) (HCC): ICD-10-CM

## 2024-05-14 NOTE — TELEPHONE ENCOUNTER
Attempted to call patient to let her know that provider is increasing her pain patch to next strength with her next refill.  She did not answer, and voicemail is currently full.

## 2024-05-15 ENCOUNTER — TELEPHONE (OUTPATIENT)
Dept: FAMILY MEDICINE CLINIC | Age: 49
End: 2024-05-15

## 2024-05-15 NOTE — TELEPHONE ENCOUNTER
Received a prescription request for the patients medications to be changed:    Farxiga to Stegalatro     Rexulti to Risperidone or Quetiapine      Is this something that you want or would you like the patient to stay on there meds as they are ??

## 2024-05-15 NOTE — TELEPHONE ENCOUNTER
Call to patient.  Was able to leave a voice mail regarding pain patch increase.  Left number for nurse line if any questions.

## 2024-05-16 DIAGNOSIS — E55.9 VITAMIN D DEFICIENCY: ICD-10-CM

## 2024-05-16 RX ORDER — ROPINIROLE 0.25 MG/1
0.25 TABLET, FILM COATED ORAL NIGHTLY
Qty: 30 TABLET | Refills: 11 | Status: SHIPPED | OUTPATIENT
Start: 2024-05-16

## 2024-05-16 RX ORDER — ERGOCALCIFEROL 1.25 MG/1
50000 CAPSULE ORAL WEEKLY
Qty: 17 CAPSULE | Refills: 2 | Status: SHIPPED | OUTPATIENT
Start: 2024-05-16

## 2024-05-16 NOTE — TELEPHONE ENCOUNTER
Received fax from pharmacy requesting refill on pts medication(s). Pt was last seen in office on 1/16/2024  and has a follow up scheduled for Visit date not found. Will send request to  Laila Narvaez  for authorization.     Requested Prescriptions     Pending Prescriptions Disp Refills    vitamin D (ERGOCALCIFEROL) 1.25 MG (59181 UT) CAPS capsule [Pharmacy Med Name: Vitamin D (Ergocalciferol) 1.25 MG (75556 UT) Oral Capsule] 17 capsule 0     Sig: Take 1 capsule by mouth once a week    rOPINIRole (REQUIP) 0.25 MG tablet [Pharmacy Med Name: rOPINIRole HCl 0.25 MG Oral Tablet] 30 tablet 0     Sig: Take 1 tablet by mouth nightly

## 2024-05-16 NOTE — TELEPHONE ENCOUNTER
Aurora did you do the pa on rexulti?        See note below  Changing Farxiga to Steglatro would be fine but I do not want to change Rexulti to the other 2 this will need a med PA

## 2024-05-17 RX ORDER — ERTUGLIFLOZIN 5 MG/1
5 TABLET, FILM COATED ORAL DAILY
Qty: 30 TABLET | Refills: 5 | Status: SHIPPED | OUTPATIENT
Start: 2024-05-17

## 2024-05-17 RX ORDER — FENTANYL 50 UG/1
1 PATCH TRANSDERMAL
Qty: 10 PATCH | Refills: 0 | Status: SHIPPED | OUTPATIENT
Start: 2024-05-31 | End: 2024-06-30

## 2024-05-28 DIAGNOSIS — E78.2 MIXED HYPERLIPIDEMIA: ICD-10-CM

## 2024-05-28 DIAGNOSIS — E11.69 TYPE 2 DIABETES MELLITUS WITH OTHER SPECIFIED COMPLICATION, WITHOUT LONG-TERM CURRENT USE OF INSULIN (HCC): ICD-10-CM

## 2024-05-28 RX ORDER — ROSUVASTATIN CALCIUM 10 MG/1
10 TABLET, COATED ORAL NIGHTLY
Qty: 60 TABLET | Refills: 5 | Status: SHIPPED | OUTPATIENT
Start: 2024-05-28

## 2024-05-30 ENCOUNTER — TELEPHONE (OUTPATIENT)
Dept: FAMILY MEDICINE CLINIC | Age: 49
End: 2024-05-30

## 2024-05-30 NOTE — TELEPHONE ENCOUNTER
Patient called stating she has always taken rexulti - her insurance no longer will cover this she states it will be $700 +     She needs an alternative, but she is concerned due to this stabilizing her mood so well

## 2024-05-30 NOTE — TELEPHONE ENCOUNTER
Will you call the pharmacy and see if this is bc she needs a pa on the medication or if it is this high due to a deductable

## 2024-05-31 NOTE — TELEPHONE ENCOUNTER
Called pts pharmacy, they stated that rexulti was ran through insurance but was not covered. I asked if a PA was needed and they said no due to it going through and just not being covered? I went ahead and ran a PA on this medication to see if it would change anything.

## 2024-05-31 NOTE — TELEPHONE ENCOUNTER
We did a PA. Insurance is allowing the medication but she has a very high deductible plan and it looks like the meds wont be paid for until that deductible is made. Now that she is not working maybe we can taper this off.  We do have samples here if she wants to try to wean off the medication and just see how it goes.

## 2024-06-10 ENCOUNTER — TELEPHONE (OUTPATIENT)
Dept: FAMILY MEDICINE CLINIC | Age: 49
End: 2024-06-10

## 2024-06-10 NOTE — TELEPHONE ENCOUNTER
Patient called stating her medication Rexulti 0.5mg is not covered by insurance even after a prior authorization will be over $700. Pt asking if there is anything similar?

## 2024-06-10 NOTE — TELEPHONE ENCOUNTER
Attempted to call pts to explain to her that Laila is wanting her to wean off the rexulti to see if she still needs it.  We can do this with samples if needed. If pts still needs a medication of the sort, Laila Narvaez will find alternative for this medication.

## 2024-06-11 NOTE — TELEPHONE ENCOUNTER
Tried calling pts to inform her of emilys requests again this morning. Still unable to reach the pts.

## 2024-06-12 NOTE — TELEPHONE ENCOUNTER
Patient called back I made her aware I have samples for her to  and she is scheduled to see ANDERSON Narvaez 6/18/24     Lot #[id number]  Exp 04/2026    2 samples0

## 2024-06-18 ENCOUNTER — OFFICE VISIT (OUTPATIENT)
Dept: FAMILY MEDICINE CLINIC | Age: 49
End: 2024-06-18
Payer: COMMERCIAL

## 2024-06-18 VITALS
TEMPERATURE: 98.2 F | WEIGHT: 172 LBS | BODY MASS INDEX: 29.52 KG/M2 | HEART RATE: 82 BPM | SYSTOLIC BLOOD PRESSURE: 128 MMHG | OXYGEN SATURATION: 98 % | DIASTOLIC BLOOD PRESSURE: 70 MMHG

## 2024-06-18 DIAGNOSIS — F33.1 MODERATE EPISODE OF RECURRENT MAJOR DEPRESSIVE DISORDER (HCC): ICD-10-CM

## 2024-06-18 DIAGNOSIS — Z79.4 TYPE 2 DIABETES MELLITUS WITH OTHER SPECIFIED COMPLICATION, WITH LONG-TERM CURRENT USE OF INSULIN (HCC): ICD-10-CM

## 2024-06-18 DIAGNOSIS — E55.9 VITAMIN D DEFICIENCY: ICD-10-CM

## 2024-06-18 DIAGNOSIS — E11.69 TYPE 2 DIABETES MELLITUS WITH OTHER SPECIFIED COMPLICATION, WITH LONG-TERM CURRENT USE OF INSULIN (HCC): Primary | ICD-10-CM

## 2024-06-18 DIAGNOSIS — Z79.4 TYPE 2 DIABETES MELLITUS WITH OTHER SPECIFIED COMPLICATION, WITH LONG-TERM CURRENT USE OF INSULIN (HCC): Primary | ICD-10-CM

## 2024-06-18 DIAGNOSIS — E11.69 TYPE 2 DIABETES MELLITUS WITH OTHER SPECIFIED COMPLICATION, WITH LONG-TERM CURRENT USE OF INSULIN (HCC): ICD-10-CM

## 2024-06-18 LAB
25(OH)D3 SERPL-MCNC: 49.3 NG/ML
ALBUMIN SERPL-MCNC: 4.3 G/DL (ref 3.5–5.2)
ALP SERPL-CCNC: 123 U/L (ref 35–104)
ALT SERPL-CCNC: 16 U/L (ref 5–33)
ANION GAP SERPL CALCULATED.3IONS-SCNC: 19 MMOL/L (ref 7–19)
AST SERPL-CCNC: 22 U/L (ref 5–32)
BASOPHILS # BLD: 0 K/UL (ref 0–0.2)
BASOPHILS NFR BLD: 0.8 % (ref 0–1)
BILIRUB SERPL-MCNC: 0.5 MG/DL (ref 0.2–1.2)
BILIRUB UR QL STRIP: NEGATIVE
BUN SERPL-MCNC: 10 MG/DL (ref 6–20)
CALCIUM SERPL-MCNC: 9.4 MG/DL (ref 8.6–10)
CHLORIDE SERPL-SCNC: 102 MMOL/L (ref 98–111)
CLARITY UR: CLEAR
CO2 SERPL-SCNC: 22 MMOL/L (ref 22–29)
COLOR UR: YELLOW
CREAT SERPL-MCNC: 0.7 MG/DL (ref 0.5–0.9)
EOSINOPHIL # BLD: 0.4 K/UL (ref 0–0.6)
EOSINOPHIL NFR BLD: 6.8 % (ref 0–5)
ERYTHROCYTE [DISTWIDTH] IN BLOOD BY AUTOMATED COUNT: 12.1 % (ref 11.5–14.5)
GLUCOSE SERPL-MCNC: 138 MG/DL (ref 74–109)
GLUCOSE UR STRIP.AUTO-MCNC: =>1000 MG/DL
HBA1C MFR BLD: 6.2 % (ref 4–6)
HCT VFR BLD AUTO: 42.9 % (ref 37–47)
HGB BLD-MCNC: 13.7 G/DL (ref 12–16)
HGB UR STRIP.AUTO-MCNC: NEGATIVE MG/L
IMM GRANULOCYTES # BLD: 0 K/UL
KETONES UR STRIP.AUTO-MCNC: NEGATIVE MG/DL
LEUKOCYTE ESTERASE UR QL STRIP.AUTO: NEGATIVE
LYMPHOCYTES # BLD: 1.7 K/UL (ref 1.1–4.5)
LYMPHOCYTES NFR BLD: 32.5 % (ref 20–40)
MCH RBC QN AUTO: 28.7 PG (ref 27–31)
MCHC RBC AUTO-ENTMCNC: 31.9 G/DL (ref 33–37)
MCV RBC AUTO: 89.9 FL (ref 81–99)
MONOCYTES # BLD: 0.6 K/UL (ref 0–0.9)
MONOCYTES NFR BLD: 11.7 % (ref 0–10)
NEUTROPHILS # BLD: 2.5 K/UL (ref 1.5–7.5)
NEUTS SEG NFR BLD: 48 % (ref 50–65)
NITRITE UR QL STRIP.AUTO: NEGATIVE
PH UR STRIP.AUTO: 5.5 [PH] (ref 5–8)
PLATELET # BLD AUTO: 258 K/UL (ref 130–400)
PMV BLD AUTO: 11 FL (ref 9.4–12.3)
POTASSIUM SERPL-SCNC: 4 MMOL/L (ref 3.5–5)
PROT SERPL-MCNC: 6.7 G/DL (ref 6.6–8.7)
PROT UR STRIP.AUTO-MCNC: NEGATIVE MG/DL
RBC # BLD AUTO: 4.77 M/UL (ref 4.2–5.4)
SODIUM SERPL-SCNC: 143 MMOL/L (ref 136–145)
SP GR UR STRIP.AUTO: 1.04 (ref 1–1.03)
T4 FREE SERPL-MCNC: 0.8 NG/DL (ref 0.93–1.7)
TSH SERPL DL<=0.005 MIU/L-ACNC: 3.22 UIU/ML (ref 0.27–4.2)
UROBILINOGEN UR STRIP.AUTO-MCNC: 0.2 E.U./DL
VIT B12 SERPL-MCNC: 458 PG/ML (ref 211–946)
WBC # BLD AUTO: 5.1 K/UL (ref 4.8–10.8)

## 2024-06-18 PROCEDURE — 99214 OFFICE O/P EST MOD 30 MIN: CPT | Performed by: NURSE PRACTITIONER

## 2024-06-18 PROCEDURE — 3044F HG A1C LEVEL LT 7.0%: CPT | Performed by: NURSE PRACTITIONER

## 2024-06-18 RX ORDER — ACYCLOVIR 400 MG/1
TABLET ORAL
Qty: 3 EACH | Refills: 11 | Status: SHIPPED | OUTPATIENT
Start: 2024-06-18

## 2024-06-18 RX ORDER — ACYCLOVIR 400 MG/1
TABLET ORAL
Qty: 1 EACH | Refills: 0 | Status: SHIPPED | OUTPATIENT
Start: 2024-06-18

## 2024-06-18 RX ORDER — FLUTICASONE PROPIONATE 50 MCG
2 SPRAY, SUSPENSION (ML) NASAL DAILY
Qty: 16 G | Refills: 0 | Status: SHIPPED | OUTPATIENT
Start: 2024-06-18

## 2024-06-18 RX ORDER — AMANTADINE HYDROCHLORIDE 100 MG/1
100 TABLET ORAL DAILY
COMMUNITY
Start: 2024-06-17

## 2024-06-18 SDOH — ECONOMIC STABILITY: FOOD INSECURITY: WITHIN THE PAST 12 MONTHS, YOU WORRIED THAT YOUR FOOD WOULD RUN OUT BEFORE YOU GOT MONEY TO BUY MORE.: SOMETIMES TRUE

## 2024-06-18 SDOH — ECONOMIC STABILITY: INCOME INSECURITY: HOW HARD IS IT FOR YOU TO PAY FOR THE VERY BASICS LIKE FOOD, HOUSING, MEDICAL CARE, AND HEATING?: SOMEWHAT HARD

## 2024-06-18 SDOH — ECONOMIC STABILITY: FOOD INSECURITY: WITHIN THE PAST 12 MONTHS, THE FOOD YOU BOUGHT JUST DIDN'T LAST AND YOU DIDN'T HAVE MONEY TO GET MORE.: SOMETIMES TRUE

## 2024-06-18 ASSESSMENT — ENCOUNTER SYMPTOMS
BLOOD IN STOOL: 0
COUGH: 0
CONSTIPATION: 0
ABDOMINAL PAIN: 0
EYE REDNESS: 0
TROUBLE SWALLOWING: 0
SORE THROAT: 0
DIARRHEA: 0
EYE DISCHARGE: 0
WHEEZING: 0
RHINORRHEA: 0

## 2024-06-18 NOTE — PROGRESS NOTES
Mago Terrell (:  1975) is a 49 y.o. female,Established patient, here for evaluation of the following chief complaint(s):  Discuss Medications (Pts is here to discuss medication.)      ASSESSMENT/PLAN:    ICD-10-CM    1. Type 2 diabetes mellitus with other specified complication, with long-term current use of insulin (HCC)  E11.69 Continuous Glucose  (DEXCOM G7 ) KYLEE    Z79.4 Continuous Glucose Sensor (DEXCOM G7 SENSOR) MISC     CBC with Auto Differential     Comprehensive Metabolic Panel     Hemoglobin A1C     TSH     T4, Free     Vitamin B12     Urinalysis with Reflex to Culture      2. Vitamin D deficiency  E55.9 Vitamin D 25 Hydroxy      3. Moderate episode of recurrent major depressive disorder (HCC)  F33.1         If moods worsen will off rexulti. Will add abilify.     Return in about 3 months (around 2024) for diabetes.    SUBJECTIVE/OBJECTIVE:  HPI  Tired all the time. Talked to MS doctor and she wants to add amantadine     Depression  Insurance isn't wanting to cover rexulti. She has been stable on this . She isn't working at the same job any more. So will wean off this.     Diabetes  Insurance changed farxiga to steglatro. She has been taking this. Stopped using her dexcom because it was going off all the time.     Review of Systems   Constitutional:  Positive for fatigue. Negative for appetite change and unexpected weight change.   HENT:  Negative for congestion, rhinorrhea, sore throat and trouble swallowing.    Eyes:  Negative for discharge and redness.   Respiratory:  Negative for cough and wheezing.    Cardiovascular:  Negative for chest pain.   Gastrointestinal:  Negative for abdominal pain, blood in stool, constipation and diarrhea.   Genitourinary:  Negative for dysuria.   Musculoskeletal:  Positive for arthralgias and myalgias.   Skin:  Negative for rash.   Neurological:  Negative for weakness.   Hematological:  Negative for adenopathy.       /70 (Site: Right

## 2024-06-19 ENCOUNTER — TELEPHONE (OUTPATIENT)
Dept: FAMILY MEDICINE CLINIC | Age: 49
End: 2024-06-19

## 2024-06-19 NOTE — TELEPHONE ENCOUNTER
----- Message from CLINTON Valentine sent at 6/18/2024  4:55 PM CDT -----  Please inform patient results show  CMP is normal this is your liver and kidney function as well as electrolytes.  Glucose is mildly elevated at 138  But A1c is 6.2 . You are doing great  Cbc is normal  B12 and vit d are normal  Tsh is in normal range

## 2024-06-20 NOTE — TELEPHONE ENCOUNTER
Called patient, spoke with: Patient regarding the results of the patients most recent results.  I advised Patient of Laila Narvaez recommendations.   Patient did voice understanding

## 2024-06-21 DIAGNOSIS — G35 MS (MULTIPLE SCLEROSIS) (HCC): Primary | ICD-10-CM

## 2024-06-21 DIAGNOSIS — G35 MULTIPLE SCLEROSIS (HCC): ICD-10-CM

## 2024-06-21 DIAGNOSIS — R53.82 CHRONIC FATIGUE: ICD-10-CM

## 2024-06-21 RX ORDER — ARMODAFINIL 250 MG/1
TABLET ORAL
Qty: 30 TABLET | Refills: 0 | OUTPATIENT
Start: 2024-06-21

## 2024-06-21 NOTE — TELEPHONE ENCOUNTER
Patient is requesting a refill of armodafinil. Patient was last seen on 6/18/2024 and has appointment on 9/19/2024. Medication was last sent in on 1/16/2024 for #30 and 2 rf.

## 2024-06-24 RX ORDER — ARMODAFINIL 250 MG/1
1 TABLET ORAL DAILY
Qty: 30 TABLET | Refills: 2 | Status: SHIPPED | OUTPATIENT
Start: 2024-06-24 | End: 2024-09-22

## 2024-07-02 ENCOUNTER — HOSPITAL ENCOUNTER (OUTPATIENT)
Dept: PAIN MANAGEMENT | Age: 49
Discharge: HOME | End: 2024-07-02
Payer: COMMERCIAL

## 2024-07-02 VITALS
BODY MASS INDEX: 28.51 KG/M2 | OXYGEN SATURATION: 95 % | RESPIRATION RATE: 16 BRPM | HEIGHT: 64 IN | HEART RATE: 80 BPM | DIASTOLIC BLOOD PRESSURE: 65 MMHG | WEIGHT: 167 LBS | SYSTOLIC BLOOD PRESSURE: 107 MMHG | TEMPERATURE: 97.7 F

## 2024-07-02 DIAGNOSIS — M79.7 FIBROMYALGIA: ICD-10-CM

## 2024-07-02 DIAGNOSIS — G89.4 CHRONIC PAIN SYNDROME: Primary | ICD-10-CM

## 2024-07-02 PROCEDURE — 99214 OFFICE O/P EST MOD 30 MIN: CPT

## 2024-07-02 PROCEDURE — 99213 OFFICE O/P EST LOW 20 MIN: CPT

## 2024-07-02 RX ORDER — METOPROLOL SUCCINATE 25 MG/1
25 TABLET, EXTENDED RELEASE ORAL DAILY
COMMUNITY
Start: 2024-05-06

## 2024-07-02 RX ORDER — FENTANYL 25 UG/1
1 PATCH TRANSDERMAL
COMMUNITY

## 2024-07-02 RX ORDER — FENTANYL 37.5 UG/H
37.5 PATCH, EXTENDED RELEASE TRANSDERMAL
Qty: 10 PATCH | Refills: 0 | Status: SHIPPED | OUTPATIENT
Start: 2024-07-02 | End: 2024-08-01

## 2024-07-02 RX ORDER — IBUPROFEN 800 MG/1
TABLET ORAL
COMMUNITY
Start: 2022-03-09

## 2024-07-02 RX ORDER — BACLOFEN 10 MG/1
10 TABLET ORAL 3 TIMES DAILY
Qty: 90 TABLET | Refills: 2 | Status: SHIPPED | OUTPATIENT
Start: 2024-07-02

## 2024-07-02 ASSESSMENT — PAIN SCALES - GENERAL: PAINLEVEL_OUTOF10: 3

## 2024-07-02 ASSESSMENT — PAIN DESCRIPTION - LOCATION: LOCATION: GENERALIZED

## 2024-07-02 ASSESSMENT — PAIN DESCRIPTION - PAIN TYPE: TYPE: CHRONIC PAIN

## 2024-07-02 ASSESSMENT — ENCOUNTER SYMPTOMS
CONSTIPATION: 0
BACK PAIN: 0

## 2024-07-02 NOTE — PROGRESS NOTES
[facility]    Office Visit    Patient Name: Mago Terrell    MR #: [id number]    Account #:[id number]    : 1975    Age: 49 y.o.    Sex: female    Date: 2024    PCP: Laila Narvaez APRN    Chief Complaint:   Chief Complaint   Patient presents with    Generalized Body Aches     3/10   Generalized pain.     History of Present Illness:   The patient is a 49 y.o. female who presents to the office for a routine 3 month follow up.     Since last visit, patient has called nurse line related to increase in breakthrough pain. Fentanyl patch was increased to 50mcg. Patient was previously on 25mcg. Patient reports medication may be a little to strong but has been effective in pain relief. Patient also takes baclofen daily. Patient has Vascular Katherine-Danlos Syndrome, MS, and Fibromyalgia.     Generalized Body Aches  This is a chronic problem. The current episode started more than 1 year ago. The problem occurs constantly. The problem has been waxing and waning. Associated symptoms include arthralgias, fatigue, headaches, myalgias and weakness. Pertinent negatives include no neck pain or numbness. Nothing aggravates the symptoms. She has tried oral narcotics for the symptoms. The treatment provided mild relief.       Patient is on or has tried and failed following medications:   Narcotics: Norco  Anticonvulsants: gabapentin (Neurontin) and lyrica;   Antidepressants:  Duloxetine (CYMBALTA);     Past Pain Management History  No history    Past Imaging  CT abdomen (with contrast), 2023 CT chest (without contrast) 2023, and MRI brain (with and without contrast) 2023    Screening Tools:     PEG: 3.7    Past PEG: 3.6    ORT: 1    PHQ-9: 11    Current Pain Assessment  Pain Assessment  Pain Assessment: 0-10  Pain Level: 3  Pain Location: Generalized  Pain Type: Chronic pain    Past Visit HPI:  4/15/2024  The patient is a 49 y.o. female who presents for follow up.

## 2024-07-02 NOTE — TELEPHONE ENCOUNTER
1. Chronic pain syndrome      Requested Prescriptions     Pending Prescriptions Disp Refills    fentaNYL 37.5 MCG/HR PT72 10 patch 0     Sig: Place 37.5 mcg onto the skin every 72 hours for 30 days. Max Daily Amount: 37.5 mcg       Continue medication with refill sent at appointment yes; refill sent to medical director at appointment yes, see refill encounter dated 7/2/2024    Electronically signed by CLINTON Hollis CNP on 7/2/2024 at 9:38 AM

## 2024-07-02 NOTE — PROGRESS NOTES
Clinic Documentation      Education Provided:  [x] Review of Arun  [] Agreement Review  [x] PEG Score Calculated [] PHQ Score Calculated [] ORT Score Calculated    [] Compliance Issues Discussed [] Cognitive Behavior Needs [x] Exercise [] Review of Test [] Financial Issues  [x] Tobacco/Alcohol Use Reviewed [x] Teaching [] New Patient [] Picture Obtained    Physician Plan:  [] Outgoing Referral  [] Pharmacy Consult  [] Test Ordered [x] Prescription Ordered/Changed   [] Obtained Test Results / Consult Notes        Complete if patient is withholding blood thinner for procedure     Blood Thinner Patient is currently taking:      [] Plavix (Hold for 7 days)  [] Aspirin (Hold for 5 days)     [] Pletal (Hold for 2 days)  [] Pradaxa (Hold for 3 days)    [] Effient (Hold for 7 days)  [] Xarelto (Hold for 2 days)    [] Eliquis (Hold for 2 days)  [] Brilinta (Hold for 7 days)    [] Coumadin (Hold for 5 days) - (INR needs to be drawn the day prior to procedure- INR < 2.0)    [] Aggrenox (Hold for 7 days)        [] Patient will stop medication on their own.    [] Blood Thinner Form Faxed for approval to hold.   Provider form faxed to:     Assessment Completed by:  Electronically signed by Marlee Hutton RN on 7/2/2024 at 9:44 AM

## 2024-07-12 DIAGNOSIS — G89.4 CHRONIC PAIN SYNDROME: ICD-10-CM

## 2024-07-12 DIAGNOSIS — E11.69 TYPE 2 DIABETES MELLITUS WITH OTHER SPECIFIED COMPLICATION, WITHOUT LONG-TERM CURRENT USE OF INSULIN (HCC): ICD-10-CM

## 2024-07-12 RX ORDER — TIRZEPATIDE 10 MG/.5ML
INJECTION, SOLUTION SUBCUTANEOUS
Qty: 12 ML | Refills: 1 | Status: SHIPPED | OUTPATIENT
Start: 2024-07-12

## 2024-07-12 RX ORDER — GABAPENTIN 300 MG/1
CAPSULE ORAL
Qty: 210 CAPSULE | Refills: 5 | Status: SHIPPED | OUTPATIENT
Start: 2024-07-12 | End: 2024-12-05

## 2024-07-12 RX ORDER — GABAPENTIN 300 MG/1
CAPSULE ORAL
Qty: 210 CAPSULE | Refills: 0 | OUTPATIENT
Start: 2024-07-12

## 2024-07-12 NOTE — TELEPHONE ENCOUNTER
Patient is requesting a refill of gabapentin. Patient was last seen on 6/18/2024 and has appointment on 9/19/2024. Medication was last sent in on 9/14/2024 for #210 and 5 rf.

## 2024-07-12 NOTE — TELEPHONE ENCOUNTER
Received fax from pharmacy requesting refill on pts medication(s). Pt was last seen in office on 6/18/2024  and has a follow up scheduled for 9/19/2024. Will send request to  Laila Narvaez  for authorization.     Requested Prescriptions     Pending Prescriptions Disp Refills    MOUNJARO 10 MG/0.5ML SOPN SC injection [Pharmacy Med Name: Mounjaro 10 MG/0.5ML Subcutaneous Solution Pen-injector] 12 mL 1     Sig: INJECT 0.5 ML SUB-Q ONCE A WEEK

## 2024-07-15 ENCOUNTER — OFFICE VISIT (OUTPATIENT)
Dept: FAMILY MEDICINE CLINIC | Age: 49
End: 2024-07-15
Payer: COMMERCIAL

## 2024-07-15 VITALS
TEMPERATURE: 98.3 F | OXYGEN SATURATION: 98 % | SYSTOLIC BLOOD PRESSURE: 108 MMHG | DIASTOLIC BLOOD PRESSURE: 65 MMHG | HEART RATE: 86 BPM | BODY MASS INDEX: 28.15 KG/M2 | WEIGHT: 164 LBS

## 2024-07-15 DIAGNOSIS — H65.93 FLUID LEVEL BEHIND TYMPANIC MEMBRANE OF BOTH EARS: ICD-10-CM

## 2024-07-15 DIAGNOSIS — G35 MS (MULTIPLE SCLEROSIS) (HCC): ICD-10-CM

## 2024-07-15 DIAGNOSIS — E11.69 TYPE 2 DIABETES MELLITUS WITH OTHER SPECIFIED COMPLICATION, WITHOUT LONG-TERM CURRENT USE OF INSULIN (HCC): Primary | ICD-10-CM

## 2024-07-15 DIAGNOSIS — H69.93 DYSFUNCTION OF BOTH EUSTACHIAN TUBES: ICD-10-CM

## 2024-07-15 PROCEDURE — 99214 OFFICE O/P EST MOD 30 MIN: CPT | Performed by: NURSE PRACTITIONER

## 2024-07-15 PROCEDURE — 3044F HG A1C LEVEL LT 7.0%: CPT | Performed by: NURSE PRACTITIONER

## 2024-07-15 RX ORDER — TRIAMCINOLONE ACETONIDE 40 MG/ML
40 INJECTION, SUSPENSION INTRA-ARTICULAR; INTRAMUSCULAR ONCE
Status: SHIPPED | OUTPATIENT
Start: 2024-07-15

## 2024-07-15 RX ORDER — PREDNISONE 10 MG/1
TABLET ORAL
Qty: 42 TABLET | Refills: 0 | Status: SHIPPED | OUTPATIENT
Start: 2024-07-15

## 2024-07-15 RX ORDER — DEXAMETHASONE SODIUM PHOSPHATE 4 MG/ML
4 INJECTION, SOLUTION INTRA-ARTICULAR; INTRALESIONAL; INTRAMUSCULAR; INTRAVENOUS; SOFT TISSUE ONCE
Status: SHIPPED | OUTPATIENT
Start: 2024-07-15

## 2024-07-15 ASSESSMENT — ENCOUNTER SYMPTOMS
EYE REDNESS: 0
COUGH: 0
DIARRHEA: 0
RHINORRHEA: 0
TROUBLE SWALLOWING: 0
BLOOD IN STOOL: 0
WHEEZING: 0
CONSTIPATION: 0
EYE DISCHARGE: 0
ABDOMINAL PAIN: 0
SORE THROAT: 0

## 2024-07-15 NOTE — PROGRESS NOTES
Mago Terrell (:  1975) is a 49 y.o. female,Established patient, here for evaluation of the following chief complaint(s):  Discuss Medications (Pts is here to discuss medications, Pts would like to discuss mood stabilizers. And insomnia medication.   )      ASSESSMENT/PLAN:    ICD-10-CM    1. Type 2 diabetes mellitus with other specified complication, without long-term current use of insulin ([facility])  E11.69       2. Fluid level behind tympanic membrane of both ears  H65.93 triamcinolone acetonide (KENALOG-40) injection 40 mg     dexAMETHasone (DECADRON) injection 4 mg     Etienne Rogers MD, Otolaryngology, [place]      3. Dysfunction of both eustachian tubes  H69.93 triamcinolone acetonide (KENALOG-40) injection 40 mg     dexAMETHasone (DECADRON) injection 4 mg     Etienne Rogers MD, Otolaryngology, [place]      4. MS (multiple sclerosis) ([facility])  G35 triamcinolone acetonide (KENALOG-40) injection 40 mg     dexAMETHasone (DECADRON) injection 4 mg      Start basaglar while on the prednisone.     No follow-ups on file.    SUBJECTIVE/OBJECTIVE:  HPI  Discuss Medications (Pts is here to discuss medications, Pts would like to discuss mood stabilizers. And insomnia medication.     Having increase fatigue. Armodafinil is not helping. Sleeping all day. Having an MS flair but her immunologist advises not to treat her MS at this time with her immune system being so low. She is doing igg.   Review of Systems   Constitutional:  Positive for fatigue. Negative for appetite change and unexpected weight change.   HENT:  Negative for congestion, rhinorrhea, sore throat and trouble swallowing.    Eyes:  Negative for discharge and redness.   Respiratory:  Negative for cough and wheezing.    Cardiovascular:  Negative for chest pain.   Gastrointestinal:  Negative for abdominal pain, blood in stool, constipation and diarrhea.   Genitourinary:  Negative for dysuria.   Musculoskeletal:  Positive for

## 2024-07-17 ENCOUNTER — OFFICE VISIT (OUTPATIENT)
Dept: ENT CLINIC | Age: 49
End: 2024-07-17
Payer: COMMERCIAL

## 2024-07-17 VITALS
WEIGHT: 163 LBS | HEIGHT: 64 IN | BODY MASS INDEX: 27.83 KG/M2 | SYSTOLIC BLOOD PRESSURE: 112 MMHG | DIASTOLIC BLOOD PRESSURE: 68 MMHG

## 2024-07-17 DIAGNOSIS — Z91.09 ENVIRONMENTAL ALLERGIES: Primary | ICD-10-CM

## 2024-07-17 DIAGNOSIS — R26.89 BALANCE DISORDER: ICD-10-CM

## 2024-07-17 PROCEDURE — 99203 OFFICE O/P NEW LOW 30 MIN: CPT | Performed by: OTOLARYNGOLOGY

## 2024-07-17 PROCEDURE — 92504 EAR MICROSCOPY EXAMINATION: CPT | Performed by: OTOLARYNGOLOGY

## 2024-07-17 ASSESSMENT — ENCOUNTER SYMPTOMS
RESPIRATORY NEGATIVE: 1
EYES NEGATIVE: 1
GASTROINTESTINAL NEGATIVE: 1
ALLERGIC/IMMUNOLOGIC NEGATIVE: 1

## 2024-07-17 NOTE — PROGRESS NOTES
2024    Mago Terrell (:  1975) is a 49 y.o. female, Established patient, here for evaluation of the following chief complaint(s):  New Patient (EARS)      Vitals:    24 1112   BP: 112/68   Weight: 73.9 kg (163 lb)   Height: 1.626 m (5' 4\")       Wt Readings from Last 3 Encounters:   24 73.9 kg (163 lb)   07/15/24 74.4 kg (164 lb)   24 75.8 kg (167 lb)       BP Readings from Last 3 Encounters:   24 112/68   07/15/24 108/65   24 107/65         SUBJECTIVE/OBJECTIVE:    Patient seen today for her ears allergies and balance issues.  She was told by primary days ago that she had fluid in her right ear.  She denies stillness but says her ear just do not feel right.  She suffers from multiple medical issues and she had to retire from work for these.  She also complains of balance issues.  She says that she has vertigo when she stands up and moves sometimes.  This going on for quite some time.  Denies any hearing loss or fullness in either ear.  She does suffer from allergies and takes antihistamines.        Review of Systems   Constitutional: Negative.    HENT: Negative.     Eyes: Negative.    Respiratory: Negative.     Cardiovascular: Negative.    Gastrointestinal: Negative.    Endocrine: Negative.    Musculoskeletal: Negative.    Skin: Negative.    Allergic/Immunologic: Negative.    Neurological:  Positive for dizziness.   Hematological: Negative.    Psychiatric/Behavioral: Negative.          Physical Exam  Vitals reviewed.   Constitutional:       Appearance: Normal appearance. She is normal weight.   HENT:      Head: Normocephalic and atraumatic.      Right Ear: Tympanic membrane, ear canal and external ear normal.      Left Ear: Tympanic membrane, ear canal and external ear normal.      Ears:      Bragg exam findings: Does not lateralize.     Nose: Nose normal.      Mouth/Throat:      Mouth: Mucous membranes are moist.      Pharynx: Oropharynx is clear.   Eyes:

## 2024-08-05 ENCOUNTER — TELEPHONE (OUTPATIENT)
Dept: FAMILY MEDICINE CLINIC | Age: 49
End: 2024-08-05

## 2024-08-05 DIAGNOSIS — G89.4 CHRONIC PAIN SYNDROME: ICD-10-CM

## 2024-08-05 DIAGNOSIS — J30.1 SEASONAL ALLERGIC RHINITIS DUE TO POLLEN: ICD-10-CM

## 2024-08-05 RX ORDER — MONTELUKAST SODIUM 10 MG/1
10 TABLET ORAL NIGHTLY
Qty: 90 TABLET | Refills: 3 | Status: SHIPPED | OUTPATIENT
Start: 2024-08-05

## 2024-08-05 NOTE — TELEPHONE ENCOUNTER
Pt called stating she had to drop her summer courses at [employer] due to MS flare up and vertigo and [employer] needs a letter from Laila Narvaez stating she was being treated for this and why

## 2024-08-05 NOTE — TELEPHONE ENCOUNTER
Received fax from pharmacy requesting refill on pts medication(s). Pt was last seen in office on 7/15/2024  and has a follow up scheduled for 9/19/2024. Will send request to  Laila Narvaez  for authorization.     Requested Prescriptions     Pending Prescriptions Disp Refills    montelukast (SINGULAIR) 10 MG tablet [Pharmacy Med Name: Montelukast Sodium 10 MG Oral Tablet] 90 tablet 3     Sig: TAKE 1 TABLET BY MOUTH NIGHTLY WITH  XYZAL  FOR  ALLERGIES

## 2024-08-06 ENCOUNTER — PATIENT MESSAGE (OUTPATIENT)
Dept: FAMILY MEDICINE CLINIC | Age: 49
End: 2024-08-06

## 2024-08-06 RX ORDER — FENTANYL 37.5 UG/H
1 PATCH, EXTENDED RELEASE TRANSDERMAL
Qty: 10 PATCH | Refills: 0 | Status: SHIPPED | OUTPATIENT
Start: 2024-08-06 | End: 2024-09-05

## 2024-08-13 ENCOUNTER — PATIENT MESSAGE (OUTPATIENT)
Dept: FAMILY MEDICINE CLINIC | Age: 49
End: 2024-08-13

## 2024-08-15 ENCOUNTER — TELEPHONE (OUTPATIENT)
Dept: PAIN MANAGEMENT | Age: 49
End: 2024-08-15

## 2024-08-15 NOTE — TELEPHONE ENCOUNTER
Call returned to patient.  No answer at this time. I have left a voicemail letting her know that provider can increase the strength of her fentanyl patch back to 50 mcg if she would like to do that.  Left number for nurse line for patient to call back.

## 2024-08-15 NOTE — TELEPHONE ENCOUNTER
Patient left message on nurse line regarding pain medication.  She is currently prescribed fentanyl patches.  She has recently had to start taking Ibuprofen again due to breakthrough pain.  She is asking provider if there is another alternative to this due to her current diagnoses.  Routed message to provider.

## 2024-09-04 ENCOUNTER — TELEPHONE (OUTPATIENT)
Dept: PAIN MANAGEMENT | Age: 49
End: 2024-09-04

## 2024-09-06 ENCOUNTER — TELEPHONE (OUTPATIENT)
Dept: FAMILY MEDICINE CLINIC | Age: 49
End: 2024-09-06

## 2024-09-06 NOTE — TELEPHONE ENCOUNTER
Received a call from pt stating her Armodafinil 250 MG TABS [[id number]]    Order Details  Dose: 1 tablet Route: Oral Frequency: DAILY   Dispense Quantity: 30 tablet Refills: 2          Sig: Take 1 tablet by mouth Daily for 90 days. Max Daily Amount: 1 tablet   Needs a PA     Please reach out to pt with all updates

## 2024-09-09 ENCOUNTER — TELEPHONE (OUTPATIENT)
Dept: FAMILY MEDICINE CLINIC | Age: 49
End: 2024-09-09

## 2024-09-09 DIAGNOSIS — G89.4 CHRONIC PAIN SYNDROME: ICD-10-CM

## 2024-09-10 ENCOUNTER — HOSPITAL ENCOUNTER (OUTPATIENT)
Dept: PAIN MANAGEMENT | Age: 49
Discharge: HOME | End: 2024-09-10
Payer: COMMERCIAL

## 2024-09-10 ENCOUNTER — TELEPHONE (OUTPATIENT)
Dept: FAMILY MEDICINE CLINIC | Age: 49
End: 2024-09-10

## 2024-09-10 VITALS
WEIGHT: 164 LBS | OXYGEN SATURATION: 99 % | TEMPERATURE: 98 F | DIASTOLIC BLOOD PRESSURE: 70 MMHG | RESPIRATION RATE: 16 BRPM | HEART RATE: 77 BPM | SYSTOLIC BLOOD PRESSURE: 116 MMHG | BODY MASS INDEX: 28 KG/M2 | HEIGHT: 64 IN

## 2024-09-10 DIAGNOSIS — G35 MS (MULTIPLE SCLEROSIS) (HCC): ICD-10-CM

## 2024-09-10 DIAGNOSIS — M79.7 FIBROMYALGIA: ICD-10-CM

## 2024-09-10 DIAGNOSIS — Q79.63 VASCULAR EHLERS-DANLOS SYNDROME: ICD-10-CM

## 2024-09-10 DIAGNOSIS — G89.4 CHRONIC PAIN SYNDROME: Primary | ICD-10-CM

## 2024-09-10 PROCEDURE — 99213 OFFICE O/P EST LOW 20 MIN: CPT

## 2024-09-10 PROCEDURE — 99214 OFFICE O/P EST MOD 30 MIN: CPT

## 2024-09-10 RX ORDER — FENTANYL 37.5 UG/H
1 PATCH, EXTENDED RELEASE TRANSDERMAL
Qty: 10 PATCH | Refills: 0 | Status: SHIPPED | OUTPATIENT
Start: 2024-09-10 | End: 2024-10-10

## 2024-09-10 RX ORDER — BACLOFEN 10 MG/1
10 TABLET ORAL 3 TIMES DAILY
Qty: 90 TABLET | Refills: 2 | Status: SHIPPED | OUTPATIENT
Start: 2024-09-10

## 2024-09-10 RX ORDER — FENTANYL 50 UG/1
1 PATCH TRANSDERMAL
Qty: 10 PATCH | Refills: 0 | Status: SHIPPED | OUTPATIENT
Start: 2024-09-10 | End: 2024-10-10

## 2024-09-10 ASSESSMENT — PAIN SCALES - GENERAL: PAINLEVEL_OUTOF10: 6

## 2024-09-10 ASSESSMENT — PAIN DESCRIPTION - LOCATION: LOCATION: GENERALIZED

## 2024-09-10 ASSESSMENT — ENCOUNTER SYMPTOMS
CONSTIPATION: 0
BACK PAIN: 0

## 2024-09-12 DIAGNOSIS — G35 MS (MULTIPLE SCLEROSIS) (HCC): ICD-10-CM

## 2024-09-12 RX ORDER — ARMODAFINIL 250 MG/1
1 TABLET ORAL DAILY
Qty: 30 TABLET | Refills: 2 | Status: SHIPPED | OUTPATIENT
Start: 2024-09-12 | End: 2024-12-11

## 2024-09-20 ENCOUNTER — OFFICE VISIT (OUTPATIENT)
Dept: FAMILY MEDICINE CLINIC | Age: 49
End: 2024-09-20
Payer: COMMERCIAL

## 2024-09-20 VITALS
SYSTOLIC BLOOD PRESSURE: 116 MMHG | DIASTOLIC BLOOD PRESSURE: 75 MMHG | TEMPERATURE: 97.3 F | HEART RATE: 88 BPM | WEIGHT: 166 LBS | BODY MASS INDEX: 28.49 KG/M2 | OXYGEN SATURATION: 97 %

## 2024-09-20 DIAGNOSIS — G89.29 CHRONIC MIDLINE LOW BACK PAIN WITH LEFT-SIDED SCIATICA: ICD-10-CM

## 2024-09-20 DIAGNOSIS — R29.898 WEAKNESS OF BOTH LOWER EXTREMITIES: ICD-10-CM

## 2024-09-20 DIAGNOSIS — G47.00 INSOMNIA, UNSPECIFIED TYPE: ICD-10-CM

## 2024-09-20 DIAGNOSIS — M54.42 CHRONIC MIDLINE LOW BACK PAIN WITH LEFT-SIDED SCIATICA: ICD-10-CM

## 2024-09-20 DIAGNOSIS — R42 VERTIGO: ICD-10-CM

## 2024-09-20 DIAGNOSIS — R29.898 UPPER EXTREMITY WEAKNESS: ICD-10-CM

## 2024-09-20 DIAGNOSIS — F33.1 MODERATE EPISODE OF RECURRENT MAJOR DEPRESSIVE DISORDER (HCC): Primary | ICD-10-CM

## 2024-09-20 LAB
ALBUMIN SERPL-MCNC: 4.4 G/DL (ref 3.5–5.2)
ALP SERPL-CCNC: 108 U/L (ref 35–104)
ALT SERPL-CCNC: 19 U/L (ref 5–33)
ANION GAP SERPL CALCULATED.3IONS-SCNC: 11 MMOL/L (ref 7–19)
AST SERPL-CCNC: 18 U/L (ref 5–32)
BASOPHILS # BLD: 0.1 K/UL (ref 0–0.2)
BASOPHILS NFR BLD: 0.8 % (ref 0–1)
BILIRUB SERPL-MCNC: 0.9 MG/DL (ref 0.2–1.2)
BUN SERPL-MCNC: 13 MG/DL (ref 6–20)
CALCIUM SERPL-MCNC: 9.1 MG/DL (ref 8.6–10)
CHLORIDE SERPL-SCNC: 100 MMOL/L (ref 98–111)
CO2 SERPL-SCNC: 28 MMOL/L (ref 22–29)
CREAT SERPL-MCNC: 0.7 MG/DL (ref 0.5–0.9)
EOSINOPHIL # BLD: 0.2 K/UL (ref 0–0.6)
EOSINOPHIL NFR BLD: 3.3 % (ref 0–5)
ERYTHROCYTE [DISTWIDTH] IN BLOOD BY AUTOMATED COUNT: 12.2 % (ref 11.5–14.5)
GLUCOSE SERPL-MCNC: 118 MG/DL (ref 70–99)
HCT VFR BLD AUTO: 41.4 % (ref 37–47)
HGB BLD-MCNC: 13.9 G/DL (ref 12–16)
IMM GRANULOCYTES # BLD: 0 K/UL
LYMPHOCYTES # BLD: 1.7 K/UL (ref 1.1–4.5)
LYMPHOCYTES NFR BLD: 27.4 % (ref 20–40)
MCH RBC QN AUTO: 29.1 PG (ref 27–31)
MCHC RBC AUTO-ENTMCNC: 33.6 G/DL (ref 33–37)
MCV RBC AUTO: 86.8 FL (ref 81–99)
MONOCYTES # BLD: 0.6 K/UL (ref 0–0.9)
MONOCYTES NFR BLD: 9.7 % (ref 0–10)
NEUTROPHILS # BLD: 3.6 K/UL (ref 1.5–7.5)
NEUTS SEG NFR BLD: 58.6 % (ref 50–65)
PLATELET # BLD AUTO: 268 K/UL (ref 130–400)
PMV BLD AUTO: 10.6 FL (ref 9.4–12.3)
POTASSIUM SERPL-SCNC: 4 MMOL/L (ref 3.5–5)
PROT SERPL-MCNC: 7.1 G/DL (ref 6.4–8.3)
RBC # BLD AUTO: 4.77 M/UL (ref 4.2–5.4)
SODIUM SERPL-SCNC: 139 MMOL/L (ref 136–145)
WBC # BLD AUTO: 6.1 K/UL (ref 4.8–10.8)

## 2024-09-20 PROCEDURE — 99214 OFFICE O/P EST MOD 30 MIN: CPT | Performed by: NURSE PRACTITIONER

## 2024-09-20 RX ORDER — DEXTROMETHORPHAN HYDROBROMIDE, BUPROPION HYDROCHLORIDE 105; 45 MG/1; MG/1
1 TABLET, MULTILAYER, EXTENDED RELEASE ORAL 2 TIMES DAILY
Qty: 60 TABLET | Refills: 5 | Status: SHIPPED | OUTPATIENT
Start: 2024-09-20

## 2024-09-20 RX ORDER — DOXEPIN HYDROCHLORIDE 100 MG/1
100 CAPSULE ORAL NIGHTLY
Qty: 90 CAPSULE | Refills: 3 | Status: SHIPPED | OUTPATIENT
Start: 2024-09-20 | End: 2025-09-20

## 2024-09-23 ASSESSMENT — ENCOUNTER SYMPTOMS
BLOOD IN STOOL: 0
WHEEZING: 0
EYE REDNESS: 0
DIARRHEA: 0
COUGH: 0
SORE THROAT: 0
CONSTIPATION: 0
TROUBLE SWALLOWING: 0
ABDOMINAL PAIN: 0
RHINORRHEA: 0
EYE DISCHARGE: 0

## 2024-09-24 LAB
VZV IGG SER IA-ACNC: 2092 IV
VZV IGM SER IA-ACNC: 0.11 ISR

## 2024-10-08 DIAGNOSIS — G89.4 CHRONIC PAIN SYNDROME: ICD-10-CM

## 2024-10-08 DIAGNOSIS — Q79.63 VASCULAR EHLERS-DANLOS SYNDROME: ICD-10-CM

## 2024-10-08 RX ORDER — FENTANYL 50 UG/1
1 PATCH TRANSDERMAL
Qty: 10 PATCH | Refills: 0 | Status: SHIPPED | OUTPATIENT
Start: 2024-10-12 | End: 2024-11-11

## 2024-10-15 DIAGNOSIS — G89.4 CHRONIC PAIN SYNDROME: ICD-10-CM

## 2024-10-15 DIAGNOSIS — Q79.63 VASCULAR EHLERS-DANLOS SYNDROME: ICD-10-CM

## 2024-10-15 RX ORDER — FENTANYL 50 UG/1
1 PATCH TRANSDERMAL
Qty: 10 PATCH | Refills: 0 | Status: SHIPPED | OUTPATIENT
Start: 2024-10-27 | End: 2024-11-26

## 2024-10-15 NOTE — TELEPHONE ENCOUNTER
Pt received fentanyl on 10/12/24 only received #5 patches (that is all they had in stock), pt called letting us know she needed a new script to finish her fill.

## 2024-10-17 ENCOUNTER — TELEPHONE (OUTPATIENT)
Dept: PAIN MANAGEMENT | Age: 49
End: 2024-10-17

## 2024-10-17 NOTE — TELEPHONE ENCOUNTER
Called patient and let her know that we will need to bring her in to see Dr. Suarez as soon as we can get her on the schedule.  She stated that is just fine. She just came from her appointment with MS doctor and she said they have told her there is no other treatment for her at this time due to the Katherine-Danlos syndrome.  It is counteracting treatment for the MS.  Patient stated that she understands she should not take Ibuprofen, but her kidney function is doing ok and she does not have another alternative at this time, but she wanted to let us know.  She will wait to be scheduled with Dr. Suarez.

## 2024-10-17 NOTE — TELEPHONE ENCOUNTER
Patient left message on nurse line regarding increase in her pain.  She is currently on 50 mcg Fentanyl patch.  She said it really is not alleviating her pain.  She is staying awake at night from the pain.  She has tried alternate pain relief such as stretching.  She states nothing is working.  She also stated that her pharmacy told her they are only able to give her one package of patches for the month and she is unsure why this is.  Forwarding message to provider and prescription staff.

## 2024-10-23 NOTE — TELEPHONE ENCOUNTER
Patient called about her Fentanyl patch will not stick to her skin, never had that problem before. Let patient know that there was a product called Tegaderm it is like a bandage that fits tight to the skin.

## 2024-10-31 ENCOUNTER — TELEMEDICINE (OUTPATIENT)
Dept: FAMILY MEDICINE CLINIC | Age: 49
End: 2024-10-31
Payer: COMMERCIAL

## 2024-10-31 DIAGNOSIS — R09.A2 GLOBUS SENSATION: ICD-10-CM

## 2024-10-31 DIAGNOSIS — R13.10 DYSPHAGIA, UNSPECIFIED TYPE: Primary | ICD-10-CM

## 2024-10-31 DIAGNOSIS — G89.29 CHRONIC IDIOPATHIC PAIN SYNDROME: ICD-10-CM

## 2024-10-31 DIAGNOSIS — H81.03 MENIERE'S DISEASE OF BOTH EARS: ICD-10-CM

## 2024-10-31 DIAGNOSIS — F33.1 MODERATE EPISODE OF RECURRENT MAJOR DEPRESSIVE DISORDER (HCC): ICD-10-CM

## 2024-10-31 PROCEDURE — 99214 OFFICE O/P EST MOD 30 MIN: CPT | Performed by: NURSE PRACTITIONER

## 2024-10-31 RX ORDER — TRIAMTERENE AND HYDROCHLOROTHIAZIDE 37.5; 25 MG/1; MG/1
1 TABLET ORAL DAILY
Qty: 90 TABLET | Refills: 1 | Status: SHIPPED | OUTPATIENT
Start: 2024-10-31

## 2024-10-31 ASSESSMENT — ENCOUNTER SYMPTOMS
DIARRHEA: 0
CONSTIPATION: 0
EYE DISCHARGE: 0
RHINORRHEA: 0
ABDOMINAL PAIN: 0
EYE REDNESS: 0
BLOOD IN STOOL: 0
WHEEZING: 0
COUGH: 0
SORE THROAT: 0
TROUBLE SWALLOWING: 1

## 2024-10-31 NOTE — PROGRESS NOTES
Mago Terrell, was evaluated through a synchronous (real-time) audio-video encounter. The patient (or guardian if applicable) is aware that this is a billable service, which includes applicable co-pays. This Virtual Visit was conducted with patient's (and/or legal guardian's) consent. Patient identification was verified, and a caregiver was present when appropriate.   The patient was located at Home: [facility] [place] [address]  Provider was located at Facility (Appt Dept): [address] [place] [id number]  Confirm you are appropriately licensed, registered, or certified to deliver care in the state where the patient is located as indicated above. If you are not or unsure, please re-schedule the visit: Yes, I confirm.     Mago Terrell (:  1975) is a Established patient, presenting virtually for evaluation of the following:      Below is the assessment and plan developed based on review of pertinent history, physical exam, labs, studies, and medications.     Assessment & Plan  Dysphagia, unspecified type       Orders:    FL ESOPHAGRAM; Future    Globus sensation       Orders:     HEAD NECK SOFT TISSUE THYROID; Future    FL ESOPHAGRAM; Future    Moderate episode of recurrent major depressive disorder (HCC)            Chronic idiopathic pain syndrome       Orders:    External Referral To Behavioral Health    Meniere's disease of both ears       Orders:    triamterene-hydroCHLOROthiazide (MAXZIDE-25) 37.5-25 MG per tablet; Take 1 tablet by mouth daily    Amb External Referral To Physical Therapy    Change lasix to maxide    No follow-ups on file.       Subjective   HPI  Ears feel full. Will have vertigo and muted hearing for a little bit when she has ringing in her ears.   Having swallowing problems with eating and drinking. Has a globus sensation.     Her insurance nurse wants her to have genetic testing for her medications.     Neurologist thinks she is she is having TIAs.     Review of

## 2024-11-11 ENCOUNTER — HOSPITAL ENCOUNTER (OUTPATIENT)
Dept: GENERAL RADIOLOGY | Age: 49
Discharge: HOME | End: 2024-11-11
Payer: COMMERCIAL

## 2024-11-11 ENCOUNTER — HOSPITAL ENCOUNTER (OUTPATIENT)
Dept: ULTRASOUND IMAGING | Age: 49
Discharge: HOME | End: 2024-11-11
Payer: COMMERCIAL

## 2024-11-11 DIAGNOSIS — R13.10 DYSPHAGIA, UNSPECIFIED TYPE: ICD-10-CM

## 2024-11-11 DIAGNOSIS — R09.A2 GLOBUS SENSATION: ICD-10-CM

## 2024-11-11 PROCEDURE — 76536 US EXAM OF HEAD AND NECK: CPT

## 2024-11-12 ENCOUNTER — HOSPITAL ENCOUNTER (OUTPATIENT)
Dept: GENERAL RADIOLOGY | Age: 49
Discharge: HOME | End: 2024-11-12
Payer: COMMERCIAL

## 2024-11-12 PROCEDURE — 74220 X-RAY XM ESOPHAGUS 1CNTRST: CPT

## 2024-11-13 ENCOUNTER — TELEPHONE (OUTPATIENT)
Dept: FAMILY MEDICINE CLINIC | Age: 49
End: 2024-11-13

## 2024-11-13 NOTE — TELEPHONE ENCOUNTER
[facility] called in. They had called pt and she let them know she has a therapist she sees in [place]. She is wanting to focus on the managing pain and symptoms from it and [facility] had told her [facility] may be the only place around her that provides services like what she is looking for. Pt stated she was familiar with [facility] and she would go from there. Pt did not schedule with [facility].

## 2024-11-14 DIAGNOSIS — N95.1 HOT FLASHES DUE TO MENOPAUSE: Primary | ICD-10-CM

## 2024-11-14 DIAGNOSIS — Z12.31 ENCOUNTER FOR SCREENING MAMMOGRAM FOR MALIGNANT NEOPLASM OF BREAST: Primary | ICD-10-CM

## 2024-11-15 ENCOUNTER — TELEPHONE (OUTPATIENT)
Dept: FAMILY MEDICINE CLINIC | Age: 49
End: 2024-11-15

## 2024-11-15 DIAGNOSIS — K20.90 ESOPHAGITIS: Primary | ICD-10-CM

## 2024-11-15 DIAGNOSIS — R13.10 DYSPHAGIA, UNSPECIFIED TYPE: ICD-10-CM

## 2024-11-15 RX ORDER — PANTOPRAZOLE SODIUM 40 MG/1
40 TABLET, DELAYED RELEASE ORAL
Qty: 90 TABLET | Refills: 1 | Status: SHIPPED | OUTPATIENT
Start: 2024-11-15

## 2024-11-15 NOTE — TELEPHONE ENCOUNTER
----- Message from CLINTON Gusman sent at 11/15/2024  8:44 AM CST -----  The barium tablet became lodged in the distal esophagus.   Eventually this passed into the stomach.  Most likely this is spasm possibly associated with esophagitis.  Endoscopy may be considered.  2.  Reflux was observed to the mid esophagus.      Recommend to start protonix 40 mg daily #30 rf 5   And see GI for an endoscopy

## 2024-11-15 NOTE — TELEPHONE ENCOUNTER
Called patient, spoke with: Patient regarding the results of the patients most recent swallow study.  I advised Patient of Cooper Mcdaniels recommendations.   Patient did voice understanding     Patient states she will call back to let us know who the GI doctor is so we can send referral.    Sent rx to pharmacy for pt    Requested Prescriptions     Signed Prescriptions Disp Refills    pantoprazole (PROTONIX) 40 MG tablet 90 tablet 1     Sig: Take 1 tablet by mouth every morning (before breakfast)     Authorizing Provider: COOPER MCDANIELS     Ordering User: ERIKA CRUZ

## 2024-11-15 NOTE — TELEPHONE ENCOUNTER
Called [facility] per pt she is established with Lawanda Grant previously     They stated they require a new referral for this - I have placed referral

## 2024-11-19 ENCOUNTER — PATIENT MESSAGE (OUTPATIENT)
Dept: FAMILY MEDICINE CLINIC | Age: 49
End: 2024-11-19

## 2024-11-19 RX ORDER — ERTUGLIFLOZIN 5 MG/1
1 TABLET, FILM COATED ORAL DAILY
Qty: 30 TABLET | Refills: 11 | Status: SHIPPED | OUTPATIENT
Start: 2024-11-19

## 2024-11-19 NOTE — TELEPHONE ENCOUNTER
Received fax from pharmacy requesting refill on pts medication(s). Pt was last seen in office on 10/31/2024  and has a follow up scheduled for Visit date not found. Will send request to CLINTON Gusman for authorization.     Requested Prescriptions     Pending Prescriptions Disp Refills    STEGLATRO 5 MG TABS [Pharmacy Med Name: Steglatro 5 MG Oral Tablet] 30 tablet 11     Sig: Take 1 tablet by mouth once daily

## 2024-11-20 ENCOUNTER — TELEPHONE (OUTPATIENT)
Dept: PAIN MANAGEMENT | Age: 49
End: 2024-11-20

## 2024-11-20 NOTE — TELEPHONE ENCOUNTER
Provider CLINTON Eagle wanted patient to have an appointment with Dr. Suarez. Called patient with appointment 12/2/24@9225. Previous note patient wanted to know if she could take CBD gummies, she can't due to THC.

## 2024-11-26 ENCOUNTER — OFFICE VISIT (OUTPATIENT)
Dept: GASTROENTEROLOGY | Age: 49
End: 2024-11-26
Payer: COMMERCIAL

## 2024-11-26 VITALS
BODY MASS INDEX: 28.17 KG/M2 | WEIGHT: 165 LBS | DIASTOLIC BLOOD PRESSURE: 80 MMHG | OXYGEN SATURATION: 98 % | SYSTOLIC BLOOD PRESSURE: 115 MMHG | HEART RATE: 81 BPM | HEIGHT: 64 IN

## 2024-11-26 DIAGNOSIS — R13.10 DYSPHAGIA, UNSPECIFIED TYPE: ICD-10-CM

## 2024-11-26 DIAGNOSIS — Q79.63 VASCULAR EHLERS-DANLOS SYNDROME: ICD-10-CM

## 2024-11-26 DIAGNOSIS — Z12.11 COLON CANCER SCREENING: Primary | ICD-10-CM

## 2024-11-26 DIAGNOSIS — R11.0 NAUSEA: ICD-10-CM

## 2024-11-26 DIAGNOSIS — G89.4 CHRONIC PAIN SYNDROME: ICD-10-CM

## 2024-11-26 DIAGNOSIS — K21.9 GASTROESOPHAGEAL REFLUX DISEASE, UNSPECIFIED WHETHER ESOPHAGITIS PRESENT: ICD-10-CM

## 2024-11-26 PROCEDURE — 99214 OFFICE O/P EST MOD 30 MIN: CPT | Performed by: NURSE PRACTITIONER

## 2024-11-26 RX ORDER — FENTANYL 50 UG/1
1 PATCH TRANSDERMAL
Qty: 10 PATCH | Refills: 0 | Status: SHIPPED | OUTPATIENT
Start: 2024-11-26 | End: 2024-12-26

## 2024-11-26 NOTE — PROGRESS NOTES
morning, 1 cap in afternoon, and 3 cap at bedtime) 210 capsule 5    ibuprofen (ADVIL;MOTRIN) 800 MG tablet ibuprofen 800 mg tablet   TAKE ONE TABLET BY MOUTH EVERY 8 HOURS AS NEEDED      metoprolol succinate (TOPROL XL) 25 MG extended release tablet Take 1 tablet by mouth daily      Amantadine (SYMMETREL) 100 MG TABS tablet Take 100 mg by mouth daily      Continuous Glucose  (DEXCOM G7 ) KYLEE Use to check blood sugar 1 each 0    Continuous Glucose Sensor (DEXCOM G7 SENSOR) MISC Use to monitor blood sugar, change every 10 days 3 each 11    rosuvastatin (CRESTOR) 10 MG tablet Take 1 tablet by mouth nightly 60 tablet 5    vitamin D (ERGOCALCIFEROL) 1.25 MG (93938 UT) CAPS capsule Take 1 capsule by mouth once a week 17 capsule 2    rOPINIRole (REQUIP) 0.25 MG tablet Take 1 tablet by mouth nightly 30 tablet 11    lisinopril (PRINIVIL;ZESTRIL) 5 MG tablet Take 1 tablet by mouth daily (Patient taking differently: Take 0.5 tablets by mouth daily) 90 tablet 3    levalbuterol (XOPENEX) 0.63 MG/3ML nebulization Take 3 mLs by nebulization every 4 hours as needed for Wheezing 300 mL 3    venlafaxine (EFFEXOR XR) 150 MG extended release capsule Take 1 capsule by mouth daily 90 capsule 3    insulin glargine (LANTUS;BASAGLAR) 100 UNIT/ML injection pen 10 Units at bedtime PRN for bs >300      prochlorperazine (COMPAZINE) 5 MG tablet Take 1 tablet by mouth every 6 hours as needed for Nausea 120 tablet 3    Immune Globulin, Human, (HIZENTRA SC) Infuse intravenously every 14 days Patient states she receives 16 grams infusion every other week.      Syringe/Needle, Disp, (SYRINGE 3CC/25GX1\") 25G X 1\" 3 ML MISC Use to inject b12 once weekly for 1 month, then monthly 12 each 1    levocetirizine (XYZAL) 5 MG tablet Take 1 tablet by mouth nightly 90 tablet 3    Continuous Blood Gluc Sensor (DEXCOM G6 SENSOR) MISC Use as directed 3 each 12    budesonide-formoterol (SYMBICORT) 160-4.5 MCG/ACT AERO Inhale 2 puffs into the lungs

## 2024-12-02 ENCOUNTER — HOSPITAL ENCOUNTER (OUTPATIENT)
Dept: PAIN MANAGEMENT | Age: 49
Discharge: HOME | End: 2024-12-02
Payer: COMMERCIAL

## 2024-12-02 ENCOUNTER — OFFICE VISIT (OUTPATIENT)
Dept: FAMILY MEDICINE CLINIC | Age: 49
End: 2024-12-02
Payer: COMMERCIAL

## 2024-12-02 VITALS
SYSTOLIC BLOOD PRESSURE: 127 MMHG | HEART RATE: 92 BPM | OXYGEN SATURATION: 99 % | DIASTOLIC BLOOD PRESSURE: 71 MMHG | RESPIRATION RATE: 16 BRPM | WEIGHT: 160 LBS | HEIGHT: 64 IN | TEMPERATURE: 99 F | BODY MASS INDEX: 27.31 KG/M2

## 2024-12-02 VITALS
DIASTOLIC BLOOD PRESSURE: 72 MMHG | SYSTOLIC BLOOD PRESSURE: 126 MMHG | BODY MASS INDEX: 27.98 KG/M2 | HEART RATE: 96 BPM | OXYGEN SATURATION: 98 % | TEMPERATURE: 97.5 F | WEIGHT: 163 LBS

## 2024-12-02 DIAGNOSIS — K56.600 PARTIAL SMALL BOWEL OBSTRUCTION (HCC): ICD-10-CM

## 2024-12-02 DIAGNOSIS — U07.1 COVID-19: ICD-10-CM

## 2024-12-02 DIAGNOSIS — M79.7 FIBROMYALGIA: ICD-10-CM

## 2024-12-02 DIAGNOSIS — G35 MS (MULTIPLE SCLEROSIS) (HCC): ICD-10-CM

## 2024-12-02 DIAGNOSIS — Q79.63 VASCULAR EHLERS-DANLOS SYNDROME: ICD-10-CM

## 2024-12-02 DIAGNOSIS — Q79.63 VASCULAR EHLERS-DANLOS SYNDROME: Primary | ICD-10-CM

## 2024-12-02 DIAGNOSIS — I72.8 SPLENIC ARTERY ANEURYSM (HCC): ICD-10-CM

## 2024-12-02 DIAGNOSIS — J01.90 ACUTE BACTERIAL SINUSITIS: Primary | ICD-10-CM

## 2024-12-02 DIAGNOSIS — G89.4 CHRONIC PAIN SYNDROME: Primary | ICD-10-CM

## 2024-12-02 DIAGNOSIS — G89.4 CHRONIC PAIN SYNDROME: ICD-10-CM

## 2024-12-02 DIAGNOSIS — F11.90 CHRONIC, CONTINUOUS USE OF OPIOIDS: ICD-10-CM

## 2024-12-02 DIAGNOSIS — B96.89 ACUTE BACTERIAL SINUSITIS: Primary | ICD-10-CM

## 2024-12-02 PROCEDURE — 99213 OFFICE O/P EST LOW 20 MIN: CPT | Performed by: NURSE PRACTITIONER

## 2024-12-02 PROCEDURE — 99215 OFFICE O/P EST HI 40 MIN: CPT | Performed by: STUDENT IN AN ORGANIZED HEALTH CARE EDUCATION/TRAINING PROGRAM

## 2024-12-02 PROCEDURE — 99215 OFFICE O/P EST HI 40 MIN: CPT

## 2024-12-02 RX ORDER — FENTANYL 50 UG/1
1 PATCH TRANSDERMAL
Qty: 10 PATCH | Refills: 0 | Status: SHIPPED | OUTPATIENT
Start: 2024-12-26 | End: 2025-01-25

## 2024-12-02 RX ORDER — BUPRENORPHINE HYDROCHLORIDE 75 UG/1
75 FILM, SOLUBLE BUCCAL 2 TIMES DAILY
Qty: 60 FILM | Refills: 0 | Status: SHIPPED | OUTPATIENT
Start: 2024-12-02 | End: 2025-01-01

## 2024-12-02 RX ORDER — BACLOFEN 10 MG/1
10 TABLET ORAL 3 TIMES DAILY
Qty: 90 TABLET | Refills: 2 | Status: SHIPPED | OUTPATIENT
Start: 2024-12-02

## 2024-12-02 RX ORDER — AZITHROMYCIN 250 MG/1
TABLET, FILM COATED ORAL
Qty: 6 TABLET | Refills: 1 | Status: SHIPPED | OUTPATIENT
Start: 2024-12-02 | End: 2024-12-12

## 2024-12-02 ASSESSMENT — PAIN DESCRIPTION - PAIN TYPE: TYPE: CHRONIC PAIN

## 2024-12-02 ASSESSMENT — ENCOUNTER SYMPTOMS
DIARRHEA: 0
ABDOMINAL PAIN: 0
SORE THROAT: 0
SINUS PRESSURE: 1
RHINORRHEA: 1
EYE DISCHARGE: 0
EYE REDNESS: 0
SINUS PAIN: 1
WHEEZING: 0
COUGH: 1
CONSTIPATION: 0
BLOOD IN STOOL: 0
TROUBLE SWALLOWING: 0

## 2024-12-02 ASSESSMENT — PAIN SCALES - GENERAL: PAINLEVEL_OUTOF10: 5

## 2024-12-02 ASSESSMENT — PAIN DESCRIPTION - LOCATION: LOCATION: GENERALIZED

## 2024-12-02 ASSESSMENT — PAIN DESCRIPTION - ORIENTATION: ORIENTATION: LOWER

## 2024-12-02 NOTE — PROGRESS NOTES
Mago Terrell (:  1975) is a 49 y.o. female,Established patient, here for evaluation of the following chief complaint(s):  Other (Pts states she tested positive for COVID yesterday. Started with symptoms Thursday.)         Assessment & Plan  Acute bacterial sinusitis       Orders:    azithromycin (ZITHROMAX) 250 MG tablet; 500mg on day 1 followed by 250mg on days 2 - 5    COVID-19              Return if symptoms worsen or fail to improve.       Subjective   HPI  COVID  Patient tested positive for COVID yesterday symptoms started late Wednesday night early Thursday morning with cough sore throat body aches fatigue headache.  She has been coughing up thick yellow mucus as well and has a lot of sinus pain and pressure.  Breathing is doing okay states that she has been staying on top of this by doing her breathing treatments.    Review of Systems   Constitutional:  Negative for appetite change and unexpected weight change.   HENT:  Positive for congestion, rhinorrhea, sinus pressure and sinus pain. Negative for sore throat and trouble swallowing.    Eyes:  Negative for discharge and redness.   Respiratory:  Positive for cough. Negative for wheezing.    Cardiovascular:  Negative for chest pain.   Gastrointestinal:  Negative for abdominal pain, blood in stool, constipation and diarrhea.   Genitourinary:  Negative for dysuria.   Musculoskeletal:  Positive for arthralgias and myalgias.   Skin:  Negative for rash.   Neurological:  Negative for weakness.   Hematological:  Negative for adenopathy.          Objective   Physical Exam  Vitals reviewed.   Constitutional:       General: She is not in acute distress.     Appearance: She is well-developed. She is ill-appearing. She is not toxic-appearing.   HENT:      Head: Normocephalic.      Right Ear: A middle ear effusion is present.      Left Ear: Tympanic membrane is retracted.      Nose: Rhinorrhea present.   Eyes:      Conjunctiva/sclera: Conjunctivae normal.

## 2024-12-02 NOTE — PROGRESS NOTES
Clinic Documentation      Education Provided:  [x] Review of Arun  [x] Agreement Review  [x] PEG Score Calculated [x] PHQ Score Calculated [x] ORT Score Calculated    [] Compliance Issues Discussed [] Cognitive Behavior Needs [x] Exercise [] Review of Test [] Financial Issues  [x] Tobacco/Alcohol Use Reviewed [x] Teaching [] New Patient [] Picture Obtained    Physician Plan:  [] Outgoing Referral  [] Pharmacy Consult  [] Test Ordered [x] Prescription Ordered/Changed   [] Obtained Test Results / Consult Notes        Complete if patient is withholding blood thinner for procedure     Blood Thinner Patient is currently taking:      [] Plavix (Hold for 7 days)  [] Aspirin (Hold for 5 days)     [] Pletal (Hold for 2 days)  [] Pradaxa (Hold for 3 days)    [] Effient (Hold for 7 days)  [] Xarelto (Hold for 2 days)    [] Eliquis (Hold for 2 days)  [] Brilinta (Hold for 7 days)    [] Coumadin (Hold for 5 days) - (INR needs to be drawn the day prior to procedure- INR < 2.0)    [] Aggrenox (Hold for 7 days)        [] Patient will stop medication on their own.    [] Blood Thinner Form Faxed for approval to hold.   Provider form faxed to:     Assessment Completed by:  Electronically signed by Nona Machuca MA on 12/2/2024 at 11:01 AM  
medications, interventions, physical therapy and improved mindset. We strive to improve function and relieve pain with a focus on health and individualizing care.    1. Medication Recommendations:   - continue fentanyl patch 50mcg/hr q 3 days  -goal is to transition off of the fentanyl patches. Will wean next visit to 37.5mcg/hr patches  -spoke about and provided counseling regarding the risks of these medications and side effects including respiratory depression and death  -start belbucca 75mcg BID and will  slowly titrate up and replace fentanyl patches with the goal of avoiding withdrawals as much as possible due to this patient's history of aortic and mitral insufficiency  -provided naloxone to the patient as rescue medication in the event of accidental opioid overdose  -continue gabapentin 600mg BID (prescribed by PCP)  -continue baclfoen 10mg TID (Cr .7)  -continue venlafaxine 150mng q daily as directed by PCP (good for pain as well as depression)  -continue other medications as prescribed    CSA PARAMETERSDATECOMMENT  CSA DATE12/2/24  URINE DRUG SCREEN1/25/24  [place] PDMP Reviewed and consistent  NALOXONE PROVIDED?Yes  ABERRANT BEHAVIOR?None  Per CDC recommendations, patients prescribed opiates with a history of overdose, substance use disorder, >50 OME's daily, or concurrent benzodiazepine use should be prescribed naloxone. We instructed the patient to keep one nasal spray on his/her person, and instruct his/her family and friends to use 2 sprays under their nose in the event of accidental overdose, then to call 911.    Potential opioid side effects were discussed in detail including constipation, urinary retention, pruritus, respiratory depression, sedation, dependence, addiction, tolerance, opioid induced hyperalgesia, osteopenia, hypogonadism and immune suppression.  Advised to take the opioid medications as prescribed.    2. Physical Therapy: Defer, continue exercises/stretches learned from PT. Insurance

## 2024-12-03 ENCOUNTER — TELEPHONE (OUTPATIENT)
Dept: FAMILY MEDICINE CLINIC | Age: 49
End: 2024-12-03

## 2024-12-03 ENCOUNTER — PATIENT MESSAGE (OUTPATIENT)
Dept: FAMILY MEDICINE CLINIC | Age: 49
End: 2024-12-03

## 2024-12-03 DIAGNOSIS — Q79.60 EHLERS-DANLOS SYNDROME: Primary | ICD-10-CM

## 2024-12-03 NOTE — TELEPHONE ENCOUNTER
Patient called requesting a referral to vascular with .     She is wanting Laila's recommendation please.    Will send to provider for advice.

## 2024-12-04 ENCOUNTER — TELEPHONE (OUTPATIENT)
Dept: PAIN MANAGEMENT | Age: 49
End: 2024-12-04

## 2024-12-04 ASSESSMENT — ENCOUNTER SYMPTOMS
NAUSEA: 0
CHOKING: 0
TROUBLE SWALLOWING: 1
DIARRHEA: 0
ANAL BLEEDING: 0
RECTAL PAIN: 0
ABDOMINAL PAIN: 0
ABDOMINAL DISTENTION: 0
COUGH: 0
SHORTNESS OF BREATH: 0
CONSTIPATION: 0
BLOOD IN STOOL: 0
VOMITING: 0

## 2024-12-05 ENCOUNTER — TELEPHONE (OUTPATIENT)
Dept: FAMILY MEDICINE CLINIC | Age: 49
End: 2024-12-05

## 2024-12-05 DIAGNOSIS — R09.A2 GLOBUS SENSATION: Primary | ICD-10-CM

## 2024-12-05 NOTE — TELEPHONE ENCOUNTER
----- Message from CLINTON Gusman sent at 12/5/2024 10:57 AM CST -----  Please inform patient results show  There is a small nodule. It is too small to biopsy.   According to guidelines we dont have to recheck it but I would like to check it again in 6 months.

## 2024-12-05 NOTE — TELEPHONE ENCOUNTER
Called to patient with results and recommendations.  Patient is wanting to know what causes these.  I told her I was not sure what causes it.  She is also wanting to know if we can repeat this in 3 months because if it is cancer she sure does not want to wait 6.  Will send back to Laila for her recommendations and will call patient back.

## 2024-12-09 RX ORDER — LEVOCETIRIZINE DIHYDROCHLORIDE 5 MG/1
5 TABLET, FILM COATED ORAL NIGHTLY
Qty: 90 TABLET | Refills: 0 | Status: SHIPPED | OUTPATIENT
Start: 2024-12-09

## 2024-12-09 NOTE — TELEPHONE ENCOUNTER
Received fax from pharmacy requesting refill on pts medication(s). Pt was last seen in office on 12/2/2024  and has a follow up scheduled for Visit date not found. Will send request to  Laila Narvaez  for patient.     Requested Prescriptions     Pending Prescriptions Disp Refills    levocetirizine (XYZAL) 5 MG tablet [Pharmacy Med Name: Levocetirizine Dihydrochloride 5 MG Oral Tablet] 90 tablet 0     Sig: Take 1 tablet by mouth nightly

## 2024-12-10 RX ORDER — CEFDINIR 300 MG/1
300 CAPSULE ORAL 2 TIMES DAILY
Qty: 20 CAPSULE | Refills: 0 | Status: SHIPPED | OUTPATIENT
Start: 2024-12-10 | End: 2024-12-20

## 2024-12-11 ENCOUNTER — TELEPHONE (OUTPATIENT)
Dept: PAIN MANAGEMENT | Age: 49
End: 2024-12-11

## 2024-12-11 ENCOUNTER — TELEPHONE (OUTPATIENT)
Dept: FAMILY MEDICINE CLINIC | Age: 49
End: 2024-12-11

## 2024-12-11 NOTE — TELEPHONE ENCOUNTER
Pt called stating she is currently getting over COVID along with her other chronic illnesses and new medications - she states the past few days she has been experiencing involuntary shaking and other movements     I did make her aware that Laila is out of the office on Wednesdays but would be back tomorrow but I could offer her an appt with Hanna - she states she likes to see the same provider for each issue so there is not so much going on and she states she can wait for rocío recommendations tomorrow     I did advise her to go ahead and contact her MS  for their recommendations as well

## 2024-12-11 NOTE — TELEPHONE ENCOUNTER
Patient called to let us know that she maybe having a reaction to the new medication Belbuca 75 mcg BID. When she is awake her legs feel like jello, also her son video taped her while she was sleeping because she started jerking & whole body shaking. I talked with Dr. Suarez & she was going to call the patient.

## 2024-12-12 NOTE — TELEPHONE ENCOUNTER
Spoke to pt she is aware of all recommendations she states she spoke to her other providers and they advised she DC the new medication prescribed by Pain management she has Dc'd this and symptoms have decreased so she will try this for the next few days and go from there

## 2024-12-12 NOTE — TELEPHONE ENCOUNTER
The covid could be exacerbating her chronic illnesses.  Try to take a warm bath. And motrin. Make sure that her fentanyl patch is on and hasn't come off. Also could be some withdrawal from the decrease in this medication

## 2024-12-13 ENCOUNTER — TELEPHONE (OUTPATIENT)
Dept: FAMILY MEDICINE CLINIC | Age: 49
End: 2024-12-13

## 2024-12-13 LAB — NONINV COLON CA DNA+OCC BLD SCRN STL QL: POSITIVE

## 2024-12-13 NOTE — TELEPHONE ENCOUNTER
Patient called this morning requesting a call back from [place].  Patient states that the paperwork that was to be faxed has been received and for the second time it has a dark black line down the middle of it.  Patient is requesting a call back as soon as Aurora gets in office.

## 2024-12-13 NOTE — TELEPHONE ENCOUNTER
Called pts back, she stated that there was a black line throught the fax sent to [facility]. Krystal garciave me an email to faxe the papers to. [email or web address]

## 2024-12-17 ENCOUNTER — TELEPHONE (OUTPATIENT)
Dept: GASTROENTEROLOGY | Age: 49
End: 2024-12-17

## 2024-12-17 ENCOUNTER — TELEMEDICINE (OUTPATIENT)
Dept: PALLATIVE CARE | Age: 49
End: 2024-12-17

## 2024-12-17 DIAGNOSIS — Q79.63 VASCULAR EHLERS-DANLOS SYNDROME: ICD-10-CM

## 2024-12-17 DIAGNOSIS — Z51.5 ENCOUNTER FOR PALLIATIVE CARE: ICD-10-CM

## 2024-12-17 DIAGNOSIS — G89.4 CHRONIC PAIN SYNDROME: Primary | ICD-10-CM

## 2024-12-17 NOTE — TELEPHONE ENCOUNTER
PT has been advised of results and recommendations. She will talk to [facility] and call us back.     ----- Message from CLINTON Peacock sent at 12/17/2024  9:48 AM CST -----  Cologuaryanne is positive the recommendation is colonoscopy, she is scheduled for a EGD in February she was going to speak with her physicians at the [facility] about the safety of having the EGD.  If she plans to have the EGD in February we can add the   colonoscopy to it

## 2024-12-17 NOTE — PROGRESS NOTES
at Facility (Appt Dept): [address] [place] [address]  Confirm you are appropriately licensed, registered, or certified to deliver care in the state where the patient is located as indicated above. If you are not or unsure, please re-schedule the visit: Yes, I confirm.       Note: not billable if this call serves to triage the patient into an appointment for the relevant concern      Nadia Campoverde, CLINTON - CNP

## 2024-12-23 ENCOUNTER — TELEPHONE (OUTPATIENT)
Dept: PAIN MANAGEMENT | Age: 49
End: 2024-12-23

## 2024-12-23 NOTE — TELEPHONE ENCOUNTER
Patient left voicemail 12/20/24@5202. Her family is worried they have video while she is sleeping is jerking & even waving her arms & legs. She just want to know if this could be a side effect of the new medication Belbuca. She is still taking as prescribed. Previous telephone encounter was having this side effect with legs felt like jello.  I had to leave a voicemail message this morning.

## 2024-12-26 ENCOUNTER — TELEPHONE (OUTPATIENT)
Dept: GASTROENTEROLOGY | Age: 49
End: 2024-12-26

## 2024-12-26 NOTE — TELEPHONE ENCOUNTER
Message sent to Dr. James:  ?   Mago Avelar   75  MRN: [id number]     This patient is scheduled for EGD/CLN on 25.  She has a lot of health issues including Katherine-Danlos.  She said that she has had 2 colon perforations during colonoscopies - both being 9- 10 years ago. I tried to find out when/where those CLN'swere done so I could get records, but she was not sure, She thought [facility], but I called them and they had no record.  She sees Dr. Gaffney at [facility] for her Katherine- Danlos - she has also been to [facility] and is getting ready to see Nancy Hughes on 25.  I have attached some records anthony her chart with some of her issues, including her GI reporting.     She is concerned about having the colonoscopy due to her history, she wanted to make sure you were comfortable doing her colonoscopies.  - I I did send clearance from Dr. Gaffney -           Reply from Dr. James:   with her history it may be best to ask [facility] to do it.     24 - Spoke to patient regarding Dr. James recommendation to have scopes completed at [facility], due to her difficult medical history.  She is going to reach out to her PCP to send the referral, since she has done some recent radiology testing.  The only thing we have done is cologuard test. I have cancelled her EGD/CLN.  Patient voiced understanding, and appreciates that we put her health first

## 2025-01-02 ENCOUNTER — OFFICE VISIT (OUTPATIENT)
Dept: VASCULAR SURGERY | Age: 50
End: 2025-01-02
Payer: COMMERCIAL

## 2025-01-02 VITALS
OXYGEN SATURATION: 98 % | SYSTOLIC BLOOD PRESSURE: 110 MMHG | HEART RATE: 90 BPM | TEMPERATURE: 96.6 F | DIASTOLIC BLOOD PRESSURE: 62 MMHG

## 2025-01-02 DIAGNOSIS — I77.72 ILIAC DISSECTION (HCC): ICD-10-CM

## 2025-01-02 DIAGNOSIS — I72.8 SPLENIC ARTERY ANEURYSM (HCC): Primary | ICD-10-CM

## 2025-01-02 PROCEDURE — 99202 OFFICE O/P NEW SF 15 MIN: CPT | Performed by: NURSE PRACTITIONER

## 2025-01-02 NOTE — PROGRESS NOTES
Regular rate and rhythm.    Pulmonary - effort appears normal.  No respiratory distress.    Lungs - Breath sounds normal. No wheezes or rales.    GI - Abdomen - soft, non tender, bowel sounds X 4 quadrants.  No guarding or rebound tenderness.  No distension or palpable mass.  Extremities - Radial and brachial pulses are 2+ to palpation bilaterally.  Right femoral pulse: present 2+; Right popliteal pulse: absent Right DP: absent; Right PT absent; Left femoral pulse: present 2+; Left popliteal pulse: absent; Left DP: absent; Left PT: absent No cyanosis, clubbing, or significant edema.  No signs atheroembolic event.  Neurologic - alert and oriented X 3.  Physiologic.   Face symmetric.  Skin - warm, dry, and intact.  no wound  Psychiatric - mood, affect, and behavior appear normal.  Judgment and thought processes appear normal.      CT - Multiple splenic artery aneurysms unchanged from prior measuring up to 17mm (series 306, image 646).     Focal proximal SMA dissection, unchanged (series 306, image 599).     Dissected left external iliac artery, unchanged (series 306, image 303).     Normal heart size. No pericardial effusion. No significant coronary artery disease. Normal caliber pulmonary arteries. No pulmonary embolism. Normal caliber thoracic aorta with no dissection. The bilateral renal arteries are normal in course and caliber.   Individual films reviewed:  no.    These results have been reviewed with the patient.  Disease process is stable, chronic illness      Reviewed previous studies including: CT scan  Individual images were reviewed.  I agree with the findings  Results were discussed with the patient.          ASSESSMENT/PLAN:  1. Splenic artery aneurysm (HCC)  2. Iliac dissection (HCC)        Proceed with continued follow up at [facility]  Symptoms of rupture reviewed with the patient including sudden onset severe back pain or abdominal pain.  This pain can sometimes radiate into the groin or leg.

## 2025-01-07 ENCOUNTER — OFFICE VISIT (OUTPATIENT)
Age: 50
End: 2025-01-07
Payer: COMMERCIAL

## 2025-01-07 VITALS
BODY MASS INDEX: 27.49 KG/M2 | HEIGHT: 64 IN | WEIGHT: 161 LBS | DIASTOLIC BLOOD PRESSURE: 62 MMHG | OXYGEN SATURATION: 99 % | TEMPERATURE: 97.2 F | HEART RATE: 86 BPM | SYSTOLIC BLOOD PRESSURE: 98 MMHG

## 2025-01-07 DIAGNOSIS — I95.2 HYPOTENSION DUE TO DRUGS: ICD-10-CM

## 2025-01-07 DIAGNOSIS — E11.69 TYPE 2 DIABETES MELLITUS WITH OTHER SPECIFIED COMPLICATION, WITHOUT LONG-TERM CURRENT USE OF INSULIN (HCC): Primary | ICD-10-CM

## 2025-01-07 DIAGNOSIS — E11.69 TYPE 2 DIABETES MELLITUS WITH OTHER SPECIFIED COMPLICATION, WITHOUT LONG-TERM CURRENT USE OF INSULIN (HCC): ICD-10-CM

## 2025-01-07 DIAGNOSIS — G35 MS (MULTIPLE SCLEROSIS) (HCC): ICD-10-CM

## 2025-01-07 LAB
25(OH)D3 SERPL-MCNC: 78.3 NG/ML
ALBUMIN SERPL-MCNC: 4.5 G/DL (ref 3.5–5.2)
ALP SERPL-CCNC: 89 U/L (ref 35–104)
ALT SERPL-CCNC: 16 U/L (ref 5–33)
ANION GAP SERPL CALCULATED.3IONS-SCNC: 16 MMOL/L (ref 7–19)
AST SERPL-CCNC: 19 U/L (ref 5–32)
BASOPHILS # BLD: 0.1 K/UL (ref 0–0.2)
BASOPHILS NFR BLD: 0.9 % (ref 0–1)
BILIRUB SERPL-MCNC: 1.3 MG/DL (ref 0.2–1.2)
BUN SERPL-MCNC: 15 MG/DL (ref 6–20)
CALCIUM SERPL-MCNC: 9.1 MG/DL (ref 8.6–10)
CHLORIDE SERPL-SCNC: 98 MMOL/L (ref 98–111)
CO2 SERPL-SCNC: 21 MMOL/L (ref 22–29)
CREAT SERPL-MCNC: 0.8 MG/DL (ref 0.5–0.9)
EOSINOPHIL # BLD: 0.2 K/UL (ref 0–0.6)
EOSINOPHIL NFR BLD: 4 % (ref 0–5)
ERYTHROCYTE [DISTWIDTH] IN BLOOD BY AUTOMATED COUNT: 11.9 % (ref 11.5–14.5)
FERRITIN SERPL-MCNC: 118.5 NG/ML (ref 13–150)
GLUCOSE SERPL-MCNC: 103 MG/DL (ref 70–99)
HBA1C MFR BLD: 6.3 % (ref 4–5.6)
HCT VFR BLD AUTO: 44.7 % (ref 37–47)
HGB BLD-MCNC: 14.8 G/DL (ref 12–16)
IMM GRANULOCYTES # BLD: 0.1 K/UL
IRON SATN MFR SERPL: 37 % (ref 14–50)
IRON SERPL-MCNC: 121 UG/DL (ref 37–145)
LYMPHOCYTES # BLD: 1.3 K/UL (ref 1.1–4.5)
LYMPHOCYTES NFR BLD: 22.5 % (ref 20–40)
MCH RBC QN AUTO: 29.1 PG (ref 27–31)
MCHC RBC AUTO-ENTMCNC: 33.1 G/DL (ref 33–37)
MCV RBC AUTO: 88 FL (ref 81–99)
MONOCYTES # BLD: 0.5 K/UL (ref 0–0.9)
MONOCYTES NFR BLD: 7.7 % (ref 0–10)
NEUTROPHILS # BLD: 3.7 K/UL (ref 1.5–7.5)
NEUTS SEG NFR BLD: 64 % (ref 50–65)
PLATELET # BLD AUTO: 192 K/UL (ref 130–400)
PLATELET SLIDE REVIEW: NORMAL
PMV BLD AUTO: 11.5 FL (ref 9.4–12.3)
POTASSIUM SERPL-SCNC: 4.6 MMOL/L (ref 3.5–5)
PROT SERPL-MCNC: 6.8 G/DL (ref 6.4–8.3)
RBC # BLD AUTO: 5.08 M/UL (ref 4.2–5.4)
SODIUM SERPL-SCNC: 135 MMOL/L (ref 136–145)
T4 FREE SERPL-MCNC: 1.28 NG/DL (ref 0.93–1.7)
TIBC SERPL-MCNC: 329 UG/DL (ref 250–400)
TSH SERPL DL<=0.005 MIU/L-ACNC: 1.12 UIU/ML (ref 0.27–4.2)
VIT B12 SERPL-MCNC: 342 PG/ML (ref 232–1245)
WBC # BLD AUTO: 5.8 K/UL (ref 4.8–10.8)

## 2025-01-07 PROCEDURE — 99214 OFFICE O/P EST MOD 30 MIN: CPT | Performed by: NURSE PRACTITIONER

## 2025-01-07 RX ORDER — METOPROLOL SUCCINATE 25 MG/1
12.5 TABLET, EXTENDED RELEASE ORAL DAILY
Qty: 30 TABLET | Refills: 5 | Status: SHIPPED
Start: 2025-01-07

## 2025-01-07 RX ORDER — INSULIN ASPART 100 [IU]/ML
INJECTION, SOLUTION INTRAVENOUS; SUBCUTANEOUS
Qty: 5 ADJUSTABLE DOSE PRE-FILLED PEN SYRINGE | Refills: 3 | Status: SHIPPED | OUTPATIENT
Start: 2025-01-07

## 2025-01-07 RX ORDER — PREDNISONE 10 MG/1
TABLET ORAL
Qty: 42 TABLET | Refills: 0 | Status: SHIPPED | OUTPATIENT
Start: 2025-01-07

## 2025-01-07 SDOH — ECONOMIC STABILITY: FOOD INSECURITY: WITHIN THE PAST 12 MONTHS, THE FOOD YOU BOUGHT JUST DIDN'T LAST AND YOU DIDN'T HAVE MONEY TO GET MORE.: SOMETIMES TRUE

## 2025-01-07 SDOH — ECONOMIC STABILITY: INCOME INSECURITY: HOW HARD IS IT FOR YOU TO PAY FOR THE VERY BASICS LIKE FOOD, HOUSING, MEDICAL CARE, AND HEATING?: SOMEWHAT HARD

## 2025-01-07 SDOH — ECONOMIC STABILITY: FOOD INSECURITY: WITHIN THE PAST 12 MONTHS, YOU WORRIED THAT YOUR FOOD WOULD RUN OUT BEFORE YOU GOT MONEY TO BUY MORE.: SOMETIMES TRUE

## 2025-01-07 ASSESSMENT — PATIENT HEALTH QUESTIONNAIRE - PHQ9
SUM OF ALL RESPONSES TO PHQ QUESTIONS 1-9: 8
9. THOUGHTS THAT YOU WOULD BE BETTER OFF DEAD, OR OF HURTING YOURSELF: NOT AT ALL
8. MOVING OR SPEAKING SO SLOWLY THAT OTHER PEOPLE COULD HAVE NOTICED. OR THE OPPOSITE, BEING SO FIGETY OR RESTLESS THAT YOU HAVE BEEN MOVING AROUND A LOT MORE THAN USUAL: SEVERAL DAYS
10. IF YOU CHECKED OFF ANY PROBLEMS, HOW DIFFICULT HAVE THESE PROBLEMS MADE IT FOR YOU TO DO YOUR WORK, TAKE CARE OF THINGS AT HOME, OR GET ALONG WITH OTHER PEOPLE: SOMEWHAT DIFFICULT
SUM OF ALL RESPONSES TO PHQ QUESTIONS 1-9: 8
6. FEELING BAD ABOUT YOURSELF - OR THAT YOU ARE A FAILURE OR HAVE LET YOURSELF OR YOUR FAMILY DOWN: SEVERAL DAYS
SUM OF ALL RESPONSES TO PHQ QUESTIONS 1-9: 8
4. FEELING TIRED OR HAVING LITTLE ENERGY: SEVERAL DAYS
SUM OF ALL RESPONSES TO PHQ QUESTIONS 1-9: 8
SUM OF ALL RESPONSES TO PHQ9 QUESTIONS 1 & 2: 2
1. LITTLE INTEREST OR PLEASURE IN DOING THINGS: SEVERAL DAYS
3. TROUBLE FALLING OR STAYING ASLEEP: SEVERAL DAYS
7. TROUBLE CONCENTRATING ON THINGS, SUCH AS READING THE NEWSPAPER OR WATCHING TELEVISION: SEVERAL DAYS
2. FEELING DOWN, DEPRESSED OR HOPELESS: SEVERAL DAYS
5. POOR APPETITE OR OVEREATING: SEVERAL DAYS

## 2025-01-07 ASSESSMENT — ENCOUNTER SYMPTOMS
TROUBLE SWALLOWING: 1
EYE DISCHARGE: 0
WHEEZING: 0
SORE THROAT: 0
CONSTIPATION: 0
ABDOMINAL PAIN: 0
EYE REDNESS: 0
RHINORRHEA: 0
COUGH: 0
BLOOD IN STOOL: 0
DIARRHEA: 0

## 2025-01-07 NOTE — ASSESSMENT & PLAN NOTE
Orders:    predniSONE (DELTASONE) 10 MG tablet; Days 1-2 (6 tablets), 3-4 (5 tabs), 5-6 (4 tabs), 7-8 (3 tabs), 9-10 (2 tabs) and 11-12 (1 tab)  Will start on prednisone taper.  She has an appointment with her MS doctor on January 23.  She will discuss further treatment then

## 2025-01-07 NOTE — PROGRESS NOTES
Mago Terrell (:  1975) is a 49 y.o. female,Established patient, here for evaluation of the following chief complaint(s):  Multiple Sclerosis (States BP has been running low - 99/60. Has been shaking a lot more started right before adnny. Has gotten worse. This morning could not walk. And fell yesterday - legs just gave up. States having issues with task - can't paint nails or sign name. Hasn't been treated for 2 years as the treatment will get rid of b cells per pt. She gets b cell infusions every other week. Had trouble eating last night from shaking. Steroids cause her Blood Sugar to go very high - over 300. )         Assessment & Plan  Type 2 diabetes mellitus with other specified complication, without long-term current use of insulin ([facility])   Chronic, at goal (stable), continue current treatment plan  Will add Basaglar and NovoLog sliding scale while she is on the steroid she is continue her Mounjaro and Steglatro    Orders:    insulin aspart (NOVOLOG FLEXPEN) 100 UNIT/ML injection pen; Per sliding scale give up to 15 u per meal up to 45 u per day    Vitamin D 25 Hydroxy; Future    Vitamin B12; Future    CBC with Auto Differential; Future    Comprehensive Metabolic Panel; Future    Hemoglobin A1C; Future    TSH; Future    T4, Free; Future    Ferritin; Future    Iron and TIBC; Future    Hypotension due to drugs     Will have her stop lisinopril and half her metoprolol    Orders:    metoprolol succinate (TOPROL XL) 25 MG extended release tablet; Take 0.5 tablets by mouth daily    MS (multiple sclerosis) ([facility])       Orders:    predniSONE (DELTASONE) 10 MG tablet; Days 1-2 (6 tablets), 3-4 (5 tabs), 5-6 (4 tabs), 7-8 (3 tabs), 9-10 (2 tabs) and 11-12 (1 tab)  Will start on prednisone taper.  She has an appointment with her MS doctor on .  She will discuss further treatment then    Return if symptoms worsen or fail to improve.       Subjective   HPI  Multiple Sclerosis (States BP has been

## 2025-01-07 NOTE — ASSESSMENT & PLAN NOTE
Chronic, at goal (stable), continue current treatment plan  Will add Basaglar and NovoLog sliding scale while she is on the steroid she is continue her Mounjaro and Shai    Orders:    insulin aspart (NOVOLOG FLEXPEN) 100 UNIT/ML injection pen; Per sliding scale give up to 15 u per meal up to 45 u per day    Vitamin D 25 Hydroxy; Future    Vitamin B12; Future    CBC with Auto Differential; Future    Comprehensive Metabolic Panel; Future    Hemoglobin A1C; Future    TSH; Future    T4, Free; Future    Ferritin; Future    Iron and TIBC; Future

## 2025-01-09 ENCOUNTER — TELEPHONE (OUTPATIENT)
Age: 50
End: 2025-01-09

## 2025-01-09 NOTE — TELEPHONE ENCOUNTER
----- Message from CLINTON Gusamn sent at 1/9/2025  8:21 AM CST -----  Please inform patient results show  A1c is 6.3 this is great  CMP is okay  CBC is normal  Vitamin D is good  Thyroid labs are normal  B12 is normal but low end so would recommend taking B12 supplement over-the-counter daily  Iron is normal  When you call please call patient's  with results

## 2025-01-13 ENCOUNTER — HOSPITAL ENCOUNTER (OUTPATIENT)
Dept: PAIN MANAGEMENT | Age: 50
Discharge: HOME | End: 2025-01-13
Payer: COMMERCIAL

## 2025-01-13 VITALS
OXYGEN SATURATION: 96 % | WEIGHT: 159 LBS | HEIGHT: 64 IN | BODY MASS INDEX: 27.14 KG/M2 | TEMPERATURE: 97.3 F | SYSTOLIC BLOOD PRESSURE: 111 MMHG | DIASTOLIC BLOOD PRESSURE: 65 MMHG | RESPIRATION RATE: 16 BRPM | HEART RATE: 73 BPM

## 2025-01-13 DIAGNOSIS — Q79.63 VASCULAR EHLERS-DANLOS SYNDROME: ICD-10-CM

## 2025-01-13 DIAGNOSIS — G89.4 CHRONIC PAIN SYNDROME: Primary | ICD-10-CM

## 2025-01-13 DIAGNOSIS — F11.90 CHRONIC, CONTINUOUS USE OF OPIOIDS: ICD-10-CM

## 2025-01-13 PROCEDURE — 99214 OFFICE O/P EST MOD 30 MIN: CPT | Performed by: STUDENT IN AN ORGANIZED HEALTH CARE EDUCATION/TRAINING PROGRAM

## 2025-01-13 PROCEDURE — 99214 OFFICE O/P EST MOD 30 MIN: CPT

## 2025-01-13 RX ORDER — FENTANYL 50 UG/1
1 PATCH TRANSDERMAL
Qty: 10 PATCH | Refills: 0 | Status: ON HOLD | OUTPATIENT
Start: 2025-01-25 | End: 2025-02-24

## 2025-01-13 RX ORDER — BUPRENORPHINE 5 UG/H
1 PATCH TRANSDERMAL WEEKLY
Qty: 4 PATCH | Refills: 0 | Status: ON HOLD | OUTPATIENT
Start: 2025-01-13 | End: 2025-02-12

## 2025-01-13 ASSESSMENT — PAIN SCALES - GENERAL: PAINLEVEL_OUTOF10: 8

## 2025-01-13 ASSESSMENT — PAIN DESCRIPTION - LOCATION: LOCATION: GENERALIZED

## 2025-01-13 ASSESSMENT — PAIN DESCRIPTION - ORIENTATION: ORIENTATION: OTHER (COMMENT)

## 2025-01-13 ASSESSMENT — PAIN DESCRIPTION - PAIN TYPE: TYPE: CHRONIC PAIN

## 2025-01-13 NOTE — TELEPHONE ENCOUNTER
Spoke to patient with lab results. Patient verbalized understanding.    Patient called back herself for results.

## 2025-01-13 NOTE — PROGRESS NOTES
Clinic Documentation      Education Provided:  [x] Review of Arun  [] Agreement Review  [x] PEG Score Calculated [] PHQ Score Calculated [] ORT Score Calculated    [] Compliance Issues Discussed [] Cognitive Behavior Needs [x] Exercise [] Review of Test [] Financial Issues  [x] Tobacco/Alcohol Use Reviewed [x] Teaching [] New Patient [] Picture Obtained    Physician Plan:  [] Outgoing Referral  [] Pharmacy Consult  [x] Test Ordered [x] Prescription Ordered/Changed   [] Obtained Test Results / Consult Notes        Complete if patient is withholding blood thinner for procedure     Blood Thinner Patient is currently taking:      [] Plavix (Hold for 7 days)  [] Aspirin (Hold for 5 days)     [] Pletal (Hold for 2 days)  [] Pradaxa (Hold for 3 days)    [] Effient (Hold for 7 days)  [] Xarelto (Hold for 2 days)    [] Eliquis (Hold for 2 days)  [] Brilinta (Hold for 7 days)    [] Coumadin (Hold for 5 days) - (INR needs to be drawn the day prior to procedure- INR < 2.0)    [] Aggrenox (Hold for 7 days)        [] Patient will stop medication on their own.    [] Blood Thinner Form Faxed for approval to hold.   Provider form faxed to:     Assessment Completed by:  Electronically signed by CECI DON on 1/13/2025 at 10:53 AM  
equivalent (OME) daily:  120      PLAN:  Our mission at the [facility] is to improve the lives of our patients by improving function and relieving pain. We have a multidisciplinary outlook with a focus on non-opioid medications, interventions, physical therapy and improved mindset. We strive to improve function and relieve pain with a focus on health and individualizing care.    1. Medication Recommendations:   - continue fentanyl patch 50mcg/hr q 3 days  -spoke about and provided counseling regarding the risks of these medications and side effects including respiratory depression and death  -stop belbucca, start butrans patch 5mcg/hr to see if better tolerated   -may consider Norco 7.5mg TID prn for breakthrough pain if pt unable to tolerate butrans.   -provided naloxone to the patient as rescue medication in the event of accidental opioid overdose   -continue gabapentin 600mg BID (prescribed by PCP)  -continue baclfoen 10mg TID (Cr .7)  -continue venlafaxine 150mng q daily as directed by PCP (good for pain as well as depression)  -continue other medications as prescribed    CSA PARAMETERSDATECOMMENT  CSA DATE12/2/24  URINE DRUG SCREEN1/13/25, pending  [place] PDMP Reviewed and consistent  NALOXONE PROVIDED?Yes  ABERRANT BEHAVIOR?None  Per CDC recommendations, patients prescribed opiates with a history of overdose, substance use disorder, >50 OME's daily, or concurrent benzodiazepine use should be prescribed naloxone. We instructed the patient to keep one nasal spray on his/her person, and instruct his/her family and friends to use 2 sprays under their nose in the event of accidental overdose, then to call 911.    Potential opioid side effects were discussed in detail including constipation, urinary retention, pruritus, respiratory depression, sedation, dependence, addiction, tolerance, opioid induced hyperalgesia, osteopenia, hypogonadism and immune suppression.  Advised to take the opioid medications as

## 2025-01-15 ENCOUNTER — TELEPHONE (OUTPATIENT)
Dept: PAIN MANAGEMENT | Age: 50
End: 2025-01-15

## 2025-01-17 DIAGNOSIS — G35 MS (MULTIPLE SCLEROSIS) (HCC): ICD-10-CM

## 2025-01-17 RX ORDER — ARMODAFINIL 250 MG/1
1 TABLET ORAL DAILY
Qty: 30 TABLET | Refills: 2 | Status: ON HOLD | OUTPATIENT
Start: 2025-01-17 | End: 2025-04-17

## 2025-01-17 RX ORDER — ARMODAFINIL 250 MG/1
TABLET ORAL
Qty: 30 TABLET | Refills: 0 | OUTPATIENT
Start: 2025-01-17

## 2025-01-17 NOTE — TELEPHONE ENCOUNTER
Patient is requesting a refill of Armodafinil. Patient was last seen on 1/7/2025 and has appointment on Visit date not found. Medication was last sent in on 9/12/2024 for #30 and 2 rf.     Requested Prescriptions     Pending Prescriptions Disp Refills    Armodafinil 250 MG TABS 30 tablet 2     Sig: Take 1 tablet by mouth Daily for 90 days. Max Daily Amount: 1 tablet

## 2025-01-18 ENCOUNTER — APPOINTMENT (OUTPATIENT)
Dept: CT IMAGING | Age: 50
DRG: 389 | End: 2025-01-18
Payer: COMMERCIAL

## 2025-01-18 ENCOUNTER — HOSPITAL ENCOUNTER (INPATIENT)
Age: 50
LOS: 2 days | Discharge: HOME | DRG: 389 | End: 2025-01-20
Attending: EMERGENCY MEDICINE | Admitting: STUDENT IN AN ORGANIZED HEALTH CARE EDUCATION/TRAINING PROGRAM
Payer: COMMERCIAL

## 2025-01-18 DIAGNOSIS — R11.2 NAUSEA AND VOMITING, UNSPECIFIED VOMITING TYPE: Primary | ICD-10-CM

## 2025-01-18 DIAGNOSIS — K56.600 PARTIAL SMALL BOWEL OBSTRUCTION (HCC): ICD-10-CM

## 2025-01-18 DIAGNOSIS — Q79.60 EHLERS-DANLOS DISEASE: ICD-10-CM

## 2025-01-18 LAB
ALBUMIN SERPL-MCNC: 5.1 G/DL (ref 3.5–5.2)
ALP SERPL-CCNC: 99 U/L (ref 35–104)
ALT SERPL-CCNC: 33 U/L (ref 5–33)
AMORPH SED URNS QL MICRO: ABNORMAL /HPF
ANION GAP SERPL CALCULATED.3IONS-SCNC: 24 MMOL/L (ref 7–19)
AST SERPL-CCNC: 18 U/L (ref 5–32)
BACTERIA #/AREA URNS HPF: ABNORMAL /HPF
BASOPHILS # BLD: 0.1 K/UL (ref 0–0.2)
BASOPHILS NFR BLD: 0.3 % (ref 0–1)
BILIRUB SERPL-MCNC: 2 MG/DL (ref 0.2–1.2)
BILIRUB UR QL STRIP: ABNORMAL
BUN SERPL-MCNC: 24 MG/DL (ref 6–20)
CALCIUM SERPL-MCNC: 10.3 MG/DL (ref 8.6–10)
CASTS #/AREA URNS LPF: ABNORMAL /LPF
CHLORIDE SERPL-SCNC: 95 MMOL/L (ref 98–111)
CLARITY UR: ABNORMAL
CO2 SERPL-SCNC: 21 MMOL/L (ref 22–29)
COLOR UR: ABNORMAL
CREAT SERPL-MCNC: 0.9 MG/DL (ref 0.5–0.9)
CRYSTALS URNS MICRO: ABNORMAL /HPF
EOSINOPHIL # BLD: 0.1 K/UL (ref 0–0.6)
EOSINOPHIL NFR BLD: 0.5 % (ref 0–5)
ERYTHROCYTE [DISTWIDTH] IN BLOOD BY AUTOMATED COUNT: 12.2 % (ref 11.5–14.5)
FINE GRAN CASTS #/AREA URNS HPF: ABNORMAL /LPF (ref 0–2)
FLUAV AG NPH QL: NEGATIVE
FLUBV AG NPH QL: NEGATIVE
GLUCOSE BLD-MCNC: 108 MG/DL (ref 70–99)
GLUCOSE BLD-MCNC: 130 MG/DL (ref 70–99)
GLUCOSE SERPL-MCNC: 154 MG/DL (ref 70–99)
GLUCOSE UR STRIP.AUTO-MCNC: =>1000 MG/DL
HCT VFR BLD AUTO: 54.4 % (ref 37–47)
HGB BLD-MCNC: 18.4 G/DL (ref 12–16)
HGB UR STRIP.AUTO-MCNC: NEGATIVE MG/L
HYALINE CASTS #/AREA URNS LPF: ABNORMAL /LPF (ref 0–5)
IMM GRANULOCYTES # BLD: 0.2 K/UL
KETONES UR STRIP.AUTO-MCNC: 15 MG/DL
LEUKOCYTE ESTERASE UR QL STRIP.AUTO: ABNORMAL
LIPASE SERPL-CCNC: 32 U/L (ref 13–60)
LYMPHOCYTES # BLD: 1.8 K/UL (ref 1.1–4.5)
LYMPHOCYTES NFR BLD: 10.2 % (ref 20–40)
MCH RBC QN AUTO: 28.8 PG (ref 27–31)
MCHC RBC AUTO-ENTMCNC: 33.8 G/DL (ref 33–37)
MCV RBC AUTO: 85.3 FL (ref 81–99)
MONOCYTES # BLD: 1.7 K/UL (ref 0–0.9)
MONOCYTES NFR BLD: 9.5 % (ref 0–10)
MUCOUS THREADS URNS QL MICRO: ABNORMAL /LPF
NEUTROPHILS # BLD: 14 K/UL (ref 1.5–7.5)
NEUTS SEG NFR BLD: 78.6 % (ref 50–65)
NITRITE UR QL STRIP.AUTO: NEGATIVE
PERFORMED ON: ABNORMAL
PERFORMED ON: ABNORMAL
PH UR STRIP.AUTO: 8 [PH] (ref 5–8)
PLATELET # BLD AUTO: 343 K/UL (ref 130–400)
PMV BLD AUTO: 10.1 FL (ref 9.4–12.3)
POTASSIUM SERPL-SCNC: 3.7 MMOL/L (ref 3.5–5)
PROT SERPL-MCNC: 7.9 G/DL (ref 6.4–8.3)
PROT UR STRIP.AUTO-MCNC: 30 MG/DL
RBC # BLD AUTO: 6.38 M/UL (ref 4.2–5.4)
RBC #/AREA URNS HPF: ABNORMAL /HPF (ref 0–2)
SODIUM SERPL-SCNC: 140 MMOL/L (ref 136–145)
SP GR UR STRIP.AUTO: 1.04 (ref 1–1.03)
SQUAMOUS #/AREA URNS HPF: ABNORMAL /HPF
UROBILINOGEN UR STRIP.AUTO-MCNC: 1 E.U./DL
WBC # BLD AUTO: 17.9 K/UL (ref 4.8–10.8)
WBC #/AREA URNS HPF: ABNORMAL /HPF (ref 0–5)
YEAST #/AREA URNS HPF: PRESENT /HPF

## 2025-01-18 PROCEDURE — 87086 URINE CULTURE/COLONY COUNT: CPT

## 2025-01-18 PROCEDURE — 6370000000 HC RX 637 (ALT 250 FOR IP)

## 2025-01-18 PROCEDURE — 36415 COLL VENOUS BLD VENIPUNCTURE: CPT

## 2025-01-18 PROCEDURE — 2580000003 HC RX 258: Performed by: EMERGENCY MEDICINE

## 2025-01-18 PROCEDURE — 87106 FUNGI IDENTIFICATION YEAST: CPT

## 2025-01-18 PROCEDURE — 6360000004 HC RX CONTRAST MEDICATION: Performed by: EMERGENCY MEDICINE

## 2025-01-18 PROCEDURE — 87804 INFLUENZA ASSAY W/OPTIC: CPT

## 2025-01-18 PROCEDURE — 85025 COMPLETE CBC W/AUTO DIFF WBC: CPT

## 2025-01-18 PROCEDURE — 96375 TX/PRO/DX INJ NEW DRUG ADDON: CPT

## 2025-01-18 PROCEDURE — 83690 ASSAY OF LIPASE: CPT

## 2025-01-18 PROCEDURE — 82962 GLUCOSE BLOOD TEST: CPT

## 2025-01-18 PROCEDURE — 81001 URINALYSIS AUTO W/SCOPE: CPT

## 2025-01-18 PROCEDURE — 6360000002 HC RX W HCPCS

## 2025-01-18 PROCEDURE — 6360000002 HC RX W HCPCS: Performed by: EMERGENCY MEDICINE

## 2025-01-18 PROCEDURE — 2580000003 HC RX 258

## 2025-01-18 PROCEDURE — 1200000000 HC SEMI PRIVATE

## 2025-01-18 PROCEDURE — 96374 THER/PROPH/DIAG INJ IV PUSH: CPT

## 2025-01-18 PROCEDURE — 80053 COMPREHEN METABOLIC PANEL: CPT

## 2025-01-18 PROCEDURE — 74177 CT ABD & PELVIS W/CONTRAST: CPT

## 2025-01-18 PROCEDURE — 99253 IP/OBS CNSLTJ NEW/EST LOW 45: CPT | Performed by: SURGERY

## 2025-01-18 PROCEDURE — 2500000003 HC RX 250 WO HCPCS

## 2025-01-18 PROCEDURE — 99285 EMERGENCY DEPT VISIT HI MDM: CPT

## 2025-01-18 RX ORDER — ARMODAFINIL 50 MG/1
250 TABLET ORAL DAILY
Status: DISCONTINUED | OUTPATIENT
Start: 2025-01-18 | End: 2025-01-20 | Stop reason: HOSPADM

## 2025-01-18 RX ORDER — HYDROMORPHONE HYDROCHLORIDE 1 MG/ML
0.5 INJECTION, SOLUTION INTRAMUSCULAR; INTRAVENOUS; SUBCUTANEOUS EVERY 6 HOURS PRN
Status: DISCONTINUED | OUTPATIENT
Start: 2025-01-18 | End: 2025-01-20 | Stop reason: HOSPADM

## 2025-01-18 RX ORDER — IOPAMIDOL 755 MG/ML
90 INJECTION, SOLUTION INTRAVASCULAR
Status: COMPLETED | OUTPATIENT
Start: 2025-01-18 | End: 2025-01-18

## 2025-01-18 RX ORDER — CIPROFLOXACIN 2 MG/ML
400 INJECTION, SOLUTION INTRAVENOUS EVERY 12 HOURS
Status: DISCONTINUED | OUTPATIENT
Start: 2025-01-18 | End: 2025-01-19

## 2025-01-18 RX ORDER — METOPROLOL SUCCINATE 25 MG/1
12.5 TABLET, EXTENDED RELEASE ORAL DAILY
Status: DISCONTINUED | OUTPATIENT
Start: 2025-01-18 | End: 2025-01-20 | Stop reason: HOSPADM

## 2025-01-18 RX ORDER — ONDANSETRON 2 MG/ML
4 INJECTION INTRAMUSCULAR; INTRAVENOUS EVERY 6 HOURS PRN
Status: DISCONTINUED | OUTPATIENT
Start: 2025-01-18 | End: 2025-01-20 | Stop reason: HOSPADM

## 2025-01-18 RX ORDER — POLYETHYLENE GLYCOL 3350 17 G/17G
17 POWDER, FOR SOLUTION ORAL DAILY PRN
Status: DISCONTINUED | OUTPATIENT
Start: 2025-01-18 | End: 2025-01-20 | Stop reason: HOSPADM

## 2025-01-18 RX ORDER — SODIUM CHLORIDE 9 MG/ML
INJECTION, SOLUTION INTRAVENOUS PRN
Status: DISCONTINUED | OUTPATIENT
Start: 2025-01-18 | End: 2025-01-20 | Stop reason: HOSPADM

## 2025-01-18 RX ORDER — SODIUM CHLORIDE 9 MG/ML
INJECTION, SOLUTION INTRAVENOUS CONTINUOUS
Status: ACTIVE | OUTPATIENT
Start: 2025-01-18 | End: 2025-01-19

## 2025-01-18 RX ORDER — DOXEPIN HYDROCHLORIDE 50 MG/1
100 CAPSULE ORAL NIGHTLY
Status: DISCONTINUED | OUTPATIENT
Start: 2025-01-18 | End: 2025-01-20 | Stop reason: HOSPADM

## 2025-01-18 RX ORDER — GABAPENTIN 300 MG/1
300 CAPSULE ORAL 3 TIMES DAILY
Status: DISCONTINUED | OUTPATIENT
Start: 2025-01-18 | End: 2025-01-20 | Stop reason: HOSPADM

## 2025-01-18 RX ORDER — METOCLOPRAMIDE HYDROCHLORIDE 5 MG/ML
10 INJECTION INTRAMUSCULAR; INTRAVENOUS EVERY 6 HOURS
Status: DISCONTINUED | OUTPATIENT
Start: 2025-01-18 | End: 2025-01-20 | Stop reason: HOSPADM

## 2025-01-18 RX ORDER — INSULIN LISPRO 100 [IU]/ML
0-4 INJECTION, SOLUTION INTRAVENOUS; SUBCUTANEOUS
Status: DISCONTINUED | OUTPATIENT
Start: 2025-01-18 | End: 2025-01-20 | Stop reason: HOSPADM

## 2025-01-18 RX ORDER — HYDROMORPHONE HYDROCHLORIDE 1 MG/ML
1 INJECTION, SOLUTION INTRAMUSCULAR; INTRAVENOUS; SUBCUTANEOUS ONCE
Status: COMPLETED | OUTPATIENT
Start: 2025-01-18 | End: 2025-01-18

## 2025-01-18 RX ORDER — ONDANSETRON 4 MG/1
4 TABLET, ORALLY DISINTEGRATING ORAL EVERY 8 HOURS PRN
Status: DISCONTINUED | OUTPATIENT
Start: 2025-01-18 | End: 2025-01-20 | Stop reason: HOSPADM

## 2025-01-18 RX ORDER — BACLOFEN 10 MG/1
10 TABLET ORAL 3 TIMES DAILY
Status: DISCONTINUED | OUTPATIENT
Start: 2025-01-18 | End: 2025-01-20 | Stop reason: HOSPADM

## 2025-01-18 RX ORDER — AMANTADINE HYDROCHLORIDE 100 MG/1
100 CAPSULE, GELATIN COATED ORAL DAILY
Status: DISCONTINUED | OUTPATIENT
Start: 2025-01-18 | End: 2025-01-20 | Stop reason: HOSPADM

## 2025-01-18 RX ORDER — SODIUM CHLORIDE 0.9 % (FLUSH) 0.9 %
5-40 SYRINGE (ML) INJECTION EVERY 12 HOURS SCHEDULED
Status: DISCONTINUED | OUTPATIENT
Start: 2025-01-18 | End: 2025-01-20 | Stop reason: HOSPADM

## 2025-01-18 RX ORDER — MAGNESIUM SULFATE IN WATER 40 MG/ML
2000 INJECTION, SOLUTION INTRAVENOUS PRN
Status: DISCONTINUED | OUTPATIENT
Start: 2025-01-18 | End: 2025-01-20 | Stop reason: HOSPADM

## 2025-01-18 RX ORDER — ENOXAPARIN SODIUM 100 MG/ML
40 INJECTION SUBCUTANEOUS DAILY
Status: DISCONTINUED | OUTPATIENT
Start: 2025-01-18 | End: 2025-01-20 | Stop reason: HOSPADM

## 2025-01-18 RX ORDER — DEXTROSE MONOHYDRATE 100 MG/ML
INJECTION, SOLUTION INTRAVENOUS CONTINUOUS PRN
Status: DISCONTINUED | OUTPATIENT
Start: 2025-01-18 | End: 2025-01-20 | Stop reason: HOSPADM

## 2025-01-18 RX ORDER — ROPINIROLE 0.25 MG/1
0.25 TABLET, FILM COATED ORAL NIGHTLY
Status: DISCONTINUED | OUTPATIENT
Start: 2025-01-18 | End: 2025-01-20 | Stop reason: HOSPADM

## 2025-01-18 RX ORDER — DOCUSATE SODIUM 100 MG/1
100 CAPSULE, LIQUID FILLED ORAL 2 TIMES DAILY PRN
Status: DISCONTINUED | OUTPATIENT
Start: 2025-01-18 | End: 2025-01-20 | Stop reason: HOSPADM

## 2025-01-18 RX ORDER — ONDANSETRON 2 MG/ML
4 INJECTION INTRAMUSCULAR; INTRAVENOUS ONCE
Status: COMPLETED | OUTPATIENT
Start: 2025-01-18 | End: 2025-01-18

## 2025-01-18 RX ORDER — SODIUM CHLORIDE, SODIUM LACTATE, POTASSIUM CHLORIDE, AND CALCIUM CHLORIDE .6; .31; .03; .02 G/100ML; G/100ML; G/100ML; G/100ML
1000 INJECTION, SOLUTION INTRAVENOUS ONCE
Status: COMPLETED | OUTPATIENT
Start: 2025-01-18 | End: 2025-01-18

## 2025-01-18 RX ORDER — SODIUM CHLORIDE 0.9 % (FLUSH) 0.9 %
5-40 SYRINGE (ML) INJECTION PRN
Status: DISCONTINUED | OUTPATIENT
Start: 2025-01-18 | End: 2025-01-20 | Stop reason: HOSPADM

## 2025-01-18 RX ORDER — BUPRENORPHINE 5 UG/H
1 PATCH TRANSDERMAL WEEKLY
Status: DISCONTINUED | OUTPATIENT
Start: 2025-01-18 | End: 2025-01-20 | Stop reason: HOSPADM

## 2025-01-18 RX ORDER — ACETAMINOPHEN 325 MG/1
650 TABLET ORAL EVERY 6 HOURS PRN
Status: DISCONTINUED | OUTPATIENT
Start: 2025-01-18 | End: 2025-01-20 | Stop reason: HOSPADM

## 2025-01-18 RX ORDER — ACETAMINOPHEN 650 MG/1
650 SUPPOSITORY RECTAL EVERY 6 HOURS PRN
Status: DISCONTINUED | OUTPATIENT
Start: 2025-01-18 | End: 2025-01-20 | Stop reason: HOSPADM

## 2025-01-18 RX ORDER — PROCHLORPERAZINE MALEATE 10 MG
5 TABLET ORAL EVERY 6 HOURS PRN
Status: DISCONTINUED | OUTPATIENT
Start: 2025-01-18 | End: 2025-01-20 | Stop reason: HOSPADM

## 2025-01-18 RX ORDER — VENLAFAXINE HYDROCHLORIDE 75 MG/1
150 CAPSULE, EXTENDED RELEASE ORAL DAILY
Status: DISCONTINUED | OUTPATIENT
Start: 2025-01-18 | End: 2025-01-20 | Stop reason: HOSPADM

## 2025-01-18 RX ADMIN — ESTROGENS, CONJUGATED 0.3 MG: 0.3 TABLET, FILM COATED ORAL at 14:30

## 2025-01-18 RX ADMIN — SODIUM CHLORIDE, POTASSIUM CHLORIDE, SODIUM LACTATE AND CALCIUM CHLORIDE 1000 ML: 600; 310; 30; 20 INJECTION, SOLUTION INTRAVENOUS at 04:48

## 2025-01-18 RX ADMIN — VENLAFAXINE HYDROCHLORIDE 150 MG: 75 CAPSULE, EXTENDED RELEASE ORAL at 14:30

## 2025-01-18 RX ADMIN — SODIUM CHLORIDE: 9 INJECTION, SOLUTION INTRAVENOUS at 10:57

## 2025-01-18 RX ADMIN — BACLOFEN 10 MG: 10 TABLET ORAL at 14:30

## 2025-01-18 RX ADMIN — HYDROMORPHONE HYDROCHLORIDE 1 MG: 1 INJECTION, SOLUTION INTRAMUSCULAR; INTRAVENOUS; SUBCUTANEOUS at 04:49

## 2025-01-18 RX ADMIN — DOXEPIN HYDROCHLORIDE 100 MG: 50 CAPSULE ORAL at 21:10

## 2025-01-18 RX ADMIN — ENOXAPARIN SODIUM 40 MG: 100 INJECTION SUBCUTANEOUS at 14:31

## 2025-01-18 RX ADMIN — GABAPENTIN 300 MG: 300 CAPSULE ORAL at 14:30

## 2025-01-18 RX ADMIN — AMANTADINE HYDROCHLORIDE 100 MG: 100 CAPSULE ORAL at 14:30

## 2025-01-18 RX ADMIN — ROPINIROLE HYDROCHLORIDE 0.25 MG: 0.25 TABLET, FILM COATED ORAL at 21:10

## 2025-01-18 RX ADMIN — ONDANSETRON 4 MG: 2 INJECTION INTRAMUSCULAR; INTRAVENOUS at 21:58

## 2025-01-18 RX ADMIN — BACLOFEN 10 MG: 10 TABLET ORAL at 21:10

## 2025-01-18 RX ADMIN — SODIUM CHLORIDE: 9 INJECTION, SOLUTION INTRAVENOUS at 21:22

## 2025-01-18 RX ADMIN — SODIUM CHLORIDE, PRESERVATIVE FREE 10 ML: 5 INJECTION INTRAVENOUS at 11:01

## 2025-01-18 RX ADMIN — HYDROMORPHONE HYDROCHLORIDE 0.5 MG: 1 INJECTION, SOLUTION INTRAMUSCULAR; INTRAVENOUS; SUBCUTANEOUS at 14:30

## 2025-01-18 RX ADMIN — METOCLOPRAMIDE HYDROCHLORIDE 10 MG: 5 INJECTION, SOLUTION INTRAMUSCULAR; INTRAVENOUS at 14:31

## 2025-01-18 RX ADMIN — ONDANSETRON 4 MG: 2 INJECTION INTRAMUSCULAR; INTRAVENOUS at 04:48

## 2025-01-18 RX ADMIN — GABAPENTIN 300 MG: 300 CAPSULE ORAL at 21:10

## 2025-01-18 RX ADMIN — CIPROFLOXACIN 400 MG: 400 INJECTION, SOLUTION INTRAVENOUS at 14:53

## 2025-01-18 RX ADMIN — IOPAMIDOL 90 ML: 755 INJECTION, SOLUTION INTRAVENOUS at 05:05

## 2025-01-18 ASSESSMENT — PAIN SCALES - GENERAL
PAINLEVEL_OUTOF10: 6
PAINLEVEL_OUTOF10: 8
PAINLEVEL_OUTOF10: 6

## 2025-01-18 ASSESSMENT — PAIN DESCRIPTION - LOCATION
LOCATION: BACK
LOCATION: ABDOMEN

## 2025-01-18 ASSESSMENT — PAIN - FUNCTIONAL ASSESSMENT: PAIN_FUNCTIONAL_ASSESSMENT: PREVENTS OR INTERFERES SOME ACTIVE ACTIVITIES AND ADLS

## 2025-01-18 ASSESSMENT — PAIN DESCRIPTION - DESCRIPTORS
DESCRIPTORS: SPASM
DESCRIPTORS: ACHING

## 2025-01-18 NOTE — ED NOTES
Pt ambulated from burgos bed (by ER-2) bathroom, and to ER-4 with spouse's assist without incident. Pt c/o weakness upon standing.

## 2025-01-18 NOTE — ED PROVIDER NOTES
is abdominal tenderness (diffuse). There is no guarding.   Musculoskeletal:      Right lower leg: No edema.      Left lower leg: No edema.   Skin:     Capillary Refill: Capillary refill takes less than 2 seconds.      Coloration: Skin is not pale.      Findings: No rash.   Neurological:      Mental Status: She is alert and oriented to person, place, and time.   Psychiatric:         Behavior: Behavior is cooperative.         DIAGNOSTIC RESULTS       RADIOLOGY:  Non-plain film images such as CT, Ultrasound and MRI are read by the radiologist. Plain radiographic images are visualized and preliminarily interpreted bythe emergency physician with the below findings:      CT ABDOMEN PELVIS W IV CONTRAST Additional Contrast? None   Final Result   1.  Complex small bowel collection within the anterior aspect of the abdomen.  There is dilated fluid-filled appearance with circumferential mucosal thickening and surrounding mesenteric edema.  Surgical anastomoses is present at this site.  Small bowel    at this site may be adherent to the anterior abdominal wall. There is partial herniation into the abdominal wall at this site.  Differential considerations include early or partial small bowel obstruction, infectious/inflammatory enteritis and ileus.     Small bowel series could be considered for further   evaluation.   2.  Additional findings as above.        All CT scans are performed using dose optimization techniques as appropriate to the performed exam and include    at least one of the following: Automated exposure control, adjustment of the mA and/or kV according to size, and the use of iterative reconstruction technique.        ______________________________________    Electronically signed by: CURTIS ESPINOZA M.D.   Date:     01/18/2025   Time:    05:26               LABS:  Labs Reviewed   CBC WITH AUTO DIFFERENTIAL - Abnormal; Notable for the following components:       Result Value    WBC 17.9 (*)     RBC 6.38 (*)

## 2025-01-18 NOTE — PROGRESS NOTES
4 Eyes Skin Assessment     NAME:  Mago Terrell  YOB: 1975  MEDICAL RECORD NUMBER:  [id number]    The patient is being assessed for  Admission    I agree that at least one RN has performed a thorough Head to Toe Skin Assessment on the patient. ALL assessment sites listed below have been assessed.      Areas assessed by both nurses:    Head, Face, Ears, Shoulders, Back, Chest, Arms, Elbows, Hands, Sacrum. Buttock, Coccyx, Ischium, Legs. Feet and Heels, and Under Medical Devices         Does the Patient have a Wound? No noted wound(s)       Dawood Prevention initiated by RN: No  Wound Care Orders initiated by RN: No    Pressure Injury (Stage 3,4, Unstageable, DTI, NWPT, and Complex wounds) if present, place Wound referral order by RN under : No    New Ostomies, if present place, Ostomy referral order under : No     Nurse 1 eSignature: Electronically signed by Moriah Murillo RN on 1/18/25 at 5:14 PM CST    **SHARE this note so that the co-signing nurse can place an eSignature**    Nurse 2 eSignature: Electronically signed by Mago Lanier LPN on 1/18/25 at 5:14 PM CST

## 2025-01-18 NOTE — CONSULTS
Ms. Terrell is a 49 year old female with a diagnosis of vascular katherine danlos, who presented with a complaint of abdominal pain, nausea, and vomiting. She state that this started yesterday afternoon. She started noticing some nausea, then vomiting, and then abdominal pain. Her last BMs was yesterday morning and was normal for her. She has been passing flatus. The patient came to the ER, where a CT was done. This was read as possible early small bowel obstruction. The patient states that her last emesis was in the ER. She has been feeling some better. Her abdominal pain is quite a bit better. She has continued passing flatus. She has a history of colostomy, colostomy reversal, small bowel resection.    Past Medical History:   Diagnosis Date    Anxiety     Chronic back pain     Chronic kidney disease     CKD (chronic kidney disease), stage III ([facility])     COVID     Depression     Diabetes ([facility])     Headache(784.0)     Hypogammaglobulinemia ([facility])     Indigestion     Insomnia     MGUS (monoclonal gammopathy of unknown significance)     Multiple sclerosis ([facility])     Neuropathy     Obesity     PVD (peripheral vascular disease) ([facility]) 2023    Restless legs syndrome     Sleep disturbance     Splenic artery aneurysm ([facility])     Type 2 diabetes mellitus without complication ([facility])     Urinary incontinence     Vascular Katherine-Danlos syndrome 2023     Past Surgical History:   Procedure Laterality Date    APPENDECTOMY      BUNIONECTOMY  2020    and infection    CHOLECYSTECTOMY      COLON SURGERY  2012    COLONOSCOPY  2012    normal per pt    HYSTERECTOMY (CERVIX STATUS UNKNOWN)      HYSTERECTOMY, TOTAL ABDOMINAL (CERVIX REMOVED)      REVISION COLOSTOMY      SMALL INTESTINE SURGERY      resectionX2    SUBTOTAL COLECTOMY      UPPER GASTROINTESTINAL ENDOSCOPY  2012     Current Facility-Administered Medications   Medication Dose Route Frequency Provider Last Rate Last Admin    sodium chloride flush 0.9 % injection 5-40 mL  5-40 mL

## 2025-01-18 NOTE — ED NOTES
ED TO INPATIENT SBAR HANDOFF    Patient Name: Mago Terrell   : 1975  49 y.o.   Family/Caregiver Present: Yes  Code Status Order: Prior    C-SSRS: Risk of Suicide: No Risk  Sitter No  Restraints:         Situation  Chief Complaint:   Chief Complaint   Patient presents with    Nausea    Vomiting    Abdominal Pain     Patient Diagnosis:     Brief Description of Patient's Condition: Pt arrived via EMS from home with c/o N/V and mid abd pain x 4 hrs PTA. She takes compazine at home and was unable to keep her medicine down. HX ehler danlos, partial SBOs (resolved without g-tube), given 1 L LR, 4mg Zofran, and 1 mg Dilaudid, pain is now controlled. BP has dropped some after Dilaudid, pt reports hx of hypotension and she is asymptomatic, sitting on side of bed, and talking with spouse. She is ambulatory and independent.   Mental Status: oriented and alert  Arrived from: home    Imaging:   CT ABDOMEN PELVIS W IV CONTRAST Additional Contrast? None   Final Result   1.  Complex small bowel collection within the anterior aspect of the abdomen.  There is dilated fluid-filled appearance with circumferential mucosal thickening and surrounding mesenteric edema.  Surgical anastomoses is present at this site.  Small bowel    at this site may be adherent to the anterior abdominal wall. There is partial herniation into the abdominal wall at this site.  Differential considerations include early or partial small bowel obstruction, infectious/inflammatory enteritis and ileus.     Small bowel series could be considered for further   evaluation.   2.  Additional findings as above.        All CT scans are performed using dose optimization techniques as appropriate to the performed exam and include    at least one of the following: Automated exposure control, adjustment of the mA and/or kV according to size, and the use of iterative reconstruction technique.        ______________________________________    Electronically signed by:

## 2025-01-18 NOTE — H&P
[facility]      Hospitalist - History & Physical      PCP: Laial Narvaez APRN    Date of Admission: 1/18/2025    Date of Service: 1/18/2025    Chief Complaint: Abdominal pain    History Of Present Illness:   The patient is a 49 y.o. female with vascular Katherine Danlos syndrome follows [facility], splenic artery aneurysm, aortoiliac dissection, Multiple sclerosis, hypogammaglobulinemia, chronic pain follows pain management, Type II DM, complaining of abdominal pain.  Patient states she acutely began having nausea, vomiting and diffuse pain abdominal pain presented for further evaluation.  She has had urinary frequency, urgency. She denied fever, chills, constipation, or change in stool appearance. Is on chronic opioid medication, denies constipation history. Had formed bowel movement yesterday. Workup in ER CT abdomen partial small bowel obstruction vs ileus, no free air noted, creatinine 0.9, WBC 17.9, Hgb 18.4.  Suspect underlying dehydration, currently afebrile we will monitor off antibiotics.  ER physician discussed case with general surgery who will see in consultation. NPO.  Patient is admitted to the hospitalist service with consult to general surgery.   Past Medical History:        Diagnosis Date    Anxiety     Chronic back pain     Chronic kidney disease     CKD (chronic kidney disease), stage III ([facility])     COVID     Depression     Diabetes ([facility])     Headache(784.0)     Hypogammaglobulinemia ([facility])     Indigestion     Insomnia     MGUS (monoclonal gammopathy of unknown significance)     Multiple sclerosis (H[facility])     Neuropathy     Obesity     PVD (peripheral vascular disease) ([facility]) 2023    Restless legs syndrome     Sleep disturbance     Splenic artery aneurysm (HCC)     Type 2 diabetes mellitus without complication ([facility])     Urinary incontinence     Vascular Katherine-Danlos syndrome 2023       Past Surgical History:        Procedure Laterality Date    APPENDECTOMY      BUNIONECTOMY  2020

## 2025-01-19 ENCOUNTER — APPOINTMENT (OUTPATIENT)
Dept: GENERAL RADIOLOGY | Age: 50
DRG: 389 | End: 2025-01-19
Payer: COMMERCIAL

## 2025-01-19 LAB
ALBUMIN SERPL-MCNC: 3.6 G/DL (ref 3.5–5.2)
ALP SERPL-CCNC: 71 U/L (ref 35–104)
ALT SERPL-CCNC: 20 U/L (ref 5–33)
ANION GAP SERPL CALCULATED.3IONS-SCNC: 10 MMOL/L (ref 7–19)
AST SERPL-CCNC: 12 U/L (ref 5–32)
BASOPHILS # BLD: 0 K/UL (ref 0–0.2)
BASOPHILS NFR BLD: 0.5 % (ref 0–1)
BILIRUB DIRECT SERPL-MCNC: 0.3 MG/DL (ref 0–0.3)
BILIRUB INDIRECT SERPL-MCNC: 0.9 MG/DL (ref 0–1)
BILIRUB SERPL-MCNC: 1.2 MG/DL (ref 0.2–1.2)
BUN SERPL-MCNC: 16 MG/DL (ref 6–20)
CALCIUM SERPL-MCNC: 8 MG/DL (ref 8.6–10)
CHLORIDE SERPL-SCNC: 101 MMOL/L (ref 98–111)
CO2 SERPL-SCNC: 25 MMOL/L (ref 22–29)
CREAT SERPL-MCNC: 0.7 MG/DL (ref 0.5–0.9)
EOSINOPHIL # BLD: 0.2 K/UL (ref 0–0.6)
EOSINOPHIL NFR BLD: 4.7 % (ref 0–5)
ERYTHROCYTE [DISTWIDTH] IN BLOOD BY AUTOMATED COUNT: 12.6 % (ref 11.5–14.5)
GLUCOSE BLD-MCNC: 117 MG/DL (ref 70–99)
GLUCOSE BLD-MCNC: 121 MG/DL (ref 70–99)
GLUCOSE BLD-MCNC: 125 MG/DL (ref 70–99)
GLUCOSE BLD-MCNC: 89 MG/DL (ref 70–99)
GLUCOSE SERPL-MCNC: 104 MG/DL (ref 70–99)
HBA1C MFR BLD: 6.3 % (ref 4–5.6)
HCT VFR BLD AUTO: 41.1 % (ref 37–47)
HGB BLD-MCNC: 13.2 G/DL (ref 12–16)
IMM GRANULOCYTES # BLD: 0 K/UL
LYMPHOCYTES # BLD: 1.3 K/UL (ref 1.1–4.5)
LYMPHOCYTES NFR BLD: 35.1 % (ref 20–40)
MAGNESIUM SERPL-MCNC: 2.1 MG/DL (ref 1.6–2.6)
MCH RBC QN AUTO: 29.2 PG (ref 27–31)
MCHC RBC AUTO-ENTMCNC: 32.1 G/DL (ref 33–37)
MCV RBC AUTO: 90.9 FL (ref 81–99)
MONOCYTES # BLD: 0.5 K/UL (ref 0–0.9)
MONOCYTES NFR BLD: 12.4 % (ref 0–10)
NEUTROPHILS # BLD: 1.8 K/UL (ref 1.5–7.5)
NEUTS SEG NFR BLD: 47 % (ref 50–65)
PERFORMED ON: ABNORMAL
PERFORMED ON: NORMAL
PLATELET # BLD AUTO: 202 K/UL (ref 130–400)
PMV BLD AUTO: 9.9 FL (ref 9.4–12.3)
POTASSIUM SERPL-SCNC: 3.5 MMOL/L (ref 3.5–5)
PROT SERPL-MCNC: 5.5 G/DL (ref 6.4–8.3)
RBC # BLD AUTO: 4.52 M/UL (ref 4.2–5.4)
SODIUM SERPL-SCNC: 136 MMOL/L (ref 136–145)
WBC # BLD AUTO: 3.8 K/UL (ref 4.8–10.8)

## 2025-01-19 PROCEDURE — 83036 HEMOGLOBIN GLYCOSYLATED A1C: CPT

## 2025-01-19 PROCEDURE — 6360000002 HC RX W HCPCS

## 2025-01-19 PROCEDURE — 6370000000 HC RX 637 (ALT 250 FOR IP)

## 2025-01-19 PROCEDURE — 80053 COMPREHEN METABOLIC PANEL: CPT

## 2025-01-19 PROCEDURE — 94760 N-INVAS EAR/PLS OXIMETRY 1: CPT

## 2025-01-19 PROCEDURE — 85025 COMPLETE CBC W/AUTO DIFF WBC: CPT

## 2025-01-19 PROCEDURE — 6360000004 HC RX CONTRAST MEDICATION: Performed by: SURGERY

## 2025-01-19 PROCEDURE — 99232 SBSQ HOSP IP/OBS MODERATE 35: CPT | Performed by: SURGERY

## 2025-01-19 PROCEDURE — 74018 RADEX ABDOMEN 1 VIEW: CPT

## 2025-01-19 PROCEDURE — 36415 COLL VENOUS BLD VENIPUNCTURE: CPT

## 2025-01-19 PROCEDURE — 2500000003 HC RX 250 WO HCPCS

## 2025-01-19 PROCEDURE — 82962 GLUCOSE BLOOD TEST: CPT

## 2025-01-19 PROCEDURE — 1200000000 HC SEMI PRIVATE

## 2025-01-19 PROCEDURE — 83735 ASSAY OF MAGNESIUM: CPT

## 2025-01-19 RX ORDER — DIATRIZOATE MEGLUMINE AND DIATRIZOATE SODIUM 660; 100 MG/ML; MG/ML
30 SOLUTION ORAL; RECTAL ONCE
Status: COMPLETED | OUTPATIENT
Start: 2025-01-19 | End: 2025-01-19

## 2025-01-19 RX ORDER — FENTANYL 50 UG/1
1 PATCH TRANSDERMAL
Status: DISCONTINUED | OUTPATIENT
Start: 2025-01-19 | End: 2025-01-20 | Stop reason: HOSPADM

## 2025-01-19 RX ADMIN — ESTROGENS, CONJUGATED 0.3 MG: 0.3 TABLET, FILM COATED ORAL at 08:31

## 2025-01-19 RX ADMIN — SODIUM CHLORIDE, PRESERVATIVE FREE 10 ML: 5 INJECTION INTRAVENOUS at 20:41

## 2025-01-19 RX ADMIN — BACLOFEN 10 MG: 10 TABLET ORAL at 13:57

## 2025-01-19 RX ADMIN — GABAPENTIN 300 MG: 300 CAPSULE ORAL at 08:31

## 2025-01-19 RX ADMIN — DOXEPIN HYDROCHLORIDE 100 MG: 50 CAPSULE ORAL at 20:41

## 2025-01-19 RX ADMIN — AMANTADINE HYDROCHLORIDE 100 MG: 100 CAPSULE ORAL at 08:31

## 2025-01-19 RX ADMIN — METOCLOPRAMIDE HYDROCHLORIDE 10 MG: 5 INJECTION, SOLUTION INTRAMUSCULAR; INTRAVENOUS at 02:45

## 2025-01-19 RX ADMIN — DIATRIZOATE MEGLUMINE AND DIATRIZOATE SODIUM 30 ML: 600; 100 SOLUTION ORAL; RECTAL at 12:38

## 2025-01-19 RX ADMIN — GABAPENTIN 300 MG: 300 CAPSULE ORAL at 20:41

## 2025-01-19 RX ADMIN — BACLOFEN 10 MG: 10 TABLET ORAL at 08:31

## 2025-01-19 RX ADMIN — HYDROMORPHONE HYDROCHLORIDE 0.5 MG: 1 INJECTION, SOLUTION INTRAMUSCULAR; INTRAVENOUS; SUBCUTANEOUS at 11:26

## 2025-01-19 RX ADMIN — GABAPENTIN 300 MG: 300 CAPSULE ORAL at 13:57

## 2025-01-19 RX ADMIN — ROPINIROLE HYDROCHLORIDE 0.25 MG: 0.25 TABLET, FILM COATED ORAL at 20:41

## 2025-01-19 RX ADMIN — ARMODAFINIL 250 MG: 50 TABLET ORAL at 08:31

## 2025-01-19 RX ADMIN — METOCLOPRAMIDE HYDROCHLORIDE 10 MG: 5 INJECTION, SOLUTION INTRAMUSCULAR; INTRAVENOUS at 18:08

## 2025-01-19 RX ADMIN — CIPROFLOXACIN 400 MG: 400 INJECTION, SOLUTION INTRAVENOUS at 02:46

## 2025-01-19 RX ADMIN — BACLOFEN 10 MG: 10 TABLET ORAL at 20:41

## 2025-01-19 RX ADMIN — VENLAFAXINE HYDROCHLORIDE 150 MG: 75 CAPSULE, EXTENDED RELEASE ORAL at 08:31

## 2025-01-19 RX ADMIN — ENOXAPARIN SODIUM 40 MG: 100 INJECTION SUBCUTANEOUS at 08:35

## 2025-01-19 RX ADMIN — METOCLOPRAMIDE HYDROCHLORIDE 10 MG: 5 INJECTION, SOLUTION INTRAMUSCULAR; INTRAVENOUS at 13:57

## 2025-01-19 RX ADMIN — METOPROLOL SUCCINATE 12.5 MG: 25 TABLET, EXTENDED RELEASE ORAL at 08:31

## 2025-01-19 ASSESSMENT — PAIN SCALES - GENERAL
PAINLEVEL_OUTOF10: 9
PAINLEVEL_OUTOF10: 5
PAINLEVEL_OUTOF10: 7

## 2025-01-19 ASSESSMENT — PAIN DESCRIPTION - LOCATION
LOCATION: BACK
LOCATION: ABDOMEN

## 2025-01-19 ASSESSMENT — PAIN DESCRIPTION - ORIENTATION
ORIENTATION: LOWER
ORIENTATION: RIGHT;UPPER

## 2025-01-19 ASSESSMENT — PAIN DESCRIPTION - DESCRIPTORS: DESCRIPTORS: SHARP;STABBING

## 2025-01-19 ASSESSMENT — PAIN - FUNCTIONAL ASSESSMENT
PAIN_FUNCTIONAL_ASSESSMENT: PREVENTS OR INTERFERES SOME ACTIVE ACTIVITIES AND ADLS
PAIN_FUNCTIONAL_ASSESSMENT: PREVENTS OR INTERFERES SOME ACTIVE ACTIVITIES AND ADLS

## 2025-01-19 NOTE — PROGRESS NOTES
[facility]      Patient:  Mago Terrell  YOB: 1975  Date of Service: 1/19/2025  MRN: [id number]   Acct: [id number]   Primary Care Physician: Laila Narvaez APRN  Advance Directive: Full Code  Admit Date: 1/18/2025       Hospital Day: 1  Portions of this note have been copied forward, however, changed to reflect the most current clinical status of this patient.  CHIEF COMPLAINT  abdominal pain     SUBJECTIVE:  denies nausea, vomiting    Cumulative hospital course   The patient is a 49 y.o. female with vascular Katherine Danlos syndrome follows [facility], splenic artery aneurysm, aortoiliac dissection, Multiple sclerosis, hypogammaglobulinemia, chronic pain follows pain management, Type II DM, complaining of abdominal pain.  Patient stated she acutely began having nausea, vomiting and diffuse pain abdominal pain presented for further evaluation.  She has had urinary frequency, urgency. She denied fever, chills, constipation, or change in stool appearance. Is on chronic opioid medication, denies constipation history. Had formed bowel movement and maintains on stool softeners. Workup in ER CT abdomen partial small bowel obstruction vs ileus, no free air noted.   general surgery consultation. Contrasted KUB r/o SBO.     Objective:   VITALS:  /65   Pulse 82   Temp 98.6 °F (37 °C) (Temporal)   Resp 20   Ht 1.626 m (5' 4.02\")   Wt 72.1 kg (159 lb)   LMP 01/01/2020   SpO2 100%   BMI 27.28 kg/m²   24HR INTAKE/OUTPUT:    Intake/Output Summary (Last 24 hours) at 1/19/2025 1154  Last data filed at 1/19/2025 0456  Gross per 24 hour   Intake 900 ml   Output --   Net 900 ml       Physical Exam  Vitals and nursing note reviewed.   Constitutional:       General: She is not in acute distress.     Appearance: Normal appearance.   HENT:      Mouth/Throat:      Mouth: Mucous membranes are moist.      Pharynx: Oropharynx is clear.   Eyes:      Extraocular Movements: Extraocular movements

## 2025-01-19 NOTE — PROGRESS NOTES
Subjective:  No acute events or changes. Still passing flatus. Reports abdominal pain is still improved, mostly a pint tenderness and back muscle pain.    Objective:  /65   Pulse 82   Temp 98.6 °F (37 °C) (Temporal)   Resp 20   Ht 1.626 m (5' 4.02\")   Wt 72.1 kg (159 lb)   LMP 01/01/2020   SpO2 100%   BMI 27.28 kg/m²   Date 01/19/25 0000 - 01/19/25 2359   Shift 3521-8530 4979-6520 1390-6740 24 Hour Total   INTAKE   I.V.(mL/kg) 900(12.5)   900(12.5)   Shift Total(mL/kg) 900(12.5)   900(12.5)   OUTPUT   Shift Total(mL/kg)       Weight (kg) 72.1 72.1 72.1 72.1     General: NAD, AAO  Chest: Regular, non-labored  Abdomen: soft, mild distention, mild TTP supraumbilical    CBC:   Lab Results   Component Value Date/Time    WBC 3.8 01/19/2025 04:22 AM    RBC 4.52 01/19/2025 04:22 AM    HGB 13.2 01/19/2025 04:22 AM    HCT 41.1 01/19/2025 04:22 AM    MCV 90.9 01/19/2025 04:22 AM    MCH 29.2 01/19/2025 04:22 AM    MCHC 32.1 01/19/2025 04:22 AM    RDW 12.6 01/19/2025 04:22 AM     01/19/2025 04:22 AM    MPV 9.9 01/19/2025 04:22 AM     BMP:    Lab Results   Component Value Date/Time     01/19/2025 04:22 AM    K 3.5 01/19/2025 04:22 AM     01/19/2025 04:22 AM    CO2 25 01/19/2025 04:22 AM    BUN 16 01/19/2025 04:22 AM    CREATININE 0.7 01/19/2025 04:22 AM    CALCIUM 8.0 01/19/2025 04:22 AM    GFRAA >59 05/20/2022 02:53 PM    LABGLOM >90 01/19/2025 04:22 AM    LABGLOM >60 01/25/2024 01:03 PM    GLUCOSE 104 01/19/2025 04:22 AM     Assessment and plan:  49 year old female with abdominal pain and possible PSBO  Contrasted KUB has been ordered, will follow up results. Continue NPO. IV pain meds PRN.   Continue home meds for holly-danlos and MS  Other cares per the hospitalist service.   Dr. Siddiqui takes over the general surgery service tomorrow at 7 am

## 2025-01-20 VITALS
TEMPERATURE: 96.8 F | DIASTOLIC BLOOD PRESSURE: 67 MMHG | HEIGHT: 64 IN | HEART RATE: 76 BPM | SYSTOLIC BLOOD PRESSURE: 116 MMHG | OXYGEN SATURATION: 95 % | WEIGHT: 159 LBS | BODY MASS INDEX: 27.14 KG/M2 | RESPIRATION RATE: 20 BRPM

## 2025-01-20 LAB
ALBUMIN SERPL-MCNC: 3.7 G/DL (ref 3.5–5.2)
ALP SERPL-CCNC: 70 U/L (ref 35–104)
ALT SERPL-CCNC: 17 U/L (ref 5–33)
ANION GAP SERPL CALCULATED.3IONS-SCNC: 11 MMOL/L (ref 7–19)
AST SERPL-CCNC: 11 U/L (ref 5–32)
BACTERIA UR CULT: ABNORMAL
BACTERIA UR CULT: ABNORMAL
BASOPHILS # BLD: 0 K/UL (ref 0–0.2)
BASOPHILS NFR BLD: 0.7 % (ref 0–1)
BILIRUB SERPL-MCNC: 0.8 MG/DL (ref 0.2–1.2)
BUN SERPL-MCNC: 12 MG/DL (ref 6–20)
CALCIUM SERPL-MCNC: 8.3 MG/DL (ref 8.6–10)
CHLORIDE SERPL-SCNC: 104 MMOL/L (ref 98–111)
CO2 SERPL-SCNC: 24 MMOL/L (ref 22–29)
CREAT SERPL-MCNC: 0.7 MG/DL (ref 0.5–0.9)
EOSINOPHIL # BLD: 0.2 K/UL (ref 0–0.6)
EOSINOPHIL NFR BLD: 4.1 % (ref 0–5)
ERYTHROCYTE [DISTWIDTH] IN BLOOD BY AUTOMATED COUNT: 12.3 % (ref 11.5–14.5)
GLUCOSE BLD-MCNC: 100 MG/DL (ref 70–99)
GLUCOSE BLD-MCNC: 106 MG/DL (ref 70–99)
GLUCOSE BLD-MCNC: 81 MG/DL (ref 70–99)
GLUCOSE SERPL-MCNC: 99 MG/DL (ref 70–99)
HCT VFR BLD AUTO: 39.5 % (ref 37–47)
HGB BLD-MCNC: 12.7 G/DL (ref 12–16)
IMM GRANULOCYTES # BLD: 0 K/UL
LYMPHOCYTES # BLD: 1.2 K/UL (ref 1.1–4.5)
LYMPHOCYTES NFR BLD: 28.6 % (ref 20–40)
MCH RBC QN AUTO: 29.4 PG (ref 27–31)
MCHC RBC AUTO-ENTMCNC: 32.2 G/DL (ref 33–37)
MCV RBC AUTO: 91.4 FL (ref 81–99)
MONOCYTES # BLD: 0.6 K/UL (ref 0–0.9)
MONOCYTES NFR BLD: 13.7 % (ref 0–10)
NEUTROPHILS # BLD: 2.2 K/UL (ref 1.5–7.5)
NEUTS SEG NFR BLD: 52.7 % (ref 50–65)
ORGANISM: ABNORMAL
PERFORMED ON: ABNORMAL
PERFORMED ON: ABNORMAL
PERFORMED ON: NORMAL
PLATELET # BLD AUTO: 188 K/UL (ref 130–400)
PMV BLD AUTO: 10.2 FL (ref 9.4–12.3)
POTASSIUM SERPL-SCNC: 3.7 MMOL/L (ref 3.5–5)
PROT SERPL-MCNC: 5.5 G/DL (ref 6.4–8.3)
RBC # BLD AUTO: 4.32 M/UL (ref 4.2–5.4)
SODIUM SERPL-SCNC: 139 MMOL/L (ref 136–145)
WBC # BLD AUTO: 4.2 K/UL (ref 4.8–10.8)

## 2025-01-20 PROCEDURE — 82962 GLUCOSE BLOOD TEST: CPT

## 2025-01-20 PROCEDURE — 6360000002 HC RX W HCPCS

## 2025-01-20 PROCEDURE — 85025 COMPLETE CBC W/AUTO DIFF WBC: CPT

## 2025-01-20 PROCEDURE — 36415 COLL VENOUS BLD VENIPUNCTURE: CPT

## 2025-01-20 PROCEDURE — 6370000000 HC RX 637 (ALT 250 FOR IP)

## 2025-01-20 PROCEDURE — 2500000003 HC RX 250 WO HCPCS

## 2025-01-20 PROCEDURE — 80053 COMPREHEN METABOLIC PANEL: CPT

## 2025-01-20 PROCEDURE — 99231 SBSQ HOSP IP/OBS SF/LOW 25: CPT | Performed by: SURGERY

## 2025-01-20 RX ADMIN — ESTROGENS, CONJUGATED 0.3 MG: 0.3 TABLET, FILM COATED ORAL at 09:00

## 2025-01-20 RX ADMIN — BACLOFEN 10 MG: 10 TABLET ORAL at 14:58

## 2025-01-20 RX ADMIN — BACLOFEN 10 MG: 10 TABLET ORAL at 09:01

## 2025-01-20 RX ADMIN — METOCLOPRAMIDE HYDROCHLORIDE 10 MG: 5 INJECTION, SOLUTION INTRAMUSCULAR; INTRAVENOUS at 12:15

## 2025-01-20 RX ADMIN — VENLAFAXINE HYDROCHLORIDE 150 MG: 75 CAPSULE, EXTENDED RELEASE ORAL at 09:00

## 2025-01-20 RX ADMIN — SODIUM CHLORIDE, PRESERVATIVE FREE 5 ML: 5 INJECTION INTRAVENOUS at 09:00

## 2025-01-20 RX ADMIN — ARMODAFINIL 250 MG: 50 TABLET ORAL at 05:44

## 2025-01-20 RX ADMIN — ENOXAPARIN SODIUM 40 MG: 100 INJECTION SUBCUTANEOUS at 09:04

## 2025-01-20 RX ADMIN — METOCLOPRAMIDE HYDROCHLORIDE 10 MG: 5 INJECTION, SOLUTION INTRAMUSCULAR; INTRAVENOUS at 05:44

## 2025-01-20 RX ADMIN — GABAPENTIN 300 MG: 300 CAPSULE ORAL at 09:01

## 2025-01-20 RX ADMIN — METOPROLOL SUCCINATE 12.5 MG: 25 TABLET, EXTENDED RELEASE ORAL at 09:00

## 2025-01-20 RX ADMIN — AMANTADINE HYDROCHLORIDE 100 MG: 100 CAPSULE ORAL at 09:00

## 2025-01-20 RX ADMIN — METOCLOPRAMIDE HYDROCHLORIDE 10 MG: 5 INJECTION, SOLUTION INTRAMUSCULAR; INTRAVENOUS at 00:19

## 2025-01-20 RX ADMIN — GABAPENTIN 300 MG: 300 CAPSULE ORAL at 14:58

## 2025-01-20 ASSESSMENT — PAIN SCALES - GENERAL
PAINLEVEL_OUTOF10: 3
PAINLEVEL_OUTOF10: 1

## 2025-01-20 ASSESSMENT — PAIN DESCRIPTION - DESCRIPTORS
DESCRIPTORS: ACHING
DESCRIPTORS: SPASM

## 2025-01-20 ASSESSMENT — PAIN DESCRIPTION - LOCATION
LOCATION: ABDOMEN;BACK
LOCATION: BACK;ABDOMEN

## 2025-01-20 ASSESSMENT — PAIN - FUNCTIONAL ASSESSMENT: PAIN_FUNCTIONAL_ASSESSMENT: ACTIVITIES ARE NOT PREVENTED

## 2025-01-20 ASSESSMENT — PAIN DESCRIPTION - ORIENTATION
ORIENTATION: MID
ORIENTATION: MID

## 2025-01-20 NOTE — PROGRESS NOTES
[facility] General Surgery Progress Note    Chief Complaint:   Chief Complaint   Patient presents with    Nausea    Vomiting    Abdominal Pain       SUBJECTIVE:  Ms. Terrell is a 49 y.o. female is seen and examined at bedside. She is passing flatus and stool. No abdominal pain.     OBJECTIVE:  /67   Pulse 76   Temp 96.8 °F (36 °C) (Temporal)   Resp 20   Ht 1.626 m (5' 4.02\")   Wt 72.1 kg (159 lb)   LMP 01/01/2020   SpO2 95%   BMI 27.28 kg/m²   CONSTITUTIONAL: Alert, appropriate, no acute distress  SKIN: warm, dry with no rashes or lesions  HEENT: NCAT, Non icteric, PERR. Trachea Midline.  ABDOMEN: soft, ND, non-TTP, +BS      Labs:  CBC:   Recent Labs     01/18/25  0021 01/19/25  0422 01/20/25  0220   WBC 17.9* 3.8* 4.2*   HGB 18.4* 13.2 12.7    202 188     BMP:    Recent Labs     01/18/25  0021 01/19/25  0422 01/20/25  0220    136 139   K 3.7 3.5 3.7   CL 95* 101 104   CO2 21* 25 24   BUN 24* 16 12   CREATININE 0.9 0.7 0.7   GLUCOSE 154* 104* 99       ASSESSMENT:  Principal Problem:    Partial small bowel obstruction (HCC)  Active Problems:    MS (multiple sclerosis) (HCC)    Diabetes mellitus (HCC)    Vascular Katherine-Danlos syndrome  Resolved Problems:    * No resolved hospital problems. *      PLAN:  Feeling better and tolerating solids.  May be discharged from surgical standpoint.  She does not require general surgery follow-up.     Discussed continuing fiber at home and monitoring and treating symptoms best she can. She notes understanding.     Sharonda Siddiqui DO   Electronically Signed on 1/20/2025 at 4:23 PM

## 2025-01-20 NOTE — DISCHARGE INSTR - DIET

## 2025-01-20 NOTE — DISCHARGE INSTRUCTIONS
Take medications as directed.  Resume activity as tolerated.  Follow up with Laila Narvaez APRN in 7 days.

## 2025-01-20 NOTE — DISCHARGE SUMMARY
changed: additional instructions     gabapentin 300 MG capsule  Commonly known as: NEURONTIN  Take 2 cap po in the morning, 2 cap in afternoon, and 3 cap at bedtime  What changed: additional instructions            CONTINUE taking these medications      Amantadine 100 MG Tabs tablet  Commonly known as: SYMMETREL     Armodafinil 250 MG Tabs  Take 1 tablet by mouth Daily for 90 days. Max Daily Amount: 1 tablet     Auvelity  MG Tbcr  Generic drug: Dextromethorphan-buPROPion ER  Take 1 tablet by mouth in the morning and at bedtime     baclofen 10 MG tablet  Commonly known as: LIORESAL  Take 1 tablet by mouth 3 times daily     buprenorphine 5 MCG/HR Ptwk  Commonly known as: Butrans  Place 1 patch onto the skin once a week for 30 days. Can fill 1/13/25 Max Daily Amount: 1 patch     Dexcom G7  [place]  Use to check blood sugar     docusate sodium 100 MG capsule  Commonly known as: COLACE     doxepin 100 MG capsule  Commonly known as: SINEQUAN  Take 1 capsule by mouth nightly     estrogens (conjugated) 0.3 MG tablet  Commonly known as: Premarin  Take 1 tablet by mouth daily     fentaNYL 50 MCG/HR  Commonly known as: DURAGESIC  Place 1 patch onto the skin every 72 hours for 30 days.  Start taking on: January 25, 2025     HIZENTRA SC     ibuprofen 800 MG tablet  Commonly known as: ADVIL;MOTRIN     insulin glargine 100 UNIT/ML injection pen  Commonly known as: LANTUS;BASAGLAR     ipratropium 0.02 % nebulizer solution  Commonly known as: ATROVENT  Take 2.5 mLs by nebulization every 4 hours as needed for Wheezing     levalbuterol 0.63 MG/3ML nebulization  Commonly known as: XOPENEX  Take 3 mLs by nebulization every 4 hours as needed for Wheezing     levocetirizine 5 MG tablet  Commonly known as: XYZAL  Take 1 tablet by mouth nightly     metoprolol succinate 25 MG extended release tablet  Commonly known as: TOPROL XL  Take 0.5 tablets by mouth daily     montelukast 10 MG tablet  Commonly known as: SINGULAIR  TAKE 1

## 2025-01-21 ENCOUNTER — TELEPHONE (OUTPATIENT)
Age: 50
End: 2025-01-21

## 2025-01-21 NOTE — TELEPHONE ENCOUNTER
Care Transitions Initial Follow Up Call    Outreach made within 2 business days of discharge: Yes    Patient: Mago Terrell Patient : 1975   MRN: [id number]  Reason for Admission: Partial bowel obstruction  Discharge Date: 25       Spoke with: Tried calling pt. No answer and No voicemail. Will try again tomorrow.     Discharge department/facility: [facility] Interactive Patient Contact:  Was patient able to fill all prescriptions:   Was patient instructed to bring all medications to the follow-up visit:   Is patient taking all medications as directed in the discharge summary?   Does patient understand their discharge instructions:   Does patient have questions or concerns that need addressed prior to 7-14 day follow up office visit:     Additional needs identified to be addressed with provider         Scheduled appointment with PCP within 7-14 days    Follow Up  Future Appointments   Date Time Provider Department Center   2025  2:40 PM Laila Narvaez APRN [facility] DEP   2025 10:30 AM [facility] US RM 1 [facility]TRA [facility] Rad/Card   4/15/2025  9:00 AM Sanaz Suarez MD [facility] Pain Cl [facility]       Corina Broussard LPN

## 2025-01-22 NOTE — TELEPHONE ENCOUNTER
Care Transitions Initial Follow Up Call     Outreach made within 2 business days of discharge: Yes     Patient: Mago Terrell      Patient : 1975   MRN: [id number]  Reason for Admission: Partial bowel obstruction  Discharge Date: 25                        Spoke with: Tried calling pt. No answer and No voicemail.      Discharge department/facility: [facility] Patient Contact:  Was patient able to fill all prescriptions:   Was patient instructed to bring all medications to the follow-up visit:   Is patient taking all medications as directed in the discharge summary?   Does patient understand their discharge instructions:   Does patient have questions or concerns that need addressed prior to 7-14 day follow up office visit:     Additional needs identified to be addressed with provider         Scheduled appointment with PCP within 7-14 days    Follow Up  Future Appointments   Date Time Provider Department Center   2025  2:40 PM Laila Narvaez APRN [facility] MAR FM [facility] DEP   2025 10:30 AM [facility] US RM 1 [facility] ULTRA [facility] Rad/Card   4/15/2025  9:00 AM Sanaz Suarez MD [facility] Pain Cl [facility] Pain [facility]       Corina Broussard LPN

## 2025-01-27 ENCOUNTER — OFFICE VISIT (OUTPATIENT)
Age: 50
End: 2025-01-27

## 2025-01-27 VITALS
TEMPERATURE: 97.8 F | DIASTOLIC BLOOD PRESSURE: 58 MMHG | HEART RATE: 86 BPM | BODY MASS INDEX: 26.76 KG/M2 | OXYGEN SATURATION: 98 % | SYSTOLIC BLOOD PRESSURE: 105 MMHG | WEIGHT: 156 LBS

## 2025-01-27 DIAGNOSIS — K43.9 VENTRAL HERNIA WITHOUT OBSTRUCTION OR GANGRENE: ICD-10-CM

## 2025-01-27 DIAGNOSIS — K56.600 PARTIAL SMALL BOWEL OBSTRUCTION (HCC): ICD-10-CM

## 2025-01-27 DIAGNOSIS — K66.0 ABDOMINAL ADHESIONS: ICD-10-CM

## 2025-01-27 DIAGNOSIS — Q79.60 EHLERS-DANLOS SYNDROME: ICD-10-CM

## 2025-01-27 DIAGNOSIS — Z09 HOSPITAL DISCHARGE FOLLOW-UP: Primary | ICD-10-CM

## 2025-01-27 RX ORDER — ONDANSETRON 4 MG/1
4 TABLET, ORALLY DISINTEGRATING ORAL 3 TIMES DAILY PRN
Qty: 30 TABLET | Refills: 2 | Status: SHIPPED | OUTPATIENT
Start: 2025-01-27

## 2025-01-30 ASSESSMENT — ENCOUNTER SYMPTOMS
RHINORRHEA: 0
DIARRHEA: 0
BLOOD IN STOOL: 0
EYE REDNESS: 0
TROUBLE SWALLOWING: 0
ABDOMINAL PAIN: 0
SORE THROAT: 0
CONSTIPATION: 0
WHEEZING: 0
EYE DISCHARGE: 0
COUGH: 0

## 2025-01-31 NOTE — PROGRESS NOTES
tab) 42 tablet 0    insulin aspart (NOVOLOG FLEXPEN) 100 UNIT/ML injection pen Per sliding scale give up to 15 u per meal up to 45 u per day 5 Adjustable Dose Pre-filled Pen Syringe 3    metoprolol succinate (TOPROL XL) 25 MG extended release tablet Take 0.5 tablets by mouth daily 30 tablet 5    levocetirizine (XYZAL) 5 MG tablet Take 1 tablet by mouth nightly 90 tablet 0    naloxone 4 MG/0.1ML LIQD nasal spray 2 sprays by Nasal route as needed for Opioid Reversal (apply 2 sprays to the nose in the event of emergent need for opioid reversal and call 911) 1 each 3    baclofen (LIORESAL) 10 MG tablet Take 1 tablet by mouth 3 times daily 90 tablet 2    STEGLATRO 5 MG TABS Take 1 tablet by mouth once daily 30 tablet 11    pantoprazole (PROTONIX) 40 MG tablet Take 1 tablet by mouth every morning (before breakfast) 90 tablet 1    estrogens, conjugated, (PREMARIN) 0.3 MG tablet Take 1 tablet by mouth daily 30 tablet 5    triamterene-hydroCHLOROthiazide (MAXZIDE-25) 37.5-25 MG per tablet Take 1 tablet by mouth daily 90 tablet 1    doxepin (SINEQUAN) 100 MG capsule Take 1 capsule by mouth nightly 90 capsule 3    Dextromethorphan-buPROPion ER (AUVELITY)  MG TBCR Take 1 tablet by mouth in the morning and at bedtime 60 tablet 5    montelukast (SINGULAIR) 10 MG tablet TAKE 1 TABLET BY MOUTH NIGHTLY WITH  XYZAL  FOR  ALLERGIES 90 tablet 3    MOUNJARO 10 MG/0.5ML SOPN SC injection INJECT 0.5 ML SUB-Q ONCE A WEEK 12 mL 1    ibuprofen (ADVIL;MOTRIN) 800 MG tablet ibuprofen 800 mg tablet   TAKE ONE TABLET BY MOUTH EVERY 8 HOURS AS NEEDED      Amantadine (SYMMETREL) 100 MG TABS tablet Take 100 mg by mouth daily      Continuous Glucose  (DEXCOM G7 ) KYLEE Use to check blood sugar 1 each 0    rosuvastatin (CRESTOR) 10 MG tablet Take 1 tablet by mouth nightly 60 tablet 5    vitamin D (ERGOCALCIFEROL) 1.25 MG (18959 UT) CAPS capsule Take 1 capsule by mouth once a week 17 capsule 2    rOPINIRole (REQUIP) 0.25 MG tablet

## 2025-02-06 ENCOUNTER — TELEMEDICINE (OUTPATIENT)
Dept: PALLATIVE CARE | Age: 50
End: 2025-02-06
Payer: COMMERCIAL

## 2025-02-06 DIAGNOSIS — Q79.63 VASCULAR EHLERS-DANLOS SYNDROME: Primary | ICD-10-CM

## 2025-02-06 DIAGNOSIS — G35 MS (MULTIPLE SCLEROSIS) (HCC): ICD-10-CM

## 2025-02-06 DIAGNOSIS — E11.9 TYPE 2 DIABETES MELLITUS WITHOUT COMPLICATION, WITHOUT LONG-TERM CURRENT USE OF INSULIN (HCC): ICD-10-CM

## 2025-02-06 DIAGNOSIS — Z51.5 ENCOUNTER FOR PALLIATIVE CARE: ICD-10-CM

## 2025-02-06 PROCEDURE — 99213 OFFICE O/P EST LOW 20 MIN: CPT | Performed by: NURSE PRACTITIONER

## 2025-02-06 PROCEDURE — 3044F HG A1C LEVEL LT 7.0%: CPT | Performed by: NURSE PRACTITIONER

## 2025-02-10 DIAGNOSIS — E11.69 TYPE 2 DIABETES MELLITUS WITH OTHER SPECIFIED COMPLICATION, WITH LONG-TERM CURRENT USE OF INSULIN (HCC): ICD-10-CM

## 2025-02-10 DIAGNOSIS — Z79.4 TYPE 2 DIABETES MELLITUS WITH OTHER SPECIFIED COMPLICATION, WITH LONG-TERM CURRENT USE OF INSULIN (HCC): ICD-10-CM

## 2025-02-10 RX ORDER — ACYCLOVIR 400 MG/1
TABLET ORAL
Qty: 1 EACH | Refills: 0 | Status: SHIPPED | OUTPATIENT
Start: 2025-02-10

## 2025-02-10 NOTE — TELEPHONE ENCOUNTER
Mago TRIANA [place] called to request a refill on her medication.      Last office visit : 12/2/2024   Next office visit : Visit date not found     Requested Prescriptions     Pending Prescriptions Disp Refills    Continuous Glucose  (DEXCOM G7 ) KYLEE 1 each 0     Sig: Use to check blood sugar            Nani Ndiaye LPN

## 2025-02-11 ENCOUNTER — HOSPITAL ENCOUNTER (OUTPATIENT)
Dept: ULTRASOUND IMAGING | Age: 50
Discharge: HOME | End: 2025-02-11
Payer: COMMERCIAL

## 2025-02-11 ENCOUNTER — OFFICE VISIT (OUTPATIENT)
Age: 50
End: 2025-02-11
Payer: COMMERCIAL

## 2025-02-11 VITALS
WEIGHT: 158 LBS | DIASTOLIC BLOOD PRESSURE: 70 MMHG | TEMPERATURE: 97.3 F | SYSTOLIC BLOOD PRESSURE: 112 MMHG | BODY MASS INDEX: 26.98 KG/M2 | OXYGEN SATURATION: 98 % | HEIGHT: 64 IN | HEART RATE: 79 BPM

## 2025-02-11 DIAGNOSIS — R09.A2 GLOBUS SENSATION: ICD-10-CM

## 2025-02-11 DIAGNOSIS — Z79.4 TYPE 2 DIABETES MELLITUS WITH OTHER SPECIFIED COMPLICATION, WITH LONG-TERM CURRENT USE OF INSULIN (HCC): Primary | ICD-10-CM

## 2025-02-11 DIAGNOSIS — L03.119 CELLULITIS OF LOWER EXTREMITY, UNSPECIFIED LATERALITY: ICD-10-CM

## 2025-02-11 DIAGNOSIS — I95.2 HYPOTENSION DUE TO DRUGS: ICD-10-CM

## 2025-02-11 DIAGNOSIS — E11.69 TYPE 2 DIABETES MELLITUS WITH OTHER SPECIFIED COMPLICATION, WITH LONG-TERM CURRENT USE OF INSULIN (HCC): Primary | ICD-10-CM

## 2025-02-11 DIAGNOSIS — Z23 NEED FOR HEPATITIS B VACCINATION: ICD-10-CM

## 2025-02-11 PROCEDURE — 3044F HG A1C LEVEL LT 7.0%: CPT | Performed by: NURSE PRACTITIONER

## 2025-02-11 PROCEDURE — 90746 HEPB VACCINE 3 DOSE ADULT IM: CPT | Performed by: NURSE PRACTITIONER

## 2025-02-11 PROCEDURE — 90471 IMMUNIZATION ADMIN: CPT | Performed by: NURSE PRACTITIONER

## 2025-02-11 PROCEDURE — 76536 US EXAM OF HEAD AND NECK: CPT

## 2025-02-11 PROCEDURE — 99214 OFFICE O/P EST MOD 30 MIN: CPT | Performed by: NURSE PRACTITIONER

## 2025-02-11 RX ORDER — ACYCLOVIR 400 MG/1
TABLET ORAL
Qty: 3 EACH | Refills: 11 | Status: SHIPPED | OUTPATIENT
Start: 2025-02-11

## 2025-02-11 RX ORDER — VENLAFAXINE HYDROCHLORIDE 150 MG/1
150 CAPSULE, EXTENDED RELEASE ORAL DAILY
Qty: 90 CAPSULE | Refills: 0 | Status: SHIPPED | OUTPATIENT
Start: 2025-02-11

## 2025-02-11 RX ORDER — CLINDAMYCIN HYDROCHLORIDE 300 MG/1
300 CAPSULE ORAL 3 TIMES DAILY
Qty: 30 CAPSULE | Refills: 0 | Status: SHIPPED | OUTPATIENT
Start: 2025-02-11 | End: 2025-02-21

## 2025-02-11 ASSESSMENT — ENCOUNTER SYMPTOMS
COUGH: 0
RHINORRHEA: 0
WHEEZING: 0
ABDOMINAL PAIN: 0
BLOOD IN STOOL: 0
SORE THROAT: 0
CONSTIPATION: 0
DIARRHEA: 0
EYE REDNESS: 0
EYE DISCHARGE: 0
TROUBLE SWALLOWING: 0

## 2025-02-11 NOTE — TELEPHONE ENCOUNTER
Received fax from pharmacy requesting refill on pts medication(s). Pt was last seen in office on 1/27/25  and has a follow up scheduled for 2/11/25. Will send request to  Laila Narvaez  for patient.     Requested Prescriptions     Pending Prescriptions Disp Refills    venlafaxine (EFFEXOR XR) 150 MG extended release capsule [Pharmacy Med Name: Venlafaxine HCl  MG Oral Capsule Extended Release 24 Hour] 90 capsule 0     Sig: Take 1 capsule by mouth once daily

## 2025-02-11 NOTE — PROGRESS NOTES
Mago Terrell (:  1975) is a 49 y.o. female,Established patient, here for evaluation of the following chief complaint(s):  Other (Pts is here to get HEP B vaccine for MS medication )         Assessment & Plan  Type 2 diabetes mellitus with other specified complication, with long-term current use of insulin (HCC)       Orders:    Continuous Glucose Sensor (DEXCOM G7 SENSOR) MISC; Use to monitor blood sugar, change every 10 days    metFORMIN (GLUCOPHAGE) 500 MG tablet; Take 1 tablet by mouth 2 times daily (with meals)  add metformin. She will try to fill mounjaro and refill sensors.   Cellulitis of lower extremity, unspecified laterality       Orders:    clindamycin (CLEOCIN) 300 MG capsule; Take 1 capsule by mouth 3 times daily for 10 days    Hypotension due to drugs            Need for hepatitis B vaccination       Orders:    Hep B, ENGERIX-B, (age 20 yrs+), IM, 1mL, 3-dose      Return in about 4 weeks (around 3/11/2025).       Subjective   HPI  Diabetes  She hasn't had mounjaro an it was 300 the other day.     Bp is doing much better    Starting new ms treatmetn and needs hep b vaccine.     Rash on legs  Lower legs bilateral redness and warmth, has some swelling .     Review of Systems   Constitutional:  Positive for fatigue. Negative for appetite change and unexpected weight change.   HENT:  Negative for congestion, rhinorrhea, sore throat and trouble swallowing.    Eyes:  Negative for discharge and redness.   Respiratory:  Negative for cough and wheezing.    Cardiovascular:  Negative for chest pain.   Gastrointestinal:  Negative for abdominal pain, blood in stool, constipation and diarrhea.   Genitourinary:  Negative for dysuria.   Musculoskeletal:  Positive for arthralgias and myalgias.   Skin:  Negative for rash.   Neurological:  Negative for weakness.   Hematological:  Negative for adenopathy.          Objective   Physical Exam  Vitals reviewed.   Constitutional:       Appearance: She is

## 2025-02-11 NOTE — ASSESSMENT & PLAN NOTE
Orders:    Continuous Glucose Sensor (DEXCOM G7 SENSOR) MISC; Use to monitor blood sugar, change every 10 days    metFORMIN (GLUCOPHAGE) 500 MG tablet; Take 1 tablet by mouth 2 times daily (with meals)  add metformin. She will try to fill mounjaro and refill sensors.

## 2025-02-13 DIAGNOSIS — G89.4 CHRONIC PAIN SYNDROME: ICD-10-CM

## 2025-02-13 DIAGNOSIS — F11.90 CHRONIC, CONTINUOUS USE OF OPIOIDS: ICD-10-CM

## 2025-02-13 DIAGNOSIS — Q79.63 VASCULAR EHLERS-DANLOS SYNDROME: ICD-10-CM

## 2025-02-13 RX ORDER — BUPRENORPHINE 5 UG/H
1 PATCH TRANSDERMAL WEEKLY
Qty: 4 PATCH | Refills: 0 | Status: SHIPPED | OUTPATIENT
Start: 2025-02-15 | End: 2025-03-17

## 2025-02-13 RX ORDER — FENTANYL 50 UG/1
1 PATCH TRANSDERMAL
Qty: 10 PATCH | Refills: 0 | Status: SHIPPED | OUTPATIENT
Start: 2025-03-05 | End: 2025-04-04

## 2025-02-13 NOTE — TELEPHONE ENCOUNTER
Last seen in office visit: 1/13/25  Next scheduled office visit: 4/15/25 with Dr Suarez  Last UDS results:  neg for buprenorphine, ok per provider (Dr Suarez)   Any discrepancies on Arun (such as refills from another provider): none

## 2025-02-20 ENCOUNTER — TELEPHONE (OUTPATIENT)
Age: 50
End: 2025-02-20

## 2025-02-20 NOTE — TELEPHONE ENCOUNTER
Wyatt with [facility] called stating she has been working with Pt to find a general surgery that would take her insurance for colon repair surgery    Dr Ryland Randhawa with [facility] surgery will see pt with Urgent referral from PCP     Ryland Randhawa   Address: [address] [facility]   F: [phone]

## 2025-02-26 ENCOUNTER — TELEPHONE (OUTPATIENT)
Dept: PAIN MANAGEMENT | Age: 50
End: 2025-02-26

## 2025-02-28 DIAGNOSIS — K20.90 ESOPHAGITIS: ICD-10-CM

## 2025-02-28 RX ORDER — PANTOPRAZOLE SODIUM 40 MG/1
TABLET, DELAYED RELEASE ORAL
Qty: 90 TABLET | Refills: 0 | Status: SHIPPED | OUTPATIENT
Start: 2025-02-28

## 2025-02-28 NOTE — TELEPHONE ENCOUNTER
Received fax from pharmacy requesting refill on pts medication. Will send to provider for authorization  Last OV: 2/11/2025  Next appt: none found    Requested Prescriptions     Pending Prescriptions Disp Refills    pantoprazole (PROTONIX) 40 MG tablet [Pharmacy Med Name: Pantoprazole Sodium 40 MG Oral Tablet Delayed Release] 90 tablet 0     Sig: TAKE 1 TABLET BY MOUTH ONCE DAILY IN THE MORNING BEFORE BREAKFAST

## 2025-03-05 DIAGNOSIS — E11.65 TYPE 2 DIABETES MELLITUS WITH HYPERGLYCEMIA, WITH LONG-TERM CURRENT USE OF INSULIN (HCC): Primary | ICD-10-CM

## 2025-03-05 DIAGNOSIS — Z79.4 TYPE 2 DIABETES MELLITUS WITH HYPERGLYCEMIA, WITH LONG-TERM CURRENT USE OF INSULIN (HCC): Primary | ICD-10-CM

## 2025-03-05 RX ORDER — SULFAMETHOXAZOLE AND TRIMETHOPRIM 800; 160 MG/1; MG/1
1 TABLET ORAL 2 TIMES DAILY
Qty: 20 TABLET | Refills: 0 | Status: SHIPPED | OUTPATIENT
Start: 2025-03-05 | End: 2025-03-15

## 2025-03-05 RX ORDER — PROCHLORPERAZINE 25 MG/1
SUPPOSITORY RECTAL
Qty: 1 EACH | Refills: 0 | Status: SHIPPED | OUTPATIENT
Start: 2025-03-05

## 2025-03-05 RX ORDER — PROCHLORPERAZINE 25 MG/1
SUPPOSITORY RECTAL
Qty: 3 EACH | Refills: 12 | Status: SHIPPED | OUTPATIENT
Start: 2025-03-05

## 2025-03-05 RX ORDER — PROCHLORPERAZINE 25 MG/1
SUPPOSITORY RECTAL
Qty: 1 EACH | Refills: 3 | Status: SHIPPED | OUTPATIENT
Start: 2025-03-05

## 2025-03-06 DIAGNOSIS — G89.4 CHRONIC PAIN SYNDROME: ICD-10-CM

## 2025-03-06 DIAGNOSIS — Q79.63 VASCULAR EHLERS-DANLOS SYNDROME: ICD-10-CM

## 2025-03-07 ENCOUNTER — TELEPHONE (OUTPATIENT)
Age: 50
End: 2025-03-07

## 2025-03-07 RX ORDER — IRBESARTAN 75 MG/1
37.5 TABLET ORAL NIGHTLY
Qty: 45 TABLET | Refills: 3 | Status: SHIPPED | OUTPATIENT
Start: 2025-03-07

## 2025-03-07 NOTE — TELEPHONE ENCOUNTER
----- Message from CLINTON Gusman sent at 3/7/2025  9:01 AM CST -----  Very very small nodule. It falls in the no further evaluation needed category on the TR scale.

## 2025-03-09 DIAGNOSIS — F33.1 MODERATE EPISODE OF RECURRENT MAJOR DEPRESSIVE DISORDER (HCC): ICD-10-CM

## 2025-03-10 ENCOUNTER — HOSPITAL ENCOUNTER (EMERGENCY)
Age: 50
Discharge: HOME | End: 2025-03-10
Attending: EMERGENCY MEDICINE
Payer: COMMERCIAL

## 2025-03-10 ENCOUNTER — APPOINTMENT (OUTPATIENT)
Dept: GENERAL RADIOLOGY | Age: 50
End: 2025-03-10
Attending: EMERGENCY MEDICINE
Payer: COMMERCIAL

## 2025-03-10 ENCOUNTER — APPOINTMENT (OUTPATIENT)
Dept: CT IMAGING | Age: 50
End: 2025-03-10
Payer: COMMERCIAL

## 2025-03-10 VITALS
HEART RATE: 76 BPM | DIASTOLIC BLOOD PRESSURE: 62 MMHG | SYSTOLIC BLOOD PRESSURE: 103 MMHG | TEMPERATURE: 98.8 F | RESPIRATION RATE: 22 BRPM | OXYGEN SATURATION: 100 % | BODY MASS INDEX: 27.46 KG/M2 | WEIGHT: 160 LBS

## 2025-03-10 DIAGNOSIS — J18.9 PNEUMONIA OF BOTH LOWER LOBES DUE TO INFECTIOUS ORGANISM: Primary | ICD-10-CM

## 2025-03-10 DIAGNOSIS — R60.0 PEDAL EDEMA: ICD-10-CM

## 2025-03-10 LAB
ALBUMIN SERPL-MCNC: 4.2 G/DL (ref 3.5–5.2)
ALP SERPL-CCNC: 67 U/L (ref 35–104)
ALT SERPL-CCNC: 22 U/L (ref 10–35)
ANION GAP SERPL CALCULATED.3IONS-SCNC: 13 MMOL/L (ref 8–16)
AST SERPL-CCNC: 21 U/L (ref 10–35)
B PARAP IS1001 DNA NPH QL NAA+NON-PROBE: NOT DETECTED
B PERT.PT PRMT NPH QL NAA+NON-PROBE: NOT DETECTED
BASOPHILS # BLD: 0.1 K/UL (ref 0–0.2)
BASOPHILS NFR BLD: 1 % (ref 0–1)
BILIRUB SERPL-MCNC: 1.1 MG/DL (ref 0.2–1.2)
BNP BLD-MCNC: 3970 PG/ML (ref 0–124)
BUN SERPL-MCNC: 15 MG/DL (ref 6–20)
C PNEUM DNA NPH QL NAA+NON-PROBE: NOT DETECTED
CALCIUM SERPL-MCNC: 9.3 MG/DL (ref 8.6–10)
CHLORIDE SERPL-SCNC: 104 MMOL/L (ref 98–107)
CO2 SERPL-SCNC: 25 MMOL/L (ref 22–29)
CREAT SERPL-MCNC: 1.1 MG/DL (ref 0.5–0.9)
D DIMER PPP FEU-MCNC: 0.53 UG/ML FEU (ref 0–0.48)
EKG P AXIS: 50 DEGREES
EKG P-R INTERVAL: 142 MS
EKG Q-T INTERVAL: 400 MS
EKG QRS DURATION: 98 MS
EKG QTC CALCULATION (BAZETT): 438 MS
EKG T AXIS: 39 DEGREES
EOSINOPHIL # BLD: 0.2 K/UL (ref 0–0.6)
EOSINOPHIL NFR BLD: 2.6 % (ref 0–5)
ERYTHROCYTE [DISTWIDTH] IN BLOOD BY AUTOMATED COUNT: 12.3 % (ref 11.5–14.5)
FLUAV RNA NPH QL NAA+NON-PROBE: NOT DETECTED
FLUBV RNA NPH QL NAA+NON-PROBE: NOT DETECTED
GLUCOSE SERPL-MCNC: 83 MG/DL (ref 70–99)
HADV DNA NPH QL NAA+NON-PROBE: NOT DETECTED
HCOV 229E RNA NPH QL NAA+NON-PROBE: NOT DETECTED
HCOV HKU1 RNA NPH QL NAA+NON-PROBE: NOT DETECTED
HCOV NL63 RNA NPH QL NAA+NON-PROBE: NOT DETECTED
HCOV OC43 RNA NPH QL NAA+NON-PROBE: NOT DETECTED
HCT VFR BLD AUTO: 41.1 % (ref 37–47)
HGB BLD-MCNC: 13.6 G/DL (ref 12–16)
HMPV RNA NPH QL NAA+NON-PROBE: NOT DETECTED
HPIV1 RNA NPH QL NAA+NON-PROBE: NOT DETECTED
HPIV2 RNA NPH QL NAA+NON-PROBE: NOT DETECTED
HPIV3 RNA NPH QL NAA+NON-PROBE: NOT DETECTED
HPIV4 RNA NPH QL NAA+NON-PROBE: NOT DETECTED
IMM GRANULOCYTES # BLD: 0.1 K/UL
LACTATE BLDV-SCNC: 2.7 MG/DL (ref 0.5–1.9)
LYMPHOCYTES # BLD: 1.6 K/UL (ref 1.1–4.5)
LYMPHOCYTES NFR BLD: 20.6 % (ref 20–40)
M PNEUMO DNA NPH QL NAA+NON-PROBE: NOT DETECTED
MCH RBC QN AUTO: 29.2 PG (ref 27–31)
MCHC RBC AUTO-ENTMCNC: 33.1 G/DL (ref 33–37)
MCV RBC AUTO: 88.4 FL (ref 81–99)
MONOCYTES # BLD: 0.6 K/UL (ref 0–0.9)
MONOCYTES NFR BLD: 8.2 % (ref 0–10)
NEUTROPHILS # BLD: 5.1 K/UL (ref 1.5–7.5)
NEUTS SEG NFR BLD: 66.9 % (ref 50–65)
PLATELET # BLD AUTO: 221 K/UL (ref 130–400)
PLATELET SLIDE REVIEW: ABNORMAL
PMV BLD AUTO: 11.9 FL (ref 9.4–12.3)
POTASSIUM SERPL-SCNC: 3.4 MMOL/L (ref 3.5–5.1)
PROT SERPL-MCNC: 6.2 G/DL (ref 6.4–8.3)
RBC # BLD AUTO: 4.65 M/UL (ref 4.2–5.4)
REASON FOR REJECTION: NORMAL
REJECTED TEST: NORMAL
RSV RNA NPH QL NAA+NON-PROBE: NOT DETECTED
RV+EV RNA NPH QL NAA+NON-PROBE: NOT DETECTED
SARS-COV-2 RNA NPH QL NAA+NON-PROBE: NOT DETECTED
SODIUM SERPL-SCNC: 142 MMOL/L (ref 136–145)
TROPONIN, HIGH SENSITIVITY: 14 NG/L (ref 0–14)
WBC # BLD AUTO: 7.7 K/UL (ref 4.8–10.8)

## 2025-03-10 PROCEDURE — 0202U NFCT DS 22 TRGT SARS-COV-2: CPT

## 2025-03-10 PROCEDURE — 83880 ASSAY OF NATRIURETIC PEPTIDE: CPT

## 2025-03-10 PROCEDURE — 93005 ELECTROCARDIOGRAM TRACING: CPT | Performed by: EMERGENCY MEDICINE

## 2025-03-10 PROCEDURE — 84484 ASSAY OF TROPONIN QUANT: CPT

## 2025-03-10 PROCEDURE — 87040 BLOOD CULTURE FOR BACTERIA: CPT

## 2025-03-10 PROCEDURE — 80053 COMPREHEN METABOLIC PANEL: CPT

## 2025-03-10 PROCEDURE — 71045 X-RAY EXAM CHEST 1 VIEW: CPT

## 2025-03-10 PROCEDURE — 36415 COLL VENOUS BLD VENIPUNCTURE: CPT

## 2025-03-10 PROCEDURE — 6360000004 HC RX CONTRAST MEDICATION: Performed by: EMERGENCY MEDICINE

## 2025-03-10 PROCEDURE — 71275 CT ANGIOGRAPHY CHEST: CPT

## 2025-03-10 PROCEDURE — 93010 ELECTROCARDIOGRAM REPORT: CPT | Performed by: INTERNAL MEDICINE

## 2025-03-10 PROCEDURE — 99285 EMERGENCY DEPT VISIT HI MDM: CPT

## 2025-03-10 PROCEDURE — 85379 FIBRIN DEGRADATION QUANT: CPT

## 2025-03-10 PROCEDURE — 83605 ASSAY OF LACTIC ACID: CPT

## 2025-03-10 PROCEDURE — 85025 COMPLETE CBC W/AUTO DIFF WBC: CPT

## 2025-03-10 RX ORDER — IOPAMIDOL 755 MG/ML
75 INJECTION, SOLUTION INTRAVASCULAR
Status: COMPLETED | OUTPATIENT
Start: 2025-03-10 | End: 2025-03-10

## 2025-03-10 RX ORDER — DEXTROMETHORPHAN HYDROBROMIDE, BUPROPION HYDROCHLORIDE 105; 45 MG/1; MG/1
TABLET, MULTILAYER, EXTENDED RELEASE ORAL
Qty: 60 TABLET | Refills: 0 | Status: SHIPPED | OUTPATIENT
Start: 2025-03-10

## 2025-03-10 RX ORDER — CEFDINIR 300 MG/1
300 CAPSULE ORAL 2 TIMES DAILY
Qty: 20 CAPSULE | Refills: 0 | Status: SHIPPED | OUTPATIENT
Start: 2025-03-10 | End: 2025-03-20

## 2025-03-10 RX ADMIN — IOPAMIDOL 75 ML: 755 INJECTION, SOLUTION INTRAVENOUS at 12:13

## 2025-03-10 NOTE — ED PROVIDER NOTES
[facility] EMERGENCY DEPARTMENT  eMERGENCY dEPARTMENT eNCOUnter      Pt Name: Mago Terrell  MRN: [id number]  Birthdate 1975  Date of evaluation: 3/10/2025  Provider: Akshat Monteiro MD    CHIEF COMPLAINT       Chief Complaint   Patient presents with    Respiratory Distress     SOB and labored breathing onset yesterday, breathing treatments not helping    Chest Pain         HISTORY OF PRESENT ILLNESS   (Location/Symptom, Timing/Onset,Context/Setting, Quality, Duration, Modifying Factors, Severity)  Note limiting factors.   Mago Terrell is a 49 y.o. female who presents to the emergency department for evaluation regarding shortness of breath.  Patient states that she has had an increased cough and congestion symptoms over the past 2 to 3 days.  Notes that yesterday she felt like her breathing got quite a bit worse.  She is using home nebulizer treatments however she does not really feel like these are helping.  She is not maintained on supplemental therapy.  Also notes that she has had some increased swelling in her lower extremities bilaterally.  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    HPI    NursingNotes were reviewed.    REVIEW OF SYSTEMS    (2-9 systems for level 4, 10 or more for level 5)     Review of Systems   Constitutional:  Negative for chills and fever.   HENT:  Positive for congestion.    Respiratory:  Positive for cough and shortness of breath. Negative for chest tightness and wheezing.    Cardiovascular:  Negative for chest pain and palpitations.   Gastrointestinal:  Negative for diarrhea, nausea and vomiting.   Neurological:  Positive for syncope. Negative for dizziness.   All other systems reviewed and are negative.           PAST MEDICALHISTORY     Past Medical History:   Diagnosis Date    Anxiety     Chronic back pain     Chronic kidney disease     CKD (chronic kidney disease), stage III (HCC)     COVID     Depression     Diabetes ([facility])

## 2025-03-10 NOTE — DISCHARGE INSTRUCTIONS
Increase your Lasix to 40 mg twice per day for the next 5 days to aid in diuresis and reduce lower extremity swelling.  Follow-up with your primary care doctor for repeat laboratory studies.  Return to the emergency department for any acute worsening of symptoms.

## 2025-03-10 NOTE — TELEPHONE ENCOUNTER
I have attempted to contact this patient by phone with the following results: no answer.  sg box full

## 2025-03-10 NOTE — TELEPHONE ENCOUNTER
Pharmacy requests a refill on Mago Terrell's medication.    Last Office Visit: 12/2/2024  Next Office Visit: Visit date not found     Requested Prescriptions     Pending Prescriptions Disp Refills    AUVELITY  MG TBCR [Pharmacy Med Name: AUVELITY 45-105MG   TAB] 60 tablet 0     Sig: TAKE 1 TABLET BY MOUTH IN THE MORNING AND 1 AT BEDTIME       Nani Ndiaye LPN

## 2025-03-12 NOTE — TELEPHONE ENCOUNTER
I have attempted without success to contact this patient by phone to discuss lab results.  JULIANA full.

## 2025-03-13 ASSESSMENT — ENCOUNTER SYMPTOMS
DIARRHEA: 0
WHEEZING: 0
NAUSEA: 0
CHEST TIGHTNESS: 0
VOMITING: 0
SHORTNESS OF BREATH: 1
COUGH: 1

## 2025-03-14 ENCOUNTER — TELEMEDICINE (OUTPATIENT)
Age: 50
End: 2025-03-14
Payer: COMMERCIAL

## 2025-03-14 DIAGNOSIS — G35 MS (MULTIPLE SCLEROSIS) (HCC): ICD-10-CM

## 2025-03-14 DIAGNOSIS — E11.69 TYPE 2 DIABETES MELLITUS WITH OTHER SPECIFIED COMPLICATION, WITHOUT LONG-TERM CURRENT USE OF INSULIN (HCC): ICD-10-CM

## 2025-03-14 DIAGNOSIS — J18.9 PNEUMONIA OF BOTH LOWER LOBES DUE TO INFECTIOUS ORGANISM: Primary | ICD-10-CM

## 2025-03-14 DIAGNOSIS — J90 PLEURAL EFFUSION: ICD-10-CM

## 2025-03-14 DIAGNOSIS — J40 BRONCHITIS: ICD-10-CM

## 2025-03-14 PROCEDURE — 99214 OFFICE O/P EST MOD 30 MIN: CPT | Performed by: NURSE PRACTITIONER

## 2025-03-14 PROCEDURE — 3044F HG A1C LEVEL LT 7.0%: CPT | Performed by: NURSE PRACTITIONER

## 2025-03-14 RX ORDER — BUDESONIDE AND FORMOTEROL FUMARATE DIHYDRATE 160; 4.5 UG/1; UG/1
2 AEROSOL RESPIRATORY (INHALATION) 2 TIMES DAILY
Qty: 10.2 G | Refills: 3 | Status: SHIPPED | OUTPATIENT
Start: 2025-03-14

## 2025-03-14 RX ORDER — PREDNISONE 10 MG/1
TABLET ORAL
Qty: 42 TABLET | Refills: 0 | Status: SHIPPED | OUTPATIENT
Start: 2025-03-14

## 2025-03-14 ASSESSMENT — ENCOUNTER SYMPTOMS
EYE REDNESS: 0
EYE DISCHARGE: 0
DIARRHEA: 0
ABDOMINAL PAIN: 0
COUGH: 1
BLOOD IN STOOL: 0
WHEEZING: 0
SORE THROAT: 0
CONSTIPATION: 0
TROUBLE SWALLOWING: 1
RHINORRHEA: 0

## 2025-03-14 NOTE — ASSESSMENT & PLAN NOTE
Orders:    predniSONE (DELTASONE) 10 MG tablet; Days 1-2 (6 tablets), 3-4 (5 tabs), 5-6 (4 tabs), 7-8 (3 tabs), 9-10 (2 tabs) and 11-12 (1 tab)

## 2025-03-14 NOTE — PROGRESS NOTES
Mago Terrell, was evaluated through a synchronous (real-time) audio-video encounter. The patient (or guardian if applicable) is aware that this is a billable service, which includes applicable co-pays. This Virtual Visit was conducted with patient's (and/or legal guardian's) consent. Patient identification was verified, and a caregiver was present when appropriate.   The patient was located at Home: [address] [place] [address]  [place] [address]  Provider was located at Facility (Appt Dept): [address] [place] [address]  [address]  [place] [address]  Confirm you are appropriately licensed, registered, or certified to deliver care in the state where the patient is located as indicated above. If you are not or unsure, please re-schedule the visit: Yes, I confirm.     Mago Terrell (:  1975) is a Established patient, presenting virtually for evaluation of the following:      Below is the assessment and plan developed based on review of pertinent history, physical exam, labs, studies, and medications.     Assessment & Plan  MS (multiple sclerosis) ([facility])       Orders:    predniSONE (DELTASONE) 10 MG tablet; Days 1-2 (6 tablets), 3-4 (5 tabs), 5-6 (4 tabs), 7-8 (3 tabs), 9-10 (2 tabs) and 11-12 (1 tab)    Bronchitis       Orders:    budesonide-formoterol (SYMBICORT) 160-4.5 MCG/ACT AERO; Inhale 2 puffs into the lungs 2 times daily Rinse mouth after use    Pneumonia of both lower lobes due to infectious organism            Pleural effusion       Orders:    Basic Metabolic Panel; Future    Brain Natriuretic Peptide; Future    Type 2 diabetes mellitus with other specified complication, without long-term current use of insulin ([facility])     Will need to monitor glucose closely and use basaglar while on prednisone like we have in the past.          No follow-ups on file.       Subjective   HPI  ER follow up for pneumonia and pleural effusion. She is still on omnicef. She doubled her lasix for 5 days. Swelling is better weight is down

## 2025-03-14 NOTE — ASSESSMENT & PLAN NOTE
Will need to monitor glucose closely and use basaglar while on prednisone like we have in the past.

## 2025-03-15 LAB
BACTERIA BLD CULT ORG #2: NORMAL
BACTERIA BLD CULT: NORMAL

## 2025-03-19 DIAGNOSIS — F11.90 CHRONIC, CONTINUOUS USE OF OPIOIDS: ICD-10-CM

## 2025-03-19 DIAGNOSIS — G89.4 CHRONIC PAIN SYNDROME: ICD-10-CM

## 2025-03-19 DIAGNOSIS — Q79.63 VASCULAR EHLERS-DANLOS SYNDROME: ICD-10-CM

## 2025-03-19 RX ORDER — BUPRENORPHINE 5 UG/H
1 PATCH TRANSDERMAL WEEKLY
Qty: 4 PATCH | Refills: 0 | Status: SHIPPED | OUTPATIENT
Start: 2025-03-19 | End: 2025-04-18

## 2025-03-25 NOTE — TELEPHONE ENCOUNTER
I will send #30, he needs to have his labs, contact him to get that done and then follow up.    Received fax from pharmacy requesting refill on pts medication(s). Pt was last seen in office on 3/14/2025  and has a follow up scheduled for Visit date not found. Will send request to  Laila Narvaez  for authorization.     Requested Prescriptions     Pending Prescriptions Disp Refills    estrogens, conjugated, (PREMARIN) 0.3 MG tablet 21 tablet 3     Sig: Take 1 tablet by mouth daily

## 2025-04-03 ENCOUNTER — TELEMEDICINE (OUTPATIENT)
Age: 50
End: 2025-04-03
Payer: COMMERCIAL

## 2025-04-03 DIAGNOSIS — K20.90 ESOPHAGITIS: ICD-10-CM

## 2025-04-03 DIAGNOSIS — E55.9 VITAMIN D DEFICIENCY: ICD-10-CM

## 2025-04-03 DIAGNOSIS — F33.1 MODERATE EPISODE OF RECURRENT MAJOR DEPRESSIVE DISORDER (HCC): ICD-10-CM

## 2025-04-03 DIAGNOSIS — G35 MS (MULTIPLE SCLEROSIS) (HCC): ICD-10-CM

## 2025-04-03 DIAGNOSIS — E78.1 HYPERTRIGLYCERIDEMIA: ICD-10-CM

## 2025-04-03 DIAGNOSIS — E11.69 TYPE 2 DIABETES MELLITUS WITH OTHER SPECIFIED COMPLICATION, WITHOUT LONG-TERM CURRENT USE OF INSULIN: Primary | ICD-10-CM

## 2025-04-03 PROCEDURE — 99214 OFFICE O/P EST MOD 30 MIN: CPT | Performed by: NURSE PRACTITIONER

## 2025-04-03 PROCEDURE — 3044F HG A1C LEVEL LT 7.0%: CPT | Performed by: NURSE PRACTITIONER

## 2025-04-03 RX ORDER — PANTOPRAZOLE SODIUM 40 MG/1
40 TABLET, DELAYED RELEASE ORAL DAILY
Qty: 90 TABLET | Refills: 3 | Status: SHIPPED | OUTPATIENT
Start: 2025-04-03

## 2025-04-03 RX ORDER — DEXTROMETHORPHAN HYDROBROMIDE, BUPROPION HYDROCHLORIDE 105; 45 MG/1; MG/1
TABLET, MULTILAYER, EXTENDED RELEASE ORAL
Qty: 60 TABLET | Refills: 5 | Status: SHIPPED | OUTPATIENT
Start: 2025-04-03

## 2025-04-03 RX ORDER — ARMODAFINIL 250 MG/1
1 TABLET ORAL DAILY
Qty: 30 TABLET | Refills: 2 | Status: SHIPPED | OUTPATIENT
Start: 2025-04-03 | End: 2025-07-02

## 2025-04-03 RX ORDER — FUROSEMIDE 40 MG/1
40 TABLET ORAL DAILY
COMMUNITY

## 2025-04-03 RX ORDER — LEVOCETIRIZINE DIHYDROCHLORIDE 5 MG/1
5 TABLET, FILM COATED ORAL NIGHTLY
Qty: 90 TABLET | Refills: 2 | Status: SHIPPED | OUTPATIENT
Start: 2025-04-03

## 2025-04-03 ASSESSMENT — ENCOUNTER SYMPTOMS
TROUBLE SWALLOWING: 0
SORE THROAT: 0
WHEEZING: 0
COUGH: 0
EYE DISCHARGE: 0
EYE REDNESS: 0
DIARRHEA: 0
CONSTIPATION: 0
ABDOMINAL PAIN: 0
BLOOD IN STOOL: 0
RHINORRHEA: 0

## 2025-04-03 NOTE — PROGRESS NOTES
Mago Terrell was evaluated through a synchronous (real-time) audio-video encounter. The patient (or guardian if applicable) is aware that this is a billable service, which includes applicable co-pays. This Virtual Visit was conducted with patient's (and/or legal guardian's) consent. Patient identification was verified, and a caregiver was present when appropriate.   The patient was located at Home: [address] [place] [address] [place] [address]  Provider was located at Facility (Appt Dept): [address] [place] [address] [address]  [place] [address]  Confirm you are appropriately licensed, registered, or certified to deliver care in the state where the patient is located as indicated above. If you are not or unsure, please re-schedule the visit: Yes, I confirm.      Mago Terrell (:  1975) is a 49 y.o. female, Established patient, here for evaluation of the following chief complaint(s):  No chief complaint on file.         Assessment & Plan  1. Health Maintenance: Weight is down to 146 pounds.  - Monitor blood sugar levels over the weekend without Mounjaro.  - Follow up in 3 months for lab work.    2. Multiple Sclerosis (MS): Reports worsening symptoms, including leg shaking and speech difficulties.  - Continue amantadine 100 mg 3 times a day.  - Continue ropinirole 0.25 mg nightly.  - Use armodafinil as needed for appointments or when necessary to stay awake. Provide refill for armodafinil.    3. Hernia: Surgeon advised that current condition does not warrant surgery.  - Avoid constipation.  - Continue taking ClearLax.    4. Falls: Reports multiple [place] including at the airport and in [place] bathroom, resulting in bruises and a head injury.  - Use a wheelchair when necessary.  - Avoid situations where falling is likely.    5. Nausea: Using ondansetron dissolvable tablets for nausea and vomiting.  - Continue ondansetron as needed.    6. Diabetes Mellitus: Monitor blood sugar levels over the weekend without Mounjaro.  - If

## 2025-04-07 ENCOUNTER — TELEPHONE (OUTPATIENT)
Age: 50
End: 2025-04-07

## 2025-04-07 NOTE — TELEPHONE ENCOUNTER
Pts xyzal was denied here are alternatives.    Drug  Levocetirizine Dihydrochloride 5MG tablets    Form  [employer] Electronic Prior Authorization Form (2017 NCPDP)  Original Claim Info  70 *[id number]* Preferred products are:   [id number] - AZELASTINE SPR 0.1%,   [id number] - OLOPATADINE SPR 0.6%,   [id number] - FLUNISOLIDE SPR 0.025%,   [id number] - FLUTICASONE SPR 50MCG,

## 2025-04-09 ENCOUNTER — PATIENT MESSAGE (OUTPATIENT)
Age: 50
End: 2025-04-09

## 2025-04-09 DIAGNOSIS — G35 MS (MULTIPLE SCLEROSIS) (HCC): Primary | ICD-10-CM

## 2025-04-09 DIAGNOSIS — E11.69 TYPE 2 DIABETES MELLITUS WITH OTHER SPECIFIED COMPLICATION, WITHOUT LONG-TERM CURRENT USE OF INSULIN: ICD-10-CM

## 2025-04-09 NOTE — TELEPHONE ENCOUNTER
Received fax from pharmacy requesting refill on pts medication(s). Pt was last seen in office on 12/2/2024  and has a follow up scheduled for Visit date not found. Will send request to  Laila Narvaez  for authorization.     Requested Prescriptions     Pending Prescriptions Disp Refills    Continuous Glucose Transmitter (DEXCOM G6 TRANSMITTER) MISC [Pharmacy Med Name: DEXCOM G6 TRANSMIT  MIS] 1 each 0     Sig: USE AS DIRECTED EVERY  90  DAYS.

## 2025-04-10 DIAGNOSIS — E11.69 TYPE 2 DIABETES MELLITUS WITH OTHER SPECIFIED COMPLICATION, WITH LONG-TERM CURRENT USE OF INSULIN: ICD-10-CM

## 2025-04-10 DIAGNOSIS — Z79.4 TYPE 2 DIABETES MELLITUS WITH OTHER SPECIFIED COMPLICATION, WITH LONG-TERM CURRENT USE OF INSULIN: ICD-10-CM

## 2025-04-10 RX ORDER — ACYCLOVIR 400 MG/1
TABLET ORAL
Qty: 3 EACH | Refills: 11 | Status: SHIPPED | OUTPATIENT
Start: 2025-04-10

## 2025-04-10 RX ORDER — PROCHLORPERAZINE 25 MG/1
SUPPOSITORY RECTAL
Qty: 1 EACH | Refills: 0 | Status: SHIPPED | OUTPATIENT
Start: 2025-04-10

## 2025-04-10 NOTE — TELEPHONE ENCOUNTER
ALISSON      4/10/25  9:28 AM  Good morning. Would you have Laila send in a prescription for G7 sensors ?     We were only doing G6 because we were out of stock.       Received call/My Chart Message from spouse requesting refill on medication(s). Pt was last seen in office on 4/3/2025  and has a follow up scheduled for Visit date not found. Will send request to provider for authorization.     Requested Prescriptions     Pending Prescriptions Disp Refills    Continuous Glucose Sensor (DEXCOM G7 SENSOR) MISC 3 each 11     Sig: Use to monitor blood sugar, change every 10 days

## 2025-04-15 ENCOUNTER — OFFICE VISIT (OUTPATIENT)
Dept: PAIN MANAGEMENT | Age: 50
End: 2025-04-15
Payer: COMMERCIAL

## 2025-04-15 VITALS
WEIGHT: 145.2 LBS | RESPIRATION RATE: 16 BRPM | DIASTOLIC BLOOD PRESSURE: 55 MMHG | SYSTOLIC BLOOD PRESSURE: 120 MMHG | OXYGEN SATURATION: 100 % | BODY MASS INDEX: 24.79 KG/M2 | TEMPERATURE: 97.3 F | HEART RATE: 83 BPM | HEIGHT: 64 IN

## 2025-04-15 DIAGNOSIS — G89.29 CHRONIC BILATERAL LOW BACK PAIN WITHOUT SCIATICA: ICD-10-CM

## 2025-04-15 DIAGNOSIS — G89.29 CHRONIC NECK PAIN: Primary | ICD-10-CM

## 2025-04-15 DIAGNOSIS — M54.50 CHRONIC BILATERAL LOW BACK PAIN WITHOUT SCIATICA: ICD-10-CM

## 2025-04-15 DIAGNOSIS — R53.81 PHYSICAL DECONDITIONING: ICD-10-CM

## 2025-04-15 DIAGNOSIS — M54.2 CHRONIC NECK PAIN: Primary | ICD-10-CM

## 2025-04-15 PROCEDURE — 99213 OFFICE O/P EST LOW 20 MIN: CPT | Performed by: STUDENT IN AN ORGANIZED HEALTH CARE EDUCATION/TRAINING PROGRAM

## 2025-04-15 NOTE — PROGRESS NOTES
Primary Physician: Laila Narvaez APRN    CHIEF COMPLAINT:diffuse pain    Progress 4/15/25  Patient presents today. She recently saw her Neurologist who mentioned that her tremors had gotten worse. Patient reports that she has since stopped all opioids including her 50mcg/hr patch. She reports that she is doing well. She has had multiple falls. She is going to see a movement specialist. Her tremors have not gotten better since coming off of the butrans patch. She denies withdrawals. She denies diarrhea, N/V or irritability. Says the pain has gotten worse but she is adamant that she does not want to be on medications for pain.    She has \"bone pain\" in her neck, low back and hips. She has diffuse pains. Pain is currently 5/10. Patient would like a referral to physical therapy.     HISTORY OF PRESENT ILLNESS:  Ms. Mago Terrell is 49 y.o. female with hx of anxiety, and depression, type 2 DM, aortic valve insufficiency, mitral insufficiency, HTN,  hx of multiple splenic artery aneurysms,  hx of spontaneous colonic rupture, fibromyalgia, and vascular Ehler's Danlos. 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 She follows with Vascular Surgery regularly. We have been managing her pain with chronic opioid medications including fentanyl patches.       Pain History:      Location: diffuse pain      Quality: \"bone pain\", aching      Severity:5 /10      Onset: > 1 year      Duration: constant      Relieving Factors: analgesics      Exacerbating Factors: walking, sitting, standing sometimes make it worse    Current Pain/Psych/Sleep Medications:  - Fentanyl patches 50mcg/hr (not taking)  -Baclofen 10mg TID (takes one at night)  -Ibuprofen takes prn  -Gabapentin 600/600 (not taking)  -Venlafaxine 150mg q daily  -[place] - stopped    Prior PharmacotherapyName, dose, response  Acetaminophen  NSAIDS/steroids  Muscle

## 2025-04-17 RX ORDER — ROPINIROLE 0.25 MG/1
0.25 TABLET, FILM COATED ORAL 3 TIMES DAILY
Qty: 90 TABLET | Refills: 11 | Status: SHIPPED | OUTPATIENT
Start: 2025-04-17

## 2025-04-17 NOTE — TELEPHONE ENCOUNTER
Received fax from pharmacy requesting refill on pts medication(s). Pt was last seen in office on 4/3/2025  and has a follow up scheduled for Visit date not found. Will send request to  Laila Narvaez  for authorization.     Requested Prescriptions     Pending Prescriptions Disp Refills    rOPINIRole (REQUIP) 0.25 MG tablet 30 tablet 11     Sig: Take 1 tablet by mouth nightly

## 2025-05-06 DIAGNOSIS — N95.1 HOT FLASHES DUE TO MENOPAUSE: ICD-10-CM

## 2025-05-06 NOTE — TELEPHONE ENCOUNTER
Received fax from pharmacy requesting refill on pts medication(s). Pt was last seen in office on 4/3/2025  and has a follow up scheduled for Visit date not found. Will send request to  Laila Narvaez  for authorization.     Requested Prescriptions     Pending Prescriptions Disp Refills    estrogens, conjugated, (PREMARIN) 0.3 MG tablet 30 tablet 5     Sig: Take 1 tablet by mouth daily

## 2025-05-20 ENCOUNTER — TELEPHONE (OUTPATIENT)
Dept: PAIN MANAGEMENT | Age: 50
End: 2025-05-20

## 2025-05-22 RX ORDER — ROPINIROLE 0.25 MG/1
0.25 TABLET, FILM COATED ORAL NIGHTLY
Qty: 90 TABLET | Refills: 0 | Status: SHIPPED | OUTPATIENT
Start: 2025-05-22

## 2025-05-22 NOTE — TELEPHONE ENCOUNTER
Received fax from pharmacy requesting refill on pts medication(s). Pt was last seen in office on 4/3/2025  and has a follow up scheduled for Visit date not found. Will send request to  Laila Narvaez  for patient.     Requested Prescriptions     Pending Prescriptions Disp Refills    rOPINIRole (REQUIP) 0.25 MG tablet [Pharmacy Med Name: rOPINIRole HCl 0.25 MG Oral Tablet] 90 tablet 0     Sig: Take 1 tablet by mouth nightly

## 2025-05-27 ENCOUNTER — TELEPHONE (OUTPATIENT)
Dept: PALLATIVE CARE | Age: 50
End: 2025-05-27

## 2025-05-27 NOTE — TELEPHONE ENCOUNTER
Call placed to disccuss follow up visit with [facility] team. No answer at this time. Voicemail left with contact information.

## 2025-06-12 ENCOUNTER — OFFICE VISIT (OUTPATIENT)
Age: 50
End: 2025-06-12
Payer: COMMERCIAL

## 2025-06-12 VITALS
HEIGHT: 64 IN | OXYGEN SATURATION: 100 % | TEMPERATURE: 98.8 F | SYSTOLIC BLOOD PRESSURE: 112 MMHG | HEART RATE: 81 BPM | WEIGHT: 144 LBS | DIASTOLIC BLOOD PRESSURE: 70 MMHG | BODY MASS INDEX: 24.59 KG/M2
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
## 2025-06-12 DIAGNOSIS — F33.1 MODERATE EPISODE OF RECURRENT MAJOR DEPRESSIVE DISORDER (HCC): ICD-10-CM

## 2025-06-12 DIAGNOSIS — Z79.4 TYPE 2 DIABETES MELLITUS WITH OTHER SPECIFIED COMPLICATION, WITH LONG-TERM CURRENT USE OF INSULIN (HCC): Primary | ICD-10-CM

## 2025-06-12 DIAGNOSIS — Q79.60 EHLERS-DANLOS SYNDROME: ICD-10-CM

## 2025-06-12 DIAGNOSIS — S40.261A TICK BITE OF RIGHT SHOULDER, INITIAL ENCOUNTER: ICD-10-CM

## 2025-06-12 DIAGNOSIS — E11.69 TYPE 2 DIABETES MELLITUS WITH OTHER SPECIFIED COMPLICATION, WITH LONG-TERM CURRENT USE OF INSULIN (HCC): Primary | ICD-10-CM

## 2025-06-12 PROCEDURE — 99214 OFFICE O/P EST MOD 30 MIN: CPT | Performed by: NURSE PRACTITIONER

## 2025-06-12 PROCEDURE — 3044F HG A1C LEVEL LT 7.0%: CPT | Performed by: NURSE PRACTITIONER

## 2025-06-12 RX ORDER — AZITHROMYCIN 250 MG/1
TABLET, FILM COATED ORAL
Qty: 6 TABLET | Refills: 0 | Status: SHIPPED | OUTPATIENT
Start: 2025-06-12 | End: 2025-06-22

## 2025-06-12 NOTE — PROGRESS NOTES
headaches.   Hematological:  Negative for adenopathy.          Objective   Blood pressure 112/70, pulse 81, temperature 98.8 °F (37.1 °C), temperature source Temporal, height 1.626 m (5' 4\"), weight 65.3 kg (144 lb), last menstrual period 01/01/2020, SpO2 100%, not currently breastfeeding.  Physical Exam       Physical Exam  Vitals reviewed.   Constitutional:       Appearance: She is well-developed.   HENT:      Head: Normocephalic.   Eyes:      Conjunctiva/sclera: Conjunctivae normal.   Cardiovascular:      Rate and Rhythm: Normal rate and regular rhythm.      Heart sounds: Normal heart sounds.   Pulmonary:      Effort: Pulmonary effort is normal.      Breath sounds: Normal breath sounds.   Abdominal:      General: Bowel sounds are normal.      Palpations: Abdomen is soft.      Tenderness: There is no abdominal tenderness.   Musculoskeletal:      Cervical back: Normal range of motion and neck supple.      Comments: Generalized pain   Skin:     General: Skin is warm and dry.   Neurological:      Mental Status: She is alert and oriented to person, place, and time.      Motor: Tremor present.   Psychiatric:         Behavior: Behavior normal.             The patient (or guardian, if applicable) and other individuals in attendance with the patient were advised that Artificial Intelligence will be utilized during this visit to record, process the conversation to generate a clinical note and to support improvement of the AI technology. The patient (or guardian, if applicable) and other individuals in attendance at the appointment consented to the use of AI, including the recording.      An electronic signature was used to authenticate this note.    --CLINTON Valentine

## 2025-06-13 ASSESSMENT — ENCOUNTER SYMPTOMS
BLOOD IN STOOL: 0
RHINORRHEA: 0
ABDOMINAL PAIN: 0
COUGH: 0
DIARRHEA: 0
TROUBLE SWALLOWING: 0
SORE THROAT: 0
CONSTIPATION: 0
EYE DISCHARGE: 0
WHEEZING: 0
EYE REDNESS: 0

## 2025-06-16 RX ORDER — VENLAFAXINE HYDROCHLORIDE 150 MG/1
150 CAPSULE, EXTENDED RELEASE ORAL DAILY
Qty: 90 CAPSULE | Refills: 0 | Status: SHIPPED | OUTPATIENT
Start: 2025-06-16

## 2025-06-16 NOTE — TELEPHONE ENCOUNTER
Pharmacy requests a refill on Mago Terrell's medication.    Last Office Visit: 6/12/2025  Next Office Visit: Visit date not found     Requested Prescriptions     Pending Prescriptions Disp Refills    venlafaxine (EFFEXOR XR) 150 MG extended release capsule [Pharmacy Med Name: Venlafaxine HCl  MG Oral Capsule Extended Release 24 Hour] 90 capsule 0     Sig: Take 1 capsule by mouth once daily       Nani Ndiaye LPN

## 2025-07-01 ENCOUNTER — OFFICE VISIT (OUTPATIENT)
Dept: PALLATIVE CARE | Age: 50
End: 2025-07-01
Payer: COMMERCIAL

## 2025-07-01 DIAGNOSIS — G25.0 BENIGN ESSENTIAL TREMOR: ICD-10-CM

## 2025-07-01 DIAGNOSIS — G89.4 CHRONIC PAIN SYNDROME: ICD-10-CM

## 2025-07-01 DIAGNOSIS — R47.81 SLURRED SPEECH: ICD-10-CM

## 2025-07-01 DIAGNOSIS — Z51.5 ENCOUNTER FOR PALLIATIVE CARE: ICD-10-CM

## 2025-07-01 DIAGNOSIS — R13.10 DYSPHAGIA, UNSPECIFIED TYPE: Primary | ICD-10-CM

## 2025-07-01 DIAGNOSIS — E11.649 HYPOGLYCEMIA ASSOCIATED WITH DIABETES (HCC): ICD-10-CM

## 2025-07-01 PROCEDURE — 3044F HG A1C LEVEL LT 7.0%: CPT | Performed by: NURSE PRACTITIONER

## 2025-07-01 PROCEDURE — 99215 OFFICE O/P EST HI 40 MIN: CPT | Performed by: NURSE PRACTITIONER

## 2025-07-01 NOTE — PROGRESS NOTES
Supportive Care/Community Based Palliative Care  Follow Up Note        Patient Name:  Mago Terrell  Medical Record Number:  [id number]  YOB: 1975    Date of Visit: 7/1/2025  Location of Visit: [facility]     Patient Care Team:  Laila Narvaez APRN as PCP - General (Family Nurse Practitioner)  Laila Narvaez APRN as PCP - Empaneled Provider  Nadia Campoverde APRN - CNP as Advanced Practice Nurse (Nurse Practitioner)  Audi Gaffney MD    History obtained from:  patient, spouse, electronic medical record    PALLIATIVE DIAGNOSES AND ORDERS/RECOMMENDATIONS/PLAN:   1. Dysphagia, unspecified type  Patient has been referred to movement specialist as well  - [facility]    2. Slurred speech  Episodes are intermittent and not indicative of strokelike symptoms  - [facility]    3. Hypoglycemia associated with diabetes (HCC)  Recommended food diary as well as continuous glucose monitoring.  Patient has been utilizing her Dexcom.  She does feel that hypoglycemic episodes are related to certain intake.  I would recommend that she keep a log as well    4. Benign essential tremor  Patient has been referred to movement specialist, other specialist indicates symptoms are not indicative of multiple sclerosis however she is receiving IVIG treatments    5. Chronic pain syndrome  No longer receiving opioid pain control, was not discharged by pain management and may follow-up if needed.    6. Encounter for palliative care  Current goals of care include: Continue treatment with palliative intent, Preserve independence/control/autonomy, Maintain or improve functional status, Maintain or improve quality of life, Focus on comfort and quality of life, Remain at home, Would not consider facility placement, Would want hospitalization if needed, Live longer    Code status confirmed: DNRCC  Will continue goals of care discussions based on clinical

## 2025-07-20 DIAGNOSIS — E11.69 TYPE 2 DIABETES MELLITUS WITH OTHER SPECIFIED COMPLICATION, WITHOUT LONG-TERM CURRENT USE OF INSULIN (HCC): ICD-10-CM

## 2025-07-21 RX ORDER — TIRZEPATIDE 10 MG/.5ML
INJECTION, SOLUTION SUBCUTANEOUS
Qty: 4 ML | Refills: 0 | Status: SHIPPED | OUTPATIENT
Start: 2025-07-21

## 2025-07-21 NOTE — TELEPHONE ENCOUNTER
Received fax from pharmacy requesting refill on pts medication(s). Pt was last seen in office on 6/12/2025  and has a follow up scheduled for Visit date not found. Will send request to  Laila Narvaez  for patient.     Requested Prescriptions     Pending Prescriptions Disp Refills    MOUNJARO 10 MG/0.5ML SOAJ pen [Pharmacy Med Name: Mounjaro 10 MG/0.5ML Subcutaneous Solution Pen-injector] 4 mL 0     Sig: INJECT 0.5 ML SUBCUTANEOUSLY ONCE WEEKLY

## 2025-07-28 RX ORDER — VENLAFAXINE HYDROCHLORIDE 150 MG/1
150 CAPSULE, EXTENDED RELEASE ORAL DAILY
Qty: 90 CAPSULE | Refills: 0 | Status: SHIPPED | OUTPATIENT
Start: 2025-07-28

## 2025-07-28 NOTE — TELEPHONE ENCOUNTER
Received fax from pharmacy requesting refill on pts medication(s). Pt was last seen in office on 6/12/2025  and has a follow up scheduled for Visit date not found. Will send request to  Laila Narvaez  for authorization.     Requested Prescriptions     Pending Prescriptions Disp Refills    venlafaxine (EFFEXOR XR) 150 MG extended release capsule [Pharmacy Med Name: Venlafaxine HCl  MG Oral Capsule Extended Release 24 Hour] 90 capsule 0     Sig: Take 1 capsule by mouth once daily

## 2025-08-21 RX ORDER — ROPINIROLE 0.25 MG/1
0.25 TABLET, FILM COATED ORAL NIGHTLY
Qty: 90 TABLET | Refills: 0 | Status: SHIPPED | OUTPATIENT
Start: 2025-08-21

## 2025-08-21 RX ORDER — ONDANSETRON 4 MG/1
TABLET, ORALLY DISINTEGRATING ORAL
Qty: 30 TABLET | Refills: 0 | Status: SHIPPED | OUTPATIENT
Start: 2025-08-21

## 2025-09-04 RX ORDER — PROMETHAZINE HYDROCHLORIDE 25 MG/1
25 TABLET ORAL 4 TIMES DAILY PRN
Qty: 20 TABLET | Refills: 0 | Status: SHIPPED | OUTPATIENT
Start: 2025-09-04 | End: 2025-09-11

## (undated) DEVICE — PENCL ES MEGADINE EZ/CLEAN BUTN W/HOLSTR 10FT

## (undated) DEVICE — BNDG ELAS W/CLIP 6IN 10YD LF STRL

## (undated) DEVICE — INTENDED FOR TISSUE SEPARATION, AND OTHER PROCEDURES THAT REQUIRE A SHARP SURGICAL BLADE TO PUNCTURE OR CUT.: Brand: BARD-PARKER ® STAINLESS STEEL BLADES

## (undated) DEVICE — PRECISION THIN (9.0 X 0.38 X 25.0MM)

## (undated) DEVICE — CVR UNIV C/ARM

## (undated) DEVICE — DRILL,  2.8 X 140MM, SOLID, MEASURING, LONG, AO: Brand: BABY GORILLA®/GORILLA® PLATING SYSTEM

## (undated) DEVICE — 4-PORT MANIFOLD: Brand: NEPTUNE 2

## (undated) DEVICE — UNDERCAST PADDING: Brand: DEROYAL

## (undated) DEVICE — GLV SURG SENSICARE PI ORTHO SZ8 LF STRL

## (undated) DEVICE — BONE FENESTRATION PERFORATOR: Brand: BABY GORILLA®/GORILLA® PLATING SYSTEM

## (undated) DEVICE — BANDAGE,GAUZE,BULKEE II,4.5"X4.1YD,STRL: Brand: MEDLINE

## (undated) DEVICE — ANTIBACTERIAL UNDYED BRAIDED (POLYGLACTIN 910), SYNTHETIC ABSORBABLE SUTURE: Brand: COATED VICRYL

## (undated) DEVICE — SPNG GZ WOVN 4X4IN 12PLY 10/BX STRL

## (undated) DEVICE — SUT ETHLN 3/0 FS1 30IN 669H

## (undated) DEVICE — SCREW DRIVER ATTACHMENT, R3CON, SOLID, AO, HX-10, SHORT TAPER: Brand: BABY GORILLA/GORILLA PLATING SYSTEM

## (undated) DEVICE — DRILL, 2.6 X 130MM, CANNULATED, AO: Brand: MONSTER SCREW SYSTEM

## (undated) DEVICE — BNDG CURAD ADHS 4IN WHT

## (undated) DEVICE — COVER,MAYO STAND,STERILE: Brand: MEDLINE

## (undated) DEVICE — FRAZIER SUCTION INSTRUMENT 10 FR W/CONTROL VENT & OBTURATOR: Brand: FRAZIER

## (undated) DEVICE — SKIN PREP TRAY W/CHG: Brand: MEDLINE INDUSTRIES, INC.

## (undated) DEVICE — PK EXTRM 30

## (undated) DEVICE — DRSNG GZ CURAD XEROFORM NONADHS 5X9IN STRL

## (undated) DEVICE — DISPOSABLE TOURNIQUET CUFF SINGLE BLADDER, SINGLE PORT AND QUICK CONNECT CONNECTOR: Brand: COLOR CUFF

## (undated) DEVICE — 4.0MM EGG BUR

## (undated) DEVICE — PK TURNOVER RM ADV

## (undated) DEVICE — APPL DURAPREP IODOPHOR APL 26ML